# Patient Record
Sex: FEMALE | Race: WHITE | NOT HISPANIC OR LATINO | Employment: FULL TIME | URBAN - METROPOLITAN AREA
[De-identification: names, ages, dates, MRNs, and addresses within clinical notes are randomized per-mention and may not be internally consistent; named-entity substitution may affect disease eponyms.]

---

## 2017-01-11 DIAGNOSIS — E07.9 THYROID DISORDER: Primary | ICD-10-CM

## 2017-01-11 RX ORDER — LEVOTHYROXINE SODIUM 112 UG/1
112 TABLET ORAL DAILY
Qty: 90 TABLET | Refills: 3 | Status: SHIPPED | OUTPATIENT
Start: 2017-01-11 | End: 2017-11-15

## 2017-01-20 DIAGNOSIS — Z94.0 KIDNEY TRANSPLANTED: Primary | ICD-10-CM

## 2017-01-20 RX ORDER — MYCOPHENOLIC ACID 180 MG/1
540 TABLET, DELAYED RELEASE ORAL 2 TIMES DAILY
Qty: 540 TABLET | Refills: 3 | Status: SHIPPED
Start: 2017-01-20 | End: 2017-12-21

## 2017-04-18 ENCOUNTER — TELEPHONE (OUTPATIENT)
Dept: NEPHROLOGY | Facility: CLINIC | Age: 57
End: 2017-04-18

## 2017-04-18 DIAGNOSIS — I10 HYPERTENSION: ICD-10-CM

## 2017-04-18 RX ORDER — METOPROLOL TARTRATE 50 MG
50 TABLET ORAL 2 TIMES DAILY
Qty: 180 TABLET | Refills: 3 | Status: SHIPPED | OUTPATIENT
Start: 2017-04-18 | End: 2018-04-09

## 2017-04-18 NOTE — TELEPHONE ENCOUNTER
Medication refill per Clinic 2A protocol.  Metoprolol  Associated DX: HTN  Last OV: 8/22/16  Next appointment: 8/14/17  Jeri Ha LPN  Nephrology  [facility]  [phone]

## 2017-05-23 ENCOUNTER — DOCUMENTATION ONLY (OUTPATIENT)
Dept: LAB | Facility: CLINIC | Age: 57
End: 2017-05-23

## 2017-05-23 NOTE — PROGRESS NOTES
Standing lab orders, ordered on 2016, have .  Patient has an appointment on 2017 at [place] for these labs.  Please review pended standing orders, complete required items, and sign if approved.  Please advise patient of any changes to care plan.  Thank you.

## 2017-05-24 ENCOUNTER — TELEPHONE (OUTPATIENT)
Dept: TRANSPLANT | Facility: CLINIC | Age: 57
End: 2017-05-24

## 2017-05-24 DIAGNOSIS — Z79.899 ENCOUNTER FOR LONG-TERM CURRENT USE OF MEDICATION: ICD-10-CM

## 2017-05-24 DIAGNOSIS — Z94.0 KIDNEY REPLACED BY TRANSPLANT: ICD-10-CM

## 2017-05-24 DIAGNOSIS — Z48.298 AFTERCARE FOLLOWING ORGAN TRANSPLANT: ICD-10-CM

## 2017-05-24 DIAGNOSIS — Z94.0 KIDNEY REPLACED BY TRANSPLANT: Primary | ICD-10-CM

## 2017-05-24 LAB
ANION GAP SERPL CALCULATED.3IONS-SCNC: 5 MMOL/L (ref 3–14)
BUN SERPL-MCNC: 14 MG/DL (ref 7–30)
CALCIUM SERPL-MCNC: 9.4 MG/DL (ref 8.5–10.1)
CHLORIDE SERPL-SCNC: 105 MMOL/L (ref 94–109)
CO2 SERPL-SCNC: 27 MMOL/L (ref 20–32)
CREAT SERPL-MCNC: 0.76 MG/DL (ref 0.52–1.04)
ERYTHROCYTE [DISTWIDTH] IN BLOOD BY AUTOMATED COUNT: 12.9 % (ref 10–15)
GFR SERPL CREATININE-BSD FRML MDRD: 78 ML/MIN/1.7M2
GLUCOSE SERPL-MCNC: 100 MG/DL (ref 70–99)
HCT VFR BLD AUTO: 37.7 % (ref 35–47)
HGB BLD-MCNC: 12.4 G/DL (ref 11.7–15.7)
MCH RBC QN AUTO: 30.1 PG (ref 26.5–33)
MCHC RBC AUTO-ENTMCNC: 32.9 G/DL (ref 31.5–36.5)
MCV RBC AUTO: 92 FL (ref 78–100)
PLATELET # BLD AUTO: 157 10E9/L (ref 150–450)
POTASSIUM SERPL-SCNC: 4.2 MMOL/L (ref 3.4–5.3)
RBC # BLD AUTO: 4.12 10E12/L (ref 3.8–5.2)
SODIUM SERPL-SCNC: 137 MMOL/L (ref 133–144)
TACROLIMUS BLD-MCNC: 4.2 UG/L (ref 5–15)
TME LAST DOSE: ABNORMAL H
WBC # BLD AUTO: 4.6 10E9/L (ref 4–11)

## 2017-05-24 PROCEDURE — 36415 COLL VENOUS BLD VENIPUNCTURE: CPT | Performed by: INTERNAL MEDICINE

## 2017-05-24 PROCEDURE — 80197 ASSAY OF TACROLIMUS: CPT | Performed by: INTERNAL MEDICINE

## 2017-05-24 PROCEDURE — 80048 BASIC METABOLIC PNL TOTAL CA: CPT | Performed by: INTERNAL MEDICINE

## 2017-05-24 PROCEDURE — 85027 COMPLETE CBC AUTOMATED: CPT | Performed by: INTERNAL MEDICINE

## 2017-08-08 ENCOUNTER — ALLIED HEALTH/NURSE VISIT (OUTPATIENT)
Dept: RESEARCH | Facility: CLINIC | Age: 57
End: 2017-08-08

## 2017-08-08 DIAGNOSIS — Z00.6 EXAMINATION OF PARTICIPANT IN CLINICAL TRIAL: Primary | ICD-10-CM

## 2017-08-08 NOTE — MR AVS SNAPSHOT
After Visit Summary   8/8/2017    Bessy Spangler    MRN: [id number]           Patient Information     Date Of Birth          1960        Visit Information        Provider Department      8/8/2017 12:39 PM Specialty, Provider [facility] [place],  Clinical Research        Today's Diagnoses     Examination of participant in clinical trial    -  1       Follow-ups after your visit        Your next 10 appointments already scheduled     Aug 14, 2017  2:45 PM CDT   (Arrive by 2:15 PM)   Return Kidney Transplant with Chris Marquez MD   [facility] ([facility])    [address] [place] [address] [phone]              Who to contact     If you have questions or need follow up information about today's clinic visit or your schedule please contact [facility] directly at [phone].  Normal or non-critical lab and imaging results will be communicated to you by MyChart, letter or phone within 4 business days after the clinic has received the results. If you do not hear from us within 7 days, please contact the clinic through Heyday[employer] or phone. If you have a critical or abnormal lab result, we will notify you by phone as soon as possible.  Submit refill requests through [employer] or call your pharmacy and they will forward the refill request to us. Please allow 3 business days for your refill to be completed.          Additional Information About Your Visit        MyChart Information     [employer] gives you secure access to your electronic health record. If you see a primary care provider, you can also send messages to your care team and make appointments. If you have questions, please call your primary care clinic.  If you do not have a primary care provider, please call [phone] and they will assist you.        Care EveryWhere ID     This is your Care EveryWhere ID. This could be used by other organizations to  access your [facility] medical records  [id number]        Your Vitals Were     Last Period                   01/25/2003            Blood Pressure from Last 3 Encounters:   11/14/16 124/75   08/22/16 126/74   11/02/15 119/67    Weight from Last 3 Encounters:   11/14/16 98 kg (216 lb)   08/22/16 96.3 kg (212 lb 3.2 oz)   11/02/15 95 kg (209 lb 8 oz)              Today, you had the following     No orders found for display       Primary Care Provider Office Phone # Fax #    Mike Tan [phone]       [address] [address]        Equal Access to Services     KARL DOAN : Wilber White, walive love, qateresota kaalmada ellen, sandra hawley. So [facility] [phone].    ATENCIÓN: Si habla español, tiene a garcia disposición servicios gratuitos de asistencia lingüística. Llame al [phone].    We comply with applicable federal civil rights laws and [place] laws. We do not discriminate on the basis of race, color, national origin, age, disability sex, sexual orientation or gender identity.            Thank you!     Thank you for choosing [facility]  for your care. Our goal is always to provide you with excellent care. Hearing back from our patients is one way we can continue to improve our services. Please take a few minutes to complete the written survey that you may receive in the mail after your visit with us. Thank you!             Your Updated Medication List - Protect others around you: Learn how to safely use, store and throw away your medicines at www.disposemymeds.org.          This list is accurate as of: 8/8/17 12:40 PM.  Always use your most recent med list.                   Brand Name Dispense Instructions for use Diagnosis    levothyroxine 112 MCG tablet    SYNTHROID/LEVOTHROID    90 tablet    Take 1 tablet (112 mcg) by mouth daily    Thyroid disorder       metoprolol 50 MG  tablet    LOPRESSOR    180 tablet    Take 1 tablet (50 mg) by mouth 2 times daily    Hypertension       * mycophenolic acid EC tablet     180 tablet    Take 3 tablets (540 mg) by mouth 2 times daily    Kidney replaced by transplant       * mycophenolic acid EC tablet     540 tablet    Take 3 tablets (540 mg) by mouth 2 times daily    Kidney transplanted       * tacrolimus 1 MG capsule    PROGRAF - GENERIC EQUIVALENT    540 capsule    Take 3 capsules (3 mg) by mouth 2 times daily    Kidney transplanted       * tacrolimus 1 MG capsule    PROGRAF - GENERIC EQUIVALENT    540 capsule    Take 3 capsules (3 mg) by mouth 2 times daily    Kidney transplanted       VITAMIN E PO           * Notice:  This list has 4 medication(s) that are the same as other medications prescribed for you. Read the directions carefully, and ask your doctor or other care provider to review them with you.

## 2017-08-10 ENCOUNTER — TELEPHONE (OUTPATIENT)
Dept: NEPHROLOGY | Facility: CLINIC | Age: 57
End: 2017-08-10

## 2017-08-11 NOTE — TELEPHONE ENCOUNTER
Patient called back. States she's been doing well since last appointment. Denied symptoms, infections, or hospitalizations. Will try to have her labs drawn tomorrow morning.    Alana White RN

## 2017-08-14 ENCOUNTER — OFFICE VISIT (OUTPATIENT)
Dept: NEPHROLOGY | Facility: CLINIC | Age: 57
End: 2017-08-14
Attending: INTERNAL MEDICINE
Payer: COMMERCIAL

## 2017-08-14 ENCOUNTER — ALLIED HEALTH/NURSE VISIT (OUTPATIENT)
Dept: RESEARCH | Facility: CLINIC | Age: 57
End: 2017-08-14

## 2017-08-14 VITALS
SYSTOLIC BLOOD PRESSURE: 125 MMHG | WEIGHT: 215.6 LBS | BODY MASS INDEX: 34.65 KG/M2 | OXYGEN SATURATION: 95 % | HEART RATE: 64 BPM | DIASTOLIC BLOOD PRESSURE: 67 MMHG | HEIGHT: 66 IN

## 2017-08-14 DIAGNOSIS — Z00.6 EXAMINATION OF PARTICIPANT IN CLINICAL TRIAL: Primary | ICD-10-CM

## 2017-08-14 DIAGNOSIS — Z94.0 KIDNEY REPLACED BY TRANSPLANT: Primary | ICD-10-CM

## 2017-08-14 DIAGNOSIS — I15.1 HYPERTENSION SECONDARY TO OTHER RENAL DISORDERS: ICD-10-CM

## 2017-08-14 DIAGNOSIS — D84.9 IMMUNOSUPPRESSED STATUS (H): ICD-10-CM

## 2017-08-14 DIAGNOSIS — Z48.298 AFTERCARE FOLLOWING ORGAN TRANSPLANT: ICD-10-CM

## 2017-08-14 PROCEDURE — 99212 OFFICE O/P EST SF 10 MIN: CPT | Mod: ZF

## 2017-08-14 ASSESSMENT — PAIN SCALES - GENERAL: PAINLEVEL: NO PAIN (0)

## 2017-08-14 NOTE — PROGRESS NOTES
Surgery Clinical Trials Office Informed Consent Process Documentation  Study Name: Female Urinary Microbiome, Chronic Graft Dysfunction and Kidney Transplantation (IRB # [id number])  ICF Version Date / IRB Approval Date:  Version dt 3/30/17; IRB Approval dt: 4/7/17   Date of Approach/Consent: 8/14/2017     The subject was screened and meets all of the inclusion criteria and none of the exclusion criteria is met.    The subject was told:  -that the study involves research  -the purpose of the research study  -the expected duration of the study and the approximate number of subject sought  -of procedures that are identified as experimental  -of reasonably foreseeable risks or discomforts to the subject  -of any benefits to the subject or others that may be expected from the research  -of alternative procedures and/or treatment  -how the confidentiality of records would be maintained  -whether or not compensation and medical treatments are available should injury occur as a result of the study  -who to contact if they have questions related to the research study or questions regarding research subjects' rights  -that participation is completely voluntary and that their decision to or not to participate will have no impact on their relationships with the [facility] and the staff    No study procedures were completed prior to the consent being obtained.  The use of historical information (lab or assessments) used for the purpose of the study was approved by subject.  The subject was fully aware that we would be reviewing their medical record for the study.  The subject demonstrated an understanding of what the study involved.  Specifically, how this study differed from standard of care at our center and what was required of the subject as part of the study.  The subject reviewed the consent form and was given the opportunity to ask questions before signing.  Questions and concerns were answered by the study staff and/or study  physician.  A copy of the signed informed consent document was provided to the subject.  The consent require the use of a :   [] Yes [x]  No     A 'short form' consent was used:     [] Yes [x] No         If yes, provide name of witness: N/A  The subject required a legally-authorized representative (LAR) to sign on their behalf:                                                    [] Yes  [x] No   If yes, please record name of LAR: N/A    Questions to Evaluate Subject Comprehension of Study  Adult or Legally Authorized Representative (LAR) for a Dependent:  Question: Adequate Response? If No, explain what actions were taken   What is the purpose of this study? [x] Yes  [] No    What will you be asked to do to participate in this study?  [x] Yes  [] No    How many study visits will there be? [x] Yes  [] No

## 2017-08-14 NOTE — PROGRESS NOTES
"Assessment and Plan:  1. LDKT - baseline Cr ~ 0.8-1.0, which has remained stable.  Normal proteinuria.  Will make no changes in immunosuppression.  2. HTN - well controlled at target of less than 140/90.  No changes.  3. Obesity - stable weight.  Encouraged patient to increase exercise and watch caloric intake.  4. Skin cancer risk - no new skin lesions.  Recommend regular follow up with Dermatology.  5. 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  6. Recommend return visit in 12 months.    Assessment and plan was discussed with patient and she voiced her understanding and agreement.    Reason for Visit:  Ms. Spangler is here for routine follow up.    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         Transplant Hx:       Tx: LDKT  Date: 7/30/09       Present Maintenance IS: Tacrolimus and Mycophenolic acid       Baseline Creatinine: 0.8-1.0    Ms. Spangler reports feeling good overall with minimal medical complaints.  She has had cold or allergy symptoms for the last week.  Symptoms include watery eyes, runny nose, sinus congestion and rare dry cough.  Generally a bit better the last couple of days.  Her energy level has been okay, but not quite normal and she could \"always use more.\"  She is active, although really gets minimal exercise.  Denies any chest pain, but a little shortness of breath.  Appetite is good and her weight is stable.  No nausea, vomiting or diarrhea.  No fever, sweats or chills.  Slight leg swelling if she sits all day.    Home BP: 120/60s.      ROS:   A comprehensive review of systems was obtained and negative, except as noted in the HPI or PMH.    Active Medical Problems:  Patient Active Problem List   Diagnosis     Breast cancer (H)     Kidney replaced by transplant     CARDIOVASCULAR SCREENING; LDL GOAL LESS THAN 100     Immunosuppressed status (H)     Hypertension secondary to other renal disorders     " "Thyroid disorder     Post-menopausal     Conjunctival hemorrhage     Tear film insufficiency     Presbyopia - Both Eyes     Aftercare following organ transplant     Personal Hx:  Social History     Social History     Marital status: Single     Spouse name: N/A     Number of children: N/A     Years of education: N/A     Occupational History     Not on file.     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 Jeremías 2014     Allergies:  Allergies   Allergen Reactions     Dilaudid [Hydromorphone Hcl]      Patient unable to recall     Sulfa Drugs Swelling     Medications:  Prior to Admission medications    Medication Sig Start Date End Date Taking? Authorizing Provider   METOPROLOL TARTRATE 2 tablets 2 times daily.   Yes Reported, Patient   tacrolimus (PROGRAF) 1 MG capsule Take  by mouth 2 times daily. 3 caps in the morning and 2 caps at night   Yes Reported, Patient   mycophenolate (CELLCEPT-GENERIC EQUIVALENT) 250 MG capsule Take 4 capsules by mouth every 12 hours. 4/16/12   Chris Marquez MD   LEVOTHYROXINE SODIUM 125 MCG OR TABS 1 TABLET DAILY    Reported, Patient     Vitals:  /67  Pulse 64  Ht 1.664 m (5' 5.5\")  Wt 97.8 kg (215 lb 9.6 oz)  LMP 01/25/2003  SpO2 95%  BMI 35.33 kg/m2    Exam:   GENERAL APPEARANCE: alert and no distress  HENT: mouth without ulcers or lesions  LYMPHATICS: no cervical or supraclavicular nodes  RESP: lungs clear to auscultation - no rales, rhonchi or wheezes  CV: regular rhythm, normal " rate, no rub, no murmur  EDEMA: no LE edema bilaterally  ABDOMEN: soft, nontender, and bowel sounds normal, overweight  MS: extremities normal - no gross deformities noted, no evidence of inflammation in joints, no muscle tenderness; left UE AV fistula with aneurysm  SKIN: no rash  TX KIDNEY: normal    Results:   Recent Results (from the past 2352 hour(s))   CBC with platelets    Collection Time: 05/24/17  8:05 AM   Result Value Ref Range    WBC 4.6 4.0 - 11.0 10e9/L    RBC Count 4.12 3.8 - 5.2 10e12/L    Hemoglobin 12.4 11.7 - 15.7 g/dL    Hematocrit 37.7 35.0 - 47.0 %    MCV 92 78 - 100 fl    MCH 30.1 26.5 - 33.0 pg    MCHC 32.9 31.5 - 36.5 g/dL    RDW 12.9 10.0 - 15.0 %    Platelet Count 157 150 - 450 10e9/L   Basic metabolic panel    Collection Time: 05/24/17  8:05 AM   Result Value Ref Range    Sodium 137 133 - 144 mmol/L    Potassium 4.2 3.4 - 5.3 mmol/L    Chloride 105 94 - 109 mmol/L    Carbon Dioxide 27 20 - 32 mmol/L    Anion Gap 5 3 - 14 mmol/L    Glucose 100 (H) 70 - 99 mg/dL    Urea Nitrogen 14 7 - 30 mg/dL    Creatinine 0.76 0.52 - 1.04 mg/dL    GFR Estimate 78 >60 mL/min/1.7m2    GFR Estimate If Black >90   GFR Calc   >60 mL/min/1.7m2    Calcium 9.4 8.5 - 10.1 mg/dL   Tacrolimus level    Collection Time: 05/24/17  8:05 AM   Result Value Ref Range    Tacrolimus Last Dose 2000 on 5.23.17     Tacrolimus Level 4.2 (L) 5.0 - 15.0 ug/L

## 2017-08-14 NOTE — NURSING NOTE
"Chief Complaint   Patient presents with     RECHECK     Annual follow up for post kidney tx        Initial /67  Pulse 64  Ht 1.664 m (5' 5.5\")  Wt 97.8 kg (215 lb 9.6 oz)  LMP 01/25/2003  SpO2 95%  BMI 35.33 kg/m2 Estimated body mass index is 35.33 kg/(m^2) as calculated from the following:    Height as of this encounter: 1.664 m (5' 5.5\").    Weight as of this encounter: 97.8 kg (215 lb 9.6 oz).  Medication Reconciliation: complete   Dana Nunez CMA    "

## 2017-08-14 NOTE — MR AVS SNAPSHOT
After Visit Summary   8/14/2017    Bessy Spangler    MRN: [id number]           Patient Information     Date Of Birth          1960        Visit Information        Provider Department      8/14/2017 2:45 PM Chris Marquez MD [facility] Nephrology        Today's Diagnoses     Kidney replaced by transplant    -  1    Immunosuppressed status (H)        Aftercare following organ transplant        Hypertension secondary to other renal disorders           Follow-ups after your visit        Follow-up notes from your care team     Return in about 1 year (around 8/14/2018).      Who to contact     If you have questions or need follow up information about today's clinic visit or your schedule please contact [facility] directly at [phone].  Normal or non-critical lab and imaging results will be communicated to you by Pure Softwarehart, letter or phone within 4 business days after the clinic has received the results. If you do not hear from us within 7 days, please contact the clinic through [employer] or phone. If you have a critical or abnormal lab result, we will notify you by phone as soon as possible.  Submit refill requests through [employer] or call your pharmacy and they will forward the refill request to us. Please allow 3 business days for your refill to be completed.          Additional Information About Your Visit        MyChart Information     [employer] gives you secure access to your electronic health record. If you see a primary care provider, you can also send messages to your care team and make appointments. If you have questions, please call your primary care clinic.  If you do not have a primary care provider, please call [phone] and they will assist you.        Care EveryWhere ID     This is your Care EveryWhere ID. This could be used by other organizations to access your [place] medical records  [id number]        Your Vitals Were     Pulse Height Last Period Pulse Oximetry BMI  "(Body Mass Index)       64 1.664 m (5' 5.5\") 01/25/2003 95% 35.33 kg/m2        Blood Pressure from Last 3 Encounters:   08/14/17 125/67   11/14/16 124/75   08/22/16 126/74    Weight from Last 3 Encounters:   08/14/17 97.8 kg (215 lb 9.6 oz)   11/14/16 98 kg (216 lb)   08/22/16 96.3 kg (212 lb 3.2 oz)              Today, you had the following     No orders found for display       Primary Care Provider Office Phone # Fax #    Mike Tan [phone] [address] [facility] [address]        Equal Access to Services     [facility] Center[facility]HA : Wilber abdulo Soarturo, waaxda luqadaha, qaybta kaalmada adeegyada, sandra carnes . So [facility] [phone].    ATENCIÓN: Si habla español, tiene a garcia disposición servicios gratuitos de asistencia lingüística. Ll[facility] [phone].    We comply with applicable federal civil rights laws and [place] laws. We do not discriminate on the basis of race, color, national origin, age, disability sex, sexual orientation or gender identity.            Thank you!     Thank you for choosing [facility]  for your care. Our goal is always to provide you with excellent care. Hearing back from our patients is one way we can continue to improve our services. Please take a few minutes to complete the written survey that you may receive in the mail after your visit with us. Thank you!             Your Updated Medication List - Protect others around you: Learn how to safely use, store and throw away your medicines at www.disposemymeds.org.          This list is accurate as of: 8/14/17  2:54 PM.  Always use your most recent med list.                   Brand Name Dispense Instructions for use Diagnosis    levothyroxine 112 MCG tablet    SYNTHROID/LEVOTHROID    90 tablet    Take 1 tablet (112 mcg) by mouth daily    Thyroid disorder       metoprolol 50 MG tablet    LOPRESSOR    180 tablet    Take 1 tablet (50 mg) by mouth 2 times daily "    Hypertension       mycophenolic acid EC tablet     540 tablet    Take 3 tablets (540 mg) by mouth 2 times daily    Kidney transplanted       tacrolimus 1 MG capsule    GENERIC EQUIVALENT    540 capsule    Take 3 capsules (3 mg) by mouth 2 times daily    Kidney transplanted       VITAMIN E PO

## 2017-08-14 NOTE — LETTER
"8/14/2017      RE: Bessy Spangler  [address] [place] [address]       Assessment and Plan:  1. LDKT - baseline Cr ~ 0.8-1.0, which has remained stable.  Normal proteinuria.  Will make no changes in immunosuppression.  2. HTN - well controlled at target of less than 140/90.  No changes.  3. Obesity - stable weight.  Encouraged patient to increase exercise and watch caloric intake.  4. Skin cancer risk - no new skin lesions.  Recommend regular follow up with Dermatology.  5. 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  6. Recommend return visit in 12 months.    Assessment and plan was discussed with patient and she voiced her understanding and agreement.    Reason for Visit:  Ms. Spangler is here for routine follow up.    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         Transplant Hx:       Tx: LDKT  Date: 7/30/09       Present Maintenance IS: Tacrolimus and Mycophenolic acid       Baseline Creatinine: 0.8-1.0    Ms. Spangler reports feeling good overall with minimal medical complaints.  She has had cold or allergy symptoms for the last week.  Symptoms include watery eyes, runny nose, sinus congestion and rare dry cough.  Generally a bit better the last couple of days.  Her energy level has been okay, but not quite normal and she could \"always use more.\"  She is active, although really gets minimal exercise.  Denies any chest pain, but a little shortness of breath.  Appetite is good and her weight is stable.  No nausea, vomiting or diarrhea.  No fever, sweats or chills.  Slight leg swelling if she sits all day.    Home BP: 120/60s.      ROS:   A comprehensive review of systems was obtained and negative, except as noted in the HPI or PMH.    Active Medical Problems:  Patient Active Problem List   Diagnosis     Breast cancer (H)     Kidney replaced by transplant     CARDIOVASCULAR SCREENING; LDL GOAL LESS THAN 100 " "    Immunosuppressed status (H)     Hypertension secondary to other renal disorders     Thyroid disorder     Post-menopausal     Conjunctival hemorrhage     Tear film insufficiency     Presbyopia - Both Eyes     Aftercare following organ transplant     Personal Hx:  Social History     Social History     Marital status: Single     Spouse name: N/A     Number of children: N/A     Years of education: N/A     Occupational History     Not on file.     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 Jeremías 2014     Allergies:  Allergies   Allergen Reactions     Dilaudid [Hydromorphone Hcl]      Patient unable to recall     Sulfa Drugs Swelling     Medications:  Prior to Admission medications    Medication Sig Start Date End Date Taking? Authorizing Provider   METOPROLOL TARTRATE 2 tablets 2 times daily.   Yes Reported, Patient   tacrolimus (PROGRAF) 1 MG capsule Take  by mouth 2 times daily. 3 caps in the morning and 2 caps at night   Yes Reported, Patient   mycophenolate (CELLCEPT-GENERIC EQUIVALENT) 250 MG capsule Take 4 capsules by mouth every 12 hours. 4/16/12   Chris Marquez MD   LEVOTHYROXINE SODIUM 125 MCG OR TABS 1 TABLET DAILY    Reported, Patient     Vitals:  /67  Pulse 64  Ht 1.664 m (5' 5.5\")  Wt 97.8 kg (215 lb 9.6 oz)  LMP 01/25/2003  SpO2 95%  BMI 35.33 kg/m2    Exam:   GENERAL APPEARANCE: alert and no distress  HENT: mouth without ulcers or lesions  LYMPHATICS: no cervical or supraclavicular nodes  RESP: " lungs clear to auscultation - no rales, rhonchi or wheezes  CV: regular rhythm, normal rate, no rub, no murmur  EDEMA: no LE edema bilaterally  ABDOMEN: soft, nontender, and bowel sounds normal, overweight  MS: extremities normal - no gross deformities noted, no evidence of inflammation in joints, no muscle tenderness; left UE AV fistula with aneurysm  SKIN: no rash  TX KIDNEY: normal    Results:   Recent Results (from the past 2352 hour(s))   CBC with platelets    Collection Time: 05/24/17  8:05 AM   Result Value Ref Range    WBC 4.6 4.0 - 11.0 10e9/L    RBC Count 4.12 3.8 - 5.2 10e12/L    Hemoglobin 12.4 11.7 - 15.7 g/dL    Hematocrit 37.7 35.0 - 47.0 %    MCV 92 78 - 100 fl    MCH 30.1 26.5 - 33.0 pg    MCHC 32.9 31.5 - 36.5 g/dL    RDW 12.9 10.0 - 15.0 %    Platelet Count 157 150 - 450 10e9/L   Basic metabolic panel    Collection Time: 05/24/17  8:05 AM   Result Value Ref Range    Sodium 137 133 - 144 mmol/L    Potassium 4.2 3.4 - 5.3 mmol/L    Chloride 105 94 - 109 mmol/L    Carbon Dioxide 27 20 - 32 mmol/L    Anion Gap 5 3 - 14 mmol/L    Glucose 100 (H) 70 - 99 mg/dL    Urea Nitrogen 14 7 - 30 mg/dL    Creatinine 0.76 0.52 - 1.04 mg/dL    GFR Estimate 78 >60 mL/min/1.7m2    GFR Estimate If Black >90   GFR Calc   >60 mL/min/1.7m2    Calcium 9.4 8.5 - 10.1 mg/dL   Tacrolimus level    Collection Time: 05/24/17  8:05 AM   Result Value Ref Range    Tacrolimus Last Dose 2000 on 5.23.17     Tacrolimus Level 4.2 (L) 5.0 - 15.0 ug/L       Chris Marquez MD

## 2017-08-14 NOTE — MR AVS SNAPSHOT
After Visit Summary   8/14/2017    Bessy Spangler    MRN: [id number]           Patient Information     Date Of Birth          1960        Visit Information        Provider Department      8/14/2017 4:22 PM Specialty, Provider [facility],  Clinical Research        Today's Diagnoses     Examination of participant in clinical trial    -  1       Follow-ups after your visit        Who to contact     If you have questions or need follow up information about today's clinic visit or your schedule please contact [facility],  CLINICAL RESEARCH directly at [phone].  Normal or non-critical lab and imaging results will be communicated to you by [employer], letter or phone within 4 business days after the clinic has received the results. If you do not hear from us within 7 days, please contact the clinic through [employer] or phone. If you have a critical or abnormal lab result, we will notify you by phone as soon as possible.  Submit refill requests through [employer] or call your pharmacy and they will forward the refill request to us. Please allow 3 business days for your refill to be completed.          Additional Information About Your Visit        MyChart Information     [employer] gives you secure access to your electronic health record. If you see a primary care provider, you can also send messages to your care team and make appointments. If you have questions, please call your primary care clinic.  If you do not have a primary care provider, please call [phone] and they will assist you.        Care EveryWhere ID     This is your Care EveryWhere ID. This could be used by other organizations to access your [facility] medical records  [id number]        Your Vitals Were     Last Period                   01/25/2003            Blood Pressure from Last 3 Encounters:   08/14/17 125/67   11/14/16 124/75   08/22/16 126/74    Weight from Last 3 Encounters:   08/14/17 97.8 kg (215 lb 9.6 oz)   11/14/16 98  kg (216 lb)   08/22/16 96.3 kg (212 lb 3.2 oz)              Today, you had the following     No orders found for display       Primary Care Provider Office Phone # Fax #    Mike Tan [phone] [address] [facility] [address]        Equal Access to Services     [facility] : Hadii aad ku hadasho Soomaali, waaxda luqadaha, qaybta kaalmada adeegyada, waxay alexisin hayaan adeeg khkimberlymarta lalibby . So [facility] [phone].    ATENCIÓN: Si habla español, tiene a garcia disposición servicios gratuitos de asistencia lingüística. Edyame al [phone].    We comply with applicable federal civil rights laws and [place] laws. We do not discriminate on the basis of race, color, national origin, age, disability sex, sexual orientation or gender identity.            Thank you!     Thank you for choosing [facility],  [facility]  for your care. Our goal is always to provide you with excellent care. Hearing back from our patients is one way we can continue to improve our services. Please take a few minutes to complete the written survey that you may receive in the mail after your visit with us. Thank you!             Your Updated Medication List - Protect others around you: Learn how to safely use, store and throw away your medicines at www.disposemymeds.org.          This list is accurate as of: 8/14/17  4:24 PM.  Always use your most recent med list.                   Brand Name Dispense Instructions for use Diagnosis    levothyroxine 112 MCG tablet    SYNTHROID/LEVOTHROID    90 tablet    Take 1 tablet (112 mcg) by mouth daily    Thyroid disorder       metoprolol 50 MG tablet    LOPRESSOR    180 tablet    Take 1 tablet (50 mg) by mouth 2 times daily    Hypertension       mycophenolic acid EC tablet     540 tablet    Take 3 tablets (540 mg) by mouth 2 times daily    Kidney transplanted       tacrolimus 1 MG capsule    GENERIC EQUIVALENT    540 capsule    Take 3 capsules (3 mg) by mouth 2  times daily    Kidney transplanted       VITAMIN E PO

## 2017-09-08 DIAGNOSIS — Z94.0 KIDNEY REPLACED BY TRANSPLANT: ICD-10-CM

## 2017-09-08 DIAGNOSIS — Z48.298 AFTERCARE FOLLOWING ORGAN TRANSPLANT: ICD-10-CM

## 2017-09-08 DIAGNOSIS — Z79.899 ENCOUNTER FOR LONG-TERM CURRENT USE OF MEDICATION: ICD-10-CM

## 2017-09-08 LAB
ANION GAP SERPL CALCULATED.3IONS-SCNC: 10 MMOL/L (ref 3–14)
BUN SERPL-MCNC: 16 MG/DL (ref 7–30)
CALCIUM SERPL-MCNC: 9.4 MG/DL (ref 8.5–10.1)
CHLORIDE SERPL-SCNC: 107 MMOL/L (ref 94–109)
CO2 SERPL-SCNC: 23 MMOL/L (ref 20–32)
CREAT SERPL-MCNC: 0.79 MG/DL (ref 0.52–1.04)
CREAT UR-MCNC: 286 MG/DL
ERYTHROCYTE [DISTWIDTH] IN BLOOD BY AUTOMATED COUNT: 12.9 % (ref 10–15)
GFR SERPL CREATININE-BSD FRML MDRD: 74 ML/MIN/1.7M2
GLUCOSE SERPL-MCNC: 97 MG/DL (ref 70–99)
HCT VFR BLD AUTO: 37.7 % (ref 35–47)
HGB BLD-MCNC: 12.4 G/DL (ref 11.7–15.7)
MCH RBC QN AUTO: 30.2 PG (ref 26.5–33)
MCHC RBC AUTO-ENTMCNC: 32.9 G/DL (ref 31.5–36.5)
MCV RBC AUTO: 92 FL (ref 78–100)
PLATELET # BLD AUTO: 131 10E9/L (ref 150–450)
POTASSIUM SERPL-SCNC: 4 MMOL/L (ref 3.4–5.3)
PROT UR-MCNC: 0.18 G/L
PROT/CREAT 24H UR: 0.06 G/G CR (ref 0–0.2)
RBC # BLD AUTO: 4.11 10E12/L (ref 3.8–5.2)
SODIUM SERPL-SCNC: 140 MMOL/L (ref 133–144)
TACROLIMUS BLD-MCNC: 5.5 UG/L (ref 5–15)
TME LAST DOSE: NORMAL H
WBC # BLD AUTO: 4.7 10E9/L (ref 4–11)

## 2017-09-08 PROCEDURE — 80048 BASIC METABOLIC PNL TOTAL CA: CPT | Performed by: INTERNAL MEDICINE

## 2017-09-08 PROCEDURE — 85027 COMPLETE CBC AUTOMATED: CPT | Performed by: INTERNAL MEDICINE

## 2017-09-08 PROCEDURE — 84156 ASSAY OF PROTEIN URINE: CPT | Performed by: INTERNAL MEDICINE

## 2017-09-08 PROCEDURE — 36415 COLL VENOUS BLD VENIPUNCTURE: CPT | Performed by: INTERNAL MEDICINE

## 2017-09-08 PROCEDURE — 80197 ASSAY OF TACROLIMUS: CPT | Performed by: INTERNAL MEDICINE

## 2017-09-10 ENCOUNTER — HEALTH MAINTENANCE LETTER (OUTPATIENT)
Age: 57
End: 2017-09-10

## 2017-09-26 DIAGNOSIS — Z94.0 KIDNEY REPLACED BY TRANSPLANT: Primary | ICD-10-CM

## 2017-09-27 RX ORDER — TACROLIMUS 1 MG/1
3 CAPSULE ORAL 2 TIMES DAILY
Qty: 540 CAPSULE | Refills: 3 | Status: SHIPPED | OUTPATIENT
Start: 2017-09-27 | End: 2018-09-28

## 2017-11-15 ENCOUNTER — OFFICE VISIT (OUTPATIENT)
Dept: FAMILY MEDICINE | Facility: CLINIC | Age: 57
End: 2017-11-15

## 2017-11-15 VITALS
SYSTOLIC BLOOD PRESSURE: 132 MMHG | HEIGHT: 66 IN | DIASTOLIC BLOOD PRESSURE: 81 MMHG | WEIGHT: 216.6 LBS | TEMPERATURE: 98.9 F | BODY MASS INDEX: 34.81 KG/M2 | HEART RATE: 69 BPM | OXYGEN SATURATION: 95 % | RESPIRATION RATE: 16 BRPM

## 2017-11-15 DIAGNOSIS — Z00.00 ROUTINE GENERAL MEDICAL EXAMINATION AT A HEALTH CARE FACILITY: Primary | ICD-10-CM

## 2017-11-15 DIAGNOSIS — E03.9 ACQUIRED HYPOTHYROIDISM: ICD-10-CM

## 2017-11-15 DIAGNOSIS — Z23 NEED FOR PROPHYLACTIC VACCINATION AND INOCULATION AGAINST INFLUENZA: ICD-10-CM

## 2017-11-15 DIAGNOSIS — Z12.11 ENCOUNTER FOR SCREENING FOR MALIGNANT NEOPLASM OF COLON: ICD-10-CM

## 2017-11-15 DIAGNOSIS — Z12.4 SCREENING FOR MALIGNANT NEOPLASM OF CERVIX: ICD-10-CM

## 2017-11-15 DIAGNOSIS — Z85.3 HX: BREAST CANCER: ICD-10-CM

## 2017-11-15 DIAGNOSIS — Z12.31 VISIT FOR SCREENING MAMMOGRAM: ICD-10-CM

## 2017-11-15 DIAGNOSIS — E07.9 THYROID DISORDER: ICD-10-CM

## 2017-11-15 LAB — TSH SERPL DL<=0.005 MIU/L-ACNC: 2.71 MU/L (ref 0.4–4)

## 2017-11-15 RX ORDER — LEVOTHYROXINE SODIUM 112 UG/1
112 TABLET ORAL DAILY
Qty: 90 TABLET | Refills: 3 | Status: SHIPPED | OUTPATIENT
Start: 2017-11-15 | End: 2019-01-22

## 2017-11-15 ASSESSMENT — PAIN SCALES - GENERAL: PAINLEVEL: NO PAIN (0)

## 2017-11-15 NOTE — MR AVS SNAPSHOT
After Visit Summary   11/15/2017    Bessy Spangler    MRN: [id number]           Patient Information     Date Of Birth          1960        Visit Information        Provider Department      11/15/2017 3:30 PM Mike Tan MD M [facility]        Today's Diagnoses     Routine general medical examination at a health care facility    -  1    Visit for screening mammogram        Screening for malignant neoplasm of cervix        Need for prophylactic vaccination and inoculation against influenza        Encounter for screening for malignant neoplasm of colon        Acquired hypothyroidism        HX: breast cancer          Care Instructions    [facility]: [phone]     Primary Care Center Medication Refill Request Information:  * Please contact your pharmacy regarding ANY request for medication refills.  ** [facility] Prescription Fax = [phone]  * Please allow 3 business days for routine medication refills.  * Please allow 5 business days for controlled substance medication refills.     Primary Care Center Test Result notification information:  *You will be notified with in 7-10 days of your appointment day regarding the results of your test.  If you are on MyChart you will be notified as soon as the provider has reviewed the results and signed off on them.    Breast Center ([facility]) [phone] (2nd Floor [facility])   Breast Center ([facility]) [phone] ([address])     Colonoscopy at [facility] [phone]          Follow-ups after your visit        Additional Services     GI Procedure Referral       Last Lab Result: Creatinine (mg/dL)       Date                     Value                 09/08/2017               0.79             ----------  Body mass index is 34.96 kg/(m^2).     Needed:  No  Language:  English    Patient will be contacted to schedule procedure.     Please be aware that coverage of these  services is subject to the terms and limitations of your health insurance plan.  Call member services at your health plan with any benefit or coverage questions.  Any procedures must be performed at a [facility] facility OR coordinated by your clinic's referral office.    Please bring the following with you to your appointment:    (1) Any X-Rays, CTs or MRIs which have been performed.  Contact the facility where they were done to arrange for  prior to your scheduled appointment.    (2) List of current medications   (3) This referral request   (4) Any documents/labs given to you for this referral                  Follow-up notes from your care team     Write patient with results Return in about 1 year (around 11/15/2018).      Your next 10 appointments already scheduled     Nov 15, 2017  4:45 PM CST   LAB with  LAB    Health Lab ([facility])    [address] [place] [id number] [phone]           Please do not eat 10-12 hours before your appointment if you are coming in fasting for labs on lipids, cholesterol, or glucose (sugar). This does not apply to pregnant women. Water, hot tea and black coffee (with nothing added) are okay. Do not drink other fluids, diet soda or chew gum.              Future tests that were ordered for you today     Open Future Orders        Priority Expected Expires Ordered    TSH with free T4 reflex Routine 11/15/2017 11/15/2018 11/15/2017    MA SCREENING DIGITAL BILAT - Future  (s+30) Routine  11/15/2018 11/15/2017    GI Procedure Referral Routine 3/1/2018 11/15/2018 11/15/2017            Who to contact     Please call your clinic at [phone] to:    Ask questions about your health    Make or cancel appointments    Discuss your medicines    Learn about your test results    Speak to your doctor   If you have compliments or concerns about an experience at your clinic, or if you wish to file a complaint, please contact  "[facility] Patient Relations at [phone] or email us at [email or web address]         Additional Information About Your Visit        [employer] Information     [employer] gives you secure access to your electronic health record. If you see a primary care provider, you can also send messages to your care team and make appointments. If you have questions, please call your primary care clinic.  If you do not have a primary care provider, please call [phone] and they will assist you.      [employer] is an electronic gateway that provides easy, online access to your medical records. With [employer], you can request a clinic appointment, read your test results, renew a prescription or communicate with your care team.     To access your existing account, please contact your [facility] Clinic or call [phone] for assistance.        Care EveryWhere ID     This is your Care EveryWhere ID. This could be used by other organizations to access your [facility] medical records  [id number]        Your Vitals Were     Pulse Temperature Respirations Height Last Period Pulse Oximetry    69 98.9  F (37.2  C) (Oral) 16 1.676 m (5' 6\") 01/25/2003 95%    Breastfeeding? BMI (Body Mass Index)                No 34.96 kg/m2           Blood Pressure from Last 3 Encounters:   11/15/17 132/81   08/14/17 125/67   11/14/16 124/75    Weight from Last 3 Encounters:   11/15/17 98.2 kg (216 lb 9.6 oz)   08/14/17 97.8 kg (215 lb 9.6 oz)   11/14/16 98 kg (216 lb)              We Performed the Following     FLU VACCINE, 3 YRS +, IM  [16982]        Primary Care Provider Office Phone # Fax #    Mike Tan [phone]       [address] [facility] [address]        Equal Access to Services     KARL DOAN : Wilber White, walive love, qastefanie kaalmalgorzata hughes, sandra hawley. So [facility] [phone].    ATENCIÓN: Si habla " español, tiene a garcia disposición servicios gratuitos de asistencia lingüística. Roxanne trujillo [phone].    We comply with applicable federal civil rights laws and [place] laws. We do not discriminate on the basis of race, color, national origin, age, disability, sex, sexual orientation, or gender identity.            Thank you!     Thank you for choosing [facility]  for your care. Our goal is always to provide you with excellent care. Hearing back from our patients is one way we can continue to improve our services. Please take a few minutes to complete the written survey that you may receive in the mail after your visit with us. Thank you!             Your Updated Medication List - Protect others around you: Learn how to safely use, store and throw away your medicines at www.disposemymeds.org.          This list is accurate as of: 11/15/17  4:40 PM.  Always use your most recent med list.                   Brand Name Dispense Instructions for use Diagnosis    levothyroxine 112 MCG tablet    SYNTHROID/LEVOTHROID    90 tablet    Take 1 tablet (112 mcg) by mouth daily    Thyroid disorder       metoprolol 50 MG tablet    LOPRESSOR    180 tablet    Take 1 tablet (50 mg) by mouth 2 times daily    Hypertension       mycophenolic acid 180 MG EC tablet     540 tablet    Take 3 tablets (540 mg) by mouth 2 times daily    Kidney transplanted       * tacrolimus 1 MG capsule    GENERIC EQUIVALENT    540 capsule    Take 3 capsules (3 mg) by mouth 2 times daily    Kidney transplanted       * tacrolimus 1 MG capsule    GENERIC EQUIVALENT    540 capsule    Take 3 capsules (3 mg) by mouth 2 times daily    Kidney replaced by transplant       VITAMIN E PO           * Notice:  This list has 2 medication(s) that are the same as other medications prescribed for you. Read the directions carefully, and ask your doctor or other care provider to review them with you.

## 2017-11-15 NOTE — NURSING NOTE
"Injectable Influenza Immunization Documentation    1.  Has the patient received the information for the injectable influenza vaccine? YES     2. Is the patient 6 months of age or older? YES     3. Does the patient have any of the following contraindications?         Severe allergy to eggs?  No     Severe allergic reaction to previous influenza vaccines? No   Severe allergy to latex? No       History of Guillain-[place] syndrome? No     Currently have a temperature greater than 100.4F? No        4.  Severely egg allergic patients should have flu vaccine eligibility assessed by an MD, RN, or pharmacist, and those who received flu vaccine should be observed for 15 min by an MD, RN, Pharmacist, Medical Technician, or member of clinic staff.\": YES    5. Latex-allergic patients should be given latex-free influenza vaccine Yes. Please reference the Vaccine latex table to determine if your clinic s product is latex-containing.    Patient tolerated injection well.        Vaccination given by Megan Alamo LPN at 4:52 PM on 11/15/2017.        "

## 2017-11-15 NOTE — PROGRESS NOTES
Bessy Spangler is here  for general physical   She has a renal transplant, living donor, her youngest son.Bessy Spangler had nephritis as a child and required living related donor kidney transplant from her son in 2009.   She remains on immunosuppressive therapy.   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   Her weight is stable.  She is due for mammography.   She has previous history of breast cancer through [facility]. Left lumpectomy, radiation and chemotherapy. She does not recall the chemotherapy she received.     Her daughter had renal failure, IgA nephropathy as a child and is on dialysis.   Last DEXA showed improved bone density. She has vitamin D deficiency being management by her nephrologist.  She states her immunizations are up to date but needs flu vaccine.  She is not due for a PAP this year as last was in 2014 HPV neg therefore due 2019.  Colonoscopy in the past  Due in 3/ 2018.  She has a history of thyroid abnormality requiring radioactive ablation, now takes thyroid replacement , needs labs.    Shortly after her renal transplant she had a pulmonary emboli. She was treated with anticoagulation for a few months, then it was discontinued without recurrence.  She has urinary stress incontinence occasionally when she fails to urinate regularly and lets her bladder get too full but no major concern.      Patient Active Problem List   Diagnosis     Breast cancer (H)     Kidney replaced by transplant     CARDIOVASCULAR SCREENING; LDL GOAL LESS THAN 100     Immunosuppressed status (H)     Hypertension secondary to other renal disorders     Thyroid disorder     Post-menopausal     Conjunctival hemorrhage     Tear film insufficiency     Presbyopia - Both Eyes     Aftercare following organ transplant       Past Medical History:   Diagnosis Date     Anemia of chronic renal failure      Breast cancer (H)      Chronic diarrhea      Chronic kidney disease  "(CKD), stage V (H)     Due to \"nephritis\". Biopsy done years ago. I do not know the histologic specifics     High risk medication use      Hyperphosphatemia      Hypertension      Hypothyroid      Immunosuppressed status (H)      Kidney replaced by transplant     nephritis as a child     PE (pulmonary embolism)     after transplant     Secondary hyperparathyroidism (of renal origin)        Past Surgical History:   Procedure Laterality Date     APPENDECTOMY OPEN  1999     AV FISTULA OR GRAFT ARTERIAL      left     C TRANSPLANT,PREP LIVING  RENAL GRAFT  7/30/2009    Hx of nephritis     LUMPECTOMY BREAST      left       Current Outpatient Prescriptions   Medication     tacrolimus (GENERIC EQUIVALENT) 1 MG capsule     metoprolol (LOPRESSOR) 50 MG tablet     MYFORTIC 180 MG PO EC TABLET     levothyroxine (SYNTHROID/LEVOTHROID) 112 MCG tablet     tacrolimus (PROGRAF - GENERIC EQUIVALENT) 1 MG capsule     VITAMIN E PO     No current facility-administered medications for this visit.        Allergies   Allergen Reactions     Dilaudid [Hydromorphone Hcl]      Patient unable to recall     Sulfa Drugs Swelling     Social History     Social History     Marital status: Single     Spouse name: N/A     Number of children: N/A     Years of education: N/A     Occupational History     Not on file.     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  degree    Sal 1981,Betty 1983 ( On dialysis), Vincenzo Alvares 1988    Grandchildren Torri 11/2010, Krystina 10/2007 lives with her and Betty, son and family. Jeremías 2014    2 additional grandchildren will be born soon     Family History " "  Problem Relation Age of Onset     DIABETES Father      type 2       Arthritis Father      Glaucoma Father      CANCER Mother 58     smoker,metastatic adnoid cystic cancer,  early 60s     Arthritis Mother      Endocrine Disease Mother      thyroid     Hypertension Maternal Grandmother       at a young age     Hypertension Maternal Grandfather      CANCER Paternal Grandmother      stomach cancer     Endocrine Disease Sister      thyroid     Genitourinary Problems Daughter 32     renal failure      Endocrine Disease Sister      thyroid     Chronic Obstructive Pulmonary Disease Paternal Grandfather      smoking  young       Occasional joint pain, postmenopausal.  10 point ROS of systems including Constitutional, Eyes, Respiratory, Cardiovascular, Gastroenterology, Genitourinary, Integumentary, Muscularskeletal, Psychiatric were all negative except for pertinent positives noted in my HPI.    /81  Pulse 69  Temp 98.9  F (37.2  C) (Oral)  Resp 16  Ht 1.676 m (5' 6\")  Wt 98.2 kg (216 lb 9.6 oz)  LMP 2003  SpO2 95%  Breastfeeding? No  BMI 34.96 kg/m2  Constitutional: Oriented to person, place, and time. Vital signs are noted.  Appears well-developed and well-nourished. Non-toxic appearance.  No distress. Obese but weight stable.  HENT:   Ears TM clear  Head: Normocephalic and atraumatic.   Mouth/Throat: Oropharynx is clear and moist. No oropharyngeal exudate.   Eyes: Conjunctivae and EOM are normal. Pupils are equal, round, and reactive to light. No scleral icterus.   Neck: Normal range of motion. Neck supple.  No tracheal deviation present. No thyromegaly present.   Cardiovascular: Regular rhythm, normal heart sounds and intact distal pulses. Grade 1/6 flow murmur present. Exam reveals no gallop and no friction rub.   Pulmonary/Chest: Effort normal and breath sounds normal. No respiratory distress.   Abdominal: Soft.Obese Bowel sounds are normal. No distension and no mass. No " tenderness.   Musculoskeletal: Left arm fistula present. Normal range of motion. No edema and no tenderness.   Lymphadenopathy:   No cervical adenopathy.   Neurological: Alert and oriented to person, place, and time. Normal strength. No cranial nerve deficit or sensory deficit.  Skin:  Lesion over left lateral chest wall near breast slightly rough, raised.Scattered seborrheic keratosis back and legs, sun damaged skin. She has a lesion on right check slightly raised  Skin is warm and dry. No rash noted. No erythema. No pallor.   Psychiatric: Normal mood, affect and behavior is normal.   Breasts: Right normal without suspicious masses, skin changes or axillary nodes, left evidence of previous lumpectomy, irregular tissue relate to previous surgery. No axillary or supraclavicular adenopathy  Pelvic deferred  Results for BESSY KITCHEN (MRN [id number]) as of 11/15/2017 18:31   Ref. Range 11/15/2017 17:04   TSH Latest Ref Range: 0.40 - 4.00 mU/L 2.71     Bessy was seen today for physical, breast exam, and update on her health. PAP due in 2019.  She has a personal HX of breast cancer left and renal transplant.   Thyroid disorder needs thyroid labs. She is on the appropriate dose of thyroid replacement I refilled for her.  Lipids are excellent through biometric testing at her work.   Annual exam.    Bessy was seen today for physical and medication refill.    Diagnoses and all orders for this visit:    Routine general medical examination at a health care facility    Visit for screening mammogram  -     MA SCREENING DIGITAL BILAT - Future  (s+30); Future    Screening for malignant neoplasm of cervix    Need for prophylactic vaccination and inoculation against influenza  -     FLU VACCINE, 3 YRS +, IM  [[id number]]    Encounter for screening for malignant neoplasm of colon  -     GI Procedure Referral; Future    Acquired hypothyroidism  -     TSH with free T4 reflex; Future    HX: breast cancer  Comments:  Left sided  1999  Orders:  -     MA SCREENING DIGITAL BILAT - Future  (s+30); Future    Other orders  -     Cancel: Pap imaged thin layer screen with HPV - recommended age 30 - 65 years (select HPV order below)      All questions were addressed and voiced understanding and agreement with the above.    Mike Tan MD

## 2017-11-15 NOTE — NURSING NOTE
Chief Complaint   Patient presents with     Physical     Patient is here for annual physical.      Medication Refill     Patient is here for medication refills.      Megan Alamo LPN at 3:15 PM on 11/15/2017.

## 2017-11-15 NOTE — PROGRESS NOTES
Rooming Note    Health Maintenance  Health Maintenance Due   Topic Date Due     MAMMO Q1 YR  11/11/2015     INFLUENZA VACCINE (SYSTEM ASSIGNED)  09/01/2017   All health maintenance items discussed and pended.  Megan Alamo LPN at 4:13 PM on 11/15/2017.

## 2017-11-15 NOTE — PATIENT INSTRUCTIONS
[facility]: [phone]     Primary Care Center Medication Refill Request Information:  * Please contact your pharmacy regarding ANY request for medication refills.  ** [facility] Prescription Fax = [phone]  * Please allow 3 business days for routine medication refills.  * Please allow 5 business days for controlled substance medication refills.     Primary Care Center Test Result notification information:  *You will be notified with in 7-10 days of your appointment day regarding the results of your test.  If you are on MyChart you will be notified as soon as the provider has reviewed the results and signed off on them.    Breast Center ([facility]) [phone] (2nd Floor [facility])   Breast Center ([facility]) [phone] ([address])     Colonoscopy at [facility] [phone]

## 2017-12-21 DIAGNOSIS — Z94.0 KIDNEY TRANSPLANTED: ICD-10-CM

## 2017-12-21 RX ORDER — MYCOPHENOLIC ACID 180 MG/1
TABLET, DELAYED RELEASE ORAL
Qty: 540 TABLET | Refills: 3 | Status: SHIPPED | OUTPATIENT
Start: 2017-12-21 | End: 2018-04-10

## 2017-12-24 ENCOUNTER — OFFICE VISIT (OUTPATIENT)
Dept: URGENT CARE | Facility: URGENT CARE | Age: 57
End: 2017-12-24
Payer: COMMERCIAL

## 2017-12-24 ENCOUNTER — NURSE TRIAGE (OUTPATIENT)
Dept: NURSING | Facility: CLINIC | Age: 57
End: 2017-12-24

## 2017-12-24 VITALS
DIASTOLIC BLOOD PRESSURE: 82 MMHG | OXYGEN SATURATION: 92 % | SYSTOLIC BLOOD PRESSURE: 152 MMHG | WEIGHT: 215 LBS | BODY MASS INDEX: 34.7 KG/M2 | TEMPERATURE: 99 F | HEART RATE: 80 BPM

## 2017-12-24 DIAGNOSIS — R07.0 THROAT PAIN: ICD-10-CM

## 2017-12-24 DIAGNOSIS — R05.9 COUGH: ICD-10-CM

## 2017-12-24 DIAGNOSIS — J06.9 VIRAL URI: Primary | ICD-10-CM

## 2017-12-24 LAB
DEPRECATED S PYO AG THROAT QL EIA: NORMAL
FLUAV+FLUBV AG SPEC QL: NEGATIVE
FLUAV+FLUBV AG SPEC QL: NEGATIVE
SPECIMEN SOURCE: NORMAL
SPECIMEN SOURCE: NORMAL

## 2017-12-24 PROCEDURE — 87081 CULTURE SCREEN ONLY: CPT | Performed by: FAMILY MEDICINE

## 2017-12-24 PROCEDURE — 87880 STREP A ASSAY W/OPTIC: CPT | Performed by: FAMILY MEDICINE

## 2017-12-24 PROCEDURE — 87804 INFLUENZA ASSAY W/OPTIC: CPT | Performed by: FAMILY MEDICINE

## 2017-12-24 PROCEDURE — 99213 OFFICE O/P EST LOW 20 MIN: CPT | Performed by: FAMILY MEDICINE

## 2017-12-24 NOTE — NURSING NOTE
"Chief Complaint   Patient presents with     Pharyngitis     Patient complains of sore throat       Initial /82 (BP Location: Left arm, Patient Position: Chair, Cuff Size: Adult Regular)  Pulse 80  Temp 99  F (37.2  C) (Oral)  Wt 215 lb (97.5 kg)  LMP 01/25/2003  SpO2 92%  BMI 34.7 kg/m2 Estimated body mass index is 34.7 kg/(m^2) as calculated from the following:    Height as of 11/15/17: 5' 6\" (1.676 m).    Weight as of this encounter: 215 lb (97.5 kg).  Medication Reconciliation: complete       Nupur Timmons    "

## 2017-12-24 NOTE — TELEPHONE ENCOUNTER
"  Reason for Disposition    [1] Sore throat is the only symptom AND [2] present > 48 hours    Additional Information    Negative: [1] Severe difficulty swallowing (e.g., drooling or spitting) AND [2] started suddenly after taking a medicine or allergic food    Negative: Wheezing, stridor, hoarseness, or difficulty breathing    Negative: [1] Swollen tongue AND [2] sudden onset    Negative: Sounds like a life-threatening emergency to the triager    Negative: Mouth ulcers are seen    Sore throat (throat pain with swallowing)    Negative: Severe difficulty breathing (e.g., struggling for each breath, speaks in single words, stridor)    Negative: Sounds like a life-threatening emergency to the triager    Negative: [1] Diagnosed strep throat AND [2] taking antibiotic AND [3] symptoms continue    Negative: Throat culture results, call about    Negative: Productive cough is main symptom    Negative: Non-productive cough is main symptom    Negative: Hoarseness is main symptom    Negative: Runny nose is main symptom    Negative: [1] Drooling or spitting out saliva (because can't swallow) AND [2] normal breathing    Negative: Unable to open mouth completely    Negative: [1] Difficulty breathing AND [2] not severe    Negative: Fever > 104 F (40 C)    Negative: [1] Refuses to drink anything AND [2] for > 12 hours    Negative: [1] Drinking very little AND [2] dehydration suspected (e.g., no urine > 12 hours, very dry mouth, very lightheaded)    Negative: Patient sounds very sick or weak to the triager    Negative: SEVERE (e.g., excruciating) throat pain    Negative: [1] Pus on tonsils (back of throat) AND [2]  fever AND [3] swollen neck lymph nodes (\"glands\")    Negative: [1] Rash AND [2] widespread (especially chest and abdomen)    Negative: Earache also present    Negative: Fever present > 3 days (72 hours)    Negative: Diabetes mellitus or weak immune system (e.g., HIV positive, cancer chemo, splenectomy, organ transplant)    " Negative: History of rheumatic fever    Negative: [1] Adult is leaving on a trip AND [2] requests an antibiotic NOW    Negative: [1] Positive throat culture or rapid strep test (according to lab, PCP, caller, etc.) AND [2] NO  standing order to call in prescription for antibiotic    Negative: [1] Exposure to family member (or spouse or boyfriend/girlfriend) with test-proven strep AND [2] within last 10 days    Protocols used: SORE THROAT-ADULT-, SWALLOWING DIFFICULTY-ADULT-  patient is calling and states he throat is swollen. Patient states she has had these symptoms for a little over a week. Patient is having difficulty swallowing due to swelling of throat.

## 2017-12-24 NOTE — MR AVS SNAPSHOT
After Visit Summary   12/24/2017    Bessy Spangler    MRN: [id number]           Patient Information     Date Of Birth          1960        Visit Information        Provider Department      12/24/2017 10:30 AM Pola Salazar MD [facility]        Today's Diagnoses     Viral URI    -  1    Throat pain        Cough          Care Instructions      Viral Upper Respiratory Illness (Adult)  You have a viral upper respiratory illness (URI), which is another term for the common cold. This illness is contagious during the first few days. It is spread through the air by coughing and sneezing. It may also be spread by direct contact (touching the sick person and then touching your own eyes, nose, or mouth). Frequent handwashing will decrease risk of spread. Most viral illnesses go away within 7 to 10 days with rest and simple home remedies. Sometimes the illness may last for several weeks. Antibiotics will not kill a virus, and they are generally not prescribed for this condition.    Home care    If symptoms are severe, rest at home for the first 2 to 3 days. When you resume activity, don't let yourself get too tired.    Avoid being exposed to cigarette smoke (yours or others ).    You may use acetaminophen or ibuprofen to control pain and fever, unless another medicine was prescribed. (Note: If you have chronic liver or kidney disease, have ever had a stomach ulcer or gastrointestinal bleeding, or are taking blood-thinning medicines, talk with your healthcare provider before using these medicines.) Aspirin should never be given to anyone under 18 years of age who is ill with a viral infection or fever. It may cause severe liver or brain damage.    Your appetite may be poor, so a light diet is fine. Avoid dehydration by drinking 6 to 8 glasses of fluids per day (water, soft drinks, juices, tea, or soup). Extra fluids will help loosen secretions in the nose and lungs.    Over-the-counter  cold medicines will not shorten the length of time you re sick, but they may be helpful for the following symptoms: cough, sore throat, and nasal and sinus congestion. (Note: Do not use decongestants if you have high blood pressure.)  Follow-up care  Follow up with your healthcare provider, or as advised.  When to seek medical advice  Call your healthcare provider right away if any of these occur:    Cough with lots of colored sputum (mucus)    Severe headache; face, neck, or ear pain    Difficulty swallowing due to throat pain    Fever of 100.4 F (38 C)  Call 911, or get immediate medical care  Call emergency services right away if any of these occur:    Chest pain, shortness of breath, wheezing, or difficulty breathing    Coughing up blood    Inability to swallow due to throat pain  Date Last Reviewed: 9/13/2015 2000-2017 [employer] [address] [place] [address]. All rights reserved. This information is not intended as a substitute for professional medical care. Always follow your healthcare professional's instructions.                Follow-ups after your visit        Who to contact     If you have questions or need follow up information about today's clinic visit or your schedule please contact [facility] directly at [phone].  Normal or non-critical lab and imaging results will be communicated to you by [employer], letter or phone within 4 business days after the clinic has received the results. If you do not hear from us within 7 days, please contact the clinic through [employer] or phone. If you have a critical or abnormal lab result, we will notify you by phone as soon as possible.  Submit refill requests through [employer] or call your pharmacy and they will forward the refill request to us. Please allow 3 business days for your refill to be completed.          Additional Information About Your Visit        [employer] Information     [employer] gives you secure access  to your electronic health record. If you see a primary care provider, you can also send messages to your care team and make appointments. If you have questions, please call your primary care clinic.  If you do not have a primary care provider, please call [phone] and they will assist you.        Care EveryWhere ID     This is your Care EveryWhere ID. This could be used by other organizations to access your [place] medical records  [id number]        Your Vitals Were     Pulse Temperature Last Period Pulse Oximetry BMI (Body Mass Index)       80 99  F (37.2  C) (Oral) 01/25/2003 92% 34.7 kg/m2        Blood Pressure from Last 3 Encounters:   12/24/17 152/82   11/15/17 132/81   08/14/17 125/67    Weight from Last 3 Encounters:   12/24/17 215 lb (97.5 kg)   11/15/17 216 lb 9.6 oz (98.2 kg)   08/14/17 215 lb 9.6 oz (97.8 kg)              We Performed the Following     Beta strep group A culture     Influenza A/B antigen     Strep, Rapid Screen        Primary Care Provider Office Phone # Fax #    Mike Tan [phone] [address] [facility] [address]        Equal Access to Services     KARL DOAN : Hadii aad ku hadasho Soomaali, waaxda luqadaha, qaybta kaalmada adeegyada, waxay alexisin haysidran keesha carnes . So [facility] [phone].    ATENCIÓN: Si habla español, tiene a garcia disposición servicios gratuitos de asistencia lingüística. Llame al [phone].    We comply with applicable federal civil rights laws and [place] laws. We do not discriminate on the basis of race, color, national origin, age, disability, sex, sexual orientation, or gender identity.            Thank you!     Thank you for choosing [facility]  for your care. Our goal is always to provide you with excellent care. Hearing back from our patients is one way we can continue to improve our services. Please take a few minutes to complete the written survey that you may receive in the  mail after your visit with us. Thank you!             Your Updated Medication List - Protect others around you: Learn how to safely use, store and throw away your medicines at www.disposemymeds.org.          This list is accurate as of: 12/24/17 12:27 PM.  Always use your most recent med list.                   Brand Name Dispense Instructions for use Diagnosis    levothyroxine 112 MCG tablet    SYNTHROID/LEVOTHROID    90 tablet    Take 1 tablet (112 mcg) by mouth daily    Thyroid disorder       metoprolol 50 MG tablet    LOPRESSOR    180 tablet    Take 1 tablet (50 mg) by mouth 2 times daily    Hypertension       mycophenolic acid 180 MG EC tablet     540 tablet    TAKE 3 TABLETS (540MG) BY MOUTH TWICE A DAY    Kidney transplanted       * tacrolimus 1 MG capsule    GENERIC EQUIVALENT    540 capsule    Take 3 capsules (3 mg) by mouth 2 times daily    Kidney transplanted       * tacrolimus 1 MG capsule    GENERIC EQUIVALENT    540 capsule    Take 3 capsules (3 mg) by mouth 2 times daily    Kidney replaced by transplant       VITAMIN E PO           * Notice:  This list has 2 medication(s) that are the same as other medications prescribed for you. Read the directions carefully, and ask your doctor or other care provider to review them with you.

## 2017-12-24 NOTE — PATIENT INSTRUCTIONS

## 2017-12-24 NOTE — PROGRESS NOTES
SUBJECTIVE:   Bessy Spangler is a 57 year old female who presents to clinic today for the following health issues:      Sore throat      Duration: 10 days     Description (location/character/radiation): sore throat    Intensity:  moderate    Accompanying signs and symptoms: sore throat, shortness of breath, coughing up mucus, sinus pressure    History (similar episodes/previous evaluation): None    Precipitating or alleviating factors: None    Therapies tried and outcome: tylenol    H/o renal transplant          Problem list and histories reviewed & adjusted, as indicated.  Additional history: as documented    Patient Active Problem List   Diagnosis     Breast cancer (H)     Kidney replaced by transplant     CARDIOVASCULAR SCREENING; LDL GOAL LESS THAN 100     Immunosuppressed status (H)     Hypertension secondary to other renal disorders     Thyroid disorder     Post-menopausal     Conjunctival hemorrhage     Tear film insufficiency     Presbyopia - Both Eyes     Aftercare following organ transplant     Past Surgical History:   Procedure Laterality Date     APPENDECTOMY OPEN       AV FISTULA OR GRAFT ARTERIAL      left     C TRANSPLANT,PREP LIVING  RENAL GRAFT  2009    Hx of nephritis     LUMPECTOMY BREAST      left       Social History   Substance Use Topics     Smoking status: Former Smoker     Packs/day: 1.00     Years: 20.00     Types: Cigarettes     Quit date: 1998     Smokeless tobacco: Never Used     Alcohol use No     Family History   Problem Relation Age of Onset     DIABETES Father      type 2       Arthritis Father      Glaucoma Father      CANCER Mother 58     smoker,metastatic adnoid cystic cancer,  early 60s     Arthritis Mother      Endocrine Disease Mother      thyroid     Hypertension Maternal Grandmother       at a young age     Hypertension Maternal Grandfather      CANCER Paternal Grandmother      stomach cancer     Endocrine Disease Sister      thyroid      Genitourinary Problems Daughter 32     renal failure      Endocrine Disease Sister      thyroid     Chronic Obstructive Pulmonary Disease Paternal Grandfather      smoking  young         Current Outpatient Prescriptions   Medication Sig Dispense Refill     MYFORTIC (BRAND) 180 MG EC TABLET TAKE 3 TABLETS (540MG) BY MOUTH TWICE A  tablet 3     levothyroxine (SYNTHROID/LEVOTHROID) 112 MCG tablet Take 1 tablet (112 mcg) by mouth daily 90 tablet 3     tacrolimus (GENERIC EQUIVALENT) 1 MG capsule Take 3 capsules (3 mg) by mouth 2 times daily 540 capsule 3     metoprolol (LOPRESSOR) 50 MG tablet Take 1 tablet (50 mg) by mouth 2 times daily 180 tablet 3     tacrolimus (PROGRAF - GENERIC EQUIVALENT) 1 MG capsule Take 3 capsules (3 mg) by mouth 2 times daily 540 capsule 3     VITAMIN E PO        Allergies   Allergen Reactions     Dilaudid [Hydromorphone Hcl]      Patient unable to recall     Sulfa Drugs Swelling     Recent Labs   Lab Test  11/15/17   1704  17   0750  17   0805   16   1741   11/13/15   0753   10/28/14   0749   13   0718   11   0730   11   0730   08/25/10   0750   LDL   --    --    --    --    --    --   92   --   145*   --   75   < >   --    < >   --    < >   --    HDL   --    --    --    --    --    --   68   --   59   --   57   < >   --    < >   --    < >   --    TRIG   --    --    --    --    --    --   103   --   98   --   175*   < >   --    < >   --    < >   --    ALT   --    --    --    --    --    --    --    --    --    --    --    --   10   --   25   --   7   CR   --   0.79  0.76   < >   --    < >  0.73   < >  0.74   < >   --    < >  0.75   < >  0.95   < >  0.79   GFRESTIMATED   --   74  78   < >   --    < >  82   < >  81   < >   --    < >  81   < >  62   < >  76   GFRESTBLACK   --   90  >90   GFR Calc     < >   --    < >  >90   GFR Calc     < >  >90   GFR Calc     < >   --    < >  >90   < >  75   < >  >90    POTASSIUM   --   4.0  4.2   < >   --    < >  4.7   < >  4.2   < >   --    < >  5.6*   < >  5.2   < >  5.0   TSH  2.71   --    --    --   4.03*   --   1.54   --   1.24   --   4.00   < >   --    --    --    --    --     < > = values in this interval not displayed.      BP Readings from Last 3 Encounters:   12/24/17 152/82   11/15/17 132/81   08/14/17 125/67    Wt Readings from Last 3 Encounters:   12/24/17 215 lb (97.5 kg)   11/15/17 216 lb 9.6 oz (98.2 kg)   08/14/17 215 lb 9.6 oz (97.8 kg)                  Labs reviewed in EPIC          Reviewed and updated as needed this visit by clinical staff     Reviewed and updated as needed this visit by Provider         ROS:  Constitutional, HEENT, cardiovascular, pulmonary, gi and gu systems are negative, except as otherwise noted.      OBJECTIVE:   /82 (BP Location: Left arm, Patient Position: Chair, Cuff Size: Adult Regular)  Pulse 80  Temp 99  F (37.2  C) (Oral)  Wt 215 lb (97.5 kg)  LMP 01/25/2003  SpO2 92%  BMI 34.7 kg/m2  Body mass index is 34.7 kg/(m^2).  GENERAL: alert, no distress and obese  EYES: Eyes grossly normal to inspection, PERRL and conjunctivae and sclerae normal  HENT: normal cephalic/atraumatic, ear canals and TM's normal, oral mucous membranes moist, oropharxnx crowded and sinuses: not tender  NECK: no adenopathy, no asymmetry, masses, or scars and thyroid normal to palpation  RESP: lungs clear to auscultation - no rales, rhonchi or wheezes  CV: regular rates and rhythm, normal S1 S2, no S3 or S4 and no murmur, click or rub  ABDOMEN: soft, nontender, no hepatosplenomegaly, no masses and bowel sounds normal  MS: no gross musculoskeletal defects noted, no edema    Diagnostic Test Results:  Results for orders placed or performed in visit on 12/24/17 (from the past 24 hour(s))   Strep, Rapid Screen   Result Value Ref Range    Specimen Description Throat     Rapid Strep A Screen       NEGATIVE: No Group A streptococcal antigen detected by  immunoassay, await culture report.   Influenza A/B antigen   Result Value Ref Range    Influenza A/B Agn Specimen Nasopharyngeal     Influenza A Negative NEG^Negative    Influenza B Negative NEG^Negative       ASSESSMENT/PLAN:         ICD-10-CM    1. Viral URI J06.9     B97.89    2. Throat pain R07.0 Strep, Rapid Screen     Influenza A/B antigen     Beta strep group A culture   3. Cough R05        Discussed in detail differentials and further management. Symptoms are likely secondary to viral infection. Recommended well hydration, over-the-counter analgesia, warm fluids and saline gargles. Written instructions/information provided. Patient understood and in agreement with the above plan. All questions are answered. Follow-up if symptoms persist or worsen.        Patient Instructions     Viral Upper Respiratory Illness (Adult)  You have a viral upper respiratory illness (URI), which is another term for the common cold. This illness is contagious during the first few days. It is spread through the air by coughing and sneezing. It may also be spread by direct contact (touching the sick person and then touching your own eyes, nose, or mouth). Frequent handwashing will decrease risk of spread. Most viral illnesses go away within 7 to 10 days with rest and simple home remedies. Sometimes the illness may last for several weeks. Antibiotics will not kill a virus, and they are generally not prescribed for this condition.    Home care    If symptoms are severe, rest at home for the first 2 to 3 days. When you resume activity, don't let yourself get too tired.    Avoid being exposed to cigarette smoke (yours or others ).    You may use acetaminophen or ibuprofen to control pain and fever, unless another medicine was prescribed. (Note: If you have chronic liver or kidney disease, have ever had a stomach ulcer or gastrointestinal bleeding, or are taking blood-thinning medicines, talk with your healthcare provider before using  these medicines.) Aspirin should never be given to anyone under 18 years of age who is ill with a viral infection or fever. It may cause severe liver or brain damage.    Your appetite may be poor, so a light diet is fine. Avoid dehydration by drinking 6 to 8 glasses of fluids per day (water, soft drinks, juices, tea, or soup). Extra fluids will help loosen secretions in the nose and lungs.    Over-the-counter cold medicines will not shorten the length of time you re sick, but they may be helpful for the following symptoms: cough, sore throat, and nasal and sinus congestion. (Note: Do not use decongestants if you have high blood pressure.)  Follow-up care  Follow up with your healthcare provider, or as advised.  When to seek medical advice  Call your healthcare provider right away if any of these occur:    Cough with lots of colored sputum (mucus)    Severe headache; face, neck, or ear pain    Difficulty swallowing due to throat pain    Fever of 100.4 F (38 C)  Call 911, or get immediate medical care  Call emergency services right away if any of these occur:    Chest pain, shortness of breath, wheezing, or difficulty breathing    Coughing up blood    Inability to swallow due to throat pain  Date Last Reviewed: 9/13/2015 2000-2017 [employer] [address] [place] [address], [place] [address]. All rights reserved. This information is not intended as a substitute for professional medical care. Always follow your healthcare professional's instructions.            Pola Salazar MD  [facility]

## 2017-12-25 LAB
BACTERIA SPEC CULT: NORMAL
SPECIMEN SOURCE: NORMAL

## 2017-12-26 ENCOUNTER — TELEPHONE (OUTPATIENT)
Dept: URGENT CARE | Facility: URGENT CARE | Age: 57
End: 2017-12-26

## 2018-01-10 ENCOUNTER — TELEPHONE (OUTPATIENT)
Dept: TRANSPLANT | Facility: CLINIC | Age: 58
End: 2018-01-10

## 2018-01-10 NOTE — TELEPHONE ENCOUNTER
Call returned to patient: Patient verbalize understanding to practice good handwashing skills, remain hydrated and f\u with PCP if she develope symptoms.

## 2018-01-10 NOTE — TELEPHONE ENCOUNTER
Patient Call: Transplant Illness  Duration of illness: someone in her house was diagnosed with Influenza A  Illness: Bessy was wondering what she should do  # [phone]

## 2018-01-23 DIAGNOSIS — Z48.298 AFTERCARE FOLLOWING ORGAN TRANSPLANT: ICD-10-CM

## 2018-01-23 DIAGNOSIS — Z94.0 KIDNEY REPLACED BY TRANSPLANT: ICD-10-CM

## 2018-01-23 DIAGNOSIS — Z79.899 ENCOUNTER FOR LONG-TERM CURRENT USE OF MEDICATION: ICD-10-CM

## 2018-01-23 LAB
ANION GAP SERPL CALCULATED.3IONS-SCNC: 10 MMOL/L (ref 3–14)
BUN SERPL-MCNC: 12 MG/DL (ref 7–30)
CALCIUM SERPL-MCNC: 9.1 MG/DL (ref 8.5–10.1)
CHLORIDE SERPL-SCNC: 108 MMOL/L (ref 94–109)
CO2 SERPL-SCNC: 22 MMOL/L (ref 20–32)
CREAT SERPL-MCNC: 0.72 MG/DL (ref 0.52–1.04)
ERYTHROCYTE [DISTWIDTH] IN BLOOD BY AUTOMATED COUNT: 13.2 % (ref 10–15)
GFR SERPL CREATININE-BSD FRML MDRD: 83 ML/MIN/1.7M2
GLUCOSE SERPL-MCNC: 101 MG/DL (ref 70–99)
HCT VFR BLD AUTO: 38.8 % (ref 35–47)
HGB BLD-MCNC: 12.4 G/DL (ref 11.7–15.7)
MCH RBC QN AUTO: 29.5 PG (ref 26.5–33)
MCHC RBC AUTO-ENTMCNC: 32 G/DL (ref 31.5–36.5)
MCV RBC AUTO: 92 FL (ref 78–100)
PLATELET # BLD AUTO: 152 10E9/L (ref 150–450)
POTASSIUM SERPL-SCNC: 4.1 MMOL/L (ref 3.4–5.3)
RBC # BLD AUTO: 4.21 10E12/L (ref 3.8–5.2)
SODIUM SERPL-SCNC: 140 MMOL/L (ref 133–144)
WBC # BLD AUTO: 4.9 10E9/L (ref 4–11)

## 2018-01-23 PROCEDURE — 80197 ASSAY OF TACROLIMUS: CPT | Performed by: INTERNAL MEDICINE

## 2018-01-23 PROCEDURE — 80048 BASIC METABOLIC PNL TOTAL CA: CPT | Performed by: INTERNAL MEDICINE

## 2018-01-23 PROCEDURE — 85027 COMPLETE CBC AUTOMATED: CPT | Performed by: INTERNAL MEDICINE

## 2018-01-23 PROCEDURE — 36415 COLL VENOUS BLD VENIPUNCTURE: CPT | Performed by: INTERNAL MEDICINE

## 2018-01-24 LAB
TACROLIMUS BLD-MCNC: 4.3 UG/L (ref 5–15)
TME LAST DOSE: ABNORMAL H

## 2018-04-01 ENCOUNTER — HOSPITAL ENCOUNTER (EMERGENCY)
Facility: CLINIC | Age: 58
Discharge: HOME | End: 2018-04-01
Attending: EMERGENCY MEDICINE | Admitting: EMERGENCY MEDICINE
Payer: COMMERCIAL

## 2018-04-01 ENCOUNTER — APPOINTMENT (OUTPATIENT)
Dept: CT IMAGING | Facility: CLINIC | Age: 58
End: 2018-04-01
Attending: EMERGENCY MEDICINE
Payer: COMMERCIAL

## 2018-04-01 VITALS
OXYGEN SATURATION: 99 % | RESPIRATION RATE: 18 BRPM | BODY MASS INDEX: 35.02 KG/M2 | SYSTOLIC BLOOD PRESSURE: 157 MMHG | TEMPERATURE: 98.7 F | HEART RATE: 70 BPM | WEIGHT: 217 LBS | DIASTOLIC BLOOD PRESSURE: 64 MMHG

## 2018-04-01 DIAGNOSIS — H81.12 BENIGN PAROXYSMAL POSITIONAL VERTIGO, LEFT: ICD-10-CM

## 2018-04-01 LAB
ANION GAP SERPL CALCULATED.3IONS-SCNC: 10 MMOL/L (ref 3–14)
APTT PPP: 27 SEC (ref 22–37)
BASOPHILS # BLD AUTO: 0 10E9/L (ref 0–0.2)
BASOPHILS NFR BLD AUTO: 0.2 %
BUN SERPL-MCNC: 9 MG/DL (ref 7–30)
CALCIUM SERPL-MCNC: 9.2 MG/DL (ref 8.5–10.1)
CHLORIDE SERPL-SCNC: 108 MMOL/L (ref 94–109)
CO2 SERPL-SCNC: 23 MMOL/L (ref 20–32)
CREAT BLD-MCNC: 0.6 MG/DL (ref 0.52–1.04)
CREAT SERPL-MCNC: 0.65 MG/DL (ref 0.52–1.04)
DIFFERENTIAL METHOD BLD: ABNORMAL
EOSINOPHIL # BLD AUTO: 0 10E9/L (ref 0–0.7)
EOSINOPHIL NFR BLD AUTO: 0 %
ERYTHROCYTE [DISTWIDTH] IN BLOOD BY AUTOMATED COUNT: 12.9 % (ref 10–15)
GFR SERPL CREATININE-BSD FRML MDRD: >90 ML/MIN/1.7M2
GFR SERPL CREATININE-BSD FRML MDRD: >90 ML/MIN/1.7M2
GLUCOSE BLDC GLUCOMTR-MCNC: 103 MG/DL (ref 70–99)
GLUCOSE SERPL-MCNC: 115 MG/DL (ref 70–99)
HCT VFR BLD AUTO: 38.9 % (ref 35–47)
HGB BLD-MCNC: 12.9 G/DL (ref 11.7–15.7)
IMM GRANULOCYTES # BLD: 0 10E9/L (ref 0–0.4)
IMM GRANULOCYTES NFR BLD: 0.3 %
INR BLD: 1.2 (ref 0.86–1.14)
LYMPHOCYTES # BLD AUTO: 0.7 10E9/L (ref 0.8–5.3)
LYMPHOCYTES NFR BLD AUTO: 12.1 %
MCH RBC QN AUTO: 29.7 PG (ref 26.5–33)
MCHC RBC AUTO-ENTMCNC: 33.2 G/DL (ref 31.5–36.5)
MCV RBC AUTO: 89 FL (ref 78–100)
MONOCYTES # BLD AUTO: 0.3 10E9/L (ref 0–1.3)
MONOCYTES NFR BLD AUTO: 4.6 %
NEUTROPHILS # BLD AUTO: 4.9 10E9/L (ref 1.6–8.3)
NEUTROPHILS NFR BLD AUTO: 82.8 %
NRBC # BLD AUTO: 0 10*3/UL
NRBC BLD AUTO-RTO: 0 /100
PLATELET # BLD AUTO: 147 10E9/L (ref 150–450)
POTASSIUM SERPL-SCNC: 4 MMOL/L (ref 3.4–5.3)
RBC # BLD AUTO: 4.35 10E12/L (ref 3.8–5.2)
SODIUM SERPL-SCNC: 141 MMOL/L (ref 133–144)
TROPONIN I BLD-MCNC: 0 UG/L (ref 0–0.1)
WBC # BLD AUTO: 5.9 10E9/L (ref 4–11)

## 2018-04-01 PROCEDURE — 25000128 H RX IP 250 OP 636: Performed by: EMERGENCY MEDICINE

## 2018-04-01 PROCEDURE — 85610 PROTHROMBIN TIME: CPT | Mod: QW

## 2018-04-01 PROCEDURE — 85730 THROMBOPLASTIN TIME PARTIAL: CPT | Performed by: EMERGENCY MEDICINE

## 2018-04-01 PROCEDURE — 93010 ELECTROCARDIOGRAM REPORT: CPT | Mod: Z6 | Performed by: EMERGENCY MEDICINE

## 2018-04-01 PROCEDURE — 82565 ASSAY OF CREATININE: CPT

## 2018-04-01 PROCEDURE — 25000125 ZZHC RX 250: Performed by: RADIOLOGY

## 2018-04-01 PROCEDURE — 80048 BASIC METABOLIC PNL TOTAL CA: CPT | Performed by: EMERGENCY MEDICINE

## 2018-04-01 PROCEDURE — 25000128 H RX IP 250 OP 636: Performed by: RADIOLOGY

## 2018-04-01 PROCEDURE — 99285 EMERGENCY DEPT VISIT HI MDM: CPT | Mod: 25 | Performed by: EMERGENCY MEDICINE

## 2018-04-01 PROCEDURE — 96374 THER/PROPH/DIAG INJ IV PUSH: CPT | Mod: 59 | Performed by: EMERGENCY MEDICINE

## 2018-04-01 PROCEDURE — 00000146 ZZHCL STATISTIC GLUCOSE BY METER IP

## 2018-04-01 PROCEDURE — 93005 ELECTROCARDIOGRAM TRACING: CPT | Performed by: EMERGENCY MEDICINE

## 2018-04-01 PROCEDURE — 85025 COMPLETE CBC W/AUTO DIFF WBC: CPT | Performed by: EMERGENCY MEDICINE

## 2018-04-01 PROCEDURE — 84484 ASSAY OF TROPONIN QUANT: CPT

## 2018-04-01 PROCEDURE — 25000131 ZZH RX MED GY IP 250 OP 636 PS 637: Performed by: EMERGENCY MEDICINE

## 2018-04-01 PROCEDURE — 70498 CT ANGIOGRAPHY NECK: CPT

## 2018-04-01 PROCEDURE — 96361 HYDRATE IV INFUSION ADD-ON: CPT | Performed by: EMERGENCY MEDICINE

## 2018-04-01 PROCEDURE — 96375 TX/PRO/DX INJ NEW DRUG ADDON: CPT | Performed by: EMERGENCY MEDICINE

## 2018-04-01 RX ORDER — ONDANSETRON 2 MG/ML
4 INJECTION INTRAMUSCULAR; INTRAVENOUS EVERY 30 MIN PRN
Status: DISCONTINUED | OUTPATIENT
Start: 2018-04-01 | End: 2018-04-02 | Stop reason: HOSPADM

## 2018-04-01 RX ORDER — LORAZEPAM 2 MG/ML
1 INJECTION INTRAMUSCULAR ONCE
Status: COMPLETED | OUTPATIENT
Start: 2018-04-01 | End: 2018-04-01

## 2018-04-01 RX ORDER — LORAZEPAM 1 MG/1
1 TABLET ORAL EVERY 8 HOURS PRN
Qty: 9 TABLET | Refills: 0 | Status: SHIPPED | OUTPATIENT
Start: 2018-04-01 | End: 2018-07-24

## 2018-04-01 RX ORDER — ONDANSETRON 4 MG/1
4 TABLET, ORALLY DISINTEGRATING ORAL EVERY 6 HOURS PRN
Qty: 12 TABLET | Refills: 0 | Status: SHIPPED | OUTPATIENT
Start: 2018-04-01 | End: 2018-04-04

## 2018-04-01 RX ORDER — MECLIZINE HYDROCHLORIDE 25 MG/1
25 TABLET ORAL ONCE
Status: COMPLETED | OUTPATIENT
Start: 2018-04-01 | End: 2018-04-01

## 2018-04-01 RX ORDER — IOPAMIDOL 755 MG/ML
75 INJECTION, SOLUTION INTRAVASCULAR ONCE
Status: COMPLETED | OUTPATIENT
Start: 2018-04-01 | End: 2018-04-01

## 2018-04-01 RX ORDER — MECLIZINE HCL 12.5 MG 12.5 MG/1
25 TABLET ORAL 4 TIMES DAILY PRN
Qty: 30 TABLET | Refills: 0 | Status: SHIPPED | OUTPATIENT
Start: 2018-04-01 | End: 2018-07-24

## 2018-04-01 RX ADMIN — SODIUM CHLORIDE 500 ML: 9 INJECTION, SOLUTION INTRAVENOUS at 19:55

## 2018-04-01 RX ADMIN — SODIUM CHLORIDE, PRESERVATIVE FREE 90 ML: 5 INJECTION INTRAVENOUS at 20:24

## 2018-04-01 RX ADMIN — LORAZEPAM 1 MG: 2 INJECTION INTRAMUSCULAR; INTRAVENOUS at 19:56

## 2018-04-01 RX ADMIN — IOPAMIDOL 75 ML: 755 INJECTION, SOLUTION INTRAVENOUS at 20:24

## 2018-04-01 RX ADMIN — ONDANSETRON 4 MG: 2 INJECTION INTRAMUSCULAR; INTRAVENOUS at 21:12

## 2018-04-01 RX ADMIN — MECLIZINE 25 MG: 25 TABLET ORAL at 21:12

## 2018-04-01 ASSESSMENT — ENCOUNTER SYMPTOMS
POLYDIPSIA: 0
AGITATION: 0
VOMITING: 1
LIGHT-HEADEDNESS: 0
NUMBNESS: 0
DIZZINESS: 1
SHORTNESS OF BREATH: 0
DIFFICULTY URINATING: 0
NAUSEA: 1
ADENOPATHY: 0
SPEECH DIFFICULTY: 0
CHILLS: 0
BACK PAIN: 0
ABDOMINAL PAIN: 0
HEADACHES: 0
COLOR CHANGE: 0
WEAKNESS: 0
FEVER: 0
NECK PAIN: 0
PHOTOPHOBIA: 0
BRUISES/BLEEDS EASILY: 0
NECK STIFFNESS: 0

## 2018-04-01 NOTE — ED AVS SNAPSHOT
[facility], Emergency Department    [address] [facility] [address]    Phone:  [phone]                                       Bessy Spangler   MRN: [id number]    Department:  [facility], Emergency Department   Date of Visit:  4/1/2018           Patient Information     Date Of Birth          1960        Your diagnoses for this visit were:     Benign paroxysmal positional vertigo, left        You were seen by Ivy Toscano MD.        Discharge Instructions       Please make an appointment to follow up with [facility] ([phone]) as soon as possible for further evaluation and recommendations.  If your symptoms significantly worsen please come back to the emergency department.    Benign Paroxysmal Positional Vertigo     Your health care provider may move your head in certain ways to treat your BPPV.     Benign paroxysmal positional vertigo (BPPV) is a problem with the inner ear. The inner ear contains the vestibular system. This system is what helps you keep your balance. BPPV causes a feeling of spinning. It is a common problem of the vestibular system.  Understanding the vestibular system  The vestibular system of the ear is made up of very tiny parts. They include the utricle, saccule, and semicircular canals. The utricle is a tiny organ that contains calcium crystals. In some people, the crystals can move into the semicircular canals. When this happens, the system no longer works as it should. This causes BPPV. Benign means it is not life threatening. Paroxysmal means it happens suddenly. Positional means that it happens when you move your head. Vertigo is a feeling of spinning.  What causes BPPV?  Causes include injury to your head or neck. Other problems with the vestibular system may cause BPPV. In many people, the cause of BPPV is not known.  Symptoms of BPPV  You many have repeated feelings of spinning (vertigo). The vertigo  usually lasts less than 1 minute. Some movements, such as rolling over in bed, can bring on vertigo.  Diagnosing BPPV  Your primary healthcare provider may diagnose and treat your BPPV. Or you may see an ear, nose, and throat doctor (otolaryngologist). In some cases, you may see a nervous system doctor (neurologist).  The healthcare provider will ask about your symptoms and your medical history. He or she will examine you. You may have hearing and balance tests. As part of the exam, your healthcare provider may have you move your head and body in certain ways. If you have BPPV, the movements can bring on vertigo. Your provider will also look for abnormal movements of your eyes. You may have other tests to check your vestibular or nervous systems.  Treatment for BPPV  Your healthcare provider may try to move the calcium crystals. This is done by having you move your head and neck in certain ways. This treatment is safe and often works well. You may also be told to do these movements at home. You may still have vertigo for a few weeks. Your healthcare provider will recheck your symptoms, usually in about a month. Special physical therapy may also be part of treatment. In rare cases, surgery may be needed for BPPV that does not go away.     When to call the healthcare provider  Call your healthcare provider right away if you have any of these:    Symptoms that do not go away with treatment    Symptoms that get worse    New symptoms   Date Last Reviewed: 5/1/2017 2000-2017 [employer]. [address] [place] [address]. All rights reserved. This information is not intended as a substitute for professional medical care. Always follow your healthcare professional's instructions.            24 Hour Appointment Hotline       To make an appointment at any [facility], call [phone] ([phone]). If you don't have a family doctor or clinic, we will help you find one. [facility] are  conveniently located to serve the needs of you and your family.             Review of your medicines      START taking        Dose / Directions Last dose taken    LORazepam 1 MG tablet   Commonly known as:  ATIVAN   Dose:  1 mg   Quantity:  9 tablet        Take 1 tablet (1 mg) by mouth every 8 hours as needed for nausea   Refills:  0        meclizine 12.5 MG tablet   Commonly known as:  ANTIVERT   Dose:  25 mg   Quantity:  30 tablet        Take 2 tablets (25 mg) by mouth 4 times daily as needed for dizziness   Refills:  0        ondansetron 4 MG ODT tab   Commonly known as:  ZOFRAN ODT   Dose:  4 mg   Quantity:  12 tablet        Take 1 tablet (4 mg) by mouth every 6 hours as needed   Refills:  0          Our records show that you are taking the medicines listed below. If these are incorrect, please call your family doctor or clinic.        Dose / Directions Last dose taken    levothyroxine 112 MCG tablet   Commonly known as:  SYNTHROID/LEVOTHROID   Dose:  112 mcg   Quantity:  90 tablet        Take 1 tablet (112 mcg) by mouth daily   Refills:  3        metoprolol tartrate 50 MG tablet   Commonly known as:  LOPRESSOR   Dose:  50 mg   Quantity:  180 tablet        Take 1 tablet (50 mg) by mouth 2 times daily   Refills:  3        mycophenolic acid 180 MG EC tablet   Quantity:  540 tablet        TAKE 3 TABLETS (540MG) BY MOUTH TWICE A DAY   Refills:  3        * tacrolimus 1 MG capsule   Commonly known as:  GENERIC EQUIVALENT   Dose:  3 mg   Quantity:  540 capsule        Take 3 capsules (3 mg) by mouth 2 times daily   Refills:  3        * tacrolimus 1 MG capsule   Commonly known as:  GENERIC EQUIVALENT   Dose:  3 mg   Quantity:  540 capsule        Take 3 capsules (3 mg) by mouth 2 times daily   Refills:  3        VITAMIN E PO        Refills:  0        * Notice:  This list has 2 medication(s) that are the same as other medications prescribed for you. Read the directions carefully, and ask your doctor or other care provider  to review them with you.            Prescriptions were sent or printed at these locations (3 Prescriptions)                   Other Prescriptions                Printed at Department/Unit printer (3 of 3)         ondansetron (ZOFRAN ODT) 4 MG ODT tab               LORazepam (ATIVAN) 1 MG tablet               meclizine (ANTIVERT) 12.5 MG tablet                Procedures and tests performed during your visit     Activity: Bedrest    Basic metabolic panel    CBC with platelets differential    CT Head Neck Angio w/o & w Contrast    Creatinine POCT    Dysphagia Screen    EKG 12 lead    Glucose by meter    Glucose monitor nursing POCT    INR point of care    ISTAT INR nursing POCT    ISTAT creatinine  POCT    ISTAT troponin nursing POCT    Notify CT that Stroke patient is in ED    Partial thromboplastin time    Pulse oximetry nursing    Troponin POCT    Vital signs and neuro checks      Orders Needing Specimen Collection     None      Pending Results     Date and Time Order Name Status Description    4/1/2018 1936 CT Head Neck Angio w/o & w Contrast Preliminary     4/1/2018 1906 EKG 12 lead Preliminary             Pending Culture Results     No orders found from 3/30/2018 to 4/2/2018.            Pending Results Instructions     If you had any lab results that were not finalized at the time of your Discharge, you can call the ED Lab Result RN at [phone]. You will be contacted by this team for any positive Lab results or changes in treatment. The nurses are available 7 days a week from 10A to 6:30P.  You can leave a message 24 hours per day and they will return your call.        Thank you for choosing [facility]       Thank you for choosing [facility] for your care. Our goal is always to provide you with excellent care. Hearing back from our patients is one way we can continue to improve our services. Please take a few minutes to complete the written survey that you may receive in the mail after you visit with us. Thank  you!        [employer] Information     [employer] gives you secure access to your electronic health record. If you see a primary care provider, you can also send messages to your care team and make appointments. If you have questions, please call your primary care clinic.  If you do not have a primary care provider, please call [phone] and they will assist you.        Care EveryWhere ID     This is your Care EveryWhere ID. This could be used by other organizations to access your [facility] medical records  [id number]        Equal Access to Services     KARL DOAN : Wilber White, walive luluizadaha, qastefanie kaalmalgorzata hughes, sandra hawley. So [facility] [phone].    ATENCIÓN: Si habla español, tiene a garcia disposición servicios gratuitos de asistencia lingüística. Llame al [phone].    We comply with applicable federal civil rights laws and [place] laws. We do not discriminate on the basis of race, color, national origin, age, disability, sex, sexual orientation, or gender identity.            After Visit Summary       This is your record. Keep this with you and show to your community pharmacist(s) and doctor(s) at your next visit.

## 2018-04-01 NOTE — ED AVS SNAPSHOT
[facility] [place], Emergency Department    [address] [place] [address]    Phone:  [phone]                                       Bessy Spangler   MRN: [id number]    Department:  [facility], Emergency Department   Date of Visit:  4/1/2018           After Visit Summary Signature Page     I have received my discharge instructions, and my questions have been answered. I have discussed any challenges I see with this plan with the nurse or doctor.    ..........................................................................................................................................  Patient/Patient Representative Signature      ..........................................................................................................................................  Patient Representative Print Name and Relationship to Patient    ..................................................               ................................................  Date                                            Time    ..........................................................................................................................................  Reviewed by Signature/Title    ...................................................              ..............................................  Date                                                            Time

## 2018-04-02 ENCOUNTER — TELEPHONE (OUTPATIENT)
Dept: FAMILY MEDICINE | Facility: CLINIC | Age: 58
End: 2018-04-02

## 2018-04-02 DIAGNOSIS — H81.10 BENIGN PAROXYSMAL POSITIONAL VERTIGO, UNSPECIFIED LATERALITY: Primary | ICD-10-CM

## 2018-04-02 LAB — INTERPRETATION ECG - MUSE: NORMAL

## 2018-04-02 NOTE — TELEPHONE ENCOUNTER
Reviewed request and chart.  Diagnoses and all orders for this visit:    Benign paroxysmal positional vertigo, unspecified laterality  -     PHYSICAL THERAPY REFERRAL    Mike Tan MD

## 2018-04-02 NOTE — ED NOTES
[facility] called re: PT referal order. PT consult order placed on EPIC and Fax'ed to [facility] 10:50 am Apr 2 2018.     Evin Juan MD  04/02/18 1048

## 2018-04-02 NOTE — TELEPHONE ENCOUNTER
----- Message from Margot Maria sent at 4/2/2018  8:10 AM CDT -----  Regarding: requested order  Hello!    Patient called to sched for PT for BPPV, however there is not an order for her in the system. She said she was referred after going to the ER. If appropriate can you please enter in that order so she can be seen asap and scheduled?    Thank you,  Margot

## 2018-04-02 NOTE — ED PROVIDER NOTES
"  History     Chief Complaint   Patient presents with     Dizziness     HPI  Bessy Spangler is a 58 year old female with a history of hypertension, CKD 5 status post kidney transplant, and chronic ongoing anemia who presents to the emergency department for the evaluation of dizziness.  The patient states that she initially noted this on 3/29/2018, 3 days ago.  At that time, its presentation was self-limiting, but it has been intermittent since. She then awoke today to a recurrence of the \"room spinning\" sensation, but this time it is much worse and more persistent.  Associated with this, the patient has been vomiting, but she denies any other symptoms such as abdominal pain or diarrhea.  Due to the nausea/vomiting, the patient has not taken her medications, including her antirejection medications, for the past 2 days.  She denies any visual changes or gait instability, but she states that it is hard to walk a straight line due to the dizziness.  Notably, the patient states that her vertiginous symptoms are alleviated when fixing her gaze to the right.  She has tried both meclizine and Zofran, but these have proven ineffective in addressing her symptoms.  No headache.  No pain.  No fevers.          I have reviewed the Medications, Allergies, Past Medical and Surgical History, and Social History in the Epic system.    Review of Systems   Constitutional: Negative for chills and fever.   HENT: Negative for congestion and ear pain.    Eyes: Negative for photophobia and visual disturbance.   Respiratory: Negative for shortness of breath.    Cardiovascular: Negative for chest pain.   Gastrointestinal: Positive for nausea and vomiting. Negative for abdominal pain.   Endocrine: Negative for polydipsia and polyuria.   Genitourinary: Negative for difficulty urinating.   Musculoskeletal: Negative for back pain, neck pain and neck stiffness.   Skin: Negative for color change.   Allergic/Immunologic: Positive for " immunocompromised state.   Neurological: Positive for dizziness. Negative for speech difficulty, weakness, light-headedness, numbness and headaches.   Hematological: Negative for adenopathy. Does not bruise/bleed easily.   Psychiatric/Behavioral: Negative for agitation and behavioral problems.       Physical Exam   BP: 168/80  Pulse: 72  Temp: 98.7  F (37.1  C)  Resp: 16  Weight: 98.4 kg (217 lb)  SpO2: 99 %      Physical Exam   Constitutional: She is oriented to person, place, and time. She appears well-developed and well-nourished. She appears distressed ( Mild distress due to nausea).   HENT:   Head: Normocephalic and atraumatic.   Mouth/Throat: Oropharynx is clear and moist. No oropharyngeal exudate.   Eyes: Conjunctivae are normal. Pupils are equal, round, and reactive to light. No scleral icterus.   There is nystagmus left left sided gaze and her symptoms worsen left-sided gaze.  Symptoms resolve right sided gaze.   Neck: Normal range of motion. Neck supple.   Cardiovascular: Normal rate, normal heart sounds and intact distal pulses.    Pulmonary/Chest: Effort normal and breath sounds normal. No respiratory distress. She has no wheezes.   Abdominal: Soft. Bowel sounds are normal. There is no tenderness.   Musculoskeletal: Normal range of motion. She exhibits no edema or tenderness.   Neurological: She is alert and oriented to person, place, and time. She has normal strength. No cranial nerve deficit or sensory deficit. She exhibits normal muscle tone. Coordination normal. GCS eye subscore is 4. GCS verbal subscore is 5. GCS motor subscore is 6.   Reflex Scores:       Patellar reflexes are 2+ on the right side and 2+ on the left side.       Achilles reflexes are 2+ on the right side and 2+ on the left side.  No dysarthria, dysmetria, diplopia, disdiadochokinesia. Strength 5/5 in b/l UEs with  and b/l LEs with dorsi- and plantar-flexion against resistance. Sensation to light touch and 2-point discrimination  intact in b/l distal UEs and LEs. CNs II-XII intact.   Skin: Skin is warm. No rash noted. She is not diaphoretic.   Psychiatric: She has a normal mood and affect. Her behavior is normal. Judgment and thought content normal.   Nursing note and vitals reviewed.      ED Course     ED Course     Procedures             EKG Interpretation:      Interpreted by Lexx Toscano MD  Time reviewed: 19:12  Symptoms at time of EK:12   Rhythm: NSR  Rate: 68  Axis: Normal  Ectopy: none  Conduction: normal  ST Segments/ T Waves: No ST-T wave changes  Q Waves: none  Comparison to prior: Unchanged from 2009    Clinical Impression: no acute changes            Critical Care time:  none             Labs Ordered and Resulted from Time of ED Arrival Up to the Time of Departure from the ED   CBC WITH PLATELETS DIFFERENTIAL - Abnormal; Notable for the following:        Result Value    Platelet Count 147 (*)     Absolute Lymphocytes 0.7 (*)     All other components within normal limits   BASIC METABOLIC PANEL - Abnormal; Notable for the following:     Glucose 115 (*)     All other components within normal limits   INR POINT OF CARE - Abnormal; Notable for the following:     INR Point of Care 1.2 (*)     All other components within normal limits   GLUCOSE BY METER - Abnormal; Notable for the following:     Glucose 103 (*)     All other components within normal limits   PARTIAL THROMBOPLASTIN TIME   GLUCOSE MONITOR NURSING POCT   CREATININE POCT   ISTAT CREATININE NURSING POCT   VITAL SIGNS AND NEURO CHECKS   ACTIVITY   PULSE OXIMETRY NURSING   ASSESSMENT   NOTIFY   ISTAT INR NURSING POCT   ISTAT TROPONIN NURSING POCT   TROPONIN POCT            Assessments & Plan (with Medical Decision Making)   58-year-old woman presenting with vertigo, nausea, vomiting.  Differential diagnosis the BPPV, posterior circulation stroke, acute vestibular neuronitis, electrolyte abnormalities.    After the recent physical exam, the patient appears to be in  mild distress due to nausea.  I will treat her nausea and vertigo with IV Ativan and obtain CT/CTA of the head and neck along with laboratory studies for further diagnostic evaluation of posterior circulation stroke.  She and her daughter agree with the plan.    I reviewed patient's CT/CTA of the head and neck and I read the radiology report; there is no evidence of intracranial hemorrhage, acute stroke, or stenosis of the major cervical arteries.  Laboratory studies returned with no leukocytosis, WBC is normal at 5900.  There is no anemia, hemoglobin is normal at 12.9.  Electrolytes show no evidence of dehydration, creatinine is normal 0.65.  Glucose is normal at 103.  Patient felt significantly better after intravenous lorazepam, intravenous ondansetron, and oral meclizine.  She was able to ambulate without difficulty.  She states her symptoms are significantly better, however, not completely resolved.  I offered to perform Epley's maneuver, however, she declined stating she preferred to follow-up in balance and dizziness/vestibular rehabilitation clinic.  I will give her a phone number for the clinic to try to schedule appointment tomorrow morning along with prescriptions for meclizine, Ativan, and Zofran.  She and her family member agree with the plan.  At this point, she is stable for discharge.  Gait is normal at discharge.      This part of the medical record was transcribed by Adriana Faye, Medical Scribe, from a dictation done by Ivy Toscano MD.     I have reviewed the nursing notes.    I have reviewed the findings, diagnosis, plan and need for follow up with the patient.    Discharge Medication List as of 4/1/2018 10:20 PM      START taking these medications    Details   ondansetron (ZOFRAN ODT) 4 MG ODT tab Take 1 tablet (4 mg) by mouth every 6 hours as needed, Disp-12 tablet, R-0, Local Print      LORazepam (ATIVAN) 1 MG tablet Take 1 tablet (1 mg) by mouth every 8 hours as needed for nausea,  Disp-9 tablet, R-0, Local Print      meclizine (ANTIVERT) 12.5 MG tablet Take 2 tablets (25 mg) by mouth 4 times daily as needed for dizziness, Disp-30 tablet, R-0, Local Print             Final diagnoses:   Benign paroxysmal positional vertigo, left   I, Mingo Fairchild, am serving as a trained medical scribe to document services personally performed by Ivy Toscano MD, based on the provider's statements to me.      IIvy MD, was physically present and have reviewed and verified the accuracy of this note documented by Mingo Fairchild.       4/1/2018   [facility], [place], EMERGENCY DEPARTMENT     Ivy Toscano MD  04/01/18 6676

## 2018-04-02 NOTE — ED NOTES
Pt here with complaints of vertigo and nausea today. Has been unable to take medications today, hx renal transplant.

## 2018-04-02 NOTE — DISCHARGE INSTRUCTIONS
Please make an appointment to follow up with [facility] ([phone]) as soon as possible for further evaluation and recommendations.  If your symptoms significantly worsen please come back to the emergency department.    Benign Paroxysmal Positional Vertigo     Your health care provider may move your head in certain ways to treat your BPPV.     Benign paroxysmal positional vertigo (BPPV) is a problem with the inner ear. The inner ear contains the vestibular system. This system is what helps you keep your balance. BPPV causes a feeling of spinning. It is a common problem of the vestibular system.  Understanding the vestibular system  The vestibular system of the ear is made up of very tiny parts. They include the utricle, saccule, and semicircular canals. The utricle is a tiny organ that contains calcium crystals. In some people, the crystals can move into the semicircular canals. When this happens, the system no longer works as it should. This causes BPPV. Benign means it is not life threatening. Paroxysmal means it happens suddenly. Positional means that it happens when you move your head. Vertigo is a feeling of spinning.  What causes BPPV?  Causes include injury to your head or neck. Other problems with the vestibular system may cause BPPV. In many people, the cause of BPPV is not known.  Symptoms of BPPV  You many have repeated feelings of spinning (vertigo). The vertigo usually lasts less than 1 minute. Some movements, such as rolling over in bed, can bring on vertigo.  Diagnosing BPPV  Your primary healthcare provider may diagnose and treat your BPPV. Or you may see an ear, nose, and throat doctor (otolaryngologist). In some cases, you may see a nervous system doctor (neurologist).  The healthcare provider will ask about your symptoms and your medical history. He or she will examine you. You may have hearing and balance tests. As part of the exam, your  healthcare provider may have you move your head and body in certain ways. If you have BPPV, the movements can bring on vertigo. Your provider will also look for abnormal movements of your eyes. You may have other tests to check your vestibular or nervous systems.  Treatment for BPPV  Your healthcare provider may try to move the calcium crystals. This is done by having you move your head and neck in certain ways. This treatment is safe and often works well. You may also be told to do these movements at home. You may still have vertigo for a few weeks. Your healthcare provider will recheck your symptoms, usually in about a month. Special physical therapy may also be part of treatment. In rare cases, surgery may be needed for BPPV that does not go away.     When to call the healthcare provider  Call your healthcare provider right away if you have any of these:    Symptoms that do not go away with treatment    Symptoms that get worse    New symptoms   Date Last Reviewed: 5/1/2017 2000-2017 [employer]. [address] [place] [address], [place] [id number]. All rights reserved. This information is not intended as a substitute for professional medical care. Always follow your healthcare professional's instructions.

## 2018-04-04 ENCOUNTER — TELEPHONE (OUTPATIENT)
Dept: GASTROENTEROLOGY | Facility: CLINIC | Age: 58
End: 2018-04-04

## 2018-04-04 ENCOUNTER — TELEPHONE (OUTPATIENT)
Dept: FAMILY MEDICINE | Facility: CLINIC | Age: 58
End: 2018-04-04

## 2018-04-04 NOTE — TELEPHONE ENCOUNTER
Called patient regarding need for excuse from work needed.   Advised patient to make an appointment to see the provider prior to this request being done. Last exam was  In November with Dr. Coffey.  Chase Perez LPN at 1:54 PM on 4/4/2018

## 2018-04-04 NOTE — TELEPHONE ENCOUNTER
Patient of Dr. Marquez's called to inform clinic that an application for assistance with medication My[employer] will be coming in and they are going to want a new prescription and to please make sure to make it a 90 day supply.

## 2018-04-04 NOTE — TELEPHONE ENCOUNTER
Patient calling asking for a excuse to be excused from work due to her vertigo.Please call her regarding this.  Please email excuse to her at [email or web address]

## 2018-04-09 ENCOUNTER — MYC MEDICAL ADVICE (OUTPATIENT)
Dept: FAMILY MEDICINE | Facility: CLINIC | Age: 58
End: 2018-04-09

## 2018-04-09 DIAGNOSIS — I10 HYPERTENSION: ICD-10-CM

## 2018-04-09 RX ORDER — METOPROLOL TARTRATE 50 MG
TABLET ORAL
Qty: 180 TABLET | Refills: 3 | Status: SHIPPED | OUTPATIENT
Start: 2018-04-09 | End: 2019-04-05

## 2018-04-09 NOTE — LETTER
Bessy Spangler  [address] [place] [address]  : 1960  MRN:  [id number]      2018      To whom it may concern  Due to medical condition is not able to drive or work 18 through 18 and will be re-evaluated on 18.  Best wishes,  Mike Tan MD

## 2018-04-10 ENCOUNTER — TRANSFERRED RECORDS (OUTPATIENT)
Dept: HEALTH INFORMATION MANAGEMENT | Facility: CLINIC | Age: 58
End: 2018-04-10

## 2018-04-10 DIAGNOSIS — Z94.0 KIDNEY TRANSPLANTED: Primary | ICD-10-CM

## 2018-04-10 RX ORDER — MYCOPHENOLIC ACID 180 MG/1
540 TABLET, DELAYED RELEASE ORAL 2 TIMES DAILY
Qty: 540 TABLET | Refills: 3 | Status: SHIPPED | OUTPATIENT
Start: 2018-04-10 | End: 2018-04-26

## 2018-04-11 ENCOUNTER — OFFICE VISIT (OUTPATIENT)
Dept: AUDIOLOGY | Facility: CLINIC | Age: 58
End: 2018-04-11
Payer: COMMERCIAL

## 2018-04-11 DIAGNOSIS — H90.3 SENSORINEURAL HEARING LOSS (SNHL) OF BOTH EARS: Primary | ICD-10-CM

## 2018-04-11 PROCEDURE — 92557 COMPREHENSIVE HEARING TEST: CPT | Performed by: AUDIOLOGIST

## 2018-04-11 PROCEDURE — 92550 TYMPANOMETRY & REFLEX THRESH: CPT | Performed by: AUDIOLOGIST

## 2018-04-11 NOTE — TELEPHONE ENCOUNTER
I reviewed need for appointment to complete short term disability and FMLA paperwork or ask ENT specialist to complete as I will be out of the office next week. I will provide a note for her work at this time documenting absence 4/1/18 through 4/13/18 when she is evaluated by ENT.  Best wishes,  Mike Tan MD

## 2018-04-11 NOTE — PROGRESS NOTES
AUDIOLOGY REPORT    SUMMARY: Audiology visit completed. See audiogram for results.    RECOMMENDATIONS: Follow-up with ENT.    Geo Ryan  Doctor of Audiology  [place] License # [id number]

## 2018-04-13 ENCOUNTER — OFFICE VISIT (OUTPATIENT)
Dept: OTOLARYNGOLOGY | Facility: CLINIC | Age: 58
End: 2018-04-13
Payer: COMMERCIAL

## 2018-04-13 VITALS
HEART RATE: 69 BPM | HEIGHT: 66 IN | SYSTOLIC BLOOD PRESSURE: 139 MMHG | WEIGHT: 215 LBS | DIASTOLIC BLOOD PRESSURE: 78 MMHG | BODY MASS INDEX: 34.55 KG/M2

## 2018-04-13 DIAGNOSIS — H81.21 VESTIBULAR NEURONITIS OF RIGHT EAR: Primary | ICD-10-CM

## 2018-04-13 DIAGNOSIS — R42 VERTIGO: ICD-10-CM

## 2018-04-13 PROCEDURE — 99203 OFFICE O/P NEW LOW 30 MIN: CPT | Performed by: OTOLARYNGOLOGY

## 2018-04-13 ASSESSMENT — ENCOUNTER SYMPTOMS
NAUSEA: 0
STRIDOR: 0
DIZZINESS: 1
VOMITING: 0
SPUTUM PRODUCTION: 0
DOUBLE VISION: 0
HEADACHES: 0
COUGH: 0
BLURRED VISION: 0
SORE THROAT: 0
TREMORS: 0
PHOTOPHOBIA: 0
HEMOPTYSIS: 0
BRUISES/BLEEDS EASILY: 0
NECK PAIN: 0
TINGLING: 0
HEARTBURN: 0
CONSTITUTIONAL NEGATIVE: 1
SINUS PAIN: 0

## 2018-04-13 NOTE — LETTER
4/13/2018         RE: Bessy Spangler  [address] [place] [address]        Dear Colleague,    Thank you for referring your patient, Bessy Spangler, to the [facility]. Please see a copy of my visit note below.    HPI    This is a 58 year old patient who has been having vertigo, dizziness and balance issues for the past 2 weeks. She was at work and her symptoms started as spinning sensation, nausea, vomiting for several hours. Felt dizzy and off-balanced next day. Next morning, her symptoms continued with vertigo and she was seen in the ER. Her symptoms aggravate when she lays down, rolls over, or turns head quickly. Feels ear pressure and tinnitus in her right ear. Denies any drainage, head trauma, head and neck surgery, back issues, Migraine, weakness, numbness or tingling sensation. She is premenopausal. She has a hx of kidney transplantation, thyroidal issues, and breast Ca. Her BP is under control. No hx of diabetes. She was given vestibular suppressors which did not seem to be helping. Her CT of head was noncontributory. She was diagnosed with BPPV and later vestibular neuronitis. She was given vestibular rehab exercises. Her sister has a hx of vertigo otherwise family hx is negative for vertigo.    Review of Systems   Constitutional: Negative.    HENT: Positive for hearing loss and tinnitus. Negative for congestion, ear discharge, ear pain, nosebleeds, sinus pain and sore throat.    Eyes: Negative for blurred vision, double vision and photophobia.   Respiratory: Negative for cough, hemoptysis, sputum production and stridor.    Gastrointestinal: Negative for heartburn, nausea and vomiting.   Musculoskeletal: Negative for neck pain.   Skin: Negative.    Neurological: Positive for dizziness. Negative for tingling, tremors and headaches.   Endo/Heme/Allergies: Positive for environmental allergies. Does not bruise/bleed easily.         Physical Exam   Constitutional: She is  well-developed, well-nourished, and in no distress.   HENT:   Head: Normocephalic and atraumatic.   Right Ear: Tympanic membrane, external ear and ear canal normal. No drainage, swelling or tenderness. No middle ear effusion. Decreased hearing is noted.   Left Ear: Tympanic membrane, external ear and ear canal normal. No drainage, swelling or tenderness.  No middle ear effusion. Decreased hearing is noted.   Nose: Nose normal. No mucosal edema, rhinorrhea or septal deviation.   Mouth/Throat: Uvula is midline, oropharynx is clear and moist and mucous membranes are normal. No oropharyngeal exudate.   Eyes: Pupils are equal, round, and reactive to light.   Neck: Neck supple. No tracheal deviation present. No thyromegaly present.   Lymphadenopathy:     She has no cervical adenopathy.   Head shake test: Positive with rotatory nystagmus.  Walking: WNLs.  Nystagmus: present to the left.    A/P    This pleasant patient is having peripheral vertigo; likely due to vestibular neuronitis. Her hearing test showed mild SNHL especially in higher frequencies. I would like to request an MRI of the brain, comprehensive vestibular testing if she can tolerate and Richardson hallpike. In the meantime, good hydration and resting recommended. She will stop her vestibular suppressors 48 hours before testing. Her questions were answered.         Again, thank you for allowing me to participate in the care of your patient.        Sincerely,        Pino Monteiro MD

## 2018-04-13 NOTE — MR AVS SNAPSHOT
After Visit Summary   4/13/2018    Bessy Spangler    MRN: [id number]           Patient Information     Date Of Birth          1960        Visit Information        Provider Department      4/13/2018 8:00 AM Pino Monteiro MD [facility]        Today's Diagnoses     Vestibular neuronitis of right ear    -  1    Vertigo          Care Instructions    Please call [phone] to schedule your testing at the           Follow-ups after your visit        Additional Services     AUDIOLOGY BALANCE REFERRAL       Your provider has referred you to: [facility] Balance [facility] [phone] [email or web address]    Audiology Balance Referral (Comprehensive) includes Videonystagmography (VNG), Rotational Chair testing, and Computerized Dynamic Posturography.  You may also select individual tests from the list below.    Procedure(s): Audiology Balance Referral Comprehensive (includes Videonystagmography (VNG), Rotational Chair Testing and Computerized Dynamic Posturography)                  Future tests that were ordered for you today     Open Future Orders        Priority Expected Expires Ordered    MR Brain w/o & w Contrast Routine  4/13/2019 4/13/2018            Who to contact     If you have questions or need follow up information about today's clinic visit or your schedule please contact [facility] directly at [phone].  Normal or non-critical lab and imaging results will be communicated to you by MyChart, letter or phone within 4 business days after the clinic has received the results. If you do not hear from us within 7 days, please contact the clinic through MyChart or phone. If you have a critical or abnormal lab result, we will notify you by phone as soon as possible.  Submit refill requests through [employer] or call your pharmacy and they will forward the refill request to us. Please allow 3 business days for  "your refill to be completed.          Additional Information About Your Visit        [employer] Information     [employer] gives you secure access to your electronic health record. If you see a primary care provider, you can also send messages to your care team and make appointments. If you have questions, please call your primary care clinic.  If you do not have a primary care provider, please call [phone] and they will assist you.      [employer] is an electronic gateway that provides easy, online access to your medical records. With [employer], you can request a clinic appointment, read your test results, renew a prescription or communicate with your care team.     To access your existing account, please contact your [facility] or call [phone] for assistance.        Care EveryWhere ID     This is your Care EveryWhere ID. This could be used by other organizations to access your [place] medical records  [id number]        Your Vitals Were     Pulse Height Last Period BMI (Body Mass Index)          69 1.676 m (5' 6\") 10/25/2004 34.7 kg/m2         Blood Pressure from Last 3 Encounters:   04/13/18 139/78   04/01/18 157/64   12/24/17 152/82    Weight from Last 3 Encounters:   04/13/18 97.5 kg (215 lb)   04/01/18 98.4 kg (217 lb)   12/24/17 97.5 kg (215 lb)              We Performed the Following     AUDIOLOGY BALANCE REFERRAL        Primary Care Provider Office Phone # Fax #    Mike Tan [phone]       [address] [facility] [address]        Equal Access to Services     HARSHA DOAN : Hadii aad ku hadasho Soomaali, waaxda luqadaha, qaybta kaalmada adeegyada, sandra carnes . So [facility] [phone].    ATENCIÓN: Si habla español, tiene a garcia disposición servicios gratuitos de asistencia lingüística. Llame al [phone].    We comply with applicable federal civil rights laws and [place] laws. We do not discriminate on the basis " of race, color, national origin, age, disability, sex, sexual orientation, or gender identity.            Thank you!     Thank you for choosing [facility]  for your care. Our goal is always to provide you with excellent care. Hearing back from our patients is one way we can continue to improve our services. Please take a few minutes to complete the written survey that you may receive in the mail after your visit with us. Thank you!             Your Updated Medication List - Protect others around you: Learn how to safely use, store and throw away your medicines at www.disposemymeds.org.          This list is accurate as of 4/13/18  8:58 AM.  Always use your most recent med list.                   Brand Name Dispense Instructions for use Diagnosis    levothyroxine 112 MCG tablet    SYNTHROID/LEVOTHROID    90 tablet    Take 1 tablet (112 mcg) by mouth daily    Thyroid disorder       LORazepam 1 MG tablet    ATIVAN    9 tablet    Take 1 tablet (1 mg) by mouth every 8 hours as needed for nausea        meclizine 12.5 MG tablet    ANTIVERT    30 tablet    Take 2 tablets (25 mg) by mouth 4 times daily as needed for dizziness        metoprolol tartrate 50 MG tablet    LOPRESSOR    180 tablet    TAKE 1 TABLET (50 MG) BY MOUTH 2 TIMES DAILY    Hypertension       mycophenolic acid 180 MG EC tablet     540 tablet    Take 3 tablets (540 mg) by mouth 2 times daily    Kidney transplanted       * tacrolimus 1 MG capsule    GENERIC EQUIVALENT    540 capsule    Take 3 capsules (3 mg) by mouth 2 times daily    Kidney transplanted       * tacrolimus 1 MG capsule    GENERIC EQUIVALENT    540 capsule    Take 3 capsules (3 mg) by mouth 2 times daily    Kidney replaced by transplant       VITAMIN E PO           ZOFRAN PO      Take 4 mg by mouth        * Notice:  This list has 2 medication(s) that are the same as other medications prescribed for you. Read the directions carefully, and ask your doctor or other care provider  to review them with you.

## 2018-04-13 NOTE — NURSING NOTE
"Bessy Spangler's goals for this visit include:   Chief Complaint   Patient presents with     Consult     Tinnitis x 2 weeks, vertigo since March       She requests these members of her care team be copied on today's visit information:   Chief Complaint   Patient presents with     Consult     Tinnitis x 2 weeks, vertigo since March         PCP: Mike Tan    Referring Provider:  No referring provider defined for this encounter.    Chief Complaint   Patient presents with     Consult     Tinnitis x 2 weeks, vertigo since March       Initial /78  Pulse 69  Ht 1.676 m (5' 6\")  Wt 97.5 kg (215 lb)  LMP 10/25/2004  BMI 34.7 kg/m2 Estimated body mass index is 34.7 kg/(m^2) as calculated from the following:    Height as of this encounter: 1.676 m (5' 6\").    Weight as of this encounter: 97.5 kg (215 lb).  Medication Reconciliation: complete    "

## 2018-04-16 ENCOUNTER — TELEPHONE (OUTPATIENT)
Dept: FAMILY MEDICINE | Facility: CLINIC | Age: 58
End: 2018-04-16

## 2018-04-16 ENCOUNTER — TELEPHONE (OUTPATIENT)
Dept: OTOLARYNGOLOGY | Facility: CLINIC | Age: 58
End: 2018-04-16

## 2018-04-16 DIAGNOSIS — H81.21 VESTIBULAR NEURONITIS OF RIGHT EAR: Primary | ICD-10-CM

## 2018-04-16 NOTE — TELEPHONE ENCOUNTER
I completed short term disability forms from 4/1/18 through 4/13/18. Further extension of work absence should be requested through ENT in my absence.  Best wishes,  Mike Tan MD

## 2018-04-16 NOTE — TELEPHONE ENCOUNTER
[facility] Call Center    Phone Message    May a detailed message be left on voicemail: yes    Reason for Call: Other: Aliza from [employer] is requested that the forms for workability and restriction be return or a verbal certification will work as well. If, When, and duration of work restriction.  Claim 2018-04-.  original form faxed to [facility] clinic on 4.9.18.  Verbal authrization can be given to Unruly      Action Taken: Message routed to:  [facility] ([facility] primary

## 2018-04-16 NOTE — TELEPHONE ENCOUNTER
Reason for call:  Other   Patient called regarding (reason for call): needs a return to work letter from Dr Omid Monteiro.  Has restrictions, no driving. But has arrangements to get to work.  Work is requiring that a letter be sent.   Additional comments: for letter, fax to: [phone]  Attn: Ella Vergara    Phone number to reach patient:  Cell number on file:    Telephone Information:   Mobile [phone]       Best Time:  any    Can we leave a detailed message on this number?  YES

## 2018-04-17 ENCOUNTER — TELEPHONE (OUTPATIENT)
Dept: OTOLARYNGOLOGY | Facility: CLINIC | Age: 58
End: 2018-04-17

## 2018-04-17 NOTE — TELEPHONE ENCOUNTER
[facility] Call Center    Phone Message    May a detailed message be left on voicemail: yes    Reason for Call: Other: Pt would like to move balance testing from 4/21/18, to 4/28/18, to coordinate appts into one day.  Please follow up with pt.     Action Taken: Other: Routed to:  [facility]NT [facility]    Forwarded message to audiology scheduling:  Patient wants to change  balance testing from 4/21 to 4/28 ( wants to do on same day as MRI).This balance testing is ordered by Omid Monteiro / DANIELA in [place], not ENT MD here at [facility].

## 2018-04-21 ENCOUNTER — OFFICE VISIT (OUTPATIENT)
Dept: AUDIOLOGY | Facility: CLINIC | Age: 58
End: 2018-04-21
Payer: COMMERCIAL

## 2018-04-21 DIAGNOSIS — H81.21 VESTIBULAR NEURONITIS OF RIGHT EAR: ICD-10-CM

## 2018-04-21 DIAGNOSIS — R42 DIZZINESS AND GIDDINESS: Primary | ICD-10-CM

## 2018-04-21 NOTE — MR AVS SNAPSHOT
After Visit Summary   4/21/2018    Bessy Spangler    MRN: [id number]           Patient Information     Date Of Birth          1960        Visit Information        Provider Department      4/21/2018 8:00 AM Megan Paulson [facility] Audiology        Today's Diagnoses     Dizziness and giddiness    -  1    Vestibular neuronitis of right ear           Follow-ups after your visit        Your next 10 appointments already scheduled     Apr 28, 2018  9:15 AM CDT   (Arrive by 9:00 AM)   MR BRAIN W/O & W CONTRAST with [address] [facility] ([facility])    [address] [address] [phone]           Take your medicines as usual, unless your doctor tells you not to. Bring a list of your current medicines to your exam (including vitamins, minerals and over-the-counter drugs).  You may or may not receive intravenous (IV) contrast for this exam pending the discretion of the Radiologist.  You do not need to do anything special to prepare.  The MRI machine uses a strong magnet. Please wear clothes without metal (snaps, zippers). A sweatsuit works well, or we may give you a hospital gown.  Please remove any body piercings and hair extensions before you arrive. You will also remove watches, jewelry, hairpins, wallets, dentures, partial dental plates and hearing aids. You may wear contact lenses, and you may be able to wear your rings. We have a safe place to keep your personal items, but it is safer to leave them at home.  **IMPORTANT** THE INSTRUCTIONS BELOW ARE ONLY FOR THOSE PATIENTS WHO HAVE BEEN PRESCRIBED SEDATION OR GENERAL ANESTHESIA DURING THEIR MRI PROCEDURE:  IF YOUR DOCTOR PRESCRIBED ORAL SEDATION (take medicine to help you relax during your exam):   You must get the medicine from your doctor (oral medication) before you arrive. Bring the medicine to the exam. Do not take it at home. You ll be told when to take it upon  arriving for your exam.   Arrive one hour early. Bring someone who can take you home after the test. Your medicine will make you sleepy. After the exam, you may not drive, take a bus or take a taxi by yourself.  IF YOUR DOCTOR PRESCRIBED IV SEDATION:   Arrive one hour early. Bring someone who can take you home after the test. Your medicine will make you sleepy. After the exam, you may not drive, take a bus or take a taxi by yourself.   No eating 6 hours before your exam. You may have clear liquids up until 4 hours before your exam. (Clear liquids include water, clear tea, black coffee and fruit juice without pulp.)  IF YOUR DOCTOR PRESCRIBED ANESTHESIA (be asleep for your exam):   Arrive 1 1/2 hours early. Bring someone who can take you home after the test. You may not drive, take a bus or take a taxi by yourself.   No eating 8 hours before your exam. You may have clear liquids up until 4 hours before your exam. (Clear liquids include water, clear tea, black coffee and fruit juice without pulp.)   You will spend four to five hours in the recovery room.  Please call the Imaging Department at your exam site with any questions.              Who to contact     Please call your clinic at [phone] to:    Ask questions about your health    Make or cancel appointments    Discuss your medicines    Learn about your test results    Speak to your doctor            Additional Information About Your Visit        [employer] Information     [employer] gives you secure access to your electronic health record. If you see a primary care provider, you can also send messages to your care team and make appointments. If you have questions, please call your primary care clinic.  If you do not have a primary care provider, please call [phone] and they will assist you.      [employer] is an electronic gateway that provides easy, online access to your medical records. With [employer], you can request a clinic appointment, read your test results,  renew a prescription or communicate with your care team.     To access your existing account, please contact your [facility] or call [phone] for assistance.        Care EveryWhere ID     This is your Care EveryWhere ID. This could be used by other organizations to access your [place] medical records  [id number]        Your Vitals Were     Last Period                   10/25/2004            Blood Pressure from Last 3 Encounters:   04/13/18 139/78   04/01/18 157/64   12/24/17 152/82    Weight from Last 3 Encounters:   04/13/18 97.5 kg (215 lb)   04/01/18 98.4 kg (217 lb)   12/24/17 97.5 kg (215 lb)              We Performed the Following     Caloric Vestibular Test, W./ Rec, Bilateral, [place], 3 Irrigations (94347/52)     Oscillating Track Test (77968)     Positional Nystagmus W/ Recording (32048)     Sinusoidal Vertical Axis Rotation Test (96879)     Spontaneous Nystagmus W/ Recording (26181)     Tympanometry (impedance - testing) (79606)        Primary Care Provider Office Phone # Fax #    Mike Tan [phone] [address] [facility] [address]        Equal Access to Services     KARL DOAN : Hadii aad ku hadasho Soomaali, waaxda luqadaha, qaybta kaalmada adeegyada, waxay arturo haysidran keesha hawley. So [facility] [phone].    ATENCIÓN: Si habla español, tiene a garcia disposición servicios gratuitos de asistencia lingüística. Llame al [phone].    We comply with applicable federal civil rights laws and [place] laws. We do not discriminate on the basis of race, color, national origin, age, disability, sex, sexual orientation, or gender identity.            Thank you!     Thank you for choosing [facility] AUDIOLOGY  for your care. Our goal is always to provide you with excellent care. Hearing back from our patients is one way we can continue to improve our services. Please take a few minutes to complete the written survey that you  may receive in the mail after your visit with us. Thank you!             Your Updated Medication List - Protect others around you: Learn how to safely use, store and throw away your medicines at www.disposemymeds.org.          This list is accurate as of 4/21/18  1:09 PM.  Always use your most recent med list.                   Brand Name Dispense Instructions for use Diagnosis    levothyroxine 112 MCG tablet    SYNTHROID/LEVOTHROID    90 tablet    Take 1 tablet (112 mcg) by mouth daily    Thyroid disorder       LORazepam 1 MG tablet    ATIVAN    9 tablet    Take 1 tablet (1 mg) by mouth every 8 hours as needed for nausea        meclizine 12.5 MG tablet    ANTIVERT    30 tablet    Take 2 tablets (25 mg) by mouth 4 times daily as needed for dizziness        metoprolol tartrate 50 MG tablet    LOPRESSOR    180 tablet    TAKE 1 TABLET (50 MG) BY MOUTH 2 TIMES DAILY    Hypertension       mycophenolic acid 180 MG EC tablet     540 tablet    Take 3 tablets (540 mg) by mouth 2 times daily    Kidney transplanted       * tacrolimus 1 MG capsule    GENERIC EQUIVALENT    540 capsule    Take 3 capsules (3 mg) by mouth 2 times daily    Kidney transplanted       * tacrolimus 1 MG capsule    GENERIC EQUIVALENT    540 capsule    Take 3 capsules (3 mg) by mouth 2 times daily    Kidney replaced by transplant       VITAMIN E PO           ZOFRAN PO      Take 4 mg by mouth        * Notice:  This list has 2 medication(s) that are the same as other medications prescribed for you. Read the directions carefully, and ask your doctor or other care provider to review them with you.

## 2018-04-21 NOTE — Clinical Note
Dr. Monteiro- Vestibular report attached. Patient has total right caloric hypofunction in uncompensated state. I didn't see BPPV today. Please consider referring for continued vestibular rehab therapy with PT. Thank you, Megan Paulson, Audiology

## 2018-04-21 NOTE — PROGRESS NOTES
AUDIOLOGY REPORT    BACKGROUND INFORMATION: Bessy Spangler, 58 year old, was seen in Audiology at the [facility] on 4/21/2018, for videonystagmography (VNG) and rotational chair testing, referred by Dr. Monteiro. The patient reports recent onset of dizziness 3 weeks ago. Symptoms started with intense spinning, nausea for several days and she was seen in ER. She has received diagnosis of right vestibular neuritis and right BPPV. She saw PT and was given vestibular exercises. She thinks her symptoms are starting to improve but her symptoms are still interfering with her typical daily life.  She has history of kidney transplant.  Hearing evaluations have revealed mild high frequency hearing loss.  Patient describes hearing differently in the right ear. Patient describes tripping on a stair last year and falling and spraining ankle. She has fallen twice in past year with the one ankle sprain. The patient has not taken any antivestibular medications in the past 24 hours. Patient did report having one cup of coffee this morning.    TEST RESULTS AND PROCEDURES:    Dizziness Handicap Inventory (DHI): 66/100 severe perceived impairment    Modified Clinical Test of Sensory Integration and Balance (mCTSIB): 30/30/30/5  Patient was able to maintain upright stance with eyes open and closed on floor, and with eyes open on foam. Patient was not able to maintain stance with eyes closed on foam, and test was discontinued. There were no falls.    Rotational chair testing:   Sinusoidal harmonic acceleration test:    Spontaneous nystagmus: Present: left-beating nystagmus  Phase: Abnormal: Phase lead at 0.01, 0.02, 0.04, 0.08, 0.16 and 0.32 Hz  Gain: Abnormal: Low gain at 0.01, 0.02 Hz, rising to borderline normal at 0.04, 0.08, 0.16 and 0.32 Hz  Symmetry: Abnormal: Right greater than left slow component velocity asymmetry at 0.01, 0.02, 0.04, 0.08, 0.16 and 0.32 Hz  Spectral Purity:  Normal at 0.01, 0.02, 0.04, 0.08, 0.16 and 0.32 Hz  Overall rotational chair test: Abnormal: pattern consistent with right peripheral vestibular hypofunction in uncompensated state    Videonystagmography (VNG) testing:  Tympanograms: normal eardrum mobility bilaterally  Ocular range of motion and ocular counter roll: Normal  Head Thrust: Positive to the right  Gaze nystagmus with fixation: Positive for slight left torsional nystagmus  Saccades: Normal  Anti-saccades: Normal  Pursuit: Normal    Gaze with fixation removed: Center: 5 deg/s left beat; Right: negative; Left: 10 deg/s left torsional; Up: 5 deg/s  Head Shake test: 6 deg/s left beat (horizontal shake); 4 deg/s left beat (vertical shake)    Richardson-Hallpike Head Right: Negative for classic BPPV, however patient had slight subjective symptoms. Left beating nystagmus present as in other positions   Richardson-Hallpike Head Left: Negative for classic BPPV, left beating nystagmus present as in other positions  Roll Test: Negative for symptoms bilaterally; left beating nystagmus present as in other positions    Positionals: Supine: Positive 12 deg/s left beat  Positionals: Body Right: Positive 6 deg/s left beat  Positionals: Body Left: Positive 6 deg/s left beat  Positionals: Pre-Caloric: Positive 7 deg/s left beat    Calorics (Tested at 44 degrees and 30 degrees Celsius for 30 seconds for warm and cool water, respectively):  Right Warm Eye Speed: 7 degrees per second left beating (same as pre-caloric baseline)  Left Warm Eye Speed: 55 degrees per second left beating  Right Ice Eye Speed: 7 deg/s left beat at 30 deg supine, and 6 deg/s left beat with head prone, no change or reversal. (same as pre-caloric baseline)  Difference between ear: No response to caloric stimuli in the right ear indicating total right peripheral caloric weakness. (Greater than 25% considered clinically significant.)  Fixation Index: 0.01  Overall caloric test: abnormal    SUMMARY AND  RECOMMENDATIONS:   1) There were no indications of significant central vestibular system involvement noted on today's exam. Nystagmus recorded in gaze with fixation is enhanced without fixation and is most likely due to actue peripheral vestibular involvement.     2) Indications of peripheral vestibular system involvement noted on today's exam were as follows:   Rotary chair pattern consistent with right peripheral hypofunction; total lack of response to caloric stimulation in the right ear; left beating primarily horizontal nystagmus enhanced without fixation is all consistent with right peripheral vestibular hypofunction in an uncompensated state. There was no evidence of classic BPPV today however patient did notice slight subjective symptoms with right Richardson-Hallpike.    Follow-up with Dr. Monteiro for medical management. Continue with PT vestibular rehabilitation to aid in central compensation process and fall prevention.  Please call this clinic at [phone] with questions regarding these results or recommendations.     Geo Loco, CCC-A  Board Certified in Audiology   Licensed Audiologist [place]: 9813

## 2018-04-24 ENCOUNTER — TELEPHONE (OUTPATIENT)
Dept: OTOLARYNGOLOGY | Facility: CLINIC | Age: 58
End: 2018-04-24

## 2018-04-26 ENCOUNTER — TELEPHONE (OUTPATIENT)
Dept: TRANSPLANT | Facility: CLINIC | Age: 58
End: 2018-04-26

## 2018-04-26 DIAGNOSIS — Z94.0 KIDNEY TRANSPLANTED: ICD-10-CM

## 2018-04-26 RX ORDER — MYCOPHENOLIC ACID 180 MG/1
540 TABLET, DELAYED RELEASE ORAL 2 TIMES DAILY
Qty: 540 TABLET | Refills: 3 | Status: SHIPPED | OUTPATIENT
Start: 2018-04-26 | End: 2018-12-31

## 2018-04-26 NOTE — TELEPHONE ENCOUNTER
Patient Call: Medication Refill  Route to LPN  Instruct the patient to first contact their pharmacy. If they have called their pharmacy and require further assistance, route to LPN.    [facility] Name: [facility]  Name of Medication: Myphortic  When will the patient be out of this medication?: Less than 3 days (Route high priority)

## 2018-04-28 ENCOUNTER — RADIANT APPOINTMENT (OUTPATIENT)
Dept: MRI IMAGING | Facility: CLINIC | Age: 58
End: 2018-04-28
Attending: OTOLARYNGOLOGY
Payer: COMMERCIAL

## 2018-04-28 DIAGNOSIS — R42 VERTIGO: ICD-10-CM

## 2018-04-28 DIAGNOSIS — H81.21 VESTIBULAR NEURONITIS OF RIGHT EAR: ICD-10-CM

## 2018-04-28 RX ORDER — GADOBUTROL 604.72 MG/ML
10 INJECTION INTRAVENOUS ONCE
Status: DISCONTINUED | OUTPATIENT
Start: 2018-04-28 | End: 2018-04-28 | Stop reason: CLARIF

## 2018-04-28 RX ORDER — GADOBUTROL 604.72 MG/ML
10 INJECTION INTRAVENOUS ONCE
Status: DISCONTINUED | OUTPATIENT
Start: 2018-04-28 | End: 2018-04-28

## 2018-04-28 NOTE — DISCHARGE INSTRUCTIONS
MRI Contrast Discharge Instructions    The IV contrast you received today will pass out of your body in your  urine. This will happen in the next 24 hours. You will not feel this process.  Your urine will not change color.    Drink at least 4 extra glasses of water or juice today (unless your doctor  has restricted your fluids). This reduces the stress on your kidneys.  You may take your regular medicines.    If you are on dialysis: It is best to have dialysis today.    If you have a reaction: Most reactions happen right away. If you have  any new symptoms after leaving the hospital (such as hives or swelling),  call your hospital at the correct number below. Or call your family doctor.  If you have breathing distress or wheezing, call 911.    Special instructions: ***    I have read and understand the above information.    Signature:______________________________________ Date:___________    Staff:__________________________________________ Date:___________     Time:__________    [facility] Departments:    ___[place]: [phone]  ___[facility]: [phone]  ___[place]: [phone] ___[facility]: [phone]  ___[facility]: [phone]  ___[facility]: [phone]  ___[phone]  ___[facility]: [phone]  ___[phone]

## 2018-05-03 ENCOUNTER — TELEPHONE (OUTPATIENT)
Dept: OTOLARYNGOLOGY | Facility: CLINIC | Age: 58
End: 2018-05-03

## 2018-05-03 NOTE — TELEPHONE ENCOUNTER
Phone call to pt with MRI results showing no pathology or cause for vertigo.  Pt states she has gone to PT for vestibular therapy.  She no longer experiences dizziness, but still feels unsteady on her feet. Pt has been doing PT exercises , and will return for more therapy if unsteadiness fails to resolve.  Pt states she will call back clinic if further questions or concerns arise.  Eli Vogel RN

## 2018-05-06 ENCOUNTER — HEALTH MAINTENANCE LETTER (OUTPATIENT)
Age: 58
End: 2018-05-06

## 2018-06-26 DIAGNOSIS — Z48.298 AFTERCARE FOLLOWING ORGAN TRANSPLANT: ICD-10-CM

## 2018-06-26 DIAGNOSIS — Z79.899 ENCOUNTER FOR LONG-TERM CURRENT USE OF MEDICATION: ICD-10-CM

## 2018-06-26 DIAGNOSIS — Z94.0 KIDNEY REPLACED BY TRANSPLANT: ICD-10-CM

## 2018-06-26 LAB
ANION GAP SERPL CALCULATED.3IONS-SCNC: 10 MMOL/L (ref 3–14)
BUN SERPL-MCNC: 10 MG/DL (ref 7–30)
CALCIUM SERPL-MCNC: 9.5 MG/DL (ref 8.5–10.1)
CHLORIDE SERPL-SCNC: 103 MMOL/L (ref 94–109)
CO2 SERPL-SCNC: 26 MMOL/L (ref 20–32)
CREAT SERPL-MCNC: 0.71 MG/DL (ref 0.52–1.04)
CREAT UR-MCNC: 19 MG/DL
ERYTHROCYTE [DISTWIDTH] IN BLOOD BY AUTOMATED COUNT: 13.2 % (ref 10–15)
GFR SERPL CREATININE-BSD FRML MDRD: 84 ML/MIN/1.7M2
GLUCOSE SERPL-MCNC: 97 MG/DL (ref 70–99)
HCT VFR BLD AUTO: 37.5 % (ref 35–47)
HGB BLD-MCNC: 12.1 G/DL (ref 11.7–15.7)
MCH RBC QN AUTO: 30.2 PG (ref 26.5–33)
MCHC RBC AUTO-ENTMCNC: 32.3 G/DL (ref 31.5–36.5)
MCV RBC AUTO: 94 FL (ref 78–100)
PLATELET # BLD AUTO: 138 10E9/L (ref 150–450)
POTASSIUM SERPL-SCNC: 4.5 MMOL/L (ref 3.4–5.3)
PROT UR-MCNC: <0.05 G/L
PROT/CREAT 24H UR: NORMAL G/G CR (ref 0–0.2)
RBC # BLD AUTO: 4.01 10E12/L (ref 3.8–5.2)
SODIUM SERPL-SCNC: 139 MMOL/L (ref 133–144)
TACROLIMUS BLD-MCNC: 4.8 UG/L (ref 5–15)
TME LAST DOSE: ABNORMAL H
WBC # BLD AUTO: 6 10E9/L (ref 4–11)

## 2018-06-26 PROCEDURE — 36415 COLL VENOUS BLD VENIPUNCTURE: CPT | Performed by: INTERNAL MEDICINE

## 2018-06-26 PROCEDURE — 85027 COMPLETE CBC AUTOMATED: CPT | Performed by: INTERNAL MEDICINE

## 2018-06-26 PROCEDURE — 84156 ASSAY OF PROTEIN URINE: CPT | Performed by: INTERNAL MEDICINE

## 2018-06-26 PROCEDURE — 80048 BASIC METABOLIC PNL TOTAL CA: CPT | Performed by: INTERNAL MEDICINE

## 2018-06-26 PROCEDURE — 80197 ASSAY OF TACROLIMUS: CPT | Performed by: INTERNAL MEDICINE

## 2018-07-24 ENCOUNTER — OFFICE VISIT (OUTPATIENT)
Dept: FAMILY MEDICINE | Facility: CLINIC | Age: 58
End: 2018-07-24
Payer: COMMERCIAL

## 2018-07-24 VITALS
TEMPERATURE: 98.5 F | WEIGHT: 222 LBS | OXYGEN SATURATION: 96 % | BODY MASS INDEX: 35.68 KG/M2 | DIASTOLIC BLOOD PRESSURE: 81 MMHG | SYSTOLIC BLOOD PRESSURE: 132 MMHG | HEIGHT: 66 IN | HEART RATE: 64 BPM

## 2018-07-24 DIAGNOSIS — D84.9 IMMUNOSUPPRESSED STATUS (H): ICD-10-CM

## 2018-07-24 DIAGNOSIS — Z94.0 KIDNEY REPLACED BY TRANSPLANT: ICD-10-CM

## 2018-07-24 DIAGNOSIS — E66.01 MORBID OBESITY (H): ICD-10-CM

## 2018-07-24 DIAGNOSIS — Z12.31 VISIT FOR SCREENING MAMMOGRAM: ICD-10-CM

## 2018-07-24 DIAGNOSIS — Z12.11 SCREEN FOR COLON CANCER: ICD-10-CM

## 2018-07-24 DIAGNOSIS — I82.4Z1 ACUTE DEEP VEIN THROMBOSIS (DVT) OF DISTAL VEIN OF RIGHT LOWER EXTREMITY (H): Primary | ICD-10-CM

## 2018-07-24 PROCEDURE — 99214 OFFICE O/P EST MOD 30 MIN: CPT | Performed by: PREVENTIVE MEDICINE

## 2018-07-24 ASSESSMENT — PAIN SCALES - GENERAL: PAINLEVEL: NO PAIN (0)

## 2018-07-24 NOTE — PATIENT INSTRUCTIONS
At [facility], we strive to deliver an exceptional experience to you, every time we see you.  If you receive a survey in the mail, please send us back your thoughts. We really do value your feedback.    Based on your medical history, these are the current health maintenance/preventive care services that you are due for (some may have been done at this visit.)  Health Maintenance Due   Topic Date Due     MAMMO Q1 YR  11/11/2015     COLONOSCOPY Q10 YR  03/03/2018       Suggested websites for health information:  Www.SocStock.org : Up to date and easily searchable information on multiple topics.  Www.Bookit.com.gov : medication info, interactive tutorials, watch real surgeries online  Www.familydoctor.org : good info from the Academy of Family Physicians  Www.cdc.gov : public health info, travel advisories, epidemics (H1N1)  Www.aap.org : children's health info, normal development, vaccinations  Www.health.[facility].[place].us : [place] dept of health, public health issues in [place], N1N1    Your care team:                            Family Medicine Internal Medicine   MD Mack Singh MD Shantel Branch-Fleming, MD Katya Georgiev PA-C Megan Hill, APRN CNP    Frank Gil MD Pediatrics   Gregg Pal, PAJOHN Hyde, CNP MD Karol Lanza APRN CNP   MD Marissa Reese MD Deborah Mielke, MD Kim Thein, APRN [facility]      Clinic hours: Monday - Thursday 7 am-7 pm; Fridays 7 am-5 pm.   Urgent care: Monday - Friday 11 am-9 pm; Saturday and Sunday 9 am-5 pm.  Pharmacy : Monday -Thursday 8 am-8 pm; Friday 8 am-6 pm; Saturday and Sunday 9 am-5 pm.     Clinic: [phone]   Pharmacy: [phone]

## 2018-07-24 NOTE — PROGRESS NOTES
SUBJECTIVE:   Bessy Spangler is a 58 year old female who presents to clinic today for the following health issues:      ED/UC Followup:    Facility:  [facility]  Date of visit: 6/29/18  Reason for visit: blood clot  Current Status: better     Reviewed Care Everywhere:    US VENOUS LOWER EXT RIGHT   Final Result   IMPRESSION:   1. Acute occlusive deep venous thrombosis along the right distal superficial femoral, popliteal, peroneal and posterior tibial veins.     REPORT SIGNED BY DR. Andrea Swann     Results per radiology.    Interventions:     Medications   2110: Eliquis 10 mg, PO     MDM:   Bessy Spangler is a 58 y.o. female who presents with right lower leg pain since yesterday. An ultrasound completed through triage shows a right lower extremity DVT. The patient has good distal pulses, no proximal DVT, in that leg, and otherwise looks well. She also has no concerning shortness of breath or pleuritic chest pain.     The cause of the DVT, however, is unclear. She did have a humerus fracture of the left upper extremity about a month ago, but that is healing and feeling better. She has had no history of clotting disorder that is known to her. She has had a previous DVT and PE years ago after a kidney transplant, but has had no episodes of spontaneous DVT in the past.     The patient will need further workup for the etiology of this current DVT, as per history and exam, there is no clear reason for it at this time. Her INR is normal. His hemoglobin is stable. Her kidney function is normal.     I did send a factor V acid, but I won't have this back today. She received her first dose of Eliquis as treatment for the DVT and will follow up with her primary care physician to determine what further blood tests need to be done and if she needs a consultation with hematology. The patient understands the plan.     Factor V negative    DIAGNOSIS:   1. Right lower leg acute DVT       Musculoskeletal  problem/pain      Duration: x 18    Description  Location: right calf pain    Intensity:  moderate    Accompanying signs and symptoms: none    History  Previous similar problem: no   Previous evaluation:  ultrasound    Precipitating or alleviating factors:  Trauma or overuse: no   Aggravating factors include: none    Therapies tried and outcome: Blood thinner    Overall better    Per patient,  a lot has happened in the last few months    No chest pain    No fever or chills    No shortness of breath       Problem list and histories reviewed & adjusted, as indicated.  Additional history: as documented    Patient Active Problem List   Diagnosis     Breast cancer (H)     Kidney replaced by transplant     CARDIOVASCULAR SCREENING; LDL GOAL LESS THAN 100     Immunosuppressed status (H)     Hypertension secondary to other renal disorders     Thyroid disorder     Post-menopausal     Conjunctival hemorrhage     Tear film insufficiency     Presbyopia - Both Eyes     Aftercare following organ transplant     Vestibular neuronitis of right ear     Morbid obesity (H)     Acute deep vein thrombosis (DVT) of distal vein of right lower extremity (H)     Past Surgical History:   Procedure Laterality Date     APPENDECTOMY OPEN       AV FISTULA OR GRAFT ARTERIAL      left     C TRANSPLANT,PREP LIVING  RENAL GRAFT  2009    Hx of nephritis     LUMPECTOMY BREAST      left       Social History   Substance Use Topics     Smoking status: Former Smoker     Packs/day: 1.00     Years: 20.00     Types: Cigarettes     Quit date: 1998     Smokeless tobacco: Never Used     Alcohol use No     Family History   Problem Relation Age of Onset     Diabetes Father      type 2       Arthritis Father      Glaucoma Father      Cancer Mother 58     smoker,metastatic adnoid cystic cancer,  early 60s     Arthritis Mother      Endocrine Disease Mother      thyroid     Hypertension Maternal Grandmother       at a young age      "Hypertension Maternal Grandfather      Cancer Paternal Grandmother      stomach cancer     Endocrine Disease Sister      thyroid     Genitourinary Problems Daughter 32     renal failure      Endocrine Disease Sister      thyroid     Chronic Obstructive Pulmonary Disease Paternal Grandfather      smoking  young         Current Outpatient Prescriptions   Medication Sig Dispense Refill     apixaban ANTICOAGULANT (ELIQUIS) 5 MG tablet Take 1 tablet (5 mg) by mouth 2 times daily 60 tablet 3     levothyroxine (SYNTHROID/LEVOTHROID) 112 MCG tablet Take 1 tablet (112 mcg) by mouth daily 90 tablet 3     metoprolol tartrate (LOPRESSOR) 50 MG tablet TAKE 1 TABLET (50 MG) BY MOUTH 2 TIMES DAILY 180 tablet 3     MYFORTIC (BRAND) 180 MG EC TABLET Take 3 tablets (540 mg) by mouth 2 times daily 540 tablet 3     tacrolimus (GENERIC EQUIVALENT) 1 MG capsule Take 3 capsules (3 mg) by mouth 2 times daily 540 capsule 3     VITAMIN D, CHOLECALCIFEROL, PO Take 1,000 Units by mouth daily       Allergies   Allergen Reactions     Dilaudid [Hydromorphone Hcl]      Patient unable to recall     Sulfa Drugs Swelling     BP Readings from Last 3 Encounters:   18 132/81   18 139/78   18 157/64    Wt Readings from Last 3 Encounters:   18 222 lb (100.7 kg)   18 215 lb (97.5 kg)   18 217 lb (98.4 kg)                  Labs reviewed in EPIC    Reviewed and updated as needed this visit by clinical staff  Tobacco  Allergies  Meds       Reviewed and updated as needed this visit by Provider         ROS:  Constitutional, neuro, ENT, endocrine, pulmonary, cardiac, gastrointestinal, genitourinary, musculoskeletal, integument and psychiatric systems are negative, except as otherwise noted.    OBJECTIVE:                                                    /81  Pulse 64  Temp 98.5  F (36.9  C) (Oral)  Ht 5' 6\" (1.676 m)  Wt 222 lb (100.7 kg)  LMP 10/25/2004  SpO2 96%  Breastfeeding? No  BMI 35.83 kg/m2  Body " mass index is 35.83 kg/(m^2).  GENERAL APPEARANCE: healthy, alert and no distress  EYES: Eyes grossly normal to inspection and conjunctivae and sclerae normal  NECK: trachea midline and normal to palpation  RESP: lungs clear to auscultation - no rales, rhonchi or wheezes  CV: regular rates and rhythm, normal S1 S2, no S3 or S4 and no murmur, click or rub  ABDOMEN: no rebound or guarding   MS: trace edema noted on right lower extremity, no erythema, no rash, peripheral pulses are palpable  SKIN: no suspicious lesions or rashes  NEURO: Normal strength and tone, mentation intact and speech normal  PSYCH: mentation appears normal    Diagnostic test results:  Diagnostic Test Results:  No results found for this or any previous visit (from the past 24 hour(s)).     ASSESSMENT/PLAN:                                                    1. Acute deep vein thrombosis (DVT) of distal vein of right lower extremity (H)  -Refill on Eliquis provided  -Previous DVT and PE after kidney transplant  -Factor V negative   - ONC/HEME ADULT REFERRAL  - apixaban ANTICOAGULANT (ELIQUIS) 5 MG tablet; Take 1 tablet (5 mg) by mouth 2 times daily  Dispense: 60 tablet; Refill: 3    2. Morbid obesity (H)  -Plans to increase exercise     3. Immunosuppressed status (H)  -History of renal transplant     4. Screen for colon cancer  -Will schedule colonoscopy herself  -Last 10 years ago     5. Visit for screening mammogram  -Appointment made  - MA SCREENING DIGITAL BILAT - Future  (s+30); Future    6. Kidney replaced by transplant  -Followed by Nephrology Dr Marquez       Follow up with Provider - 3 months with PCP     Gina Dc MD MPH    [facility]

## 2018-07-24 NOTE — MR AVS SNAPSHOT
After Visit Summary   7/24/2018    Bessy Spangler    MRN: [id number]           Patient Information     Date Of Birth          1960        Visit Information        Provider Department      7/24/2018 7:00 AM Gina Dc MD [facility]        Today's Diagnoses     Acute deep vein thrombosis (DVT) of distal vein of right lower extremity (H)    -  1    Morbid obesity (H)        Immunosuppressed status (H)        Screen for colon cancer        Visit for screening mammogram        Kidney replaced by transplant          Care Instructions    At [facility], we strive to deliver an exceptional experience to you, every time we see you.  If you receive a survey in the mail, please send us back your thoughts. We really do value your feedback.    Based on your medical history, these are the current health maintenance/preventive care services that you are due for (some may have been done at this visit.)  Health Maintenance Due   Topic Date Due     MAMMO Q1 YR  11/11/2015     COLONOSCOPY Q10 YR  03/03/2018       Suggested websites for health information:  Www.IDEA SPHERE.org : Up to date and easily searchable information on multiple topics.  Www.medlineplus.gov : medication info, interactive tutorials, watch real surgeries online  Www.familydoctor.org : good info from the Academy of Family Physicians  Www.cdc.gov : public health info, travel advisories, epidemics (H1N1)  Www.aap.org : children's health info, normal development, vaccinations  Www.health.state.[place].us : [place] dept of health, public health issues in [place], N1N1    Your care team:                            Family Medicine Internal Medicine   MD Mack Singh MD Shantel Branch-Fleming, MD Katya Georgiev PA-C Megan Hill, APRN CNP    Frank Gil MD Pediatrics   GISEL Donaldson, MD Karol Vivas APRN CNP   MD Marissa Reese MD Deborah Mielke, MD     DIAMANTE Nick CNP      Clinic hours: Monday - Thursday 7 am-7 pm; Fridays 7 am-5 pm.   Urgent care: Monday - Friday 11 am-9 pm; Saturday and Sunday 9 am-5 pm.  Pharmacy : Monday -Thursday 8 am-8 pm; Friday 8 am-6 pm; Saturday and Sunday 9 am-5 pm.     Clinic: [phone]   Pharmacy: [phone]              Follow-ups after your visit        Additional Services     ONC/HEME ADULT REFERRAL       Your provider has referred you to: [facility]: Cancer Care/Hematology (All Cancer Related Services) - [place] [phone]   [email or web address]    Please be aware that coverage of these services is subject to the terms and limitations of your health insurance plan.  Call member services at your health plan with any benefit or coverage questions.      Please bring the following with you to your appointment:    (1) Any X-Rays, CTs or MRIs which have been performed.  Contact the facility where they were done to arrange for  prior to your scheduled appointment.   (2) List of current medications  (3) This referral request   (4) Any documents/labs given to you for this referral                  Future tests that were ordered for you today     Open Future Orders        Priority Expected Expires Ordered    MA SCREENING DIGITAL BILAT - Future  (s+30) Routine  7/24/2019 7/24/2018            Who to contact     If you have questions or need follow up information about today's clinic visit or your schedule please contact [facility] directly at [phone].  Normal or non-critical lab and imaging results will be communicated to you by MyChart, letter or phone within 4 business days after the clinic has received the results. If you do not hear from us within 7 days, please contact the clinic through MyChart or phone. If you have a critical or abnormal lab result, we will notify you by phone as soon as possible.  Submit refill requests through "[employer]hart or  "call your pharmacy and they will forward the refill request to us. Please allow 3 business days for your refill to be completed.          Additional Information About Your Visit        [employer] Information     [employer] gives you secure access to your electronic health record. If you see a primary care provider, you can also send messages to your care team and make appointments. If you have questions, please call your primary care clinic.  If you do not have a primary care provider, please call [phone] and they will assist you.        Care EveryWhere ID     This is your Care EveryWhere ID. This could be used by other organizations to access your [facility] medical records  [id number]        Your Vitals Were     Pulse Temperature Height Last Period Pulse Oximetry Breastfeeding?    64 98.5  F (36.9  C) (Oral) 5' 6\" (1.676 m) 10/25/2004 96% No    BMI (Body Mass Index)                   35.83 kg/m2            Blood Pressure from Last 3 Encounters:   07/24/18 132/81   04/13/18 139/78   04/01/18 157/64    Weight from Last 3 Encounters:   07/24/18 222 lb (100.7 kg)   04/13/18 215 lb (97.5 kg)   04/01/18 217 lb (98.4 kg)              We Performed the Following     ONC/HEME ADULT REFERRAL          Today's Medication Changes          These changes are accurate as of 7/24/18  7:31 AM.  If you have any questions, ask your nurse or doctor.               These medicines have changed or have updated prescriptions.        Dose/Directions    apixaban ANTICOAGULANT 5 MG tablet   Commonly known as:  ELIQUIS   This may have changed:    - when to take this  - Another medication with the same name was removed. Continue taking this medication, and follow the directions you see here.   Used for:  Acute deep vein thrombosis (DVT) of distal vein of right lower extremity (H)   Changed by:  Gina Dc MD        Dose:  5 mg   Take 1 tablet (5 mg) by mouth 2 times daily   Quantity:  60 tablet   Refills:  3       tacrolimus 1 MG capsule "   Commonly known as:  GENERIC EQUIVALENT   This may have changed:  Another medication with the same name was removed. Continue taking this medication, and follow the directions you see here.   Used for:  Kidney replaced by transplant   Changed by:  Gina Dc MD        Dose:  3 mg   Take 3 capsules (3 mg) by mouth 2 times daily   Quantity:  540 capsule   Refills:  3         Stop taking these medicines if you haven't already. Please contact your care team if you have questions.     LORazepam 1 MG tablet   Commonly known as:  ATIVAN   Stopped by:  Gina Dc MD           meclizine 12.5 MG tablet   Commonly known as:  ANTIVERT   Stopped by:  Gina Dc MD           VITAMIN E PO   Stopped by:  Gina Dc MD           ZOFRAN PO   Stopped by:  Gina Dc MD                Where to get your medicines      These medications were sent to [place] [address] [place] [facility] [address] [facility]  [address] [facility] [address]     Phone:  [phone]     apixaban ANTICOAGULANT 5 MG tablet                Primary Care Provider Office Phone # Fax #    Mike Tan [phone] [address] [facility] [address]        Equal Access to Services     [facility]: Hadii aad ku hadasho Soomaali, waaxda luqadaha, qaybta kaalmada adeegyada, sandra morris haynicci hawley. So [facility] [phone].    ATENCIÓN: Si habla español, tiene a garcia disposición servicios gratuitos de asistencia lingüística. Llame al [phone].    We comply with applicable federal civil rights laws and [place] laws. We do not discriminate on the basis of race, color, national origin, age, disability, sex, sexual orientation, or gender identity.            Thank you!     Thank you for choosing [facility]  for your care. Our goal is always to provide you with excellent care. Hearing back from our patients is one way we can continue to improve our services.  Please take a few minutes to complete the written survey that you may receive in the mail after your visit with us. Thank you!             Your Updated Medication List - Protect others around you: Learn how to safely use, store and throw away your medicines at www.disposemymeds.org.          This list is accurate as of 7/24/18  7:31 AM.  Always use your most recent med list.                   Brand Name Dispense Instructions for use Diagnosis    apixaban ANTICOAGULANT 5 MG tablet    ELIQUIS    60 tablet    Take 1 tablet (5 mg) by mouth 2 times daily    Acute deep vein thrombosis (DVT) of distal vein of right lower extremity (H)       levothyroxine 112 MCG tablet    SYNTHROID/LEVOTHROID    90 tablet    Take 1 tablet (112 mcg) by mouth daily    Thyroid disorder       metoprolol tartrate 50 MG tablet    LOPRESSOR    180 tablet    TAKE 1 TABLET (50 MG) BY MOUTH 2 TIMES DAILY    Hypertension       mycophenolic acid 180 MG EC tablet     540 tablet    Take 3 tablets (540 mg) by mouth 2 times daily    Kidney transplanted       tacrolimus 1 MG capsule    GENERIC EQUIVALENT    540 capsule    Take 3 capsules (3 mg) by mouth 2 times daily    Kidney replaced by transplant       VITAMIN D (CHOLECALCIFEROL) PO      Take 1,000 Units by mouth daily

## 2018-07-31 ENCOUNTER — TELEPHONE (OUTPATIENT)
Dept: NEPHROLOGY | Facility: CLINIC | Age: 58
End: 2018-07-31

## 2018-07-31 NOTE — TELEPHONE ENCOUNTER
Left voicemail for patient to call back (follow up from last transplant appointment).    Alana White RN

## 2018-08-07 ENCOUNTER — ONCOLOGY VISIT (OUTPATIENT)
Dept: ONCOLOGY | Facility: CLINIC | Age: 58
End: 2018-08-07
Payer: COMMERCIAL

## 2018-08-07 ENCOUNTER — RADIANT APPOINTMENT (OUTPATIENT)
Dept: MAMMOGRAPHY | Facility: CLINIC | Age: 58
End: 2018-08-07
Payer: COMMERCIAL

## 2018-08-07 VITALS
DIASTOLIC BLOOD PRESSURE: 76 MMHG | OXYGEN SATURATION: 93 % | SYSTOLIC BLOOD PRESSURE: 136 MMHG | TEMPERATURE: 99.4 F | HEIGHT: 66 IN | BODY MASS INDEX: 35.84 KG/M2 | WEIGHT: 223 LBS | RESPIRATION RATE: 18 BRPM | HEART RATE: 68 BPM

## 2018-08-07 DIAGNOSIS — C50.112 MALIGNANT NEOPLASM OF CENTRAL PORTION OF LEFT BREAST IN FEMALE, ESTROGEN RECEPTOR POSITIVE (H): ICD-10-CM

## 2018-08-07 DIAGNOSIS — Z12.31 VISIT FOR SCREENING MAMMOGRAM: ICD-10-CM

## 2018-08-07 DIAGNOSIS — I82.4Z1 ACUTE DEEP VEIN THROMBOSIS (DVT) OF DISTAL VEIN OF RIGHT LOWER EXTREMITY (H): Primary | ICD-10-CM

## 2018-08-07 DIAGNOSIS — Z17.0 MALIGNANT NEOPLASM OF CENTRAL PORTION OF LEFT BREAST IN FEMALE, ESTROGEN RECEPTOR POSITIVE (H): ICD-10-CM

## 2018-08-07 PROCEDURE — 85306 CLOT INHIBIT PROT S FREE: CPT | Performed by: INTERNAL MEDICINE

## 2018-08-07 PROCEDURE — 36415 COLL VENOUS BLD VENIPUNCTURE: CPT | Performed by: INTERNAL MEDICINE

## 2018-08-07 PROCEDURE — 85303 CLOT INHIBIT PROT C ACTIVITY: CPT | Performed by: INTERNAL MEDICINE

## 2018-08-07 PROCEDURE — 86146 BETA-2 GLYCOPROTEIN ANTIBODY: CPT | Performed by: INTERNAL MEDICINE

## 2018-08-07 PROCEDURE — 85300 ANTITHROMBIN III ACTIVITY: CPT | Performed by: INTERNAL MEDICINE

## 2018-08-07 PROCEDURE — 86147 CARDIOLIPIN ANTIBODY EA IG: CPT | Performed by: INTERNAL MEDICINE

## 2018-08-07 PROCEDURE — 99204 OFFICE O/P NEW MOD 45 MIN: CPT | Performed by: INTERNAL MEDICINE

## 2018-08-07 PROCEDURE — 77067 SCR MAMMO BI INCL CAD: CPT | Mod: TC

## 2018-08-07 ASSESSMENT — PAIN SCALES - GENERAL: PAINLEVEL: NO PAIN (0)

## 2018-08-07 NOTE — PATIENT INSTRUCTIONS
Labs today    Cont Eliquis    Refer to Genetic Counselor    Repeat US in 2 months and see me a few days after that

## 2018-08-07 NOTE — LETTER
8/7/2018         RE: Bessy Spangler  [address] [place] [address]        Dear Colleague,    Thank you for referring your patient, Bessy Spangler, to the [facility]. Please see a copy of my visit note below.    Hematology initial visit:  Date on this visit: 8/7/2018    Besys Spangler  is referred by Dr.Shaista Dc for a hematology consultation. She requires evaluation for new diagnosis of DVT    Primary Physician: Mike Tan     History Of Present Illness:  Ms. Spangler is a 58 year old female who has a hx of grade 2 Infiltrating ductal carcinoma of the left breast diagnosed March of 2002, 1.1 cm, tumor stage T1c, N0, stage 1, ER/CO positive, and HER-2 3+ positive, treated with lumpectomy followed by 4 cycles of Adriamycin and cyclophosphamide followed by radiation. She was on on tamoxifen from July 2002 until June 2005. At that point it was changed to exemestane. In May 2008 patient elected to stop therapy.   She also has a hx of nephritis and had kidney transplant in July 2009. She then had PE in September 2009 in the post transplant setting. Factor V Leidin and Prothrombin gene mutations were negative.    Recently found to have Acute occlusive deep venous thrombosis along the right distal superficial femoral, popliteal, peroneal and posterior tibial veins, when she presented with a couple of days of right leg pain and swelling. There was no prior sickness, long travels, or other provoking factors. No local trauma apart from a sprained ankle in December 2017 when she was wearing a boot for a week but she was not bedbound at that time and she was able to walk even at that time she developed vertigo. Had a fall around end of May 2018 when she broke her left humerus.  She was started on Apixaban.  She tells me that she is tolerating apixaban well.  Denies bleeding.  The right leg pain has resolved.  The swelling has not resolved and it is is still swollen.  Denies  "any shortness of breath.  No B symptoms.  No other swellings.  No other pain.  No neuropathy.  Around February 2018 and had negative workup for that.  This has significantly improved with time.    She tells me that for the breast cancer she has been doing well.  She never had genetic testing for breast cancer.          ROS:  A comprehensive ROS was otherwise neg    Past Medical/Surgical History:  Past Medical History:   Diagnosis Date     Anemia of chronic renal failure      Breast cancer (H)      Chronic diarrhea      Chronic kidney disease (CKD), stage V (H)     Due to \"nephritis\". Biopsy done years ago. I do not know the histologic specifics     High risk medication use      Hyperphosphatemia      Hypertension      Hypothyroid      Immunosuppressed status (H)      Kidney replaced by transplant     nephritis as a child     PE (pulmonary embolism)     after transplant     Secondary hyperparathyroidism (of renal origin)      Past Surgical History:   Procedure Laterality Date     APPENDECTOMY OPEN  1999     AV FISTULA OR GRAFT ARTERIAL      left     C TRANSPLANT,PREP LIVING  RENAL GRAFT  7/30/2009    Hx of nephritis     LUMPECTOMY BREAST      left     Allergies:  Allergies as of 08/07/2018 - Wali as Reviewed 08/07/2018   Allergen Reaction Noted     Dilaudid [hydromorphone hcl]  10/15/2011     Sulfa drugs Swelling 05/27/2008     Current Medications:  Current Outpatient Prescriptions   Medication Sig Dispense Refill     apixaban ANTICOAGULANT (ELIQUIS) 5 MG tablet Take 1 tablet (5 mg) by mouth 2 times daily 60 tablet 3     levothyroxine (SYNTHROID/LEVOTHROID) 112 MCG tablet Take 1 tablet (112 mcg) by mouth daily 90 tablet 3     metoprolol tartrate (LOPRESSOR) 50 MG tablet TAKE 1 TABLET (50 MG) BY MOUTH 2 TIMES DAILY 180 tablet 3     MYFORTIC (BRAND) 180 MG EC TABLET Take 3 tablets (540 mg) by mouth 2 times daily 540 tablet 3     tacrolimus (GENERIC EQUIVALENT) 1 MG capsule Take 3 capsules (3 mg) by mouth 2 times daily " 540 capsule 3     VITAMIN D, CHOLECALCIFEROL, PO Take 1,000 Units by mouth daily        Family History:  Family History   Problem Relation Age of Onset     Diabetes Father      type 2       Arthritis Father      Glaucoma Father      Cancer Mother 58     smoker,metastatic adnoid cystic cancer,  early 60s     Arthritis Mother      Endocrine Disease Mother      thyroid     Hypertension Maternal Grandmother       at a young age     Hypertension Maternal Grandfather      Cancer Paternal Grandmother      stomach cancer     Endocrine Disease Sister      thyroid     Genitourinary Problems Daughter 32     renal failure      Endocrine Disease Sister      thyroid     Chronic Obstructive Pulmonary Disease Paternal Grandfather      smoking  young     No family history of bleeding or clotting disorder.  Her mother was diagnosed with adenoid cystic cancer of the throat around the age 59.  Paternal grandmother had stomach cancer but she is not sure of the age.  Daughter has chronic kidney disease.  She has 2 other sons who are healthy.  Social History:  Social History     Social History     Marital status: Single     Spouse name: N/A     Number of children: N/A     Years of education: N/A     Occupational History     Not on file.     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  degree    Sal 1981,Betty  ( On dialysis), Vincenzo Alvares 1988    Grandchildren Torri 2010, Krystina 10/2007 lives with her and Betty, son and family. Jeremías     2 additional grandchildren will be born soon   She used to smoke but quit 15 years ago.  Does not drink alcohol.  She  "lives with her younger son his wife and 2 children.  She has a desk job related to real estate .  Physical Exam:  /76  Pulse 68  Temp 99.4  F (37.4  C) (Oral)  Resp 18  Ht 1.676 m (5' 6\")  Wt 101.2 kg (223 lb)  LMP 10/25/2004  SpO2 93%  BMI 35.99 kg/m2  CONSTITUTIONAL: no acute distress  EYES: PERRLA, no palor or icterus.   ENT/MOUTH: no mouth lesions. Ears normal  CVS: s1s2 no m r g .   RESPIRATORY: clear to auscultation b/l  GI: soft non tender no hepatosplenomegaly  NEURO: AAOX3  Grossly non focal neuro exam  INTEGUMENT: no obvious rashes  LYMPHATIC: no palpable cervical, supraclavicular, axillary or inguinal LAD  MUSCULOSKELETAL: Unremarkable. No bony tenderness.   EXTREMITIES: Right leg is more swollen as compared to the left side.  There is no calf tenderness.  Left upper extremity has AV fistula in the forearm.  PSYCH: Mentation, mood and affect are normal. Decision making capacity is intact    Laboratory/Imaging Studies  Reviewed  Results for orders placed or performed in visit on 08/07/18   MA SCREENING DIGITAL BILAT - Future  (s+30)    Narrative    Examination: Bilateral digital screening mammography with computer  aided detection, 8/7/2018 9:01 AM.    Comparison: 11/11/2014, 10/29/2012, 05/26/2010    History: No current breast concerns. LEFT breast cancer status post  breast conservation therapy.    BREAST DENSITY: Scattered fibroglandular densities.    COMMENTS: Breast conservation therapy changes again seen in the LEFT  breast. There is an asymmetry seen in the medial LEFT breast on the CC  projection. No concerning findings in the right breast.       Impression    IMPRESSION: BI-RADS CATEGORY: 0 - Need Additional Imaging Evaluation  and/or Prior Mammograms for Comparison.    RECOMMENDED FOLLOW-UP: Diagnostic Mammogram and Ultrasound.    Additional imaging evaluation of the LEFT breast with diagnostic  mammography and possibly ultrasound.    The patient will be notified of " the results.     ANDREA MELCHOR MD     ASSESSMENT/PLAN:    Unprovoked right lower extremity DVT.  Previously she has a history of provoked pulmonary embolism after kidney transplant.  She is negative for factor V Leiden and prothrombin gene mutations.  Because of unprovoked nature of the DVT I believe she would need indefinite anticoagulation.  She is on apixaban.  For now she will continue that and she is tolerating it well.  I would recommend repeating an ultrasound in a couple of months to get another baseline.    Thrombophilia workup.  We discussed the possibility of doing thrombophilia workup although the chances that it will change our management is pretty low.  She would like to go ahead and proceed with the thrombophilia workup.  In the past she was tested negative for prothrombin gene mutation and factor V Leiden so I will test her for protein C/protein S as well as anti-thrombin III levels.  We will check anticardiolipin antibodies and anti-beta-2 glycoprotein antibodies.  I will not check lupus anticoagulant as she is on apixaban and it can make the lupus anticoagulant test false-positive.    Right leg swelling.  I asked her that she can wear compression stockings because of the swelling.  She has them at home and she will wear them.    Previous history of breast cancer.  She had early stage left-sided breast cancer which was estrogen and progesterone receptor positive and HER-2 was positive as well.  She was treated with lumpectomy followed by adjuvant Adriamycin and Cytoxan followed by radiation.  She was on hormonal therapy for about 5 years or so but she has not received any treatment for it since 2008.  She is following with her primary care physician getting regular annual mammograms.  She never had genetic testing done even though she developed a breast cancer at a young age.  Should her  paternal grandmother also had a stomach cancer.  I recommend that she gets evaluated by a genetic counselor and  she is interested in that.  I gave her referral for that.    I would like to see her back in a couple of months after repeat ultrasound.    I answered all of her questions to her satisfaction.  She is agreeable and comfortable with the plan.    Symone Pacheco          Again, thank you for allowing me to participate in the care of your patient.        Sincerely,        Symone Pacheco MD

## 2018-08-07 NOTE — MR AVS SNAPSHOT
After Visit Summary   8/7/2018    Bessy Spangler    MRN: [id number]           Patient Information     Date Of Birth          1960        Visit Information        Provider Department      8/7/2018 12:45 PM Symone Pacheco MD [facility]        Today's Diagnoses     Acute deep vein thrombosis (DVT) of distal vein of right lower extremity (H)    -  1    Malignant neoplasm of central portion of left breast in female, estrogen receptor positive (H)          Care Instructions    Labs today    Cont Eliquis    Refer to Genetic Counselor    Repeat US in 2 months and see me a few days after that          Follow-ups after your visit        Additional Services     CANCER RISK MGMT/CANCER GENETIC COUNSELING REFERRAL       Your provider has referred you to the Cancer Risk Management Program - Cancer Genetic Counseling.    Reason for Referral: genetic testing     We have a sent a notice to a staff member of the Cancer Risk Management Program to give you a call to assist with scheduling your appointment.  You may also call  [phone] ([phone]) to initiate scheduling.    Please be aware that coverage of these services is subject to the terms and limitations of your health insurance plan.  Call member services at your health plan with any benefit or coverage questions.      Please bring the completed family history sheet to your appointment in addition to any available outside medical records documenting your cancer diagnosis.                  Your next 10 appointments already scheduled     Aug 23, 2018  3:00 PM CDT   Lab with  LAB   [facility] ([facility])    [address] [place] [address] [id number] [phone]            Aug 23, 2018  4:00 PM CDT   (Arrive by 3:30 PM)   Return Kidney Transplant with  Kidney/Pancreas Recipient   [facility] ([facility])    [address] [place] [address]  [address]  [facility] [id number] [phone]            Oct 04, 2018  7:00 AM CDT   US LOWER EXTREMITY VENOUS DUPLEX RIGHT with MGUS1, MG US TECH   [facility]    [address] [place] [address] [phone]           Please bring a list of your medicines (including vitamins, minerals and over-the-counter drugs). Also, tell your doctor about any allergies you may have. Wear comfortable clothes and leave your valuables at home.  You do not need to do anything special to prepare for your exam.  Please call the Imaging Department at your exam site with any questions.            Oct 09, 2018  4:15 PM CDT   Return Visit with Symone Pacheco MD   [facility] ([facility])    [address] [place] [address] [phone]              Future tests that were ordered for you today     Open Future Orders        Priority Expected Expires Ordered    US Lower Extremity Venous Duplex RT Routine 10/7/2018 8/7/2019 8/7/2018    Protein C chromogenic Routine  8/7/2019 8/7/2018    Beta 2 Glycoprotein 1 Antibody IgG Routine  8/7/2019 8/7/2018    Beta 2 Glycoprotein 1 Antibody IgM Routine  8/7/2019 8/7/2018    Antithrombin III Routine  8/7/2019 8/7/2018    Protein S Antigen Free Routine  8/7/2019 8/7/2018            Who to contact     If you have questions or need follow up information about today's clinic visit or your schedule please contact [facility] directly at [phone].  Normal or non-critical lab and imaging results will be communicated to you by MyChart, letter or phone within 4 business days after the clinic has received the results. If you do not hear from us within 7 days, please contact the clinic through MyChart or phone. If you have a critical or abnormal lab result, we will notify you by phone as soon as possible.  Submit refill requests through [employer] or call your pharmacy and they will  "forward the refill request to us. Please allow 3 business days for your refill to be completed.          Additional Information About Your Visit        [employer] Information     [employer] gives you secure access to your electronic health record. If you see a primary care provider, you can also send messages to your care team and make appointments. If you have questions, please call your primary care clinic.  If you do not have a primary care provider, please call [phone] and they will assist you.      [employer] is an electronic gateway that provides easy, online access to your medical records. With [employer], you can request a clinic appointment, read your test results, renew a prescription or communicate with your care team.     To access your existing account, please contact your [facility] or call [phone] for assistance.        Care EveryWhere ID     This is your Care EveryWhere ID. This could be used by other organizations to access your [place] medical records  [id number]        Your Vitals Were     Pulse Temperature Respirations Height Last Period Pulse Oximetry    68 99.4  F (37.4  C) (Oral) 18 1.676 m (5' 6\") 10/25/2004 93%    BMI (Body Mass Index)                   35.99 kg/m2            Blood Pressure from Last 3 Encounters:   08/07/18 136/76   07/24/18 132/81   04/13/18 139/78    Weight from Last 3 Encounters:   08/07/18 101.2 kg (223 lb)   07/24/18 100.7 kg (222 lb)   04/13/18 97.5 kg (215 lb)              We Performed the Following     CANCER RISK MGMT/CANCER GENETIC COUNSELING REFERRAL     Cardiolipin Princess IgG and IgM        Primary Care Provider Office Phone # Fax #    Mike Tan [phone]       [address] [facility] [address]        Equal Access to Services     KARL DOAN : rafael Davis, sandra douglas. So [facility] [phone].    ATENCIÓN: Si " karol hull, tiene a garcia disposición servicios gratuitos de asistencia lingüística. Roxanne trujillo [phone].    We comply with applicable federal civil rights laws and [place] laws. We do not discriminate on the basis of race, color, national origin, age, disability, sex, sexual orientation, or gender identity.            Thank you!     Thank you for choosing [facility]  for your care. Our goal is always to provide you with excellent care. Hearing back from our patients is one way we can continue to improve our services. Please take a few minutes to complete the written survey that you may receive in the mail after your visit with us. Thank you!             Your Updated Medication List - Protect others around you: Learn how to safely use, store and throw away your medicines at www.disposemymeds.org.          This list is accurate as of 8/7/18  1:40 PM.  Always use your most recent med list.                   Brand Name Dispense Instructions for use Diagnosis    apixaban ANTICOAGULANT 5 MG tablet    ELIQUIS    60 tablet    Take 1 tablet (5 mg) by mouth 2 times daily    Acute deep vein thrombosis (DVT) of distal vein of right lower extremity (H)       levothyroxine 112 MCG tablet    SYNTHROID/LEVOTHROID    90 tablet    Take 1 tablet (112 mcg) by mouth daily    Thyroid disorder       metoprolol tartrate 50 MG tablet    LOPRESSOR    180 tablet    TAKE 1 TABLET (50 MG) BY MOUTH 2 TIMES DAILY    Hypertension       mycophenolic acid 180 MG EC tablet     540 tablet    Take 3 tablets (540 mg) by mouth 2 times daily    Kidney transplanted       tacrolimus 1 MG capsule    GENERIC EQUIVALENT    540 capsule    Take 3 capsules (3 mg) by mouth 2 times daily    Kidney replaced by transplant       VITAMIN D (CHOLECALCIFEROL) PO      Take 1,000 Units by mouth daily

## 2018-08-07 NOTE — NURSING NOTE
"Oncology Rooming Note    August 7, 2018 1:00 PM   Bessy Spangler is a 58 year old female who presents for:    Chief Complaint   Patient presents with     Oncology Clinic Visit     new patient      Initial Vitals: /76  Pulse 68  Temp 99.4  F (37.4  C) (Oral)  Resp 18  Ht 1.676 m (5' 6\")  Wt 101.2 kg (223 lb)  LMP 10/25/2004  SpO2 93%  BMI 35.99 kg/m2 Estimated body mass index is 35.99 kg/(m^2) as calculated from the following:    Height as of this encounter: 1.676 m (5' 6\").    Weight as of this encounter: 101.2 kg (223 lb). Body surface area is 2.17 meters squared.  No Pain (0) Comment: Data Unavailable   Patient's last menstrual period was 10/25/2004.  Allergies reviewed: Yes  Medications reviewed: Yes    Medications: Medication refills not needed today.  Pharmacy name entered into [facility]:    [facility] - [place], [place] - [address], [address]  [facility] [place]  [facility] [id number] [address] [facility] [place] - [address] [facility] - USE FOR MAILING ONLY - [facility] [place] [facility] [address] [place] [address] [place]  [id number] - [place] - [address] [facility] [place] [place] - [facility]         5 minutes for nursing intake (face to face time)     Zahra Aburto LPN              "

## 2018-08-07 NOTE — PROGRESS NOTES
Hematology initial visit:  Date on this visit: 8/7/2018    Bessy Spangler  is referred by Dr.Shaista Dc for a hematology consultation. She requires evaluation for new diagnosis of DVT    Primary Physician: Mike Tan     History Of Present Illness:  Ms. Spangler is a 58 year old female who has a hx of grade 2 Infiltrating ductal carcinoma of the left breast diagnosed March of 2002, 1.1 cm, tumor stage T1c, N0, stage 1, ER/CO positive, and HER-2 3+ positive, treated with lumpectomy followed by 4 cycles of Adriamycin and cyclophosphamide followed by radiation. She was on on tamoxifen from July 2002 until June 2005. At that point it was changed to exemestane. In May 2008 patient elected to stop therapy.   She also has a hx of nephritis and had kidney transplant in July 2009. She then had PE in September 2009 in the post transplant setting. Factor V Leidin and Prothrombin gene mutations were negative.    Recently found to have Acute occlusive deep venous thrombosis along the right distal superficial femoral, popliteal, peroneal and posterior tibial veins, when she presented with a couple of days of right leg pain and swelling. There was no prior sickness, long travels, or other provoking factors. No local trauma apart from a sprained ankle in December 2017 when she was wearing a boot for a week but she was not bedbound at that time and she was able to walk even at that time she developed vertigo. Had a fall around end of May 2018 when she broke her left humerus.  She was started on Apixaban.  She tells me that she is tolerating apixaban well.  Denies bleeding.  The right leg pain has resolved.  The swelling has not resolved and it is is still swollen.  Denies any shortness of breath.  No B symptoms.  No other swellings.  No other pain.  No neuropathy.  Around February 2018 and had negative workup for that.  This has significantly improved with time.    She tells me that for the breast cancer she has been  "doing well.  She never had genetic testing for breast cancer.          ROS:  A comprehensive ROS was otherwise neg    Past Medical/Surgical History:  Past Medical History:   Diagnosis Date     Anemia of chronic renal failure      Breast cancer (H)      Chronic diarrhea      Chronic kidney disease (CKD), stage V (H)     Due to \"nephritis\". Biopsy done years ago. I do not know the histologic specifics     High risk medication use      Hyperphosphatemia      Hypertension      Hypothyroid      Immunosuppressed status (H)      Kidney replaced by transplant     nephritis as a child     PE (pulmonary embolism)     after transplant     Secondary hyperparathyroidism (of renal origin)      Past Surgical History:   Procedure Laterality Date     APPENDECTOMY OPEN       AV FISTULA OR GRAFT ARTERIAL      left     C TRANSPLANT,PREP LIVING  RENAL GRAFT  2009    Hx of nephritis     LUMPECTOMY BREAST      left     Allergies:  Allergies as of 2018 - [place] as Reviewed 2018   Allergen Reaction Noted     Dilaudid [hydromorphone hcl]  10/15/2011     Sulfa drugs Swelling 2008     Current Medications:  Current Outpatient Prescriptions   Medication Sig Dispense Refill     apixaban ANTICOAGULANT (ELIQUIS) 5 MG tablet Take 1 tablet (5 mg) by mouth 2 times daily 60 tablet 3     levothyroxine (SYNTHROID/LEVOTHROID) 112 MCG tablet Take 1 tablet (112 mcg) by mouth daily 90 tablet 3     metoprolol tartrate (LOPRESSOR) 50 MG tablet TAKE 1 TABLET (50 MG) BY MOUTH 2 TIMES DAILY 180 tablet 3     MYFORTIC (BRAND) 180 MG EC TABLET Take 3 tablets (540 mg) by mouth 2 times daily 540 tablet 3     tacrolimus (GENERIC EQUIVALENT) 1 MG capsule Take 3 capsules (3 mg) by mouth 2 times daily 540 capsule 3     VITAMIN D, CHOLECALCIFEROL, PO Take 1,000 Units by mouth daily        Family History:  Family History   Problem Relation Age of Onset     Diabetes Father      type 2  2015     Arthritis Father      Glaucoma Father      Cancer " "Mother 58     smoker,metastatic adnoid cystic cancer,  early 60s     Arthritis Mother      Endocrine Disease Mother      thyroid     Hypertension Maternal Grandmother       at a young age     Hypertension Maternal Grandfather      Cancer Paternal Grandmother      stomach cancer     Endocrine Disease Sister      thyroid     Genitourinary Problems Daughter 32     renal failure      Endocrine Disease Sister      thyroid     Chronic Obstructive Pulmonary Disease Paternal Grandfather      smoking  young     No family history of bleeding or clotting disorder.  Her mother was diagnosed with adenoid cystic cancer of the throat around the age 59.  Paternal grandmother had stomach cancer but she is not sure of the age.  Daughter has chronic kidney disease.  She has 2 other sons who are healthy.  Social History:  Social History     Social History     Marital status: Single     Spouse name: N/A     Number of children: N/A     Years of education: N/A     Occupational History     Not on file.     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  degree    Sal ,Betty  ( On dialysis), Vincenzo Alvares     Grandchildren Torri 2010, Krystina 10/2007 lives with her and Betty, son and family. Jeremías     2 additional grandchildren will be born soon   She used to smoke but quit 15 years ago.  Does not drink alcohol.  She lives with her younger son his wife and 2 children.  She has a desk job related to real estate .  Physical Exam:  /76  Pulse 68  Temp 99.4  F (37.4  C) (Oral)  Resp 18  Ht 1.676 m (5' 6\")  Wt 101.2 kg (223 lb)  LMP " 10/25/2004  SpO2 93%  BMI 35.99 kg/m2  CONSTITUTIONAL: no acute distress  EYES: PERRLA, no palor or icterus.   ENT/MOUTH: no mouth lesions. Ears normal  CVS: s1s2 no m r g .   RESPIRATORY: clear to auscultation b/l  GI: soft non tender no hepatosplenomegaly  NEURO: AAOX3  Grossly non focal neuro exam  INTEGUMENT: no obvious rashes  LYMPHATIC: no palpable cervical, supraclavicular, axillary or inguinal LAD  MUSCULOSKELETAL: Unremarkable. No bony tenderness.   EXTREMITIES: Right leg is more swollen as compared to the left side.  There is no calf tenderness.  Left upper extremity has AV fistula in the forearm.  PSYCH: Mentation, mood and affect are normal. Decision making capacity is intact    Laboratory/Imaging Studies  Reviewed  Results for orders placed or performed in visit on 08/07/18   MA SCREENING DIGITAL BILAT - Future  (s+30)    Narrative    Examination: Bilateral digital screening mammography with computer  aided detection, 8/7/2018 9:01 AM.    Comparison: 11/11/2014, 10/29/2012, 05/26/2010    History: No current breast concerns. LEFT breast cancer status post  breast conservation therapy.    BREAST DENSITY: Scattered fibroglandular densities.    COMMENTS: Breast conservation therapy changes again seen in the LEFT  breast. There is an asymmetry seen in the medial LEFT breast on the CC  projection. No concerning findings in the right breast.       Impression    IMPRESSION: BI-RADS CATEGORY: 0 - Need Additional Imaging Evaluation  and/or Prior Mammograms for Comparison.    RECOMMENDED FOLLOW-UP: Diagnostic Mammogram and Ultrasound.    Additional imaging evaluation of the LEFT breast with diagnostic  mammography and possibly ultrasound.    The patient will be notified of the results.     ANDREA MELCHOR MD     ASSESSMENT/PLAN:    Unprovoked right lower extremity DVT.  Previously she has a history of provoked pulmonary embolism after kidney transplant.  She is negative for factor V Leiden and prothrombin gene  mutations.  Because of unprovoked nature of the DVT I believe she would need indefinite anticoagulation.  She is on apixaban.  For now she will continue that and she is tolerating it well.  I would recommend repeating an ultrasound in a couple of months to get another baseline.    Thrombophilia workup.  We discussed the possibility of doing thrombophilia workup although the chances that it will change our management is pretty low.  She would like to go ahead and proceed with the thrombophilia workup.  In the past she was tested negative for prothrombin gene mutation and factor V Leiden so I will test her for protein C/protein S as well as anti-thrombin III levels.  We will check anticardiolipin antibodies and anti-beta-2 glycoprotein antibodies.  I will not check lupus anticoagulant as she is on apixaban and it can make the lupus anticoagulant test false-positive.    Right leg swelling.  I asked her that she can wear compression stockings because of the swelling.  She has them at home and she will wear them.    Previous history of breast cancer.  She had early stage left-sided breast cancer which was estrogen and progesterone receptor positive and HER-2 was positive as well.  She was treated with lumpectomy followed by adjuvant Adriamycin and Cytoxan followed by radiation.  She was on hormonal therapy for about 5 years or so but she has not received any treatment for it since 2008.  She is following with her primary care physician getting regular annual mammograms.  She never had genetic testing done even though she developed a breast cancer at a young age.  Should her  paternal grandmother also had a stomach cancer.  I recommend that she gets evaluated by a genetic counselor and she is interested in that.  I gave her referral for that.    I would like to see her back in a couple of months after repeat ultrasound.    I answered all of her questions to her satisfaction.  She is agreeable and comfortable with the  plan.    Symone Pacheco

## 2018-08-08 LAB
B2 GLYCOPROT1 IGG SERPL IA-ACNC: <0.6 U/ML
B2 GLYCOPROT1 IGM SERPL IA-ACNC: <0.9 U/ML
CARDIOLIPIN ANTIBODY IGG: <1.6 GPL-U/ML (ref 0–19.9)
CARDIOLIPIN ANTIBODY IGM: 3.3 MPL-U/ML (ref 0–19.9)

## 2018-08-10 ENCOUNTER — HOSPITAL ENCOUNTER (OUTPATIENT)
Dept: MAMMOGRAPHY | Facility: CLINIC | Age: 58
Discharge: HOME | End: 2018-08-10
Attending: FAMILY MEDICINE | Admitting: FAMILY MEDICINE
Payer: COMMERCIAL

## 2018-08-10 DIAGNOSIS — R92.8 ABNORMAL MAMMOGRAM: ICD-10-CM

## 2018-08-10 LAB
AT III ACT/NOR PPP CHRO: 95 % (ref 85–135)
PROT C ACT/NOR PPP CHRO: 118 % (ref 70–170)
PROT S FREE AG ACT/NOR PPP IA: 100 % (ref 55–125)

## 2018-08-10 PROCEDURE — G0279 TOMOSYNTHESIS, MAMMO: HCPCS

## 2018-08-10 NOTE — LETTER
Bessy Spangler  [address] [place] [id number]      August 10, 2018    Dear Bessy:    Thank you for your recent visit.  Breast Imaging Result: We are pleased to inform you that the results of your recent breast imaging show no evidence of malignancy (cancer).    If you are experiencing any breast problems such as a lump or localized pain we request that you discuss this with your health care provider if you haven t already done so, as additional testing may be necessary.    As you know, early detection of cancer is very important. Although mammography is the most accurate method for early detection, not all cancers are found through mammography. A thorough examination includes a combination of mammography, physical examination and breast self-examination. Currently the American College of Radiology and the Society of Breast Imaging recommend an annual mammogram for all women beginning at the age of 40.    A report of your breast imaging results was sent to: Gina Dc    Your breast imaging will become part of your medical file here at [facility] for at least 10 years. You are responsible for informing any new health care provider or breast imaging facility of the date and location of this examination.    We appreciate the opportunity to participate in your health care.    Sincerely,    Marsha Castillo MD  Interpreting Radiologist  [facility]

## 2018-08-22 DIAGNOSIS — Z94.0 KIDNEY REPLACED BY TRANSPLANT: ICD-10-CM

## 2018-08-22 DIAGNOSIS — Z48.298 AFTERCARE FOLLOWING ORGAN TRANSPLANT: Primary | ICD-10-CM

## 2018-08-22 DIAGNOSIS — Z79.899 ENCOUNTER FOR LONG-TERM CURRENT USE OF MEDICATION: ICD-10-CM

## 2018-08-23 ENCOUNTER — OFFICE VISIT (OUTPATIENT)
Dept: NEPHROLOGY | Facility: CLINIC | Age: 58
End: 2018-08-23
Attending: INTERNAL MEDICINE
Payer: COMMERCIAL

## 2018-08-23 VITALS
DIASTOLIC BLOOD PRESSURE: 74 MMHG | HEART RATE: 69 BPM | BODY MASS INDEX: 35.61 KG/M2 | WEIGHT: 220.6 LBS | SYSTOLIC BLOOD PRESSURE: 118 MMHG | OXYGEN SATURATION: 97 % | TEMPERATURE: 98.6 F

## 2018-08-23 DIAGNOSIS — Z29.89 NEED FOR PNEUMOCYSTIS PROPHYLAXIS: ICD-10-CM

## 2018-08-23 DIAGNOSIS — N25.81 SECONDARY RENAL HYPERPARATHYROIDISM (H): ICD-10-CM

## 2018-08-23 DIAGNOSIS — I10 BENIGN ESSENTIAL HYPERTENSION: ICD-10-CM

## 2018-08-23 DIAGNOSIS — Z94.0 KIDNEY TRANSPLANTED: Primary | ICD-10-CM

## 2018-08-23 DIAGNOSIS — D84.9 IMMUNOSUPPRESSION (H): ICD-10-CM

## 2018-08-23 PROCEDURE — G0463 HOSPITAL OUTPT CLINIC VISIT: HCPCS | Mod: ZF

## 2018-08-23 ASSESSMENT — PAIN SCALES - GENERAL: PAINLEVEL: NO PAIN (0)

## 2018-08-23 NOTE — LETTER
8/23/2018      RE: Bessy Spangler  [address] [place] [address]       TRANSPLANT NEPHROLOGY VISIT    Assessment & Plan   # LDKT: baseline Cr ~ 0.7 mg / dl; Stable   - Proteinuria: Normal     # Immunosuppression: Tacrolimus immediate release (goal  3-5) and Mycophenolic acid (goal  no TDM needed)   - Changes: No    Immunsupresent Medications     Immunosuppressive Agents Disp Start End    MYFORTIC (BRAND) 180 MG EC TABLET 540 tablet 4/26/2018     Sig - Route: Take 3 tablets (540 mg) by mouth 2 times daily - Oral    Class: Local Print    tacrolimus (GENERIC EQUIVALENT) 1 MG capsule 540 capsule 9/27/2017     Sig - Route: Take 3 capsules (3 mg) by mouth 2 times daily - Oral    Class: E-Prescribe        # Prophylaxis:   - PJP: Bactrim   - CMV: completed    # Transplant History:    Transplant: living kidney    Donor Type: Donor Class:    Crossmatch at time of Tx: negative    DSA at time of Tx: No    Latest DSA: No    Significant changes in immunosuppression: None    Biopsy: No Rejection History:No                   Transplant Office Phone Number: [phone]     SA1 Comments   Date Value Ref Range Status   10/26/2009   Final    No donor specific antibody to 7-30-09 kidney donor.     SA2 Comments   Date Value Ref Range Status   10/26/2009   Final    Possible donor specific antibody to 7-30-09 kidney donor: DQB1*0501 qme=[id number]. Pt is DQB1*0503.     BK Virus Log   Date Value Ref Range Status   01/23/2012  <3.0 Log copies/mL Final    <3.0  The lower limit of detection for this assay is 1000 copies/mL.  Real-time TaqMan   PCR was performed using BK primers and probe for the detection of a 90 bp   portion of the  1 gene.  The performance characteristics were validated by   the [facility], [place].   It has not been cleared or approved by the U.S. Food and Drug Administration.     # Hypertension: controlled; Goal BP: 140/90   - Changes: No    # Mineral Bone Disorder:   - Secondary  renal hyperparathyroidism: secondary hyperparathyroidism; check PTH yearly.    - Vitamin D: low in 2014   - Calcium: 9.5 mg / dl   - Phosphorus: 4.2    # Other Medical Issues: I recommended that the patient follow up with dermatology so that she can be followed for skin cancer screening.  I also recommended that she get her flu shot this fall when available with her primary care provider.    Return visit: No Follow-up on file.    Assessment and plan was discussed with the patient and he voiced his understanding and agreement.    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  Next    The patient notes that she has had a rough last 12 months.  She ended up hurting her ankle in January resulting in inability to be able to exercise.  She developed a right lower extremity DVT that also limited her ability to be able to move.      The patient has excellent allograft function.  Her most recent creatinine of 0.7 mg/dL.  She is on chronic immunosuppression therapy with tacrolimus and CellCept.  Her goal tacrolimus level is 3-5 ng/mL.  Her most recent tacrolimus level in June was 4.8 ng/mL.  Next    Today, the patient states that she feels well.  She specifically denies any chest pain or breathing difficulties.  She has no nausea or vomiting.  She has no fever shakes or chills.  She is moving her bowels appropriately.  She does note weight gain in the last 9 months given her recent musculoskeletal problems.    She did see a dermatologist about 3 or 4 years ago but is due to see one.    She is planning on getting her flu shot this fall.    Recent Hospitalizations:  [x] No [] Yes    New Medical Issues: [x] No [] Yes    Decreased energy: [x] No [] Yes    Chest pain or SOB with exertion:  [x] No  [] Yes    Appetite change or weight change: [x] No [] Yes    Nausea, vomiting or diarrhea:  [x] No [] Yes    Fever, sweats or chills: [x] No [] Yes    Leg swelling: [x] No [] Yes      Other medical issues:  No    Home BP: at goal    Review of Systems   A comprehensive review of systems was obtained and negative, except as noted in the HPI or PMH.    Problem List   Patient Active Problem List   Diagnosis     Breast cancer (H)     Kidney replaced by transplant     CARDIOVASCULAR SCREENING; LDL GOAL LESS THAN 100     Immunosuppressed status (H)     Hypertension secondary to other renal disorders     Thyroid disorder     Post-menopausal     Conjunctival hemorrhage     Tear film insufficiency     Presbyopia - Both Eyes     Aftercare following organ transplant     Vestibular neuronitis of right ear     Morbid obesity (H)     Acute deep vein thrombosis (DVT) of distal vein of right lower extremity (H)     Social History  Social History     Social History     Marital status: Single     Spouse name: N/A     Number of children: N/A     Years of education: N/A     Occupational History     Not on file.     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  degree    Sal 1981,Betty 1983 ( On dialysis), Vincenzo Alvares 1988    Grandchildren Torri 11/2010, Krystina 10/2007 lives with her and Betty, son and family. Jeremías 2014    2 additional grandchildren will be born soon     Allergies      Allergies   Allergen Reactions     Dilaudid [Hydromorphone Hcl]      Patient unable to recall     Sulfa Drugs Swelling     Medications   Outpatient Encounter Prescriptions as of 8/23/2018    Medication Sig Dispense Refill     apixaban ANTICOAGULANT (ELIQUIS) 5 MG tablet Take 1 tablet (5 mg) by mouth 2 times daily 60 tablet 3     levothyroxine (SYNTHROID/LEVOTHROID) 112 MCG tablet Take 1 tablet (112 mcg) by mouth daily 90 tablet 3     metoprolol tartrate (LOPRESSOR) 50 MG tablet TAKE 1 TABLET (50 MG) BY MOUTH 2 TIMES DAILY 180 tablet 3     MYFORTIC (BRAND) 180 MG EC TABLET Take 3 tablets (540 mg) by mouth 2 times daily 540 tablet 3     tacrolimus (GENERIC EQUIVALENT) 1 MG capsule Take 3 capsules (3 mg) by mouth 2 times daily 540 capsule 3     VITAMIN D, CHOLECALCIFEROL, PO Take 1,000 Units by mouth daily       No facility-administered encounter medications on file as of 8/23/2018.      Physical Exam   Vital Signs: Temp 98.6  F (37  C) (Oral)  Wt 100.1 kg (220 lb 9.6 oz)  LMP 10/25/2004  BMI 35.61 kg/m2     GENERAL APPEARANCE: alert and no distress  HENT: mouth without ulcers or lesions  LYMPHATICS: no cervical or supraclavicular nodes  RESP: lungs clear to auscultation - no rales, rhonchi or wheezes  CV: regular rhythm, normal rate, no rub, no murmur  EDEMA: no LE edema bilaterally  ABDOMEN: soft, nondistended, nontender, bowel sounds normal  MS: extremities normal - no gross deformities noted, no evidence of inflammation in joints, no muscle tenderness  SKIN: no rash    Data   Renal -   SA1 Comments   Date Value Ref Range Status   10/26/2009   Final    No donor specific antibody to 7-30-09 kidney donor.     SA2 Comments   Date Value Ref Range Status   10/26/2009   Final    Possible donor specific antibody to 7-30-09 kidney donor: DQB1*0501 bkk=9792. Pt is DQB1*0503.     BK Virus Log   Date Value Ref Range Status   01/23/2012  <3.0 Log copies/mL Final    <3.0  The lower limit of detection for this assay is 1000 copies/mL.  Real-time TaqMan   PCR was performed using BK primers and probe for the detection of a 90 bp   portion of the  1 gene.  The performance characteristics were validated by   the  [facility], [place].   It has not been cleared or approved by the U.S. Food and Drug Administration.     Creatinine   Date Value Ref Range Status   06/26/2018 0.71 0.52 - 1.04 mg/dL Final     Protein Total Urine g/gr Creatinine   Date Value Ref Range Status   06/26/2018 Unable to calculate due to low value 0 - 0.2 g/g Cr Final     Tacrolimus Level   Date Value Ref Range Status   06/26/2018 4.8 (L) 5.0 - 15.0 ug/L Final     Comment:     Tacrolimus Reference Range  Kidney Transplant  Pediatric                      ug/L    0-3 months post transplant   10-12    3-6 months post transplant   8-10    6-12 months post transplant  6-8    >12 months post transplant   4-7  Adult    0-6 months post transplant   8-10    6-12 months post transplant  6-8    >12 months post transplant   4-6    >5 years post transplant     3-5  Heart Transplant  Pediatric    0-12 months post transplant  10-15    >12 months post transplant   5-10  Adult    0-3 months post transplant   10-15    3-6 months post transplant   8-12    6-12 months post transplant  6-12    >12 months post transplant   6-10  Lung Transplant    0-12 months post transplant  10-15    >12 months post transplant   8-12  Liver Transplant  Pediatric    0-3 months post transplant   10-15    3-6 months post transplant   8-10    >6 months post transplant    6-8  Adult    0-3 months post transplant   10-12    3-6 months post transplant   8-10    >6 months post transplant    6-8  Pancreas Transplant    0-6 months post transplant   8-10    >6 months post transplant    5-8  This test was developed and its performance characteristics determined by the   [facility],  [facility]. It has   not been cleared or approved by the FDA. The laboratory is regulated under   CLIA as qualified to perform high-complexity testing. This test is used for   clinical purposes. It should not be regarded as investigational or for    research.       Mycophenolic Acid Mg/L   Date Value Ref Range Status   07/02/2014 1.24 1.00 - 3.50 mg/L Final     Glucose   Date Value Ref Range Status   06/26/2018 97 70 - 99 mg/dL Final     Hemoglobin A1C   Date Value Ref Range Status   07/29/2009 5.0 4.3 - 6.0 % Final     Hemoglobin   Date Value Ref Range Status   06/26/2018 12.1 11.7 - 15.7 g/dL Final     Ferritin   Date Value Ref Range Status   08/26/2014 365 (H) 10 - 300 ng/mL Final     Iron Saturation Index   Date Value Ref Range Status   08/26/2014 27 15 - 46 % Final     Parathyroid Hormone Intact   Date Value Ref Range Status   08/26/2014 175 (H) 12 - 72 pg/mL Final     Vitamin D Deficiency screening   Date Value Ref Range Status   08/26/2014 19 (L) 30 - 75 ug/L Final     Comment:     Season, race, dietary intake, and treatment affect the concentration of   25-hydroxy-Vitamin D. Values may decrease during winter months and increase   during summer months. Values less than 30 ug/L may indicate Vitamin D   deficiency.   Vitamin D determiniation is routinely performed by an immunoassay specific   for   25 hydroxyvitamin D3.  If an individual is on vitamin D2 (ergocalciferol)   supplementation, please specify 25 OH vitamin D2 and D3 level determination   by   LCMSMS test VITD23.  For questions, please contact the laboratory at   [phone].       Calcium   Date Value Ref Range Status   06/26/2018 9.5 8.5 - 10.1 mg/dL Final     Phosphorus   Date Value Ref Range Status   01/27/2010 3.3 2.5 - 4.5 mg/dL Final     Magnesium   Date Value Ref Range Status   05/12/2010 1.7 1.6 - 2.3 mg/dL Final     Potassium   Date Value Ref Range Status   06/26/2018 4.5 3.4 - 5.3 mmol/L Final     CMV IgG Antibody   Date Value Ref Range Status   07/29/2009 >160.0 EU/mL Final     Comment:     Positive for anti-CMV IgG     EBV IgG Antibody Interpretation   Date Value Ref Range Status   07/29/2009 Positive, suggests immunologic exposure.  Final       Kidney/Pancreas Recipient

## 2018-08-23 NOTE — NURSING NOTE
Chief Complaint   Patient presents with     RECHECK     Follow up Kidney TX     /74 (BP Location: Right arm)  Pulse 69  Temp 98.6  F (37  C) (Oral)  Wt 100.1 kg (220 lb 9.6 oz)  LMP 10/25/2004  SpO2 97%  BMI 35.61 kg/m2   Danyelle Krishnamurthy

## 2018-08-23 NOTE — PATIENT INSTRUCTIONS
Goal blood pressure is 100-140/60-90. If more than half measurements are out of range, call and we will adjust bp meds.     Continue with labs and meds as you are doing. Every 3 months is fine.    Flu shot in the fall with PCP.    Establish back with dermatology at least every 2 years.     Call with any questions.     [phone].  Coordinator is pavel Madrid.    Follow up in 12 months.

## 2018-08-23 NOTE — MR AVS SNAPSHOT
After Visit Summary   8/23/2018    Bessy Spangler    MRN: [id number]           Patient Information     Date Of Birth          1960        Visit Information        Provider Department      8/23/2018 4:00 PM Recipient, Uc Kidney/Pancreas [facility]        Care Instructions    Goal blood pressure is 100-140/60-90. If more than half measurements are out of range, call and we will adjust bp meds.     Continue with labs and meds as you are doing. Every 3 months is fine.    Flu shot in the fall with PCP.    Establish back with dermatology at least every 2 years.     Call with any questions.     [phone].  Coordinator is pavel Madrid.    Follow up in 12 months.           Follow-ups after your visit        Your next 10 appointments already scheduled     Oct 04, 2018  7:00 AM CDT   US LOWER EXTREMITY VENOUS DUPLEX RIGHT with MGUS1, MG US TECH   [facility]    [address] [place] [id number] [phone]           Please bring a list of your medicines (including vitamins, minerals and over-the-counter drugs). Also, tell your doctor about any allergies you may have. Wear comfortable clothes and leave your valuables at home.  You do not need to do anything special to prepare for your exam.  Please call the Imaging Department at your exam site with any questions.            Oct 09, 2018  4:15 PM CDT   Return Visit with Symone Pacheco MD   [facility] ([facility])    [address] [place] [address]   [phone]            Nov 02, 2018 11:15 AM CDT   New Visit with Eli Calderon GC   [facility] ([facility])    [address] [place] [address]   [phone]            Aug 22, 2019  3:05 PM CDT   (Arrive by 2:35 PM)   Return Kidney Transplant with Uc Kidney/Pancreas Recipient   [facility]  [place])    [address]  [address]  [facility] [address] [phone]              Future tests that were ordered for you today     Open Standing Orders        Priority Remaining Interval Expires Ordered    CBC with platelets Routine 4/4 Every 3 Months 8/22/2019 8/22/2018    Basic metabolic panel Routine 4/4 Every 3 Months 8/22/2019 8/22/2018    Tacrolimus level Routine 4/4 Every 3 Months 8/22/2019 8/22/2018    Protein  random urine with Creat Ratio Routine 2/2 Every 6 Months 8/22/2019 8/22/2018            Who to contact     If you have questions or need follow up information about today's clinic visit or your schedule please contact [facility] directly at [phone].  Normal or non-critical lab and imaging results will be communicated to you by Notonthehighstreethart, letter or phone within 4 business days after the clinic has received the results. If you do not hear from us within 7 days, please contact the clinic through [employer] Storaget or phone. If you have a critical or abnormal lab result, we will notify you by phone as soon as possible.  Submit refill requests through Programeter or call your pharmacy and they will forward the refill request to us. Please allow 3 business days for your refill to be completed.          Additional Information About Your Visit        Programeter Information     Programeter gives you secure access to your electronic health record. If you see a primary care provider, you can also send messages to your care team and make appointments. If you have questions, please call your primary care clinic.  If you do not have a primary care provider, please call [phone] and they will assist you.        Care EveryWhere ID     This is your Care EveryWhere ID. This could be used by other organizations to access your [place] medical records  [id number]        Your Vitals Were     Pulse Temperature Last Period Pulse Oximetry BMI (Body Mass Index)       69 98.6  F (37  C) (Oral) 10/25/2004 97% 35.61  kg/m2        Blood Pressure from Last 3 Encounters:   08/23/18 118/74   08/07/18 136/76   07/24/18 132/81    Weight from Last 3 Encounters:   08/23/18 100.1 kg (220 lb 9.6 oz)   08/07/18 101.2 kg (223 lb)   07/24/18 100.7 kg (222 lb)              Today, you had the following     No orders found for display       Primary Care Provider Office Phone # Fax #    Mike Tan [phone]       [address] [address]        Equal Access to Services     [facility]: Hadii sonal felton hadjian Soarturo, walive love, qateresota kaalmada ellen, sandra carnes . So [facility] [phone].    ATENCIÓN: Si habla español, tiene a garcia disposición servicios gratuitos de asistencia lingüística. LlFort [facility] [phone].    We comply with applicable federal civil rights laws and [place] laws. We do not discriminate on the basis of race, color, national origin, age, disability, sex, sexual orientation, or gender identity.            Thank you!     Thank you for choosing [facility]  for your care. Our goal is always to provide you with excellent care. Hearing back from our patients is one way we can continue to improve our services. Please take a few minutes to complete the written survey that you may receive in the mail after your visit with us. Thank you!             Your Updated Medication List - Protect others around you: Learn how to safely use, store and throw away your medicines at www.disposemymeds.org.          This list is accurate as of 8/23/18  4:31 PM.  Always use your most recent med list.                   Brand Name Dispense Instructions for use Diagnosis    apixaban ANTICOAGULANT 5 MG tablet    ELIQUIS    60 tablet    Take 1 tablet (5 mg) by mouth 2 times daily    Acute deep vein thrombosis (DVT) of distal vein of right lower extremity (H)       levothyroxine 112 MCG tablet    SYNTHROID/LEVOTHROID    90 tablet    Take 1 tablet (112 mcg) by mouth daily     Thyroid disorder       metoprolol tartrate 50 MG tablet    LOPRESSOR    180 tablet    TAKE 1 TABLET (50 MG) BY MOUTH 2 TIMES DAILY    Hypertension       mycophenolic acid 180 MG EC tablet     540 tablet    Take 3 tablets (540 mg) by mouth 2 times daily    Kidney transplanted       tacrolimus 1 MG capsule    GENERIC EQUIVALENT    540 capsule    Take 3 capsules (3 mg) by mouth 2 times daily    Kidney replaced by transplant       VITAMIN D (CHOLECALCIFEROL) PO      Take 1,000 Units by mouth daily

## 2018-08-23 NOTE — PROGRESS NOTES
TRANSPLANT NEPHROLOGY VISIT    Assessment & Plan   # LDKT: baseline Cr ~ 0.7 mg / dl; Stable   - Proteinuria: Normal     # Immunosuppression: Tacrolimus immediate release (goal  3-5) and Mycophenolic acid (goal  no TDM needed)   - Changes: No    Immunsupresent Medications     Immunosuppressive Agents Disp Start End    MYFORTIC (BRAND) 180 MG EC TABLET 540 tablet 4/26/2018     Sig - Route: Take 3 tablets (540 mg) by mouth 2 times daily - Oral    Class: Local Print    tacrolimus (GENERIC EQUIVALENT) 1 MG capsule 540 capsule 9/27/2017     Sig - Route: Take 3 capsules (3 mg) by mouth 2 times daily - Oral    Class: E-Prescribe        # Prophylaxis:   - PJP: Bactrim   - CMV: completed    # Transplant History:    Transplant: living kidney    Donor Type: Donor Class:    Crossmatch at time of Tx: negative    DSA at time of Tx: No    Latest DSA: No    Significant changes in immunosuppression: None    Biopsy: No Rejection History:No                   Transplant Office Phone Number: [phone]     SA1 Comments   Date Value Ref Range Status   10/26/2009   Final    No donor specific antibody to 7-30-09 kidney donor.     SA2 Comments   Date Value Ref Range Status   10/26/2009   Final    Possible donor specific antibody to 7-30-09 kidney donor: DQB1*0501 giz=[id number]. Pt is DQB1*0503.     BK Virus Log   Date Value Ref Range Status   01/23/2012  <3.0 Log copies/mL Final    <3.0  The lower limit of detection for this assay is 1000 copies/mL.  Real-time TaqMan   PCR was performed using BK primers and probe for the detection of a 90 bp   portion of the  1 gene.  The performance characteristics were validated by   the [facility].   It has not been cleared or approved by the U.S. Food and Drug Administration.     # Hypertension: controlled; Goal BP: 140/90   - Changes: No    # Mineral Bone Disorder:   - Secondary renal hyperparathyroidism: secondary hyperparathyroidism; check PTH yearly.    - Vitamin  D: low in 2014   - Calcium: 9.5 mg / dl   - Phosphorus: 4.2    # Other Medical Issues: I recommended that the patient follow up with dermatology so that she can be followed for skin cancer screening.  I also recommended that she get her flu shot this fall when available with her primary care provider.    Return visit: No Follow-up on file.    Assessment and plan was discussed with the patient and he voiced his understanding and agreement.    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  Next    The patient notes that she has had a rough last 12 months.  She ended up hurting her ankle in January resulting in inability to be able to exercise.  She developed a right lower extremity DVT that also limited her ability to be able to move.      The patient has excellent allograft function.  Her most recent creatinine of 0.7 mg/dL.  She is on chronic immunosuppression therapy with tacrolimus and CellCept.  Her goal tacrolimus level is 3-5 ng/mL.  Her most recent tacrolimus level in June was 4.8 ng/mL.  Next    Today, the patient states that she feels well.  She specifically denies any chest pain or breathing difficulties.  She has no nausea or vomiting.  She has no fever shakes or chills.  She is moving her bowels appropriately.  She does note weight gain in the last 9 months given her recent musculoskeletal problems.    She did see a dermatologist about 3 or 4 years ago but is due to see one.    She is planning on getting her flu shot this fall.    Recent Hospitalizations:  [x] No [] Yes    New Medical Issues: [x] No [] Yes    Decreased energy: [x] No [] Yes    Chest pain or SOB with exertion:  [x] No [] Yes    Appetite change or weight change: [x] No [] Yes    Nausea, vomiting or  diarrhea:  [x] No [] Yes    Fever, sweats or chills: [x] No [] Yes    Leg swelling: [x] No [] Yes      Other medical issues:  No    Home BP: at goal    Review of Systems   A comprehensive review of systems was obtained and negative, except as noted in the HPI or PMH.    Problem List   Patient Active Problem List   Diagnosis     Breast cancer (H)     Kidney replaced by transplant     CARDIOVASCULAR SCREENING; LDL GOAL LESS THAN 100     Immunosuppressed status (H)     Hypertension secondary to other renal disorders     Thyroid disorder     Post-menopausal     Conjunctival hemorrhage     Tear film insufficiency     Presbyopia - Both Eyes     Aftercare following organ transplant     Vestibular neuronitis of right ear     Morbid obesity (H)     Acute deep vein thrombosis (DVT) of distal vein of right lower extremity (H)     Social History  Social History     Social History     Marital status: Single     Spouse name: N/A     Number of children: N/A     Years of education: N/A     Occupational History     Not on file.     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  degree    Sal 1981,Betty 1983 ( On dialysis), Vincenzo Alvares 1988    Grandchildren Torri 11/2010, Krystina 10/2007 lives with her and Betty, son and family. Jeremías 2014    2 additional grandchildren will be born soon     Allergies      Allergies   Allergen Reactions     Dilaudid [Hydromorphone Hcl]      Patient unable to recall     Sulfa Drugs Swelling     Medications   Outpatient Encounter Prescriptions as of 8/23/2018   Medication Sig Dispense Refill     apixaban ANTICOAGULANT (ELIQUIS) 5 MG tablet Take 1  tablet (5 mg) by mouth 2 times daily 60 tablet 3     levothyroxine (SYNTHROID/LEVOTHROID) 112 MCG tablet Take 1 tablet (112 mcg) by mouth daily 90 tablet 3     metoprolol tartrate (LOPRESSOR) 50 MG tablet TAKE 1 TABLET (50 MG) BY MOUTH 2 TIMES DAILY 180 tablet 3     MYFORTIC (BRAND) 180 MG EC TABLET Take 3 tablets (540 mg) by mouth 2 times daily 540 tablet 3     tacrolimus (GENERIC EQUIVALENT) 1 MG capsule Take 3 capsules (3 mg) by mouth 2 times daily 540 capsule 3     VITAMIN D, CHOLECALCIFEROL, PO Take 1,000 Units by mouth daily       No facility-administered encounter medications on file as of 8/23/2018.      Physical Exam   Vital Signs: Temp 98.6  F (37  C) (Oral)  Wt 100.1 kg (220 lb 9.6 oz)  LMP 10/25/2004  BMI 35.61 kg/m2     GENERAL APPEARANCE: alert and no distress  HENT: mouth without ulcers or lesions  LYMPHATICS: no cervical or supraclavicular nodes  RESP: lungs clear to auscultation - no rales, rhonchi or wheezes  CV: regular rhythm, normal rate, no rub, no murmur  EDEMA: no LE edema bilaterally  ABDOMEN: soft, nondistended, nontender, bowel sounds normal  MS: extremities normal - no gross deformities noted, no evidence of inflammation in joints, no muscle tenderness  SKIN: no rash    Data   Renal -   SA1 Comments   Date Value Ref Range Status   10/26/2009   Final    No donor specific antibody to 7-30-09 kidney donor.     SA2 Comments   Date Value Ref Range Status   10/26/2009   Final    Possible donor specific antibody to 7-30-09 kidney donor: DQB1*0501 wyn=[id number]. Pt is DQB1*0503.     BK Virus Log   Date Value Ref Range Status   01/23/2012  <3.0 Log copies/mL Final    <3.0  The lower limit of detection for this assay is 1000 copies/mL.  Real-time TaqMan   PCR was performed using BK primers and probe for the detection of a 90 bp   portion of the  1 gene.  The performance characteristics were validated by   the [facility], [place].   It has not been cleared or approved by  the U.S. Food and Drug Administration.     Creatinine   Date Value Ref Range Status   06/26/2018 0.71 0.52 - 1.04 mg/dL Final     Protein Total Urine g/gr Creatinine   Date Value Ref Range Status   06/26/2018 Unable to calculate due to low value 0 - 0.2 g/g Cr Final     Tacrolimus Level   Date Value Ref Range Status   06/26/2018 4.8 (L) 5.0 - 15.0 ug/L Final     Comment:     Tacrolimus Reference Range  Kidney Transplant  Pediatric                      ug/L    0-3 months post transplant   10-12    3-6 months post transplant   8-10    6-12 months post transplant  6-8    >12 months post transplant   4-7  Adult    0-6 months post transplant   8-10    6-12 months post transplant  6-8    >12 months post transplant   4-6    >5 years post transplant     3-5  Heart Transplant  Pediatric    0-12 months post transplant  10-15    >12 months post transplant   5-10  Adult    0-3 months post transplant   10-15    3-6 months post transplant   8-12    6-12 months post transplant  6-12    >12 months post transplant   6-10  Lung Transplant    0-12 months post transplant  10-15    >12 months post transplant   8-12  Liver Transplant  Pediatric    0-3 months post transplant   10-15    3-6 months post transplant   8-10    >6 months post transplant    6-8  Adult    0-3 months post transplant   10-12    3-6 months post transplant   8-10    >6 months post transplant    6-8  Pancreas Transplant    0-6 months post transplant   8-10    >6 months post transplant    5-8  This test was developed and its performance characteristics determined by the   [facility],  [facility]. It has   not been cleared or approved by the FDA. The laboratory is regulated under   CLIA as qualified to perform high-complexity testing. This test is used for   clinical purposes. It should not be regarded as investigational or for   research.       Mycophenolic Acid Mg/L   Date Value Ref Range Status   07/02/2014 1.24 1.00 -  3.50 mg/L Final     Glucose   Date Value Ref Range Status   06/26/2018 97 70 - 99 mg/dL Final     Hemoglobin A1C   Date Value Ref Range Status   07/29/2009 5.0 4.3 - 6.0 % Final     Hemoglobin   Date Value Ref Range Status   06/26/2018 12.1 11.7 - 15.7 g/dL Final     Ferritin   Date Value Ref Range Status   08/26/2014 365 (H) 10 - 300 ng/mL Final     Iron Saturation Index   Date Value Ref Range Status   08/26/2014 27 15 - 46 % Final     Parathyroid Hormone Intact   Date Value Ref Range Status   08/26/2014 175 (H) 12 - 72 pg/mL Final     Vitamin D Deficiency screening   Date Value Ref Range Status   08/26/2014 19 (L) 30 - 75 ug/L Final     Comment:     Season, race, dietary intake, and treatment affect the concentration of   25-hydroxy-Vitamin D. Values may decrease during winter months and increase   during summer months. Values less than 30 ug/L may indicate Vitamin D   deficiency.   Vitamin D determiniation is routinely performed by an immunoassay specific   for   25 hydroxyvitamin D3.  If an individual is on vitamin D2 (ergocalciferol)   supplementation, please specify 25 OH vitamin D2 and D3 level determination   by   LCMSMS test [id number].  For questions, please contact the laboratory at   [phone].       Calcium   Date Value Ref Range Status   06/26/2018 9.5 8.5 - 10.1 mg/dL Final     Phosphorus   Date Value Ref Range Status   01/27/2010 3.3 2.5 - 4.5 mg/dL Final     Magnesium   Date Value Ref Range Status   05/12/2010 1.7 1.6 - 2.3 mg/dL Final     Potassium   Date Value Ref Range Status   06/26/2018 4.5 3.4 - 5.3 mmol/L Final     CMV IgG Antibody   Date Value Ref Range Status   07/29/2009 >160.0 EU/mL Final     Comment:     Positive for anti-CMV IgG     EBV IgG Antibody Interpretation   Date Value Ref Range Status   07/29/2009 Positive, suggests immunologic exposure.  Final

## 2018-08-25 ENCOUNTER — MYC REFILL (OUTPATIENT)
Dept: FAMILY MEDICINE | Facility: CLINIC | Age: 58
End: 2018-08-25

## 2018-08-25 DIAGNOSIS — I82.4Z1 ACUTE DEEP VEIN THROMBOSIS (DVT) OF DISTAL VEIN OF RIGHT LOWER EXTREMITY (H): ICD-10-CM

## 2018-08-27 NOTE — TELEPHONE ENCOUNTER
"Requested Prescriptions   Pending Prescriptions Disp Refills     apixaban ANTICOAGULANT (ELIQUIS) 5 MG tablet  Last Written Prescription Date:  07/24/18-Patient requesting this be changed to a 90 day supply.  See message below.  Last Fill Quantity: 60,  # refills: 3   Last Office Visit with GRACIE, NATE or [facility] prescribing provider:  07/24/18-Dao   Future Office Visit:    Next 5 appointments (look out 90 days)     Oct 09, 2018  4:15 PM CDT   Return Visit with Symone Pacheco MD   [facility] ([facility]    [address] [address] [place] [id number] [phone]                60 tablet 3     Sig: Take 1 tablet (5 mg) by mouth 2 times daily    Platelet Inhibitors Failed    8/27/2018  6:57 AM       Failed - Normal ALT on file in past 12 months    Recent Labs   Lab Test  08/25/11   0730   ALT  10            Failed - Normal AST on file in past 12 months    Recent Labs   Lab Test  08/25/11   0730   AST  21            Failed - Normal Platelets on file in past 12 months    Recent Labs   Lab Test  06/26/18   0753   PLT  138*              Passed - Normal HGB on file in past 12 months    Recent Labs   Lab Test  06/26/18   0753   HGB  12.1              Passed - Recent (12 mo) or future (30 days) visit within the authorizing provider's specialty    Patient had office visit in the last 12 months or has a visit in the next 30 days with authorizing provider or within the authorizing provider's specialty.  See \"Patient Info\" tab in inbasket, or \"Choose Columns\" in Meds & Orders section of the refill encounter.           Passed - Patient is age 18 or older       Passed - No active pregnancy on record       Passed - Normal serum creatinine on file in past 12 months    Recent Labs   Lab Test  06/26/18   0753  04/01/18   1942   05/28/14   0844   CR  0.71   --    < >   --    CREAT   --   0.6   --    --    CRPOC   --    --    --   0.7    < > = values in this interval not displayed.            " Passed - No positive pregnancy test in past 12 months

## 2018-08-27 NOTE — TELEPHONE ENCOUNTER
Message from [place]:  Original authorizing provider: MD Bessy Reese would like a refill of the following medications:  apixaban ANTICOAGULANT (ELIQUIS) 5 MG tablet [Gina Dc MD]    Preferred pharmacy: [facility] [id number] [id number] [facility]    Comment:  Latesha Dc, Could you please write this prescription for a 3 month supply. Thank you, Bessy Spangler

## 2018-09-18 ENCOUNTER — OFFICE VISIT (OUTPATIENT)
Dept: FAMILY MEDICINE | Facility: CLINIC | Age: 58
End: 2018-09-18
Payer: COMMERCIAL

## 2018-09-18 ENCOUNTER — TRANSFERRED RECORDS (OUTPATIENT)
Dept: HEALTH INFORMATION MANAGEMENT | Facility: CLINIC | Age: 58
End: 2018-09-18

## 2018-09-18 ENCOUNTER — DOCUMENTATION ONLY (OUTPATIENT)
Dept: TRANSPLANT | Facility: CLINIC | Age: 58
End: 2018-09-18

## 2018-09-18 VITALS
BODY MASS INDEX: 34.23 KG/M2 | WEIGHT: 213 LBS | OXYGEN SATURATION: 97 % | DIASTOLIC BLOOD PRESSURE: 74 MMHG | TEMPERATURE: 98.4 F | SYSTOLIC BLOOD PRESSURE: 119 MMHG | HEIGHT: 66 IN | HEART RATE: 65 BPM

## 2018-09-18 DIAGNOSIS — Z79.899 ENCOUNTER FOR LONG-TERM CURRENT USE OF MEDICATION: ICD-10-CM

## 2018-09-18 DIAGNOSIS — E07.9 THYROID DISORDER: ICD-10-CM

## 2018-09-18 DIAGNOSIS — I82.4Z1 ACUTE DEEP VEIN THROMBOSIS (DVT) OF DISTAL VEIN OF RIGHT LOWER EXTREMITY (H): ICD-10-CM

## 2018-09-18 DIAGNOSIS — Z00.00 ROUTINE GENERAL MEDICAL EXAMINATION AT A HEALTH CARE FACILITY: Primary | ICD-10-CM

## 2018-09-18 DIAGNOSIS — Z12.11 SCREEN FOR COLON CANCER: ICD-10-CM

## 2018-09-18 DIAGNOSIS — Z00.00 ROUTINE GENERAL MEDICAL EXAMINATION AT A HEALTH CARE FACILITY: ICD-10-CM

## 2018-09-18 DIAGNOSIS — Z94.0 KIDNEY REPLACED BY TRANSPLANT: ICD-10-CM

## 2018-09-18 DIAGNOSIS — D84.9 IMMUNOSUPPRESSED STATUS (H): ICD-10-CM

## 2018-09-18 DIAGNOSIS — Z48.298 AFTERCARE FOLLOWING ORGAN TRANSPLANT: ICD-10-CM

## 2018-09-18 DIAGNOSIS — Z23 NEED FOR PROPHYLACTIC VACCINATION AND INOCULATION AGAINST INFLUENZA: ICD-10-CM

## 2018-09-18 LAB
ANION GAP SERPL CALCULATED.3IONS-SCNC: 9 MMOL/L (ref 3–14)
BUN SERPL-MCNC: 14 MG/DL (ref 7–30)
CALCIUM SERPL-MCNC: 9.6 MG/DL (ref 8.5–10.1)
CHLORIDE SERPL-SCNC: 106 MMOL/L (ref 94–109)
CHOLEST SERPL-MCNC: 166 MG/DL
CO2 SERPL-SCNC: 24 MMOL/L (ref 20–32)
CREAT SERPL-MCNC: 0.78 MG/DL (ref 0.52–1.04)
ERYTHROCYTE [DISTWIDTH] IN BLOOD BY AUTOMATED COUNT: 13 % (ref 10–15)
GFR SERPL CREATININE-BSD FRML MDRD: 75 ML/MIN/1.7M2
GLUCOSE SERPL-MCNC: 100 MG/DL (ref 70–99)
HCT VFR BLD AUTO: 39 % (ref 35–47)
HDLC SERPL-MCNC: 59 MG/DL
HGB BLD-MCNC: 12.7 G/DL (ref 11.7–15.7)
LDLC SERPL CALC-MCNC: 90 MG/DL
MCH RBC QN AUTO: 29.9 PG (ref 26.5–33)
MCHC RBC AUTO-ENTMCNC: 32.6 G/DL (ref 31.5–36.5)
MCV RBC AUTO: 92 FL (ref 78–100)
NONHDLC SERPL-MCNC: 107 MG/DL
PLATELET # BLD AUTO: 140 10E9/L (ref 150–450)
POTASSIUM SERPL-SCNC: 4.2 MMOL/L (ref 3.4–5.3)
RBC # BLD AUTO: 4.25 10E12/L (ref 3.8–5.2)
SODIUM SERPL-SCNC: 139 MMOL/L (ref 133–144)
T4 FREE SERPL-MCNC: 1.05 NG/DL (ref 0.76–1.46)
TACROLIMUS BLD-MCNC: 6.9 UG/L (ref 5–15)
TME LAST DOSE: NORMAL H
TRIGL SERPL-MCNC: 86 MG/DL
TSH SERPL DL<=0.005 MIU/L-ACNC: 3.15 MU/L (ref 0.4–4)
WBC # BLD AUTO: 5.2 10E9/L (ref 4–11)

## 2018-09-18 PROCEDURE — 85027 COMPLETE CBC AUTOMATED: CPT | Performed by: PREVENTIVE MEDICINE

## 2018-09-18 PROCEDURE — 36415 COLL VENOUS BLD VENIPUNCTURE: CPT | Performed by: PREVENTIVE MEDICINE

## 2018-09-18 PROCEDURE — 84439 ASSAY OF FREE THYROXINE: CPT | Performed by: PREVENTIVE MEDICINE

## 2018-09-18 PROCEDURE — 80197 ASSAY OF TACROLIMUS: CPT | Performed by: INTERNAL MEDICINE

## 2018-09-18 PROCEDURE — 99396 PREV VISIT EST AGE 40-64: CPT | Mod: 25 | Performed by: PREVENTIVE MEDICINE

## 2018-09-18 PROCEDURE — 90471 IMMUNIZATION ADMIN: CPT | Performed by: PREVENTIVE MEDICINE

## 2018-09-18 PROCEDURE — 84443 ASSAY THYROID STIM HORMONE: CPT | Performed by: PREVENTIVE MEDICINE

## 2018-09-18 PROCEDURE — 90686 IIV4 VACC NO PRSV 0.5 ML IM: CPT | Performed by: PREVENTIVE MEDICINE

## 2018-09-18 PROCEDURE — 80048 BASIC METABOLIC PNL TOTAL CA: CPT | Performed by: PREVENTIVE MEDICINE

## 2018-09-18 PROCEDURE — 80061 LIPID PANEL: CPT | Performed by: PREVENTIVE MEDICINE

## 2018-09-18 ASSESSMENT — PAIN SCALES - GENERAL: PAINLEVEL: NO PAIN (0)

## 2018-09-18 NOTE — PROGRESS NOTES

## 2018-09-18 NOTE — MR AVS SNAPSHOT
After Visit Summary   9/18/2018    Bessy Spangler    MRN: [id number]           Patient Information     Date Of Birth          1960        Visit Information        Provider Department      9/18/2018 7:00 AM Gina Dc MD [facility]        Today's Diagnoses     Routine general medical examination at a health care facility    -  1    Thyroid disorder        Immunosuppressed status (H)        Acute deep vein thrombosis (DVT) of distal vein of right lower extremity (H)        Screen for colon cancer        Need for prophylactic vaccination and inoculation against influenza          Care Instructions    At [facility], we strive to deliver an exceptional experience to you, every time we see you.  If you receive a survey in the mail, please send us back your thoughts. We really do value your feedback.    Based on your medical history, these are the current health maintenance/preventive care services that you are due for (some may have been done at this visit.)  Health Maintenance Due   Topic Date Due     COLONOSCOPY Q10 YR  03/03/2018     INFLUENZA VACCINE (1) 09/01/2018       Suggested websites for health information:  Www.[employer] : Up to date and easily searchable information on multiple topics.  Www.medlineplus.gov : medication info, interactive tutorials, watch real surgeries online  Www.familydoctor.org : good info from the Academy of Family Physicians  Www.cdc.gov : public health info, travel advisories, epidemics (H1N1)  Www.aap.org : children's health info, normal development, vaccinations  Www.health.[facility].[place].us : [place] dept of health, public health issues in [place], N1N1    Your care team:                            Family Medicine Internal Medicine   MD Mack Singh MD Shantel Branch-Fleming, MD Katya Georgiev PA-C Megan Hill, APRN PATRICIA Gil MD Pediatrics   Gregg Pal, GISEL Hyde, CNP MD Karol Lanza  MD Marissa Roy CNP, MD Deborah Mielke, MD Kim Thein, APRN CNP      Clinic hours: Monday - Thursday 7 am-7 pm; Fridays 7 am-5 pm.   Urgent care: Monday - Friday 11 am-9 pm; Saturday and Sunday 9 am-5 pm.  Pharmacy : Monday -Thursday 8 am-8 pm; Friday 8 am-6 pm; Saturday and Sunday 9 am-5 pm.     Clinic: [phone]   Pharmacy: [phone]        Preventive Health Recommendations  Female Ages 50 - 64    Yearly exam: See your health care provider every year in order to  o Review health changes.   o Discuss preventive care.    o Review your medicines if your doctor has prescribed any.      Get a Pap test every three years (unless you have an abnormal result and your provider advises testing more often).    If you get Pap tests with HPV test, you only need to test every 5 years, unless you have an abnormal result.     You do not need a Pap test if your uterus was removed (hysterectomy) and you have not had cancer.    You should be tested each year for STDs (sexually transmitted diseases) if you're at risk.     Have a mammogram every 1 to 2 years.    Have a colonoscopy at age 50, or have a yearly FIT test (stool test). These exams screen for colon cancer.      Have a cholesterol test every 5 years, or more often if advised.    Have a diabetes test (fasting glucose) every three years. If you are at risk for diabetes, you should have this test more often.     If you are at risk for osteoporosis (brittle bone disease), think about having a bone density scan (DEXA).    Shots: Get a flu shot each year. Get a tetanus shot every 10 years.    Nutrition:     Eat at least 5 servings of fruits and vegetables each day.    Eat whole-grain bread, whole-wheat pasta and brown rice instead of white grains and rice.    Get adequate Calcium and Vitamin D.     Lifestyle    Exercise at least 150 minutes a week (30 minutes a day, 5 days a week). This will help you control your weight and prevent  disease.    Limit alcohol to one drink per day.    No smoking.     Wear sunscreen to prevent skin cancer.     See your dentist every six months for an exam and cleaning.    See your eye doctor every 1 to 2 years.            Follow-ups after your visit        Additional Services     GASTROENTEROLOGY ADULT REF PROCEDURE ONLY       Last Lab Result: Creatinine (mg/dL)       Date                     Value                 06/26/2018               0.71             ----------  Body mass index is 34.38 kg/(m^2).     Needed:  No  Language:  English    Patient will be contacted to schedule procedure.     Please be aware that coverage of these services is subject to the terms and limitations of your health insurance plan.  Call member services at your health plan with any benefit or coverage questions.  Any procedures must be performed at a [facility] facility OR coordinated by your clinic's referral office.    Please bring the following with you to your appointment:    (1) Any X-Rays, CTs or MRIs which have been performed.  Contact the facility where they were done to arrange for  prior to your scheduled appointment.    (2) List of current medications   (3) This referral request   (4) Any documents/labs given to you for this referral                  Follow-up notes from your care team     Return in about 1 year (around 9/18/2019) for Routine Visit.      Your next 10 appointments already scheduled     Oct 04, 2018  7:00 AM CDT   US LOWER EXTREMITY VENOUS DUPLEX RIGHT with MGUS1, MG  [facility]   [facility] ([facility])    [address] [address] [place] [id number] [phone]           How do I prepare for my exam? (Food and drink instructions) No Food and Drink Restrictions.  How do I prepare for my exam? (Other instructions) You do not need to do anything special to prepare for your exam.  What should I wear: Wear comfortable clothes.  How long does the exam take: Most  ultrasounds take 30 to 60 minutes.  What should I bring: Bring a list of your medicines, including vitamins, minerals and over-the-counter drugs. It is safest to leave personal items at home.  Do I need a :  No  is needed.  What do I need to tell my doctor: Tell your doctor about any allergies you may have.  What should I do after the exam: No restrictions, You may resume normal activities.  What is this test: An ultrasound uses sound waves to make pictures of the body. Sound waves do not cause pain. The only discomfort may be the pressure of the wand against your skin or full bladder.  Who should I call with questions: If you have any questions, please call the Imaging Department where you will have your exam. Directions, parking instructions, and other information is available on our website, [email or web address].            Oct 09, 2018  4:15 PM CDT   [facility]    [address] [place] [id number] [phone]            Nov 02, 2018 11:15 AM CDT   New [facility] with Eli Calderon GC   [facility]    [address] [place] [id number] [phone]            Aug 22, 2019  3:05 PM CDT   (Arrive by 2:35 PM)   Return Kidney Transplant with  Kidney/Pancreas Recipient [address] [place] [address] Nephrology ([facility])    [address] [place] [address]  [address]  [facility] [address] [phone]              Who to contact     If you have questions or need follow up information about today's clinic visit or your schedule please contact [facility] directly at [phone].  Normal or non-critical lab and imaging results will be communicated to you by MyChart, letter or phone within 4 business days after the clinic has received the results. If you do not hear from us within 7 days, please contact the clinic through  "MyChart or phone. If you have a critical or abnormal lab result, we will notify you by phone as soon as possible.  Submit refill requests through testbirds or call your pharmacy and they will forward the refill request to us. Please allow 3 business days for your refill to be completed.          Additional Information About Your Visit        [employer] Information     testbirds gives you secure access to your electronic health record. If you see a primary care provider, you can also send messages to your care team and make appointments. If you have questions, please call your primary care clinic.  If you do not have a primary care provider, please call [phone] and they will assist you.        Care EveryWhere ID     This is your Care EveryWhere ID. This could be used by other organizations to access your [facility] medical records  [id number]        Your Vitals Were     Pulse Temperature Height Last Period Pulse Oximetry Breastfeeding?    65 98.4  F (36.9  C) (Oral) 5' 6\" (1.676 m) 10/25/2004 97% No    BMI (Body Mass Index)                   34.38 kg/m2            Blood Pressure from Last 3 Encounters:   09/18/18 119/74   08/23/18 118/74   08/07/18 136/76    Weight from Last 3 Encounters:   09/18/18 213 lb (96.6 kg)   08/23/18 220 lb 9.6 oz (100.1 kg)   08/07/18 223 lb (101.2 kg)              We Performed the Following     FLU VACCINE, SPLIT VIRUS, IM (QUADRIVALENT) [50355]- >3 YRS     GASTROENTEROLOGY ADULT REF PROCEDURE ONLY     Vaccine Administration, Initial [46560]        Primary Care Provider Office Phone # Fax #    Mike Tan [phone] [address] [facility] [address]        Equal Access to Services     [facility]: Hadii sonal White, waadelfoda luluizadaha, qastefanie kaalmada ellen, sandra hawley. So [facility] [phone].    ATENCIÓN: Si habla español, tiene a garcia disposición servicios gratuitos de asistencia lingüística. Llame al " [phone].    We comply with applicable federal civil rights laws and [place] laws. We do not discriminate on the basis of race, color, national origin, age, disability, sex, sexual orientation, or gender identity.            Thank you!     Thank you for choosing [facility]  for your care. Our goal is always to provide you with excellent care. Hearing back from our patients is one way we can continue to improve our services. Please take a few minutes to complete the written survey that you may receive in the mail after your visit with us. Thank you!             Your Updated Medication List - Protect others around you: Learn how to safely use, store and throw away your medicines at www.disposemymeds.org.          This list is accurate as of 9/18/18  7:53 AM.  Always use your most recent med list.                   Brand Name Dispense Instructions for use Diagnosis    apixaban ANTICOAGULANT 5 MG tablet    ELIQUIS    180 tablet    Take 1 tablet (5 mg) by mouth 2 times daily    Acute deep vein thrombosis (DVT) of distal vein of right lower extremity (H)       levothyroxine 112 MCG tablet    SYNTHROID/LEVOTHROID    90 tablet    Take 1 tablet (112 mcg) by mouth daily    Thyroid disorder       metoprolol tartrate 50 MG tablet    LOPRESSOR    180 tablet    TAKE 1 TABLET (50 MG) BY MOUTH 2 TIMES DAILY    Hypertension       mycophenolic acid 180 MG EC tablet     540 tablet    Take 3 tablets (540 mg) by mouth 2 times daily    Kidney transplanted       tacrolimus 1 MG capsule    GENERIC EQUIVALENT    540 capsule    Take 3 capsules (3 mg) by mouth 2 times daily    Kidney replaced by transplant       VITAMIN D (CHOLECALCIFEROL) PO      Take 1,000 Units by mouth daily

## 2018-09-18 NOTE — PROGRESS NOTES
Bessy,     Cholesterol looks good and is at goal for you.   Thyroid function is in the normal range.     Please do not hesitate to call us at [phone] if you have any questions or concerns.    Thank you,    Gina Dc MD MPH

## 2018-09-18 NOTE — PATIENT INSTRUCTIONS
At [facility], we strive to deliver an exceptional experience to you, every time we see you.  If you receive a survey in the mail, please send us back your thoughts. We really do value your feedback.    Based on your medical history, these are the current health maintenance/preventive care services that you are due for (some may have been done at this visit.)  Health Maintenance Due   Topic Date Due     COLONOSCOPY Q10 YR  03/03/2018     INFLUENZA VACCINE (1) 09/01/2018       Suggested websites for health information:  Www.[employer].Dailyevent : Up to date and easily searchable information on multiple topics.  Www.MorphoSys.gov : medication info, interactive tutorials, watch real surgeries online  Www.familydoctor.org : good info from the Academy of Family Physicians  Www.cdc.gov : public health info, travel advisories, epidemics (H1N1)  Www.aap.org : children's health info, normal development, vaccinations  Www.health.[email or web address].[place].us : [place] dept of health, public health issues in [place], N1N1    Your care team:                            Family Medicine Internal Medicine   MD Mack Singh MD Shantel Branch-Fleming, MD Katya Georgiev PA-C Megan Hill, APRN CNP    Frnak Gil MD Pediatrics   Gregg Pal, PAJOHN Hyde, CNP MD Karol Lanza APRN CNP   MD Marissa Reese MD Deborah Mielke, MD Kim Thein, APRN [facility]      Clinic hours: Monday - Thursday 7 am-7 pm; Fridays 7 am-5 pm.   Urgent care: Monday - Friday 11 am-9 pm; Saturday and Sunday 9 am-5 pm.  Pharmacy : Monday -Thursday 8 am-8 pm; Friday 8 am-6 pm; Saturday and Sunday 9 am-5 pm.     Clinic: [phone]   Pharmacy: [phone]        Preventive Health Recommendations  Female Ages 50 - 64    Yearly exam: See your health care provider every year in order to  o Review health changes.   o Discuss preventive care.    o Review your medicines if your doctor has prescribed any.      Get a Pap  test every three years (unless you have an abnormal result and your provider advises testing more often).    If you get Pap tests with HPV test, you only need to test every 5 years, unless you have an abnormal result.     You do not need a Pap test if your uterus was removed (hysterectomy) and you have not had cancer.    You should be tested each year for STDs (sexually transmitted diseases) if you're at risk.     Have a mammogram every 1 to 2 years.    Have a colonoscopy at age 50, or have a yearly FIT test (stool test). These exams screen for colon cancer.      Have a cholesterol test every 5 years, or more often if advised.    Have a diabetes test (fasting glucose) every three years. If you are at risk for diabetes, you should have this test more often.     If you are at risk for osteoporosis (brittle bone disease), think about having a bone density scan (DEXA).    Shots: Get a flu shot each year. Get a tetanus shot every 10 years.    Nutrition:     Eat at least 5 servings of fruits and vegetables each day.    Eat whole-grain bread, whole-wheat pasta and brown rice instead of white grains and rice.    Get adequate Calcium and Vitamin D.     Lifestyle    Exercise at least 150 minutes a week (30 minutes a day, 5 days a week). This will help you control your weight and prevent disease.    Limit alcohol to one drink per day.    No smoking.     Wear sunscreen to prevent skin cancer.     See your dentist every six months for an exam and cleaning.    See your eye doctor every 1 to 2 years.

## 2018-09-18 NOTE — PROGRESS NOTES
SUBJECTIVE:   CC: Bessy Spangler is an 58 year old woman who presents for preventive health visit.     Healthy Habits:    Do you get at least three servings of calcium containing foods daily (dairy, green leafy vegetables, etc.)? yes    Amount of exercise or daily activities, outside of work: 3 day(s) per week, walking     Problems taking medications regularly No    Medication side effects: No    Have you had an eye exam in the past two years? yes    Do you see a dentist twice per year? yes    Do you have sleep apnea, excessive snoring or daytime drowsiness?no      Hypothyroidism Follow-up      Since last visit, patient describes the following symptoms: Weight stable, no hair loss, no skin changes, no constipation, no loose stools      Today's PHQ-2 Score:   PHQ-2 ( 1999 Pf[employer]) 4/13/2018 10/16/2014   Q1: Little interest or pleasure in doing things 0 0   Q2: Feeling down, depressed or hopeless 0 0   PHQ-2 Score 0 0       Abuse: Current or Past(Physical, Sexual or Emotional)- No  Do you feel safe in your environment - Yes    Social History   Substance Use Topics     Smoking status: Former Smoker     Packs/day: 1.00     Years: 20.00     Types: Cigarettes     Quit date: 1/1/1998     Smokeless tobacco: Never Used     Alcohol use No     If you drink alcohol do you typically have >3 drinks per day or >7 drinks per week? No                     Reviewed orders with patient.  Reviewed health maintenance and updated orders accordingly - Yes  Labs reviewed in EPIC  BP Readings from Last 3 Encounters:   09/18/18 119/74   08/23/18 118/74   08/07/18 136/76    Wt Readings from Last 3 Encounters:   09/18/18 213 lb (96.6 kg)   08/23/18 220 lb 9.6 oz (100.1 kg)   08/07/18 223 lb (101.2 kg)                  Patient Active Problem List   Diagnosis     Breast cancer (H)     Kidney replaced by transplant     CARDIOVASCULAR SCREENING; LDL GOAL LESS THAN 100     Immunosuppressed status (H)     Hypertension secondary to other renal  disorders     Thyroid disorder     Post-menopausal     Conjunctival hemorrhage     Tear film insufficiency     Presbyopia - Both Eyes     Aftercare following organ transplant     Vestibular neuronitis of right ear     Morbid obesity (H)     Acute deep vein thrombosis (DVT) of distal vein of right lower extremity (H)     Past Surgical History:   Procedure Laterality Date     APPENDECTOMY OPEN       AV FISTULA OR GRAFT ARTERIAL      left     C TRANSPLANT,PREP LIVING  RENAL GRAFT  2009    Hx of nephritis     LUMPECTOMY BREAST      left       Social History   Substance Use Topics     Smoking status: Former Smoker     Packs/day: 1.00     Years: 20.00     Types: Cigarettes     Quit date: 1998     Smokeless tobacco: Never Used     Alcohol use No     Family History   Problem Relation Age of Onset     Diabetes Father      type 2       Arthritis Father      Glaucoma Father      Cancer Mother 58     smoker,metastatic adnoid cystic cancer,  early 60s     Arthritis Mother      Endocrine Disease Mother      thyroid     Hypertension Maternal Grandmother       at a young age     Hypertension Maternal Grandfather      Cancer Paternal Grandmother      stomach cancer     Endocrine Disease Sister      thyroid     Genitourinary Problems Daughter 32     renal failure      Endocrine Disease Sister      thyroid     Chronic Obstructive Pulmonary Disease Paternal Grandfather      smoking  young         Current Outpatient Prescriptions   Medication Sig Dispense Refill     apixaban ANTICOAGULANT (ELIQUIS) 5 MG tablet Take 1 tablet (5 mg) by mouth 2 times daily 180 tablet 1     levothyroxine (SYNTHROID/LEVOTHROID) 112 MCG tablet Take 1 tablet (112 mcg) by mouth daily 90 tablet 3     metoprolol tartrate (LOPRESSOR) 50 MG tablet TAKE 1 TABLET (50 MG) BY MOUTH 2 TIMES DAILY 180 tablet 3     MYFORTIC (BRAND) 180 MG EC TABLET Take 3 tablets (540 mg) by mouth 2 times daily 540 tablet 3     tacrolimus (GENERIC EQUIVALENT)  "1 MG capsule Take 3 capsules (3 mg) by mouth 2 times daily 540 capsule 3     VITAMIN D, CHOLECALCIFEROL, PO Take 1,000 Units by mouth daily       Allergies   Allergen Reactions     Dilaudid [Hydromorphone Hcl]      Patient unable to recall     Sulfa Drugs Swelling       Patient over age 50, mutual decision to screen reflected in health maintenance.  Does have a history of breast cancer     Pertinent mammograms are reviewed under the imaging tab.  History of abnormal Pap smear: NO - age 30-65 PAP every 5 years with negative HPV co-testing recommended  PAP / HPV 10/16/2014   PAP NIL     Reviewed and updated as needed this visit by clinical staff  Tobacco  Allergies  Meds  Med Hx  Surg Hx         Reviewed and updated as needed this visit by Provider  Allergies  Meds  Med Hx  Surg Hx        Past Medical History:   Diagnosis Date     Anemia of chronic renal failure      Breast cancer (H)      Chronic diarrhea      Chronic kidney disease (CKD), stage V (H)     Due to \"nephritis\". Biopsy done years ago. I do not know the histologic specifics     High risk medication use      Hyperphosphatemia      Hypertension      Hypothyroid      Immunosuppressed status (H)      Kidney replaced by transplant     nephritis as a child     PE (pulmonary embolism)     after transplant     Secondary hyperparathyroidism (of renal origin)       Past Surgical History:   Procedure Laterality Date     APPENDECTOMY OPEN  1999     AV FISTULA OR GRAFT ARTERIAL      left     C TRANSPLANT,PREP LIVING  RENAL GRAFT  7/30/2009    Hx of nephritis     LUMPECTOMY BREAST      left       ROS:  CONSTITUTIONAL: NEGATIVE for fever, chills, change in weight  INTEGUMENTARY/SKIN: NEGATIVE for worrisome rashes, moles or lesions  EYES: NEGATIVE for vision changes or irritation  ENT: NEGATIVE for ear, mouth and throat problems  RESP: NEGATIVE for significant cough or SOB  BREAST: NEGATIVE for masses, tenderness or discharge  CV: NEGATIVE for chest pain, " "palpitations or peripheral edema  GI: NEGATIVE for nausea, abdominal pain, heartburn, or change in bowel habits  : NEGATIVE for unusual urinary or vaginal symptoms. No vaginal bleeding.  MUSCULOSKELETAL: NEGATIVE for significant arthralgias or myalgia  NEURO: NEGATIVE for weakness, dizziness or paresthesias  ENDOCRINE: NEGATIVE for temperature intolerance, skin/hair changes  HEME/ALLERGY/IMMUNE: NEGATIVE for bleeding problems  PSYCHIATRIC: NEGATIVE for changes in mood or affect     OBJECTIVE:   /74  Pulse 65  Temp 98.4  F (36.9  C) (Oral)  Ht 5' 6\" (1.676 m)  Wt 213 lb (96.6 kg)  LMP 10/25/2004  SpO2 97%  Breastfeeding? No  BMI 34.38 kg/m2  EXAM:  GENERAL APPEARANCE: healthy, alert and no distress  EYES: Eyes grossly normal to inspection, PERRL and conjunctivae and sclerae normal  HENT: ear canals and TM's normal, nose and mouth without ulcers or lesions, oropharynx clear and oral mucous membranes moist  NECK: no adenopathy, no asymmetry  RESP: lungs clear to auscultation - no rales, rhonchi or wheezes  BREAST: normal without masses, tenderness or nipple discharge and no palpable axillary masses or adenopathy, changes on left side consistent with past lumpectomy   CV: regular rate and rhythm, normal S1 S2, no S3 or S4, no murmur, click or rub, no peripheral edema and peripheral pulses strong  ABDOMEN: soft, nontender, no hepatosplenomegaly, no masses  MS: no musculoskeletal defects are noted and gait is age appropriate without ataxia  SKIN: no suspicious lesions or rashes  NEURO: Normal strength and tone, sensory exam grossly normal, mentation intact and speech normal  PSYCH: mentation appears normal and affect normal/bright    Diagnostic Test Results:  No results found for this or any previous visit (from the past 24 hour(s)).    ASSESSMENT/PLAN:   Bessy was seen today for physical and flu shot.    Diagnoses and all orders for this visit:    Routine general medical examination at a [facility]" "facility  -     Lipid panel reflex to direct LDL Fasting; Future  -Future fasting labs     Thyroid disorder  -     TSH; Future  -     T4 free; Future  -refill coming up in 11/18. If labs normal, then refill medication for a year     Immunosuppressed status (H)  -renal transplant  -followed by Nephrology    Acute deep vein thrombosis (DVT) of distal vein of right lower extremity (H)  -Saw Hematology  -On Eliquis     Screen for colon cancer  -     GASTROENTEROLOGY ADULT REF PROCEDURE ONLY        COUNSELING:   Reviewed preventive health counseling, as reflected in patient instructions       Regular exercise       Healthy diet/nutrition       Vision screening       Immunizations    Vaccinated for: Influenza             Colon cancer screening    BP Readings from Last 1 Encounters:   09/18/18 119/74     Estimated body mass index is 34.38 kg/(m^2) as calculated from the following:    Height as of this encounter: 5' 6\" (1.676 m).    Weight as of this encounter: 213 lb (96.6 kg).      Weight management plan: Discussed healthy diet and exercise guidelines and patient will follow up in 12 months in clinic to re-evaluate.     reports that she quit smoking about 20 years ago. Her smoking use included Cigarettes. She has a 20.00 pack-year smoking history. She has never used smokeless tobacco.      Counseling Resources:  ATP IV Guidelines  Pooled Cohorts Equation Calculator  Breast Cancer Risk Calculator  FRAX Risk Assessment  ICSI Preventive Guidelines  Dietary Guidelines for Americans, 2010  USDA's MyPlate  ASA Prophylaxis  Lung CA Screening    Gina Dc MD MPH    [facility]  "

## 2018-09-24 ENCOUNTER — TELEPHONE (OUTPATIENT)
Dept: FAMILY MEDICINE | Facility: CLINIC | Age: 58
End: 2018-09-24

## 2018-09-24 NOTE — TELEPHONE ENCOUNTER
Panel Management Review      9/18/2018  Last Office Visit with this department: 9/18/2018    Fail List measure: Colon Screening      Patient is due/failing the following:   COLONOSCOPY    Action needed:   None    Type of outreach:    Sent letter.    Questions for provider review:    None                                                                                                                                    Demarcus Harper MA      Chart routed to Na .

## 2018-09-24 NOTE — LETTER
September 24, 2018      Bessy Spangler  [address] [place] [address]          Dear Bessy Spangler,      At [facility] we care about your health and are committed to providing quality patient care, which includes staying current on preventative cancer screenings.  You can increase your chances of finding and treating cancers through regular screenings.      Our records show that you are due for the following screening(s):    Colonoscopy for colon cancer  Specialty Schedule Number [phone]  Recommended every ten years for everyone age 50 and older  We strongly urge our patient's to consider having a colonoscopy done, which is the best screening test available and only needs to be done every 10 years if normal.      Other option is that you can do a FIT and this is once a year.  Any questions or concern, please contact us at [facility] at [phone].    You may contact the closest location to schedule the screening test(s) at your earliest convenience.    If you have already had one or all of the above screening tests at another facility, please call us so that we may update your chart.      Sincerely,    Quality Committee at [facility]

## 2018-09-28 DIAGNOSIS — Z94.0 KIDNEY REPLACED BY TRANSPLANT: Primary | ICD-10-CM

## 2018-09-28 RX ORDER — TACROLIMUS 1 MG/1
3 CAPSULE ORAL 2 TIMES DAILY
Qty: 540 CAPSULE | Refills: 3 | Status: SHIPPED | OUTPATIENT
Start: 2018-09-28 | End: 2018-12-19

## 2018-10-04 ENCOUNTER — RADIANT APPOINTMENT (OUTPATIENT)
Dept: ULTRASOUND IMAGING | Facility: CLINIC | Age: 58
End: 2018-10-04
Attending: INTERNAL MEDICINE
Payer: COMMERCIAL

## 2018-10-04 DIAGNOSIS — I82.4Z1 ACUTE DEEP VEIN THROMBOSIS (DVT) OF DISTAL VEIN OF RIGHT LOWER EXTREMITY (H): ICD-10-CM

## 2018-10-04 PROCEDURE — 93971 EXTREMITY STUDY: CPT | Mod: RT | Performed by: RADIOLOGY

## 2018-10-05 ENCOUNTER — TELEPHONE (OUTPATIENT)
Dept: ONCOLOGY | Facility: CLINIC | Age: 58
End: 2018-10-05

## 2018-10-05 NOTE — TELEPHONE ENCOUNTER
10/5/2018    I spoke with Bessy today. She also had a representative from her insurance company on the phone, as there were questions about her upcoming genetic counseling/testing being covered. They asked specifically about the 37910 code. We discussed that this is for the genetic counseling visit only, and that the laboratory testing would be billed separately. We discussed that there are multiple labs that offer different genetic tests, and that we would review her personal and family history to determine what test is appropriate.  She asked which lab we most commonly use, which I stated was [facility]. Her  was able to confirm that they are in-network with this laboratory.    After her  left the call, Bessy had further questions about the visit. She also asked how she would know how much testing would cost. We discussed [facility]'s billing policy, and that they contact the patient if the out of pocket cost is expected to be over $100. Bessy was comfortable with this. She also wished to move her appointment. We will meet on Oct 19th at 8am, and she will bet set up with a tentative return appointment on Dec 19th at 1:15pm.    Bessy had no further questions today.     Eli Calderon MS, [facility]  Licensed Genetic Counselor  P. [phone]  F. [phone]

## 2018-10-09 ENCOUNTER — ONCOLOGY VISIT (OUTPATIENT)
Dept: ONCOLOGY | Facility: CLINIC | Age: 58
End: 2018-10-09
Payer: COMMERCIAL

## 2018-10-09 VITALS
RESPIRATION RATE: 18 BRPM | OXYGEN SATURATION: 97 % | SYSTOLIC BLOOD PRESSURE: 135 MMHG | WEIGHT: 213 LBS | DIASTOLIC BLOOD PRESSURE: 76 MMHG | HEART RATE: 64 BPM | TEMPERATURE: 98.8 F | BODY MASS INDEX: 34.23 KG/M2 | HEIGHT: 66 IN

## 2018-10-09 DIAGNOSIS — I82.4Z1 ACUTE DEEP VEIN THROMBOSIS (DVT) OF DISTAL VEIN OF RIGHT LOWER EXTREMITY (H): Primary | ICD-10-CM

## 2018-10-09 PROCEDURE — 99214 OFFICE O/P EST MOD 30 MIN: CPT | Performed by: INTERNAL MEDICINE

## 2018-10-09 ASSESSMENT — PAIN SCALES - GENERAL: PAINLEVEL: NO PAIN (0)

## 2018-10-09 NOTE — NURSING NOTE
"Oncology Rooming Note    October 9, 2018 4:02 PM   Bessy Spangler is a 58 year old female who presents for:    Chief Complaint   Patient presents with     Oncology Clinic Visit     follow up     Initial Vitals: /76  Pulse 64  Temp 98.8  F (37.1  C)  Resp 18  Ht 1.676 m (5' 6\")  Wt 96.6 kg (213 lb)  LMP 10/25/2004  SpO2 97%  BMI 34.38 kg/m2 Estimated body mass index is 34.38 kg/(m^2) as calculated from the following:    Height as of this encounter: 1.676 m (5' 6\").    Weight as of this encounter: 96.6 kg (213 lb). Body surface area is 2.12 meters squared.  No Pain (0) Comment: Data Unavailable   Patient's last menstrual period was 10/25/2004.  Allergies reviewed: Yes  Medications reviewed: Yes    Medications: Medication refills not needed today.  Pharmacy name entered into [facility]:    [facility] - [place], [place] - [address], [address]  [facility] [place]  [facility] [address] [place] [facility] - [place], [place] - [address] [facility] - USE FOR MAILING ONLY - [facility] [place] - [place] - [address] [place] [address] [place]  [id number] - [place] - [address] [facility]  [facility] [place], [place] - [facility]        5 minutes for nursing intake (face to face time)     Zahra Aburto LPN              "

## 2018-10-09 NOTE — PROGRESS NOTES
Hematology follow up visit:  Date on this visit: 10/9/2018     Chief complaint  DVT    Primary Physician: Mike Tan     History Of Present Illness:    Please see previous note for details.  I have copied and updated from prior note.   Ms. Spangler is a 58 year old female who has a hx of grade 2 Infiltrating ductal carcinoma of the left breast diagnosed March of 2002, 1.1 cm, tumor stage T1c, N0, stage 1, ER/VT positive, and HER-2 3+ positive, treated with lumpectomy followed by 4 cycles of Adriamycin and cyclophosphamide followed by radiation. She was on on tamoxifen from July 2002 until June 2005. At that point it was changed to exemestane. In May 2008 patient elected to stop therapy.   She also has a hx of nephritis and had kidney transplant in July 2009. She then had PE in September 2009 in the post transplant setting. Factor V Leidin and Prothrombin gene mutations were negative.    Recently found to have Acute occlusive deep venous thrombosis along the right distal superficial femoral, popliteal, peroneal and posterior tibial veins, when she presented with a couple of days of right leg pain and swelling. There was no prior sickness, long travels, or other provoking factors. No local trauma apart from a sprained ankle in December 2017 when she was wearing a boot for a week but she was not bedbound at that time and she was able to walk even at that time she developed vertigo. Had a fall around end of May 2018 when she broke her left humerus.  She was started on Apixaban and was tolerating it well.  We did further thrombophilia workup and Protein C/protein S levels were normal.  Antithrombin III was also unremarkable.  Anti-beta-2 glycoprotein antibodies and anticardiolipin antibodies were negative.      Because of unprovoked nature of the blood clot we decided on indefinite anticoagulation.  She was continued on apixaban.    She had a repeat ultrasound done improved clot burden and now nonocclusive  "thrombus in the right popliteal vein is seen.  Overall she feels well and is tolerating apixaban well.  Denies any bleeding.  She does not noticed the right leg swelling anymore.  No trouble breathing.  She has been trying to eat healthy and exercise more and has lost a few pounds intentionally.  Denies nausea vomiting diarrhea constipation.  No trouble breathing.  Occasionally feels he feels a little dizzy but denies any new neurological problems.        ROS:  Rest of the review of the system was essentially unremarkable.    I reviewed other history in epic as below.    Past Medical/Surgical History:  Past Medical History:   Diagnosis Date     Anemia of chronic renal failure      Breast cancer (H)      Chronic diarrhea      Chronic kidney disease (CKD), stage V (H)     Due to \"nephritis\". Biopsy done years ago. I do not know the histologic specifics     High risk medication use      Hyperphosphatemia      Hypertension      Hypothyroid      Immunosuppressed status (H)      Kidney replaced by transplant     nephritis as a child     PE (pulmonary embolism)     after transplant     Secondary hyperparathyroidism (of renal origin)    Previous history of breast cancer.  She had early stage left-sided breast cancer which was estrogen and progesterone receptor positive and HER-2 was positive as well.  She was treated with lumpectomy followed by adjuvant Adriamycin and Cytoxan followed by radiation.  She was on hormonal therapy for about 5 years or so but she has not received any treatment for it since 2008.  She is following with her primary care physician getting regular annual mammograms.    Past Surgical History:   Procedure Laterality Date     APPENDECTOMY OPEN  1999     AV FISTULA OR GRAFT ARTERIAL      left     C TRANSPLANT,PREP LIVING  RENAL GRAFT  7/30/2009    Hx of nephritis     LUMPECTOMY BREAST      left     Allergies:  Allergies as of 10/09/2018 - Wali as Reviewed 10/09/2018   Allergen Reaction Noted     " Dilaudid [hydromorphone hcl]  10/15/2011     Sulfa drugs Swelling 2008     Current Medications:  Current Outpatient Prescriptions   Medication Sig Dispense Refill     apixaban ANTICOAGULANT (ELIQUIS) 5 MG tablet Take 1 tablet (5 mg) by mouth 2 times daily 180 tablet 1     levothyroxine (SYNTHROID/LEVOTHROID) 112 MCG tablet Take 1 tablet (112 mcg) by mouth daily 90 tablet 3     metoprolol tartrate (LOPRESSOR) 50 MG tablet TAKE 1 TABLET (50 MG) BY MOUTH 2 TIMES DAILY 180 tablet 3     MYFORTIC (BRAND) 180 MG EC TABLET Take 3 tablets (540 mg) by mouth 2 times daily 540 tablet 3     tacrolimus (GENERIC EQUIVALENT) 1 MG capsule Take 3 capsules (3 mg) by mouth 2 times daily 540 capsule 3     VITAMIN D, CHOLECALCIFEROL, PO Take 1,000 Units by mouth daily        Family History:  Family History   Problem Relation Age of Onset     Diabetes Father      type 2       Arthritis Father      Glaucoma Father      Cancer Mother 58     smoker,metastatic adnoid cystic cancer,  early 60s     Arthritis Mother      Endocrine Disease Mother      thyroid     Hypertension Maternal Grandmother       at a young age     Hypertension Maternal Grandfather      Cancer Paternal Grandmother      stomach cancer     Endocrine Disease Sister      thyroid     Genitourinary Problems Daughter 32     renal failure      Endocrine Disease Sister      thyroid     Chronic Obstructive Pulmonary Disease Paternal Grandfather      smoking  young     No family history of bleeding or clotting disorder.  Her mother was diagnosed with adenoid cystic cancer of the throat around the age 59.  Paternal grandmother had stomach cancer but she is not sure of the age.  Daughter has chronic kidney disease.  She has 2 other sons who are healthy.  Social History:  Social History     Social History     Marital status: Single     Spouse name: N/A     Number of children: N/A     Years of education: N/A     Occupational History     Not on file.     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  degree    Sal 1981,Betty 1983 ( On dialysis), Vincenzo Alvares 1988    Grandchildren Torri 11/2010, Krystina 10/2007 lives with her and Betty, son and family. Jeremías 2014    2 additional grandchildren will be born soon   She used to smoke but quit 15 years ago.  Does not drink alcohol.  She lives with her younger son his wife and 2 children.  She has a desk job related to real estate .  Physical Exam:  /76  Pulse 64  Temp 98.8  F (37.1  C)  Resp 18  Ht 1.676 m (5' 6\")  Wt 96.6 kg (213 lb)  LMP 10/25/2004  SpO2 97%  BMI 34.38 kg/m2  CONSTITUTIONAL: No apparent distress  EYES: PERRLA, without pallor or jaundice  ENT/MOUTH: Ears unremarkable. No oral lesions  CVS: s1s2 normal  RESPIRATORY: Chest is clear  GI: Abdomen is benign  NEURO: Alert and oriented ×3  INTEGUMENT: no concerning skin rashes   LYMPHATIC: no palpable lymphadenopathy  MUSCULOSKELETAL: Unremarkable. No bony tenderness.   EXTREMITIES: no pedal edema  PSYCH: Mentation, mood and affect are appropriate      Laboratory/Imaging Studies  Reviewed  Results for orders placed or performed in visit on 10/04/18   US Lower Extremity Venous Duplex RT    Narrative    Exam: Ultrasound of the deep venous system of right lower extremity  dated 10/4/2018 7:30 AM    Clinical information: Follow-up DVT    Comparison: Outside report, [facility]: Right lower  extremity venous ultrasound 6/29/2018    Ordering provider: CORRIE STOKES    Technique: Gray-scale evaluation with compression and Doppler  assessment of deep venous system for spontaneous and " "phasic flow, as  well as the presence of distal augmentation. Color flow images  obtained as needed. Gray-scale images with compression of the great  saphenous vein obtained as needed.    Findings:    Right leg:    CFV: Thrombus: No, Phasic: Yes  Femoral vein, proximal: Thrombus: No, Phasic: Yes  Femoral vein, mid: Thrombus: No, Phasic: Yes  Femoral vein, distal: Thrombus: No, Phasic: Yes  Popliteal vein: Thrombus: Yes, nonocclusive. Phasic: Abnormal  PTV: Thrombus: No  Peroneal vein: Thrombus: No      Impression    Impression:    Right leg: Nonocclusive thrombus in the right popliteal vein. Per  outside report dated 6/29/2018 (images not available), occlusive  thrombus was previously seen in the right distal superficial femoral,  popliteal, peroneal, and posterior tibial veins.    Reference: \"Duplex Ultrasound in the Diagnosis of Lower-Extremity Deep  Venous Thrombosis\"- Juliann Russell MD, S; Reggie Juarez MD  (Circulation. 2014;129:917-921. http://circ.ahajournals.org )     ASSESSMENT/PLAN:      Unprovoked right lower extremity DVT.  Previously she has a history of provoked pulmonary embolism after kidney transplant.  Because this time the clot was clearly unprovoked I recommended indefinite anticoagulation.  She is tolerating apixaban well and she should continue that.  We discussed that long-term anticoagulation increases the risk of significant bleeding but in her case the risk of clotting if she goes off anticoagulation is likely to be much higher than the risk of significant bleeding.    Repeat ultrasound shows significant improvement in the clot burden.  Symptomatically she is doing well and her lower extremity swelling has resolved.        Thrombophilia workup.   In the past she was tested negative for prothrombin gene mutation and factor V Leiden so  we did further thrombophilia workup and Protein C/protein S levels were normal.  Antithrombin III was also unremarkable.  Anti-beta-2 glycoprotein " antibodies and anticardiolipin antibodies were negative.  I did not repeat lupus anticoagulant as it would have been false positive in the setting of apixaban use.      Genetic testing.  Because of her young age of presentation for breast cancer as well as paternal grandmother with a stomach cancer I gave her a referral to see genetic counselor and she has an appointment later this month.    Going forward she can follow with her primary care physician and see me back on an as-needed basis     All of her questions were answered to her satisfaction.  She is comfortable with the plan.      Symone Pacheco

## 2018-10-09 NOTE — MR AVS SNAPSHOT
After Visit Summary   10/9/2018    Bessy Spangler    MRN: [id number]           Patient Information     Date Of Birth          1960        Visit Information        Provider Department      10/9/2018 4:15 PM Symone Pacheco MD [facility]        Today's Diagnoses     Acute deep vein thrombosis (DVT) of distal vein of right lower extremity (H)    -  1      Care Instructions    Cont Apixaban    Follow with PCP and see me back as needed          Follow-ups after your visit        Your next 10 appointments already scheduled     Oct 19, 2018  8:00 AM CDT   New Visit with Eli Calderon GC   [facility] ([facility])    [id number] [place] [id number] [phone]            Dec 19, 2018  1:15 PM CST   Return Visit with Eli Calderon GC   [facility] ([facility])    [address] [place] [id number] [phone]            Aug 22, 2019  3:05 PM CDT   (Arrive by 2:35 PM)   Return Kidney Transplant with  Kidney/Pancreas Recipient 1   [facility] ([facility])    [address] [place] [address]  [address] [place] [id number] [phone]              Who to contact     If you have questions or need follow up information about today's clinic visit or your schedule please contact [facility] directly at [phone].  Normal or non-critical lab and imaging results will be communicated to you by MyChart, letter or phone within 4 business days after the clinic has received the results. If you do not hear from us within 7 days, please contact the clinic through MyChart or phone. If you have a critical or abnormal lab result, we will notify you by phone as soon as possible.  Submit refill requests through [employer] or call your pharmacy and they will forward the refill request to us. Please allow 3 business days for your refill to be completed.     "      Additional Information About Your Visit        [employer] Information     [employer] gives you secure access to your electronic health record. If you see a primary care provider, you can also send messages to your care team and make appointments. If you have questions, please call your primary care clinic.  If you do not have a primary care provider, please call [phone] and they will assist you.      [employer] is an electronic gateway that provides easy, online access to your medical records. With [employer], you can request a clinic appointment, read your test results, renew a prescription or communicate with your care team.     To access your existing account, please contact your [facility] or call [phone] for assistance.        Care EveryWhere ID     This is your Care EveryWhere ID. This could be used by other organizations to access your [facility] medical records  [id number]        Your Vitals Were     Pulse Temperature Respirations Height Last Period Pulse Oximetry    64 98.8  F (37.1  C) 18 1.676 m (5' 6\") 10/25/2004 97%    BMI (Body Mass Index)                   34.38 kg/m2            Blood Pressure from Last 3 Encounters:   10/09/18 135/76   09/18/18 119/74   08/23/18 118/74    Weight from Last 3 Encounters:   10/09/18 96.6 kg (213 lb)   09/18/18 96.6 kg (213 lb)   08/23/18 100.1 kg (220 lb 9.6 oz)              Today, you had the following     No orders found for display       Primary Care Provider Office Phone # Fax #    Mike Tan [phone]       [address] [facility] [address]        Equal Access to Services     [facility] : Hadii sonal ku hadashmarge Soarturo, waaxda luqadaha, qaybta kaalmasandra moya. So [facility] [phone].    ATENCIÓN: Si habla español, tiene a garcia disposición servicios gratuitos de asistencia lingüística. Llame al [phone].    We comply with applicable federal civil rights " laws and [place] laws. We do not discriminate on the basis of race, color, national origin, age, disability, sex, sexual orientation, or gender identity.            Thank you!     Thank you for choosing [facility]  for your care. Our goal is always to provide you with excellent care. Hearing back from our patients is one way we can continue to improve our services. Please take a few minutes to complete the written survey that you may receive in the mail after your visit with us. Thank you!             Your Updated Medication List - Protect others around you: Learn how to safely use, store and throw away your medicines at www.disposemymeds.org.          This list is accurate as of 10/9/18  4:49 PM.  Always use your most recent med list.                   Brand Name Dispense Instructions for use Diagnosis    apixaban ANTICOAGULANT 5 MG tablet    ELIQUIS    180 tablet    Take 1 tablet (5 mg) by mouth 2 times daily    Acute deep vein thrombosis (DVT) of distal vein of right lower extremity (H)       levothyroxine 112 MCG tablet    SYNTHROID/LEVOTHROID    90 tablet    Take 1 tablet (112 mcg) by mouth daily    Thyroid disorder       metoprolol tartrate 50 MG tablet    LOPRESSOR    180 tablet    TAKE 1 TABLET (50 MG) BY MOUTH 2 TIMES DAILY    Hypertension       mycophenolic acid 180 MG EC tablet     540 tablet    Take 3 tablets (540 mg) by mouth 2 times daily    Kidney transplanted       tacrolimus 1 MG capsule    GENERIC EQUIVALENT    540 capsule    Take 3 capsules (3 mg) by mouth 2 times daily    Kidney replaced by transplant       VITAMIN D (CHOLECALCIFEROL) PO      Take 1,000 Units by mouth daily

## 2018-10-09 NOTE — LETTER
10/9/2018         RE: Bessy Spangler  [id number]  [facility] [id number]        Dear Colleague,    Thank you for referring your patient, Bessy Spangler, to the [facility]. Please see a copy of my visit note below.    Hematology follow up visit:  Date on this visit: 10/9/2018     Chief complaint  DVT    Primary Physician: Mike Tan     History Of Present Illness:    Please see previous note for details.  I have copied and updated from prior note.   Ms. Spangler is a 58 year old female who has a hx of grade 2 Infiltrating ductal carcinoma of the left breast diagnosed March of 2002, 1.1 cm, tumor stage T1c, N0, stage 1, ER/MN positive, and HER-2 3+ positive, treated with lumpectomy followed by 4 cycles of Adriamycin and cyclophosphamide followed by radiation. She was on on tamoxifen from July 2002 until June 2005. At that point it was changed to exemestane. In May 2008 patient elected to stop therapy.   She also has a hx of nephritis and had kidney transplant in July 2009. She then had PE in September 2009 in the post transplant setting. Factor V Leidin and Prothrombin gene mutations were negative.    Recently found to have Acute occlusive deep venous thrombosis along the right distal superficial femoral, popliteal, peroneal and posterior tibial veins, when she presented with a couple of days of right leg pain and swelling. There was no prior sickness, long travels, or other provoking factors. No local trauma apart from a sprained ankle in December 2017 when she was wearing a boot for a week but she was not bedbound at that time and she was able to walk even at that time she developed vertigo. Had a fall around end of May 2018 when she broke her left humerus.  She was started on Apixaban and was tolerating it well.  We did further thrombophilia workup and Protein C/protein S levels were normal.  Antithrombin III was also unremarkable.  Anti-beta-2 glycoprotein antibodies and  "anticardiolipin antibodies were negative.      Because of unprovoked nature of the blood clot we decided on indefinite anticoagulation.  She was continued on apixaban.    She had a repeat ultrasound done improved clot burden and now nonocclusive thrombus in the right popliteal vein is seen.  Overall she feels well and is tolerating apixaban well.  Denies any bleeding.  She does not noticed the right leg swelling anymore.  No trouble breathing.  She has been trying to eat healthy and exercise more and has lost a few pounds intentionally.  Denies nausea vomiting diarrhea constipation.  No trouble breathing.  Occasionally feels he feels a little dizzy but denies any new neurological problems.        ROS:  Rest of the review of the system was essentially unremarkable.    I reviewed other history in epic as below.    Past Medical/Surgical History:  Past Medical History:   Diagnosis Date     Anemia of chronic renal failure      Breast cancer (H)      Chronic diarrhea      Chronic kidney disease (CKD), stage V (H)     Due to \"nephritis\". Biopsy done years ago. I do not know the histologic specifics     High risk medication use      Hyperphosphatemia      Hypertension      Hypothyroid      Immunosuppressed status (H)      Kidney replaced by transplant     nephritis as a child     PE (pulmonary embolism)     after transplant     Secondary hyperparathyroidism (of renal origin)    Previous history of breast cancer.  She had early stage left-sided breast cancer which was estrogen and progesterone receptor positive and HER-2 was positive as well.  She was treated with lumpectomy followed by adjuvant Adriamycin and Cytoxan followed by radiation.  She was on hormonal therapy for about 5 years or so but she has not received any treatment for it since 2008.  She is following with her primary care physician getting regular annual mammograms.    Past Surgical History:   Procedure Laterality Date     APPENDECTOMY OPEN  1999     AV " FISTULA OR GRAFT ARTERIAL      left     C TRANSPLANT,PREP LIVING  RENAL GRAFT  2009    Hx of nephritis     LUMPECTOMY BREAST      left     Allergies:  Allergies as of 10/09/2018 - Wali as Reviewed 10/09/2018   Allergen Reaction Noted     Dilaudid [hydromorphone hcl]  10/15/2011     Sulfa drugs Swelling 2008     Current Medications:  Current Outpatient Prescriptions   Medication Sig Dispense Refill     apixaban ANTICOAGULANT (ELIQUIS) 5 MG tablet Take 1 tablet (5 mg) by mouth 2 times daily 180 tablet 1     levothyroxine (SYNTHROID/LEVOTHROID) 112 MCG tablet Take 1 tablet (112 mcg) by mouth daily 90 tablet 3     metoprolol tartrate (LOPRESSOR) 50 MG tablet TAKE 1 TABLET (50 MG) BY MOUTH 2 TIMES DAILY 180 tablet 3     MYFORTIC (BRAND) 180 MG EC TABLET Take 3 tablets (540 mg) by mouth 2 times daily 540 tablet 3     tacrolimus (GENERIC EQUIVALENT) 1 MG capsule Take 3 capsules (3 mg) by mouth 2 times daily 540 capsule 3     VITAMIN D, CHOLECALCIFEROL, PO Take 1,000 Units by mouth daily        Family History:  Family History   Problem Relation Age of Onset     Diabetes Father      type 2       Arthritis Father      Glaucoma Father      Cancer Mother 58     smoker,metastatic adnoid cystic cancer,  early 60s     Arthritis Mother      Endocrine Disease Mother      thyroid     Hypertension Maternal Grandmother       at a young age     Hypertension Maternal Grandfather      Cancer Paternal Grandmother      stomach cancer     Endocrine Disease Sister      thyroid     Genitourinary Problems Daughter 32     renal failure      Endocrine Disease Sister      thyroid     Chronic Obstructive Pulmonary Disease Paternal Grandfather      smoking  young     No family history of bleeding or clotting disorder.  Her mother was diagnosed with adenoid cystic cancer of the throat around the age 59.  Paternal grandmother had stomach cancer but she is not sure of the age.  Daughter has chronic kidney disease.  She has 2  "other sons who are healthy.  Social History:  Social History     Social History     Marital status: Single     Spouse name: N/A     Number of children: N/A     Years of education: N/A     Occupational History     Not on file.     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  degree    Sal 1981,Betty 1983 ( On dialysis), Vincenzo Alvares 1988    Grandchildren Torri 11/2010, Krystina 10/2007 lives with her and Betty, son and family. Jeremías 2014    2 additional grandchildren will be born soon   She used to smoke but quit 15 years ago.  Does not drink alcohol.  She lives with her younger son his wife and 2 children.  She has a desk job related to real estate .  Physical Exam:  /76  Pulse 64  Temp 98.8  F (37.1  C)  Resp 18  Ht 1.676 m (5' 6\")  Wt 96.6 kg (213 lb)  LMP 10/25/2004  SpO2 97%  BMI 34.38 kg/m2  CONSTITUTIONAL: No apparent distress  EYES: PERRLA, without pallor or jaundice  ENT/MOUTH: Ears unremarkable. No oral lesions  CVS: s1s2 normal  RESPIRATORY: Chest is clear  GI: Abdomen is benign  NEURO: Alert and oriented ×3  INTEGUMENT: no concerning skin rashes   LYMPHATIC: no palpable lymphadenopathy  MUSCULOSKELETAL: Unremarkable. No bony tenderness.   EXTREMITIES: no pedal edema  PSYCH: Mentation, mood and affect are appropriate      Laboratory/Imaging Studies  Reviewed  Results for orders placed or performed in visit on 10/04/18    Lower Extremity Venous Duplex RT    Narrative    Exam: Ultrasound of the deep venous system of right lower extremity  dated 10/4/2018 7:30 AM    Clinical information: Follow-up DVT    Comparison: Outside " "report, [facility]: Right lower  extremity venous ultrasound 6/29/2018    Ordering provider: CORRIE STOKES    Technique: Gray-scale evaluation with compression and Doppler  assessment of deep venous system for spontaneous and phasic flow, as  well as the presence of distal augmentation. Color flow images  obtained as needed. Gray-scale images with compression of the great  saphenous vein obtained as needed.    Findings:    Right leg:    CFV: Thrombus: No, Phasic: Yes  Femoral vein, proximal: Thrombus: No, Phasic: Yes  Femoral vein, mid: Thrombus: No, Phasic: Yes  Femoral vein, distal: Thrombus: No, Phasic: Yes  Popliteal vein: Thrombus: Yes, nonocclusive. Phasic: Abnormal  PTV: Thrombus: No  Peroneal vein: Thrombus: No      Impression    Impression:    Right leg: Nonocclusive thrombus in the right popliteal vein. Per  outside report dated 6/29/2018 (images not available), occlusive  thrombus was previously seen in the right distal superficial femoral,  popliteal, peroneal, and posterior tibial veins.    Reference: \"Duplex Ultrasound in the Diagnosis of Lower-Extremity Deep  Venous Thrombosis\"- Juliann Russell MD, S; Reggie Juarez MD  (Circulation. 2014;129:917-921. http://circ.ahajournals.org )     ASSESSMENT/PLAN:      Unprovoked right lower extremity DVT.  Previously she has a history of provoked pulmonary embolism after kidney transplant.  Because this time the clot was clearly unprovoked I recommended indefinite anticoagulation.  She is tolerating apixaban well and she should continue that.  We discussed that long-term anticoagulation increases the risk of significant bleeding but in her case the risk of clotting if she goes off anticoagulation is likely to be much higher than the risk of significant bleeding.    Repeat ultrasound shows significant improvement in the clot burden.  Symptomatically she is doing well and her lower extremity swelling has resolved.        Thrombophilia workup.   In " the past she was tested negative for prothrombin gene mutation and factor V Leiden so  we did further thrombophilia workup and Protein C/protein S levels were normal.  Antithrombin III was also unremarkable.  Anti-beta-2 glycoprotein antibodies and anticardiolipin antibodies were negative.  I did not repeat lupus anticoagulant as it would have been false positive in the setting of apixaban use.      Genetic testing.  Because of her young age of presentation for breast cancer as well as paternal grandmother with a stomach cancer I gave her a referral to see genetic counselor and she has an appointment later this month.    Going forward she can follow with her primary care physician and see me back on an as-needed basis     All of her questions were answered to her satisfaction.  She is comfortable with the plan.      Symone Pacheco          Again, thank you for allowing me to participate in the care of your patient.        Sincerely,        Symone Pacheco MD

## 2018-11-09 ENCOUNTER — TELEPHONE (OUTPATIENT)
Dept: FAMILY MEDICINE | Facility: CLINIC | Age: 58
End: 2018-11-09

## 2018-11-09 NOTE — TELEPHONE ENCOUNTER
What type of form? Wellness form  What day did you drop off your forms? 11/9/18  Is there a due date? As soon as possible (7-10 business day to compete forms)   How would you like to receive these forms? Please fax to [phone]  Which clinic was the form dropped off at? [facility]    What is the best number to contact you? Home [phone]  What time works best to contact you with in 4 hrs? Anytime  Is it okay to leave a message? Yes    Margot Vasquez

## 2018-11-09 NOTE — TELEPHONE ENCOUNTER
Received form. Filled in results. I do not see in the visit that a waist circumference has been done. I called and left a message for patient to call  Me with the waist measurement in inches.  Cindy Wright MA/  For Teams Luis Antonio

## 2018-11-13 NOTE — TELEPHONE ENCOUNTER
[facility] [phone], right fax confirmed at 9:14 am today. Copy to TC and abstracting.  Cindy Wright MA/  For Teams [employer]

## 2018-11-20 ENCOUNTER — TELEPHONE (OUTPATIENT)
Dept: CARE COORDINATION | Facility: CLINIC | Age: 58
End: 2018-11-20

## 2018-11-20 ENCOUNTER — TELEPHONE (OUTPATIENT)
Dept: TRANSPLANT | Facility: CLINIC | Age: 58
End: 2018-11-20

## 2018-11-20 NOTE — TELEPHONE ENCOUNTER
Patient Call: Medication refill  Route to LPN    Reason for call: Still having problems with [employer] Assistance Plan- on her Myfortic They have not received the paper work yet     Call back needed? Yes    Return Call Needed  Same as documented in contacts section  When to return call?: Same day: Route High Priority

## 2018-11-20 NOTE — TELEPHONE ENCOUNTER
Transplant Social Work Services Phone Call      Data: Received message that pt was requesting assistance with [facility] patient assistance program for Myfortic medication. Completed Section A of application and faxed (11/20 at 11:30a) to [employer], as requested. Called pt to update her. Pt expressed frustration trying to get application submitted and thanked SW for the assistance. Provided her with SW contact info (for this writer and long term transplant SW, Angie) for any additional needs that come up. Sent message to pt's transplant coordinator and requested that she fax new script for Myfortic to [facility], as well.   Intervention: Patient assistance program   Assessment: Pt seemed frustrated, but was pleasant and receptive to SW, seemed appreciative of the assistance.  Education provided by SW: NA   Plan: SW available to support and assist with ongoing social service needs.    Tash Mercado, MSW, [facility]  - Coverage for Angie Jennifer   Outpatient Kidney/Pancreas/Auto Islet Transplant   Ph. [phone]  [email or web address]     NO LETTER

## 2018-11-26 NOTE — TELEPHONE ENCOUNTER
Call returned to pt. She states that she has been in contact with SW, and has no further questions. I asked that she call with further issues she may have. Bessy voices understanding.

## 2018-12-17 DIAGNOSIS — Z79.899 ENCOUNTER FOR LONG-TERM CURRENT USE OF MEDICATION: ICD-10-CM

## 2018-12-17 DIAGNOSIS — Z48.298 AFTERCARE FOLLOWING ORGAN TRANSPLANT: ICD-10-CM

## 2018-12-17 DIAGNOSIS — Z94.0 KIDNEY REPLACED BY TRANSPLANT: ICD-10-CM

## 2018-12-17 LAB
ANION GAP SERPL CALCULATED.3IONS-SCNC: 7 MMOL/L (ref 3–14)
BUN SERPL-MCNC: 16 MG/DL (ref 7–30)
CALCIUM SERPL-MCNC: 8.6 MG/DL (ref 8.5–10.1)
CHLORIDE SERPL-SCNC: 107 MMOL/L (ref 94–109)
CO2 SERPL-SCNC: 24 MMOL/L (ref 20–32)
CREAT SERPL-MCNC: 0.64 MG/DL (ref 0.52–1.04)
ERYTHROCYTE [DISTWIDTH] IN BLOOD BY AUTOMATED COUNT: 13.6 % (ref 10–15)
GFR SERPL CREATININE-BSD FRML MDRD: >90 ML/MIN/1.7M2
GLUCOSE SERPL-MCNC: 96 MG/DL (ref 70–99)
HCT VFR BLD AUTO: 37.5 % (ref 35–47)
HGB BLD-MCNC: 12.2 G/DL (ref 11.7–15.7)
MCH RBC QN AUTO: 30.1 PG (ref 26.5–33)
MCHC RBC AUTO-ENTMCNC: 32.5 G/DL (ref 31.5–36.5)
MCV RBC AUTO: 93 FL (ref 78–100)
PLATELET # BLD AUTO: 124 10E9/L (ref 150–450)
POTASSIUM SERPL-SCNC: 4.1 MMOL/L (ref 3.4–5.3)
RBC # BLD AUTO: 4.05 10E12/L (ref 3.8–5.2)
SODIUM SERPL-SCNC: 138 MMOL/L (ref 133–144)
TACROLIMUS BLD-MCNC: 6.1 UG/L (ref 5–15)
TME LAST DOSE: NORMAL H
WBC # BLD AUTO: 5.2 10E9/L (ref 4–11)

## 2018-12-17 PROCEDURE — 85027 COMPLETE CBC AUTOMATED: CPT | Performed by: INTERNAL MEDICINE

## 2018-12-17 PROCEDURE — 80048 BASIC METABOLIC PNL TOTAL CA: CPT | Performed by: INTERNAL MEDICINE

## 2018-12-17 PROCEDURE — 36415 COLL VENOUS BLD VENIPUNCTURE: CPT | Performed by: INTERNAL MEDICINE

## 2018-12-17 PROCEDURE — 80197 ASSAY OF TACROLIMUS: CPT | Performed by: INTERNAL MEDICINE

## 2018-12-18 DIAGNOSIS — Z94.0 KIDNEY REPLACED BY TRANSPLANT: Primary | ICD-10-CM

## 2018-12-18 NOTE — TELEPHONE ENCOUNTER
ISSUE:   Tacrolimus level 6.1 on 12/17/18, goal 3-5, dose 3 mg BID    PLAN:   Please call pt and confirm this was a good 12-hour trough. Verify dose 3 mg BID. Confirm no new medications or illness (macey. Diarrhea). If good trough, decrease dose to 2 mg BID and recheck level in 1 week. Update rx, enter lab order.

## 2018-12-19 ENCOUNTER — TELEPHONE (OUTPATIENT)
Dept: TRANSPLANT | Facility: CLINIC | Age: 58
End: 2018-12-19

## 2018-12-19 DIAGNOSIS — Z94.0 KIDNEY REPLACED BY TRANSPLANT: Primary | ICD-10-CM

## 2018-12-19 RX ORDER — TACROLIMUS 1 MG/1
2 CAPSULE ORAL 2 TIMES DAILY
Qty: 360 CAPSULE | Refills: 3 | Status: SHIPPED | OUTPATIENT
Start: 2018-12-19 | End: 2019-08-23

## 2018-12-19 NOTE — TELEPHONE ENCOUNTER
Call placed to patient. No answer. Detailed voice message left requesting patient to return call to discuss tacrolimus level, any recent illness, medication changes or diarrhea. Instructions given to decrease tacrolimus dose to 2 mg BID and repeat level in one week. Order/rx sent

## 2018-12-19 NOTE — TELEPHONE ENCOUNTER
Patient Call: General    Reason for call: patient was sent a My chart message to contact coordinator regarding tacrolimus level.    Call back needed? Yes    Return Call Needed  Same as documented in contacts section  When to return call?: Same day: Route High Priority

## 2018-12-19 NOTE — TELEPHONE ENCOUNTER
Call returned to pt. She confirms current tac dose of 3mg BID and 12 hour level. I asked that she DECREASE dose to 2mg BID and repeat level next week. Rx updated. Lab order sent.   Pt verbalizes understanding of plan

## 2018-12-28 ENCOUNTER — TELEPHONE (OUTPATIENT)
Dept: TRANSPLANT | Facility: CLINIC | Age: 58
End: 2018-12-28

## 2018-12-28 DIAGNOSIS — Z94.0 KIDNEY TRANSPLANTED: ICD-10-CM

## 2018-12-28 DIAGNOSIS — Z94.0 KIDNEY REPLACED BY TRANSPLANT: ICD-10-CM

## 2018-12-28 LAB
ANION GAP SERPL CALCULATED.3IONS-SCNC: 7 MMOL/L (ref 3–14)
BASOPHILS # BLD AUTO: 0 10E9/L (ref 0–0.2)
BASOPHILS NFR BLD AUTO: 0.2 %
BUN SERPL-MCNC: 16 MG/DL (ref 7–30)
CALCIUM SERPL-MCNC: 9.2 MG/DL (ref 8.5–10.1)
CHLORIDE SERPL-SCNC: 105 MMOL/L (ref 94–109)
CO2 SERPL-SCNC: 28 MMOL/L (ref 20–32)
CREAT SERPL-MCNC: 0.68 MG/DL (ref 0.52–1.04)
DIFFERENTIAL METHOD BLD: ABNORMAL
EOSINOPHIL # BLD AUTO: 0 10E9/L (ref 0–0.7)
EOSINOPHIL NFR BLD AUTO: 0.8 %
ERYTHROCYTE [DISTWIDTH] IN BLOOD BY AUTOMATED COUNT: 13.4 % (ref 10–15)
GFR SERPL CREATININE-BSD FRML MDRD: >90 ML/MIN/{1.73_M2}
GLUCOSE SERPL-MCNC: 110 MG/DL (ref 70–99)
HCT VFR BLD AUTO: 39.2 % (ref 35–47)
HGB BLD-MCNC: 12.8 G/DL (ref 11.7–15.7)
LYMPHOCYTES # BLD AUTO: 1.5 10E9/L (ref 0.8–5.3)
LYMPHOCYTES NFR BLD AUTO: 30.1 %
MCH RBC QN AUTO: 30.2 PG (ref 26.5–33)
MCHC RBC AUTO-ENTMCNC: 32.7 G/DL (ref 31.5–36.5)
MCV RBC AUTO: 93 FL (ref 78–100)
MONOCYTES # BLD AUTO: 0.4 10E9/L (ref 0–1.3)
MONOCYTES NFR BLD AUTO: 8.9 %
NEUTROPHILS # BLD AUTO: 3 10E9/L (ref 1.6–8.3)
NEUTROPHILS NFR BLD AUTO: 60 %
PLATELET # BLD AUTO: 132 10E9/L (ref 150–450)
POTASSIUM SERPL-SCNC: 4.4 MMOL/L (ref 3.4–5.3)
RBC # BLD AUTO: 4.24 10E12/L (ref 3.8–5.2)
SODIUM SERPL-SCNC: 140 MMOL/L (ref 133–144)
TACROLIMUS BLD-MCNC: 3.3 UG/L (ref 5–15)
TME LAST DOSE: ABNORMAL H
WBC # BLD AUTO: 5 10E9/L (ref 4–11)

## 2018-12-28 PROCEDURE — 80197 ASSAY OF TACROLIMUS: CPT | Performed by: INTERNAL MEDICINE

## 2018-12-28 PROCEDURE — 80048 BASIC METABOLIC PNL TOTAL CA: CPT | Performed by: INTERNAL MEDICINE

## 2018-12-28 PROCEDURE — 36415 COLL VENOUS BLD VENIPUNCTURE: CPT | Performed by: INTERNAL MEDICINE

## 2018-12-28 PROCEDURE — 85025 COMPLETE CBC W/AUTO DIFF WBC: CPT | Performed by: INTERNAL MEDICINE

## 2018-12-28 RX ORDER — MYCOPHENOLIC ACID 180 MG/1
TABLET, DELAYED RELEASE ORAL
Qty: 540 TABLET | Refills: 3 | Status: SHIPPED | OUTPATIENT
Start: 2018-12-28 | End: 2019-09-23

## 2018-12-28 NOTE — TELEPHONE ENCOUNTER
Patient Call: Medication Refill  Route to LPN  Instruct the patient to first contact their pharmacy. If they have called their pharmacy and require further assistance, route to LPN.    Pharmacy Name:   [facility] [phone] (Phone)  [phone] (Fax)       Name of Medication: MYFORTIC (BRAND) 180 MG EC TABLET  When will the patient be out of this medication?: Less than 3 days (Route high priority)

## 2018-12-31 RX ORDER — MYCOPHENOLIC ACID 180 MG/1
540 TABLET, DELAYED RELEASE ORAL 2 TIMES DAILY
Qty: 540 TABLET | Refills: 3 | Status: SHIPPED | OUTPATIENT
Start: 2018-12-31 | End: 2019-09-13

## 2019-01-22 DIAGNOSIS — E07.9 THYROID DISORDER: ICD-10-CM

## 2019-01-22 RX ORDER — LEVOTHYROXINE SODIUM 112 UG/1
112 TABLET ORAL DAILY
Qty: 90 TABLET | Refills: 0 | Status: SHIPPED | OUTPATIENT
Start: 2019-01-22 | End: 2019-04-16

## 2019-01-22 NOTE — TELEPHONE ENCOUNTER
Last Clinic Visit: 11/15/2017  [facility]  Scheduling has been notified to contact the pt for appointment.  90 Refill sent to pharmacy

## 2019-01-24 ENCOUNTER — OFFICE VISIT (OUTPATIENT)
Dept: URGENT CARE | Facility: URGENT CARE | Age: 59
End: 2019-01-24
Payer: COMMERCIAL

## 2019-01-24 VITALS
WEIGHT: 204.8 LBS | HEART RATE: 74 BPM | DIASTOLIC BLOOD PRESSURE: 79 MMHG | OXYGEN SATURATION: 97 % | TEMPERATURE: 97.9 F | SYSTOLIC BLOOD PRESSURE: 158 MMHG | BODY MASS INDEX: 33.06 KG/M2

## 2019-01-24 DIAGNOSIS — J02.9 SORE THROAT: Primary | ICD-10-CM

## 2019-01-24 DIAGNOSIS — J01.10 ACUTE NON-RECURRENT FRONTAL SINUSITIS: ICD-10-CM

## 2019-01-24 DIAGNOSIS — J02.0 STREP THROAT: ICD-10-CM

## 2019-01-24 LAB
DEPRECATED S PYO AG THROAT QL EIA: NORMAL
SPECIMEN SOURCE: NORMAL

## 2019-01-24 PROCEDURE — 87880 STREP A ASSAY W/OPTIC: CPT | Performed by: NURSE PRACTITIONER

## 2019-01-24 PROCEDURE — 87081 CULTURE SCREEN ONLY: CPT | Performed by: NURSE PRACTITIONER

## 2019-01-24 PROCEDURE — 99214 OFFICE O/P EST MOD 30 MIN: CPT | Performed by: NURSE PRACTITIONER

## 2019-01-24 RX ORDER — DOXYCYCLINE 100 MG/1
100 CAPSULE ORAL DAILY
Qty: 7 CAPSULE | Refills: 0 | Status: SHIPPED | OUTPATIENT
Start: 2019-01-24 | End: 2019-01-31

## 2019-01-24 ASSESSMENT — ENCOUNTER SYMPTOMS
DIARRHEA: 0
COUGH: 1
SINUS PAIN: 1
SORE THROAT: 1
CHILLS: 0
SINUS PRESSURE: 1
NAUSEA: 0
VOMITING: 0
RHINORRHEA: 1
SHORTNESS OF BREATH: 0
HEADACHES: 0
FEVER: 0

## 2019-01-24 NOTE — PATIENT INSTRUCTIONS
Patient Education     Sinusitis (Antibiotic Treatment)    The sinuses are air-filled spaces within the bones of the face. They connect to the inside of the nose. Sinusitis is an inflammation of the tissue that lines the sinuses. Sinusitis can occur during a cold. It can also happen due to allergies to pollens and other particles in the air. Sinusitis can cause symptoms of sinus congestion and a feeling of fullness. A sinus infection causes fever, headache, and facial pain. There is often green or yellow fluid draining from the nose or into the back of the throat (post-nasal drip). You have been given antibiotics to treat this condition.  Home care    Take the full course of antibiotics as instructed. Do not stop taking them, even when you feel better.    Drink plenty of water, hot tea, and other liquids. This may help thin nasal mucus. It also may help your sinuses drain fluids.    Heat may help soothe painful areas of your face. Use a towel soaked in hot water. Or,  the shower and direct the warm spray onto your face. Using a vaporizer along with a menthol rub at night may also help soothe symptoms.     An expectorant with guaifenesin may help thin nasal mucus and help your sinuses drain fluids.    You can use an over-the-counter decongestant, unless a similar medicine was prescribed to you. Nasal sprays work the fastest. Use one that contains phenylephrine or oxymetazoline. First blow your nose gently. Then use the spray. Do not use these medicines more often than directed on the label. If you do, your symptoms may get worse. You may also take pills that contain pseudoephedrine. Don t use products that combine multiple medicines. This is because side effects may be increased. Read labels. You can also ask the pharmacist for help. (People with high blood pressure should not use decongestants. They can raise blood pressure.)    Over-the-counter antihistamines may help if allergies contributed to your  sinusitis.      Do not use nasal rinses or irrigation during an acute sinus infection, unless your healthcare provider tells you to. Rinsing may spread the infection to other areas in your sinuses.    Use acetaminophen or ibuprofen to control pain, unless another pain medicine was prescribed to you. If you have chronic liver or kidney disease or ever had a stomach ulcer, talk with your healthcare provider before using these medicines. (Aspirin should never be taken by anyone under age 18 who is ill with a fever. It may cause severe liver damage.)    Don't smoke. This can make symptoms worse.  Follow-up care  Follow up with your healthcare provider or our staff if you are better in 1 week.  When to seek medical advice  Call your healthcare provider if any of these occur:    Facial pain or headache that gets worse    Stiff neck    Unusual drowsiness or confusion    Swelling of your forehead or eyelids    Vision problems, such as blurred or double vision    Fever of 100.4 F (38 C) or higher, or as directed by your healthcare provider    Seizure    Breathing problems    Symptoms don't go away in 10 days  Prevention  Here are steps you can take to help prevent an infection:    Keep good hand washing habits.    Don t have close contact with people who have sore throats, colds, or other upper respiratory infections.    Don t smoke, and stay away from secondhand smoke.    Stay up to date with of your vaccines.  Date Last Reviewed: 11/1/2017 2000-2018 [employer]. [address] [place] [address], [place] [address]. All rights reserved. This information is not intended as a substitute for professional medical care. Always follow your healthcare professional's instructions.

## 2019-01-24 NOTE — PROGRESS NOTES
"SUBJECTIVE:   Bessy Spangler is a 58 year old female presenting with a chief complaint of   Chief Complaint   Patient presents with     Nose Problem     Patient complains of scabs inside of nose x 2 weeks, sore throat, and pain in both ears        She is an established patient of [facility].    URI Adult    Onset of symptoms was 2 week(s) ago.  Course of illness is worsening.    Severity moderate  Current and Associated symptoms: runny nose, stuffy nose, cough - productive, ear pain right,  sore throat, facial pain/pressure and   Treatment measures tried include None tried.  Predisposing factors include None.        Review of Systems   Constitutional: Negative for chills and fever.   HENT: Positive for congestion, postnasal drip, rhinorrhea, sinus pressure, sinus pain and sore throat. Negative for ear pain.    Respiratory: Positive for cough. Negative for shortness of breath.    Gastrointestinal: Negative for diarrhea, nausea and vomiting.   Neurological: Negative for headaches.       Past Medical History:   Diagnosis Date     Anemia of chronic renal failure      Breast cancer (H)      Chronic diarrhea      Chronic kidney disease (CKD), stage V (H)     Due to \"nephritis\". Biopsy done years ago. I do not know the histologic specifics     High risk medication use      Hyperphosphatemia      Hypertension      Hypothyroid      Immunosuppressed status (H)      Kidney replaced by transplant     nephritis as a child     PE (pulmonary embolism)     after transplant     Secondary hyperparathyroidism (of renal origin)      Family History   Problem Relation Age of Onset     Diabetes Father         type 2       Arthritis Father      Glaucoma Father      Cancer Mother 58        smoker,metastatic adnoid cystic cancer,  early 60s     Arthritis Mother      Endocrine Disease Mother         thyroid     Hypertension Maternal Grandmother          at a young age     Hypertension Maternal Grandfather      Cancer Paternal " Grandmother         stomach cancer     Endocrine Disease Sister         thyroid     Genitourinary Problems Daughter 32        renal failure      Endocrine Disease Sister         thyroid     Chronic Obstructive Pulmonary Disease Paternal Grandfather         smoking  young     Current Outpatient Medications   Medication Sig Dispense Refill     apixaban ANTICOAGULANT (ELIQUIS) 5 MG tablet Take 1 tablet (5 mg) by mouth 2 times daily 180 tablet 1     doxycycline hyclate (VIBRAMYCIN) 100 MG capsule Take 1 capsule (100 mg) by mouth daily for 7 days 7 capsule 0     levothyroxine (SYNTHROID/LEVOTHROID) 112 MCG tablet Take 1 tablet (112 mcg) by mouth daily Please Call the clinic to schedule a follow up appointment, [phone]-- for further refills 90 tablet 0     metoprolol tartrate (LOPRESSOR) 50 MG tablet TAKE 1 TABLET (50 MG) BY MOUTH 2 TIMES DAILY 180 tablet 3     MYFORTIC (BRAND) 180 MG EC tablet Take 3 tablets (540 mg) by mouth 2 times daily 540 tablet 3     MYFORTIC (BRAND) 180 MG EC tablet TAKE 3 TABLETS (540MG) BY MOUTH TWICE A  tablet 3     tacrolimus (GENERIC EQUIVALENT) 1 MG capsule Take 2 capsules (2 mg) by mouth 2 times daily 360 capsule 3     VITAMIN D, CHOLECALCIFEROL, PO Take 1,000 Units by mouth daily       Social History     Tobacco Use     Smoking status: Former Smoker     Packs/day: 1.00     Years: 20.00     Pack years: 20.00     Types: Cigarettes     Last attempt to quit: 1998     Years since quittin.0     Smokeless tobacco: Never Used   Substance Use Topics     Alcohol use: No     Alcohol/week: 0.0 oz       OBJECTIVE  /79 (BP Location: Left arm, Patient Position: Chair, Cuff Size: Adult Regular)   Pulse 74   Temp 97.9  F (36.6  C) (Oral)   Wt 92.9 kg (204 lb 12.8 oz)   LMP 10/25/2004   SpO2 97%   BMI 33.06 kg/m      Physical Exam   Constitutional: No distress.   HENT:   Right Ear: Tympanic membrane and external ear normal.   Left Ear: Tympanic membrane and external ear  normal.   Nose: Mucosal edema and rhinorrhea present. Right sinus exhibits frontal sinus tenderness. Left sinus exhibits frontal sinus tenderness.   Mouth/Throat: Oropharynx is clear and moist.   Eyes: EOM are normal. Pupils are equal, round, and reactive to light.   Neck: Normal range of motion. Neck supple.   Pulmonary/Chest: Effort normal and breath sounds normal. No respiratory distress.   Lymphadenopathy:     She has no cervical adenopathy.   Neurological: She is alert. No cranial nerve deficit.   Skin: Skin is warm and dry. She is not diaphoretic.   Psychiatric: She has a normal mood and affect.   Nursing note and vitals reviewed.      Labs:  Results for orders placed or performed in visit on 01/24/19 (from the past 24 hour(s))   Strep, Rapid Screen   Result Value Ref Range    Specimen Description Throat     Rapid Strep A Screen       NEGATIVE: No Group A streptococcal antigen detected by immunoassay, await culture report.     ASSESSMENT:      ICD-10-CM    1. Sore throat J02.9 Strep, Rapid Screen   2. Acute non-recurrent frontal sinusitis J01.10 doxycycline hyclate (VIBRAMYCIN) 100 MG capsule            Differential Diagnosis:  URI Adult/Peds:  Influenza, Pneumonia, Viral pharyngitis and Viral upper respiratory illness    Serious Comorbid Conditions:  Adult:  None    PLAN:  Reduced dose of antibiotic due to renal disease  I recommend follow up with PCP in 3 days or sooner if symptoms are getting worse  Side effects of medications discussed  Hydration advised  All questions are answered and patient is in agreement with treatment plan  Nettie Evans  [facility]  Family Nurse Practitoner          Patient Instructions     Patient Education     Sinusitis (Antibiotic Treatment)    The sinuses are air-filled spaces within the bones of the face. They connect to the inside of the nose. Sinusitis is an inflammation of the tissue that lines the sinuses. Sinusitis can occur during a cold. It can also happen due to allergies to  pollens and other particles in the air. Sinusitis can cause symptoms of sinus congestion and a feeling of fullness. A sinus infection causes fever, headache, and facial pain. There is often green or yellow fluid draining from the nose or into the back of the throat (post-nasal drip). You have been given antibiotics to treat this condition.  Home care    Take the full course of antibiotics as instructed. Do not stop taking them, even when you feel better.    Drink plenty of water, hot tea, and other liquids. This may help thin nasal mucus. It also may help your sinuses drain fluids.    Heat may help soothe painful areas of your face. Use a towel soaked in hot water. Or,  the shower and direct the warm spray onto your face. Using a vaporizer along with a menthol rub at night may also help soothe symptoms.     An expectorant with guaifenesin may help thin nasal mucus and help your sinuses drain fluids.    You can use an over-the-counter decongestant, unless a similar medicine was prescribed to you. Nasal sprays work the fastest. Use one that contains phenylephrine or oxymetazoline. First blow your nose gently. Then use the spray. Do not use these medicines more often than directed on the label. If you do, your symptoms may get worse. You may also take pills that contain pseudoephedrine. Don t use products that combine multiple medicines. This is because side effects may be increased. Read labels. You can also ask the pharmacist for help. (People with high blood pressure should not use decongestants. They can raise blood pressure.)    Over-the-counter antihistamines may help if allergies contributed to your sinusitis.      Do not use nasal rinses or irrigation during an acute sinus infection, unless your healthcare provider tells you to. Rinsing may spread the infection to other areas in your sinuses.    Use acetaminophen or ibuprofen to control pain, unless another pain medicine was prescribed to you. If you  have chronic liver or kidney disease or ever had a stomach ulcer, talk with your healthcare provider before using these medicines. (Aspirin should never be taken by anyone under age 18 who is ill with a fever. It may cause severe liver damage.)    Don't smoke. This can make symptoms worse.  Follow-up care  Follow up with your healthcare provider or our staff if you are better in 1 week.  When to seek medical advice  Call your healthcare provider if any of these occur:    Facial pain or headache that gets worse    Stiff neck    Unusual drowsiness or confusion    Swelling of your forehead or eyelids    Vision problems, such as blurred or double vision    Fever of 100.4 F (38 C) or higher, or as directed by your healthcare provider    Seizure    Breathing problems    Symptoms don't go away in 10 days  Prevention  Here are steps you can take to help prevent an infection:    Keep good hand washing habits.    Don t have close contact with people who have sore throats, colds, or other upper respiratory infections.    Don t smoke, and stay away from secondhand smoke.    Stay up to date with of your vaccines.  Date Last Reviewed: 11/1/2017 2000-2018 [employer]. [address] [place] [address]. All rights reserved. This information is not intended as a substitute for professional medical care. Always follow your healthcare professional's instructions.

## 2019-01-25 LAB
BACTERIA SPEC CULT: ABNORMAL
SPECIMEN SOURCE: ABNORMAL

## 2019-01-25 RX ORDER — AMOXICILLIN 875 MG
875 TABLET ORAL 2 TIMES DAILY
Qty: 20 TABLET | Refills: 0 | Status: SHIPPED | OUTPATIENT
Start: 2019-01-25 | End: 2019-02-04

## 2019-02-28 ENCOUNTER — MYC MEDICAL ADVICE (OUTPATIENT)
Dept: FAMILY MEDICINE | Facility: CLINIC | Age: 59
End: 2019-02-28

## 2019-02-28 ENCOUNTER — TELEPHONE (OUTPATIENT)
Dept: FAMILY MEDICINE | Facility: CLINIC | Age: 59
End: 2019-02-28

## 2019-02-28 NOTE — LETTER
March 14, 2019      Bessy Spangler  [address] [place] [address]    Dear Bessy Spangler,     At [facility] we care about your health and are committed to providing quality patient care.    Which includes staying current on preventive cancer screenings.  You can increase your chances of finding and treating cancers through regular screenings.      Our records indicate you may be due for the following preventive screening(s):    Colonoscopy    Colonoscopy is recommended every ten years for everyone age 50 and older. Please take a moment to read over the enclosed information packet about colon cancer screening. We strongly urge our patient's to consider having a colonoscopy done, which is the best screening test available and only needs to be done every 10 years if normal. If you are unwilling or unable to have a colonoscopy then we recommend the annual stool testing for blood. This test is called a FIT test and it looks for blood in the stool.     To schedule an appointment or discuss this screening further, you may contact us by phone at the [facility] at [phone] or online through the patient [email or web address]    If you have had any of the screenings listed above at another facility, please call us so that we may update your chart.      Your partners in health,      Quality Committee at [facility]

## 2019-04-05 DIAGNOSIS — I10 HYPERTENSION: ICD-10-CM

## 2019-04-05 RX ORDER — METOPROLOL TARTRATE 50 MG
TABLET ORAL
Qty: 180 TABLET | Refills: 0 | Status: SHIPPED | OUTPATIENT
Start: 2019-04-05 | End: 2019-06-29

## 2019-04-05 NOTE — TELEPHONE ENCOUNTER
Medication refill:  Metoprolol tartrate (Lopressor) 50mg tablet    Last OV: 8/23/18 with Dr. Vu  Next OV: 8/22/19  Pharmacy:[facility] [place]  Refilled per Nephrology protocol  Claire Lutz, RN, BSN  Nephrology Care Coordinator  [facility]  [email or web address]  [phone]

## 2019-04-16 DIAGNOSIS — E07.9 THYROID DISORDER: ICD-10-CM

## 2019-04-18 NOTE — TELEPHONE ENCOUNTER
"Requested Prescriptions   Pending Prescriptions Disp Refills     levothyroxine (SYNTHROID/LEVOTHROID) 112 MCG tablet [Pharmacy Med Name: LEVOTHYROXINE 112 MCG TABLET] 90 tablet 0     Sig: TAKE 1 TABLET BY MOUTH DAILY PLEASE CALL THE CLINIC TO SCHEDULE A N APPT, [phone] FORREFILLS       Thyroid Protocol Failed - 4/16/2019  7:34 AM        Failed - Recent (12 mo) or future (30 days) visit within the authorizing provider's specialty     Patient had office visit in the last 12 months or has a visit in the next 30 days with authorizing provider or within the authorizing provider's specialty.  See \"Patient Info\" tab in inbasket, or \"Choose Columns\" in Meds & Orders section of the refill encounter.              Passed - Patient is 12 years or older        Passed - Medication is active on med list        Passed - Normal TSH on file in past 12 months     Recent Labs   Lab Test 09/18/18  0746   TSH 3.15              Passed - No active pregnancy on record     If patient is pregnant or has had a positive pregnancy test, please check TSH.          Passed - No positive pregnancy test in past 12 months     If patient is pregnant or has had a positive pregnancy test, please check TSH.          Last Written Prescription Date:  1/22/19  Last Fill Quantity: 90,  # refills: 0   Last office visit: 9/18/2018 with prescribing provider:     Future Office Visit:      "

## 2019-04-18 NOTE — TELEPHONE ENCOUNTER
Routing refill request to provider for review/approval because:  Medication was last refilled by Dr. Tan ([facility]) but was forwarded on to [facility] by Dr. Tan's office and noting patient has been following up with Dr. Dc.  Need PCP to review and refill please.    Per routing comment in refill encounter:   Ivon Wilcox, RN  P Bk Refill   Caller: Unspecified (2 days ago,  7:33 AM)             Requested under Dr. Tan at  but has been seeing Gina Dc MD MPH at [facility], TSH done 9/2018/ WNL. Thanks!           Estefanía Kiran RN

## 2019-04-19 RX ORDER — LEVOTHYROXINE SODIUM 112 UG/1
112 TABLET ORAL DAILY
Qty: 90 TABLET | Refills: 1 | Status: SHIPPED | OUTPATIENT
Start: 2019-04-19 | End: 2019-09-24

## 2019-05-10 DIAGNOSIS — I82.4Z1 ACUTE DEEP VEIN THROMBOSIS (DVT) OF DISTAL VEIN OF RIGHT LOWER EXTREMITY (H): ICD-10-CM

## 2019-05-10 NOTE — TELEPHONE ENCOUNTER
Requested Prescriptions   Pending Prescriptions Disp Refills     ELIQUIS 5 MG tablet [Pharmacy Med Name: ELIQUIS 5 MG TABLET]      Last Written Prescription Date:  8/28/18  Last Fill Quantity: 180,  # refills: 1   Last Office Visit with GRACIE, CINDA or [facility] prescribing provider:  9/18/18   Future Office Visit:      180 tablet 1     Sig: TAKE 1 TABLET (5 MG) BY MOUTH 2 TIMES DAILY       Direct Oral Anticoagulant Agents Failed - 5/10/2019  1:18 AM        Failed - Normal Platelets on file in past 12 months     Recent Labs   Lab Test 12/28/18  0752   *               Failed - Recent (6 mo) or future (30 days) visit within the authorizing provider's specialty        Passed - Medication is active on med list        Passed - Patient is 18-79 years of age        Passed - Serum creatinine less than or equal to 1.4 on file in past 12 mos     Recent Labs   Lab Test 12/28/18  0752  04/01/18  1942  05/28/14  0844   CR 0.68   < >  --    < >  --    CREAT  --   --  0.6  --   --    CRPOC  --   --   --   --  0.7    < > = values in this interval not displayed.             Passed - Weight is greater than 60 kg for the past year     Wt Readings from Last 3 Encounters:   01/24/19 92.9 kg (204 lb 12.8 oz)   10/09/18 96.6 kg (213 lb)   09/18/18 96.6 kg (213 lb)             Passed - No active pregnancy on record        Passed - No positive pregnancy test within past 12 months              Ga Faarax  [facility]

## 2019-05-13 NOTE — TELEPHONE ENCOUNTER
Routing refill request to provider for review/approval because:  A break in medication  Racheal Ann RN

## 2019-05-14 RX ORDER — APIXABAN 5 MG/1
TABLET, FILM COATED ORAL
Qty: 180 TABLET | Refills: 0 | Status: SHIPPED | OUTPATIENT
Start: 2019-05-14 | End: 2019-07-04

## 2019-05-17 DIAGNOSIS — Z79.899 ENCOUNTER FOR LONG-TERM CURRENT USE OF MEDICATION: ICD-10-CM

## 2019-05-17 DIAGNOSIS — Z94.0 KIDNEY REPLACED BY TRANSPLANT: ICD-10-CM

## 2019-05-17 DIAGNOSIS — Z48.298 AFTERCARE FOLLOWING ORGAN TRANSPLANT: ICD-10-CM

## 2019-05-17 LAB
ANION GAP SERPL CALCULATED.3IONS-SCNC: 9 MMOL/L (ref 3–14)
BUN SERPL-MCNC: 19 MG/DL (ref 7–30)
CALCIUM SERPL-MCNC: 9.3 MG/DL (ref 8.5–10.1)
CHLORIDE SERPL-SCNC: 105 MMOL/L (ref 94–109)
CO2 SERPL-SCNC: 26 MMOL/L (ref 20–32)
CREAT SERPL-MCNC: 0.69 MG/DL (ref 0.52–1.04)
CREAT UR-MCNC: 20 MG/DL
ERYTHROCYTE [DISTWIDTH] IN BLOOD BY AUTOMATED COUNT: 13.4 % (ref 10–15)
GFR SERPL CREATININE-BSD FRML MDRD: >90 ML/MIN/{1.73_M2}
GLUCOSE SERPL-MCNC: 99 MG/DL (ref 70–99)
HCT VFR BLD AUTO: 38.5 % (ref 35–47)
HGB BLD-MCNC: 12.4 G/DL (ref 11.7–15.7)
MCH RBC QN AUTO: 30.2 PG (ref 26.5–33)
MCHC RBC AUTO-ENTMCNC: 32.2 G/DL (ref 31.5–36.5)
MCV RBC AUTO: 94 FL (ref 78–100)
PLATELET # BLD AUTO: 133 10E9/L (ref 150–450)
POTASSIUM SERPL-SCNC: 4.2 MMOL/L (ref 3.4–5.3)
PROT UR-MCNC: <0.05 G/L
PROT/CREAT 24H UR: NORMAL G/G CR (ref 0–0.2)
RBC # BLD AUTO: 4.11 10E12/L (ref 3.8–5.2)
SODIUM SERPL-SCNC: 140 MMOL/L (ref 133–144)
TACROLIMUS BLD-MCNC: 3 UG/L (ref 5–15)
TME LAST DOSE: ABNORMAL H
WBC # BLD AUTO: 4.9 10E9/L (ref 4–11)

## 2019-05-17 PROCEDURE — 85027 COMPLETE CBC AUTOMATED: CPT | Performed by: INTERNAL MEDICINE

## 2019-05-17 PROCEDURE — 80197 ASSAY OF TACROLIMUS: CPT | Performed by: INTERNAL MEDICINE

## 2019-05-17 PROCEDURE — 84156 ASSAY OF PROTEIN URINE: CPT | Performed by: INTERNAL MEDICINE

## 2019-05-17 PROCEDURE — 36415 COLL VENOUS BLD VENIPUNCTURE: CPT | Performed by: INTERNAL MEDICINE

## 2019-05-17 PROCEDURE — 80048 BASIC METABOLIC PNL TOTAL CA: CPT | Performed by: INTERNAL MEDICINE

## 2019-05-20 ENCOUNTER — TELEPHONE (OUTPATIENT)
Dept: TRANSPLANT | Facility: CLINIC | Age: 59
End: 2019-05-20

## 2019-06-07 ENCOUNTER — TELEPHONE (OUTPATIENT)
Dept: TRANSPLANT | Facility: CLINIC | Age: 59
End: 2019-06-07

## 2019-06-07 NOTE — TELEPHONE ENCOUNTER
Prior Authorization Specialty Medication Request    Medication/Dose: Myfortic 180 mg tablet  ICD code (if different than what is on RX):  Z94.0 Kidney transplant   Previously Tried and Failed:      Important Lab Values:   Rationale:     Insurance Name:   Insurance ID:   Insurance Phone Number:     Pharmacy Information (if different than what is on RX)  Name:  [facility]  Phone:

## 2019-06-07 NOTE — TELEPHONE ENCOUNTER
Call placed to patient. No answer. Voice message left requesting a return call to discuss if patient is able to take generic Myfortic

## 2019-06-07 NOTE — TELEPHONE ENCOUNTER
Patient Call: General  Route to LPN    Reason for call: patient's insurance plan will not cover Myfortic anymore and will need change to another medication     Call back needed? Yes    Return Call Needed  Same as documented in contacts section  When to return call?: Greater than one day: Route standard priority

## 2019-06-10 ENCOUNTER — TELEPHONE (OUTPATIENT)
Dept: TRANSPLANT | Facility: CLINIC | Age: 59
End: 2019-06-10

## 2019-06-10 NOTE — TELEPHONE ENCOUNTER
PA Initiation    Medication: MYFORTIC 180MG - PA INITIATED  Insurance Company:  [employer]  Start Date: 6/10/2019

## 2019-06-11 NOTE — TELEPHONE ENCOUNTER
Medication Appeal Initiation  We have initiated an appeal for the requested medication:  Medication: MYFORTIC 180MG - PA INITIATED  Appeal Start Date:   6/11/2019  Insurance Company:  [employer]   Comments:   Initial PA and Appeal denied - initiated second appeal

## 2019-06-26 NOTE — TELEPHONE ENCOUNTER
SPOKE TO [facility] SPECIALITY PA TEAM - FORMULARY IS SWITCHING OVER ON 7/1/2019 AT WHICH POINT MYFORTIC 180MG DR TAN WILL BE CONSIDERED A SPECIALTY DRUG. WAS DIRECTED TO RESUBMIT PA ON 7/1/19 ONCE FORMULARY CHANGE OCCURS. SPOKE TO PATIENT AND CONFIRMED SHE RECEIVED A 90 DAY SUPPLY ON 5/6/2019 AND HAS PLENTY OF MEDICATION ON HAND. WILL RE-SUBMIT PA ON 7/1/2019.

## 2019-06-29 DIAGNOSIS — I10 HYPERTENSION: ICD-10-CM

## 2019-07-01 ENCOUNTER — TELEPHONE (OUTPATIENT)
Dept: TRANSPLANT | Facility: CLINIC | Age: 59
End: 2019-07-01

## 2019-07-01 RX ORDER — METOPROLOL TARTRATE 50 MG
TABLET ORAL
Qty: 180 TABLET | Refills: 0 | Status: SHIPPED | OUTPATIENT
Start: 2019-07-01 | End: 2019-09-26

## 2019-07-01 NOTE — TELEPHONE ENCOUNTER
Called patient to inform her of submitted PA for Myfortic 180mg tablets. Informed patient that she would be contacted by writer once the PA is approved/denied. Patient understood.

## 2019-07-04 DIAGNOSIS — I82.4Z1 ACUTE DEEP VEIN THROMBOSIS (DVT) OF DISTAL VEIN OF RIGHT LOWER EXTREMITY (H): ICD-10-CM

## 2019-07-04 NOTE — TELEPHONE ENCOUNTER
Requested Prescriptions   Pending Prescriptions Disp Refills     ELIQUIS 5 MG tablet [Pharmacy Med Name: ELIQUIS 5 MG TABLET] 180 tablet 0     Sig: TAKE 1 TABLET (5 MG) BY MOUTH 2 TIMES DAILY       Direct Oral Anticoagulant Agents Failed - 7/4/2019 12:05 PM        Failed - Normal Platelets on file in past 12 months     Recent Labs   Lab Test 05/17/19  0755   *               Failed - Recent (6 mo) or future (30 days) visit within the authorizing provider's specialty        Passed - Medication is active on med list        Passed - Patient is 18-79 years of age        Passed - Serum creatinine less than or equal to 1.4 on file in past 12 mos     Recent Labs   Lab Test 05/17/19  0755  04/01/18  1942  05/28/14  0844   CR 0.69   < >  --    < >  --    CREAT  --   --  0.6  --   --    CRPOC  --   --   --   --  0.7    < > = values in this interval not displayed.             Passed - Weight is greater than 60 kg for the past year     Wt Readings from Last 3 Encounters:   01/24/19 92.9 kg (204 lb 12.8 oz)   10/09/18 96.6 kg (213 lb)   09/18/18 96.6 kg (213 lb)             Passed - No active pregnancy on record        Passed - No positive pregnancy test within past 12 months        Last Written Prescription Date:  5/14/19  Last Fill Quantity: 180,  # refills: 0   Last office visit: 9/18/2018 with prescribing provider:  Mikal     Future Office Visit:

## 2019-07-08 RX ORDER — APIXABAN 5 MG/1
TABLET, FILM COATED ORAL
Qty: 180 TABLET | Refills: 0 | Status: SHIPPED | OUTPATIENT
Start: 2019-07-08 | End: 2019-10-11

## 2019-07-08 NOTE — TELEPHONE ENCOUNTER
Routing refill request to provider for review/approval because:  Labs out of range:  platelettes  Patient needs to be seen because:  Over due for 6 month recheck      Prashant Gu RN, BSN, PHN

## 2019-07-26 NOTE — TELEPHONE ENCOUNTER
Medication Appeal Initiation    We have initiated an appeal for the requested medication:  Medication: MYFORTIC 180MG - APPEAL  Appeal Start Date:   7/26/19  Insurance Company:  [employer]   Comments:   INSURANCE COMPANY REQUESTING FURTHER DOCUMENTATION OF TRIAL/FAILURE OF PREFERRED MEDICATION. FAXED OVER DOCUMENTATION.

## 2019-07-31 ENCOUNTER — TELEPHONE (OUTPATIENT)
Dept: TRANSPLANT | Facility: CLINIC | Age: 59
End: 2019-07-31

## 2019-08-14 ENCOUNTER — TELEPHONE (OUTPATIENT)
Dept: TRANSPLANT | Facility: CLINIC | Age: 59
End: 2019-08-14

## 2019-08-14 DIAGNOSIS — Z79.899 IMMUNOSUPPRESSIVE MANAGEMENT ENCOUNTER FOLLOWING KIDNEY TRANSPLANT: ICD-10-CM

## 2019-08-14 DIAGNOSIS — Z94.0 IMMUNOSUPPRESSIVE MANAGEMENT ENCOUNTER FOLLOWING KIDNEY TRANSPLANT: ICD-10-CM

## 2019-08-14 DIAGNOSIS — Z94.0 KIDNEY TRANSPLANTED: Primary | ICD-10-CM

## 2019-08-14 DIAGNOSIS — Z48.298 AFTERCARE FOLLOWING ORGAN TRANSPLANT: ICD-10-CM

## 2019-08-14 RX ORDER — MYCOPHENOLIC ACID 180 MG/1
540 TABLET, DELAYED RELEASE ORAL 2 TIMES DAILY
Qty: 180 TABLET | Refills: 0 | Status: SHIPPED | OUTPATIENT
Start: 2019-08-14 | End: 2019-09-06

## 2019-08-14 NOTE — TELEPHONE ENCOUNTER
Call Bessy Spangler and make aware she has not tried Mycophenolic acid.    Will send 1 months worth. She will call and update transplant Center.  If asymptomatic, she will call for refills (3 months at a time).  Appreciates help.    Mycophenolic acid 180 mg (3 tabs = 540 mg) BID (#180 tabs, good for 1 month)  [facility] [place] [place] [phone] (Phone)  [phone] (Fax)

## 2019-08-14 NOTE — TELEPHONE ENCOUNTER
Chris Marquez MD   Physician   Nephrology   Progress Notes   Signed   Note Time:  2/24/2014  4:00 PM               Expand All Collapse All    Assessment and Plan:  1. LDKT - baseline Cr ~ 0.8-1.0, which has remained stable.  Will make no changes in immunosuppression.  2. HTN - well controlled at target of less than 140/90.  No changes.  3. Diarrhea - resolved with change from CellCept to Myfortic.  4. Obesity - encouraged patient to increase exercise and watch caloric intake.  5. URI - likely viral in origin.  Would follow.  6. Recommend return visit in 6 months.        [facility] aware unable to tolerate Cellcept. [facility] message sent. Awaiting reply regarding appeal made.  Per Bessy Spangler she has about 3 weeks worth left of Myfortic (540 mg bid).

## 2019-08-14 NOTE — TELEPHONE ENCOUNTER
"[8/14/2019 11:37 AM] Javy Cárdenas:   Hi Wilian, the appeal for Myfortic has been denied. Despite the documentation of diarrhea subsiding when changing from Mycophenolate Mofetil to Myfortic, the insurance is requesting documentation of trial and failure of both Mycophenolate Mofetil and Mycophenolate Sodium EC. The patient has not tried Mydophenolate Sodium EC.     [8/14/2019 12:23 PM] Wilian Rose:   it looks like she tried mycophenolate (MYFORTIC) 180 mg EC in 2013, is that Mycophenolate sodium EC ?    [8/14/2019 1:01 PM] Javy Cárdenas:   It's not clear from the notes which medication was filled at the time, be it the generic or brand name. The generic version had just received approval from the FDA a few months prior to the Rx's in question, so I'm not even sure the generic drug was on the market at the time of these Rx's.  The Rx from 3/4/13 was written on 2/26/13 but then DC'd on 3/4/13 for \"None\" under reason  [8/14/2019 1:02 PM] Javy Cárdenas:   Actually, now that I look at it closer, it look like the brand name Myfortic was filled. The mycophenolate 180mg EC Rx was d/c'd as a duplicate, and Myfortic was sent       "

## 2019-08-14 NOTE — TELEPHONE ENCOUNTER
Pts is calling to check on refill of her medication.    Her insurance wont pay for the medication, however the pt has stomach issues with other medications. Pts HR stated to the pt that she has to try 3 other medications that are formulary plan before they will cover her myfortic. Pt stated an Russel Block was suppose to start a appeal.     Please connect with the pt as soon as possible.

## 2019-08-22 ENCOUNTER — OFFICE VISIT (OUTPATIENT)
Dept: NEPHROLOGY | Facility: CLINIC | Age: 59
End: 2019-08-22
Attending: INTERNAL MEDICINE
Payer: COMMERCIAL

## 2019-08-22 VITALS
WEIGHT: 200 LBS | OXYGEN SATURATION: 98 % | HEART RATE: 63 BPM | DIASTOLIC BLOOD PRESSURE: 66 MMHG | BODY MASS INDEX: 32.28 KG/M2 | SYSTOLIC BLOOD PRESSURE: 109 MMHG

## 2019-08-22 DIAGNOSIS — Z94.0 KIDNEY REPLACED BY TRANSPLANT: ICD-10-CM

## 2019-08-22 DIAGNOSIS — E55.9 VITAMIN D DEFICIENCY: Primary | ICD-10-CM

## 2019-08-22 DIAGNOSIS — Z79.899 ENCOUNTER FOR LONG-TERM CURRENT USE OF MEDICATION: ICD-10-CM

## 2019-08-22 DIAGNOSIS — Z48.298 AFTERCARE FOLLOWING ORGAN TRANSPLANT: ICD-10-CM

## 2019-08-22 DIAGNOSIS — D84.9 IMMUNOSUPPRESSED STATUS (H): ICD-10-CM

## 2019-08-22 DIAGNOSIS — Z94.0 KIDNEY REPLACED BY TRANSPLANT: Primary | ICD-10-CM

## 2019-08-22 LAB
ANION GAP SERPL CALCULATED.3IONS-SCNC: 7 MMOL/L (ref 3–14)
BUN SERPL-MCNC: 21 MG/DL (ref 7–30)
CALCIUM SERPL-MCNC: 9.7 MG/DL (ref 8.5–10.1)
CHLORIDE SERPL-SCNC: 107 MMOL/L (ref 94–109)
CO2 SERPL-SCNC: 26 MMOL/L (ref 20–32)
CREAT SERPL-MCNC: 0.65 MG/DL (ref 0.52–1.04)
ERYTHROCYTE [DISTWIDTH] IN BLOOD BY AUTOMATED COUNT: 13 % (ref 10–15)
GFR SERPL CREATININE-BSD FRML MDRD: >90 ML/MIN/{1.73_M2}
GLUCOSE SERPL-MCNC: 102 MG/DL (ref 70–99)
HCT VFR BLD AUTO: 38 % (ref 35–47)
HGB BLD-MCNC: 12.3 G/DL (ref 11.7–15.7)
MCH RBC QN AUTO: 30.5 PG (ref 26.5–33)
MCHC RBC AUTO-ENTMCNC: 32.4 G/DL (ref 31.5–36.5)
MCV RBC AUTO: 94 FL (ref 78–100)
PLATELET # BLD AUTO: 125 10E9/L (ref 150–450)
POTASSIUM SERPL-SCNC: 4.2 MMOL/L (ref 3.4–5.3)
RBC # BLD AUTO: 4.03 10E12/L (ref 3.8–5.2)
SODIUM SERPL-SCNC: 140 MMOL/L (ref 133–144)
TACROLIMUS BLD-MCNC: 3.1 UG/L (ref 5–15)
TME LAST DOSE: ABNORMAL H
WBC # BLD AUTO: 4.3 10E9/L (ref 4–11)

## 2019-08-22 PROCEDURE — 85027 COMPLETE CBC AUTOMATED: CPT | Performed by: INTERNAL MEDICINE

## 2019-08-22 PROCEDURE — 80048 BASIC METABOLIC PNL TOTAL CA: CPT | Performed by: INTERNAL MEDICINE

## 2019-08-22 PROCEDURE — 80197 ASSAY OF TACROLIMUS: CPT | Performed by: INTERNAL MEDICINE

## 2019-08-22 PROCEDURE — G0463 HOSPITAL OUTPT CLINIC VISIT: HCPCS | Mod: ZF

## 2019-08-22 PROCEDURE — 36415 COLL VENOUS BLD VENIPUNCTURE: CPT | Performed by: INTERNAL MEDICINE

## 2019-08-22 ASSESSMENT — PAIN SCALES - GENERAL: PAINLEVEL: NO PAIN (0)

## 2019-08-22 NOTE — LETTER
8/22/2019      RE: Bessy Spangler  [id number] [place] [address]       CHRONIC TRANSPLANT NEPHROLOGY VISIT    Assessment & Plan   # LDKT: Stable   - Baseline Cr ~ 0.6-0.7   - Proteinuria: Normal (<0.2 grams)   - Date DSA Last Checked: 2009       Latest DSA: No   - BK Viremia: Not checked recently due to time from transplant   - Kidney Tx Biopsy: No    # Immunosuppression: Tacrolimus immediate release (goal 4-6) and Mycophenolate mofetil (goal not followed)   - Changes: No- will wait repeat tacrolimus level today and readjust as needed.    # Prophylaxis:   - PJP: None    # Hypertension: Controlled; Goal BP: < 130/80   - Changes: No    # History of RLE DVT: unprovoked. Hypercoagulable work up negative. Followed by oncology. Currently on Eliquis    # Skin Cancer Risk:    - Discussed sun protection and recommend regular follow up with Dermatology.    # Medical Compliance: Yes    # Transplant History:  Etiology of kidney failure: Chronic glomerulonephritis (GN)  Tx: DDKT  Transplant: 7/30/2009 (Kidney)  Donor Type: Living Donor Class: Standard Criteria Donor  Significant changes in immunosuppression: None  Significant transplant-related complications: None    Transplant Office Phone Number: [phone]    Assessment and plan was discussed with the patient and she voiced her understanding and agreement.    Return visit: No follow-ups on file.    Rosemary Singh PA-C    Chief Complaint   Ms. Spangler is a 59 year old here for routine follow up.    History of Present Illness   Ms. Spangler has been doing well since she was seen here approximately 1 year ago. She has been trying to be more physically active with walking, but has been busier at work lately and has been sitting at her desk more often. She's still sorting out insurance issues with [employer], but has a few weeks of it left.     Recent Hospitalizations:  [x] No [] Yes    New Medical Issues: [x] No [] Yes    Decreased energy: [x] No [] Yes   "  Chest pain or SOB with exertion:  [x] No [] Yes    Appetite change or weight change: [x] No [] Yes    Nausea, vomiting or diarrhea:  [x] No [] Yes    Fever, sweats or chills: [x] No [] Yes    Leg swelling: [x] No [] Yes      Home BP: 120-130 mmHg systolic     Review of Systems   A comprehensive review of systems was obtained and negative, except as noted in the HPI or PMH.    Problem List   Patient Active Problem List   Diagnosis     Breast cancer (H)     Kidney replaced by transplant     CARDIOVASCULAR SCREENING; LDL GOAL LESS THAN 100     Immunosuppressed status (H)     Hypertension secondary to other renal disorders     Thyroid disorder     Post-menopausal     Conjunctival hemorrhage     Tear film insufficiency     Presbyopia - Both Eyes     Aftercare following organ transplant     Vestibular neuronitis of right ear     Morbid obesity (H)     Acute deep vein thrombosis (DVT) of distal vein of right lower extremity (H)       Social History   Social History     Tobacco Use     Smoking status: Former Smoker     Packs/day: 1.00     Years: 20.00     Pack years: 20.00     Types: Cigarettes     Last attempt to quit: 1998     Years since quittin.6     Smokeless tobacco: Never Used   Substance Use Topics     Alcohol use: No     Alcohol/week: 0.0 oz     Drug use: No       Allergies   Allergies   Allergen Reactions     Dilaudid [Hydromorphone Hcl]      Patient unable to recall     Hydromorphone      Patient unable to recall     Sulfa Drugs Swelling     \"swelling\" - age 3         Medications   Current Outpatient Medications   Medication Sig     ELIQUIS 5 MG tablet TAKE 1 TABLET (5 MG) BY MOUTH 2 TIMES DAILY     levothyroxine (SYNTHROID/LEVOTHROID) 112 MCG tablet Take 1 tablet (112 mcg) by mouth daily     metoprolol tartrate (LOPRESSOR) 50 MG tablet TAKE 1 TABLET (50 MG) BY MOUTH 2 TIMES DAILY     mycophenolic acid (GENERIC EQUIVALENT) 180 MG EC tablet Take 3 tablets (540 mg) by mouth 2 times daily     MYFORTIC " (BRAND) 180 MG EC tablet Take 3 tablets (540 mg) by mouth 2 times daily     MYFORTIC (BRAND) 180 MG EC tablet TAKE 3 TABLETS (540MG) BY MOUTH TWICE A DAY     tacrolimus (GENERIC EQUIVALENT) 1 MG capsule Take 2 capsules (2 mg) by mouth 2 times daily     VITAMIN D, CHOLECALCIFEROL, PO Take 1,000 Units by mouth daily     No current facility-administered medications for this visit.      There are no discontinued medications.    Physical Exam   Vital Signs: LMP 10/25/2004     GENERAL APPEARANCE: alert and no distress  HENT: mouth without ulcers or lesions  LYMPHATICS: no cervical or supraclavicular nodes  RESP: lungs clear to auscultation - no rales, rhonchi or wheezes  CV: regular rhythm, normal rate, no rub, no murmur  EDEMA: no LE edema bilaterally  ABDOMEN: soft, nondistended, nontender  MS: extremities normal - no gross deformities noted, no evidence of inflammation in joints, no muscle tenderness  SKIN: no rash      Data     Renal Latest Ref Rng & Units 5/17/2019 12/28/2018 12/17/2018   Na 133 - 144 mmol/L 140 140 138   K 3.4 - 5.3 mmol/L 4.2 4.4 4.1   Cl 94 - 109 mmol/L 105 105 107   CO2 20 - 32 mmol/L 26 28 24   BUN 7 - 30 mg/dL 19 16 16   Cr 0.52 - 1.04 mg/dL 0.69 0.68 0.64   Glucose 70 - 99 mg/dL 99 110(H) 96   Ca  8.5 - 10.1 mg/dL 9.3 9.2 8.6   Mg 1.6 - 2.3 mg/dL - - -     Bone Health Latest Ref Rng & Units 8/26/2014 1/27/2010 1/20/2010   Phos 2.5 - 4.5 mg/dL - 3.3 4.2   PTHi 12 - 72 pg/mL 175(H) - -   Vit D Def 30 - 75 ug/L 19(L) - -     Heme Latest Ref Rng & Units 8/22/2019 5/17/2019 12/28/2018   WBC 4.0 - 11.0 10e9/L 4.3 4.9 5.0   Hgb 11.7 - 15.7 g/dL 12.3 12.4 12.8   Plt 150 - 450 10e9/L 125(L) 133(L) 132(L)     Liver Latest Ref Rng & Units 8/25/2011 2/16/2011 8/25/2010   AP 40 - 150 U/L 66 70 67   TBili 0.2 - 1.3 mg/dL 0.2 <0.1(L) 0.3   ALT 0 - 50 U/L 10 25 7   AST 0 - 45 U/L 21 16 19   Tot Protein 6.8 - 8.8 g/dL 7.2 6.8 6.9   Albumin 3.9 - 5.1 g/dL 4.3 4.2 4.4     Pancreas Latest Ref Rng & Units  7/29/2009   A1C 4.3 - 6.0 % 5.0     Iron studies Latest Ref Rng & Units 8/26/2014 6/20/2012 3/23/2012   Iron 35 - 180 ug/dL 76 71 97   Iron sat 15 - 46 % 27 23 32   Ferritin 10 - 300 ng/mL 365(H) 344(H) 328(H)     [facility] Txp Virology Latest Ref Rng & Units 3/4/2013 1/23/2012 8/25/2011   CMV IgG EU/mL - - -   CVM DNA Quant - Whole blood, EDTA anticoagulant - -   CMV Quant <100 Copies/mL <100  No CMV DNA detected. - -   CMV QT Log <2.0 Log copies/mL <2.0  The Cytomegalovirus DNA Quantitation assay is a real-time polymerase chain   reaction (PCR) utilizing analyte specific reagents manufactured by [employer]   [facility]. Analyte Specific Reagents (ASRs) are used in many laboratory   tests necessary for standard medical care and generally do not require FDA   approval.   This test was developed and its performance characteristics determined by   [facility].  It has not been   cleared or approved by the US Food and Drug Administration. - -   BK Spec - - Plasma Plasma, EDTA anticoagulant  CORRECTED ON 08/29 AT 1009: PREVIOUSLY REPORTED AS Whole blood, EDTA   anticoagulant   BK Res <1000 copies/mL - <1000 <1000   BK Log <3.0 Log copies/mL - <3.0  The lower limit of detection for this assay is 1000 copies/mL.  Real-time TaqMan   PCR was performed using BK primers and probe for the detection of a 90 bp   portion of the  1 gene.  The performance characteristics were validated by   the [facility].   It has not been cleared or approved by the U.S. Food and Drug Administration. <3.0  The lower limit of detection for this assay is 1000 copies/mL.  Real-time TaqMan   PCR was performed using BK primers and probe for the detection of a 90 bp   portion of the  1 gene.  The performance characteristics were validated by   the [facility].   It has not been cleared or approved by the U.S. Food and Drug Administration.    EBV IgG - - - -   Hep B Core NEG - - -   Hep B Surf - - - -   HIV 1&2 NEG - - -        Recent Labs   Lab Test 12/17/18  0801 12/28/18  0752 05/17/19  0755   DOSTAC 8pm on [id number] at 2000 2008 05.16.19   TACROL 6.1 3.3* 3.0*     Recent Labs   Lab Test 02/11/14  0738 04/04/14  0747 07/02/14  0802   DOSMPA 2/10/2014 20:00. 2000 04/03/2014 07/01/14 2000 PM   MPACID 1.79 1.45 1.24   MPAG 43.1 50.2 46.4       Kidney/Pancreas Recipient

## 2019-08-22 NOTE — NURSING NOTE
Chief Complaint   Patient presents with     RECHECK     Kidney tx fu     Blood pressure 109/66, pulse 63, weight 90.7 kg (200 lb), last menstrual period 10/25/2004, SpO2 98 %, not currently breastfeeding.    Kenney Llamas CMA on 8/22/2019 at 1:55 PM

## 2019-08-22 NOTE — PROGRESS NOTES
CHRONIC TRANSPLANT NEPHROLOGY VISIT    Assessment & Plan   # LDKT: Stable   - Baseline Cr ~ 0.6-0.7   - Proteinuria: Normal (<0.2 grams)   - Date DSA Last Checked: 2009       Latest DSA: No   - BK Viremia: Not checked recently due to time from transplant   - Kidney Tx Biopsy: No    # Immunosuppression: Tacrolimus immediate release (goal 4-6) and Mycophenolate mofetil (goal not followed)   - Changes: No- will wait repeat tacrolimus level today and readjust as needed.    # Prophylaxis:   - PJP: None    # Hypertension: Controlled; Goal BP: < 130/80   - Changes: No    # History of RLE DVT: unprovoked. Hypercoagulable work up negative. Followed by oncology. Currently on Eliquis    # Skin Cancer Risk:    - Discussed sun protection and recommend regular follow up with Dermatology.    # Medical Compliance: Yes    # Transplant History:  Etiology of kidney failure: Chronic glomerulonephritis (GN)  Tx: DDKT  Transplant: 7/30/2009 (Kidney)  Donor Type: Living Donor Class: Standard Criteria Donor  Significant changes in immunosuppression: None  Significant transplant-related complications: None    Transplant Office Phone Number: [phone]    Assessment and plan was discussed with the patient and she voiced her understanding and agreement.    Return visit: No follow-ups on file.    Rosemary Singh PA-C    Chief Complaint   Ms. Spangler is a 59 year old here for routine follow up.    History of Present Illness   Ms. Spangler has been doing well since she was seen here approximately 1 year ago. She has been trying to be more physically active with walking, but has been busier at work lately and has been sitting at her desk more often. She's still sorting out insurance issues with [employer], but has a few weeks of it left.     Recent Hospitalizations:  [x] No [] Yes    New Medical Issues: [x] No [] Yes    Decreased energy: [x] No [] Yes    Chest pain or SOB with exertion:  [x] No [] Yes    Appetite change or weight change: [x] No  "[] Yes    Nausea, vomiting or diarrhea:  [x] No [] Yes    Fever, sweats or chills: [x] No [] Yes    Leg swelling: [x] No [] Yes      Home BP: 120-130 mmHg systolic     Review of Systems   A comprehensive review of systems was obtained and negative, except as noted in the HPI or PMH.    Problem List   Patient Active Problem List   Diagnosis     Breast cancer (H)     Kidney replaced by transplant     CARDIOVASCULAR SCREENING; LDL GOAL LESS THAN 100     Immunosuppressed status (H)     Hypertension secondary to other renal disorders     Thyroid disorder     Post-menopausal     Conjunctival hemorrhage     Tear film insufficiency     Presbyopia - Both Eyes     Aftercare following organ transplant     Vestibular neuronitis of right ear     Morbid obesity (H)     Acute deep vein thrombosis (DVT) of distal vein of right lower extremity (H)       Social History   Social History     Tobacco Use     Smoking status: Former Smoker     Packs/day: 1.00     Years: 20.00     Pack years: 20.00     Types: Cigarettes     Last attempt to quit: 1998     Years since quittin.6     Smokeless tobacco: Never Used   Substance Use Topics     Alcohol use: No     Alcohol/week: 0.0 oz     Drug use: No       Allergies   Allergies   Allergen Reactions     Dilaudid [Hydromorphone Hcl]      Patient unable to recall     Hydromorphone      Patient unable to recall     Sulfa Drugs Swelling     \"swelling\" - age 3         Medications   Current Outpatient Medications   Medication Sig     ELIQUIS 5 MG tablet TAKE 1 TABLET (5 MG) BY MOUTH 2 TIMES DAILY     levothyroxine (SYNTHROID/LEVOTHROID) 112 MCG tablet Take 1 tablet (112 mcg) by mouth daily     metoprolol tartrate (LOPRESSOR) 50 MG tablet TAKE 1 TABLET (50 MG) BY MOUTH 2 TIMES DAILY     mycophenolic acid (GENERIC EQUIVALENT) 180 MG EC tablet Take 3 tablets (540 mg) by mouth 2 times daily     MYFORTIC (BRAND) 180 MG EC tablet Take 3 tablets (540 mg) by mouth 2 times daily     MYFORTIC (BRAND) 180 " MG EC tablet TAKE 3 TABLETS (540MG) BY MOUTH TWICE A DAY     tacrolimus (GENERIC EQUIVALENT) 1 MG capsule Take 2 capsules (2 mg) by mouth 2 times daily     VITAMIN D, CHOLECALCIFEROL, PO Take 1,000 Units by mouth daily     No current facility-administered medications for this visit.      There are no discontinued medications.    Physical Exam   Vital Signs: LMP 10/25/2004     GENERAL APPEARANCE: alert and no distress  HENT: mouth without ulcers or lesions  LYMPHATICS: no cervical or supraclavicular nodes  RESP: lungs clear to auscultation - no rales, rhonchi or wheezes  CV: regular rhythm, normal rate, no rub, no murmur  EDEMA: no LE edema bilaterally  ABDOMEN: soft, nondistended, nontender  MS: extremities normal - no gross deformities noted, no evidence of inflammation in joints, no muscle tenderness  SKIN: no rash      Data     Renal Latest Ref Rng & Units 5/17/2019 12/28/2018 12/17/2018   Na 133 - 144 mmol/L 140 140 138   K 3.4 - 5.3 mmol/L 4.2 4.4 4.1   Cl 94 - 109 mmol/L 105 105 107   CO2 20 - 32 mmol/L 26 28 24   BUN 7 - 30 mg/dL 19 16 16   Cr 0.52 - 1.04 mg/dL 0.69 0.68 0.64   Glucose 70 - 99 mg/dL 99 110(H) 96   Ca  8.5 - 10.1 mg/dL 9.3 9.2 8.6   Mg 1.6 - 2.3 mg/dL - - -     Bone Health Latest Ref Rng & Units 8/26/2014 1/27/2010 1/20/2010   Phos 2.5 - 4.5 mg/dL - 3.3 4.2   PTHi 12 - 72 pg/mL 175(H) - -   Vit D Def 30 - 75 ug/L 19(L) - -     Heme Latest Ref Rng & Units 8/22/2019 5/17/2019 12/28/2018   WBC 4.0 - 11.0 10e9/L 4.3 4.9 5.0   Hgb 11.7 - 15.7 g/dL 12.3 12.4 12.8   Plt 150 - 450 10e9/L 125(L) 133(L) 132(L)     Liver Latest Ref Rng & Units 8/25/2011 2/16/2011 8/25/2010   AP 40 - 150 U/L 66 70 67   TBili 0.2 - 1.3 mg/dL 0.2 <0.1(L) 0.3   ALT 0 - 50 U/L 10 25 7   AST 0 - 45 U/L 21 16 19   Tot Protein 6.8 - 8.8 g/dL 7.2 6.8 6.9   Albumin 3.9 - 5.1 g/dL 4.3 4.2 4.4     Pancreas Latest Ref Rng & Units 7/29/2009   A1C 4.3 - 6.0 % 5.0     Iron studies Latest Ref Rng & Units 8/26/2014 6/20/2012 3/23/2012    Iron 35 - 180 ug/dL 76 71 97   Iron sat 15 - 46 % 27 23 32   Ferritin 10 - 300 ng/mL 365(H) 344(H) 328(H)     [facility] Txp Virology Latest Ref Rng & Units 3/4/2013 1/23/2012 8/25/2011   CMV IgG EU/mL - - -   CVM DNA Quant - Whole blood, EDTA anticoagulant - -   CMV Quant <100 Copies/mL <100  No CMV DNA detected. - -   CMV QT Log <2.0 Log copies/mL <2.0  The Cytomegalovirus DNA Quantitation assay is a real-time polymerase chain   reaction (PCR) utilizing analyte specific reagents manufactured by [employer]   [facility]. Analyte Specific Reagents (ASRs) are used in many laboratory   tests necessary for standard medical care and generally do not require FDA   approval.   This test was developed and its performance characteristics determined by   [facility].  It has not been   cleared or approved by the [place] Food and Drug Administration. - -   BK Spec - - Plasma Plasma, EDTA anticoagulant  CORRECTED ON 08/29 AT 1009: PREVIOUSLY REPORTED AS Whole blood, EDTA   anticoagulant   BK Res <1000 copies/mL - <1000 <1000   BK Log <3.0 Log copies/mL - <3.0  The lower limit of detection for this assay is 1000 copies/mL.  Real-time TaqMan   PCR was performed using BK primers and probe for the detection of a 90 bp   portion of the  1 gene.  The performance characteristics were validated by   the [facility].   It has not been cleared or approved by the U.S. Food and Drug Administration. <3.0  The lower limit of detection for this assay is 1000 copies/mL.  Real-time TaqMan   PCR was performed using BK primers and probe for the detection of a 90 bp   portion of the  1 gene.  The performance characteristics were validated by   the [facility].   It has not been cleared or approved by the U.S. Food and Drug Administration.   EBV IgG - - - -   Hep B Core NEG - - -   Hep B Surf - - - -   HIV 1&2 NEG - - -        Recent Labs    Lab Test 12/17/18  0801 12/28/18  0752 05/17/19  0755   DOSTAC 8pm on [id number] at 2000 2008 05.16.19   TACROL 6.1 3.3* 3.0*     Recent Labs   Lab Test 02/11/14  0738 04/04/14  0747 07/02/14  0802   DOSMPA 2/10/2014 20:00. 2000 04/03/2014 07/01/14 2000 PM   MPACID 1.79 1.45 1.24   MPAG 43.1 50.2 46.4

## 2019-08-22 NOTE — LETTER
8/22/2019       RE: Bessy Spangler  [id number] [facility] [place] [address]     Dear Colleague,    Thank you for referring your patient, Bessy Spangler, to the [facility] at [facility]. Please see a copy of my visit note below.    CHRONIC TRANSPLANT NEPHROLOGY VISIT    Assessment & Plan   # LDKT: Stable   - Baseline Cr ~ 0.6-0.7   - Proteinuria: Normal (<0.2 grams)   - Date DSA Last Checked: 2009       Latest DSA: No   - BK Viremia: Not checked recently due to time from transplant   - Kidney Tx Biopsy: No    # Immunosuppression: Tacrolimus immediate release (goal 4-6) and Mycophenolate mofetil (goal not followed)   - Changes: No- will wait repeat tacrolimus level today and readjust as needed.    # Prophylaxis:   - PJP: None    # Hypertension: Controlled; Goal BP: < 130/80   - Changes: No    # History of RLE DVT: unprovoked. Hypercoagulable work up negative. Followed by oncology. Currently on Eliquis    # Skin Cancer Risk:    - Discussed sun protection and recommend regular follow up with Dermatology.    # Medical Compliance: Yes    # Transplant History:  Etiology of kidney failure: Chronic glomerulonephritis (GN)  Tx: DDKT  Transplant: 7/30/2009 (Kidney)  Donor Type: Living Donor Class: Standard Criteria Donor  Significant changes in immunosuppression: None  Significant transplant-related complications: None    Transplant Office Phone Number: [phone]    Assessment and plan was discussed with the patient and she voiced her understanding and agreement.    Return visit: No follow-ups on file.    Rosemary Singh PA-C    Chief Complaint   Ms. Spangler is a 59 year old here for routine follow up.    History of Present Illness   Ms. Spangler has been doing well since she was seen here approximately 1 year ago. She has been trying to be more physically active with walking, but has been busier at work lately and has been sitting at her desk more often. She's still  "sorting out insurance issues with Myfortic, but has a few weeks of it left.     Recent Hospitalizations:  [x] No [] Yes    New Medical Issues: [x] No [] Yes    Decreased energy: [x] No [] Yes    Chest pain or SOB with exertion:  [x] No [] Yes    Appetite change or weight change: [x] No [] Yes    Nausea, vomiting or diarrhea:  [x] No [] Yes    Fever, sweats or chills: [x] No [] Yes    Leg swelling: [x] No [] Yes      Home BP: 120-130 mmHg systolic     Review of Systems   A comprehensive review of systems was obtained and negative, except as noted in the HPI or PMH.    Problem List   Patient Active Problem List   Diagnosis     Breast cancer (H)     Kidney replaced by transplant     CARDIOVASCULAR SCREENING; LDL GOAL LESS THAN 100     Immunosuppressed status (H)     Hypertension secondary to other renal disorders     Thyroid disorder     Post-menopausal     Conjunctival hemorrhage     Tear film insufficiency     Presbyopia - Both Eyes     Aftercare following organ transplant     Vestibular neuronitis of right ear     Morbid obesity (H)     Acute deep vein thrombosis (DVT) of distal vein of right lower extremity (H)       Social History   Social History     Tobacco Use     Smoking status: Former Smoker     Packs/day: 1.00     Years: 20.00     Pack years: 20.00     Types: Cigarettes     Last attempt to quit: 1998     Years since quittin.6     Smokeless tobacco: Never Used   Substance Use Topics     Alcohol use: No     Alcohol/week: 0.0 oz     Drug use: No       Allergies   Allergies   Allergen Reactions     Dilaudid [Hydromorphone Hcl]      Patient unable to recall     Hydromorphone      Patient unable to recall     Sulfa Drugs Swelling     \"swelling\" - age 3         Medications   Current Outpatient Medications   Medication Sig     ELIQUIS 5 MG tablet TAKE 1 TABLET (5 MG) BY MOUTH 2 TIMES DAILY     levothyroxine (SYNTHROID/LEVOTHROID) 112 MCG tablet Take 1 tablet (112 mcg) by mouth daily     metoprolol tartrate " (LOPRESSOR) 50 MG tablet TAKE 1 TABLET (50 MG) BY MOUTH 2 TIMES DAILY     mycophenolic acid (GENERIC EQUIVALENT) 180 MG EC tablet Take 3 tablets (540 mg) by mouth 2 times daily     MYFORTIC (BRAND) 180 MG EC tablet Take 3 tablets (540 mg) by mouth 2 times daily     MYFORTIC (BRAND) 180 MG EC tablet TAKE 3 TABLETS (540MG) BY MOUTH TWICE A DAY     tacrolimus (GENERIC EQUIVALENT) 1 MG capsule Take 2 capsules (2 mg) by mouth 2 times daily     VITAMIN D, CHOLECALCIFEROL, PO Take 1,000 Units by mouth daily     No current facility-administered medications for this visit.      There are no discontinued medications.    Physical Exam   Vital Signs: LMP 10/25/2004     GENERAL APPEARANCE: alert and no distress  HENT: mouth without ulcers or lesions  LYMPHATICS: no cervical or supraclavicular nodes  RESP: lungs clear to auscultation - no rales, rhonchi or wheezes  CV: regular rhythm, normal rate, no rub, no murmur  EDEMA: no LE edema bilaterally  ABDOMEN: soft, nondistended, nontender  MS: extremities normal - no gross deformities noted, no evidence of inflammation in joints, no muscle tenderness  SKIN: no rash      Data     Renal Latest Ref Rng & Units 5/17/2019 12/28/2018 12/17/2018   Na 133 - 144 mmol/L 140 140 138   K 3.4 - 5.3 mmol/L 4.2 4.4 4.1   Cl 94 - 109 mmol/L 105 105 107   CO2 20 - 32 mmol/L 26 28 24   BUN 7 - 30 mg/dL 19 16 16   Cr 0.52 - 1.04 mg/dL 0.69 0.68 0.64   Glucose 70 - 99 mg/dL 99 110(H) 96   Ca  8.5 - 10.1 mg/dL 9.3 9.2 8.6   Mg 1.6 - 2.3 mg/dL - - -     Bone Health Latest Ref Rng & Units 8/26/2014 1/27/2010 1/20/2010   Phos 2.5 - 4.5 mg/dL - 3.3 4.2   PTHi 12 - 72 pg/mL 175(H) - -   Vit D Def 30 - 75 ug/L 19(L) - -     Heme Latest Ref Rng & Units 8/22/2019 5/17/2019 12/28/2018   WBC 4.0 - 11.0 10e9/L 4.3 4.9 5.0   Hgb 11.7 - 15.7 g/dL 12.3 12.4 12.8   Plt 150 - 450 10e9/L 125(L) 133(L) 132(L)     Liver Latest Ref Rng & Units 8/25/2011 2/16/2011 8/25/2010   AP 40 - 150 U/L 66 70 67   TBili 0.2 - 1.3 mg/dL  0.2 <0.1(L) 0.3   ALT 0 - 50 U/L 10 25 7   AST 0 - 45 U/L 21 16 19   Tot Protein 6.8 - 8.8 g/dL 7.2 6.8 6.9   Albumin 3.9 - 5.1 g/dL 4.3 4.2 4.4     Pancreas Latest Ref Rng & Units 7/29/2009   A1C 4.3 - 6.0 % 5.0     Iron studies Latest Ref Rng & Units 8/26/2014 6/20/2012 3/23/2012   Iron 35 - 180 ug/dL 76 71 97   Iron sat 15 - 46 % 27 23 32   Ferritin 10 - 300 ng/mL 365(H) 344(H) 328(H)     [facility] Txp Virology Latest Ref Rng & Units 3/4/2013 1/23/2012 8/25/2011   CMV IgG EU/mL - - -   CVM DNA Quant - Whole blood, EDTA anticoagulant - -   CMV Quant <100 Copies/mL <100  No CMV DNA detected. - -   CMV QT Log <2.0 Log copies/mL <2.0  The Cytomegalovirus DNA Quantitation assay is a real-time polymerase chain   reaction (PCR) utilizing analyte specific reagents manufactured by [employer]   [employer]. Analyte Specific Reagents (ASRs) are used in many laboratory   tests necessary for standard medical care and generally do not require FDA   approval.   This test was developed and its performance characteristics determined by   [facility].  It has not been   cleared or approved by the US Food and Drug Administration. - -   BK Spec - - Plasma Plasma, EDTA anticoagulant  CORRECTED ON 08/29 AT 1009: PREVIOUSLY REPORTED AS Whole blood, EDTA   anticoagulant   BK Res <1000 copies/mL - <1000 <1000   BK Log <3.0 Log copies/mL - <3.0  The lower limit of detection for this assay is 1000 copies/mL.  Real-time TaqMan   PCR was performed using BK primers and probe for the detection of a 90 bp   portion of the  1 gene.  The performance characteristics were validated by   the [facility].   It has not been cleared or approved by the U.S. Food and Drug Administration. <3.0  The lower limit of detection for this assay is 1000 copies/mL.  Real-time TaqMan   PCR was performed using BK primers and probe for the detection of a 90 bp   portion of the  1 gene.  The  performance characteristics were validated by   the [facility], [place].   It has not been cleared or approved by the U.S. Food and Drug Administration.   EBV IgG - - - -   Hep B Core NEG - - -   Hep B Surf - - - -   HIV 1&2 NEG - - -        Recent Labs   Lab Test 12/17/18  0801 12/28/18  0752 05/17/19  0755   DOSTAC 8pm on [id number] at 2000 2008 05.16.19   TACROL 6.1 3.3* 3.0*     Recent Labs   Lab Test 02/11/14  0738 04/04/14  0747 07/02/14  0802   DOSMPA 2/10/2014 20:00. 2000 04/03/2014 07/01/14 2000 PM   MPACID 1.79 1.45 1.24   MPAG 43.1 50.2 46.4       Again, thank you for allowing me to participate in the care of your patient.      Sincerely,    Kidney/Pancreas Recipient

## 2019-08-22 NOTE — NURSING NOTE
Bessy Spangler was seen today in clinic by this writer. Medications, lab orders, lab frequency, and necessary follow up discussed with patient. Patient was provided with a copy of the current lab letter. Patient voiced understanding and agreement of education and plan.     Bessy M Spangler will check labs next month after starting generic myfortic, then every 3 months thereafter.     Marsha Nolan RN

## 2019-08-23 DIAGNOSIS — Z94.0 KIDNEY REPLACED BY TRANSPLANT: Primary | ICD-10-CM

## 2019-08-23 RX ORDER — TACROLIMUS 0.5 MG/1
0.5 CAPSULE ORAL 2 TIMES DAILY
Qty: 60 CAPSULE | Refills: 11 | Status: SHIPPED | OUTPATIENT
Start: 2019-08-23 | End: 2019-09-13

## 2019-08-23 RX ORDER — TACROLIMUS 1 MG/1
2 CAPSULE ORAL 2 TIMES DAILY
Qty: 360 CAPSULE | Refills: 3 | Status: SHIPPED | OUTPATIENT
Start: 2019-08-23 | End: 2019-09-13

## 2019-08-23 NOTE — TELEPHONE ENCOUNTER
Call placed to patient. Patient confirms current dose and accurate trough level. Denies any recent illness, diarrhea or medication changes. Patient v\u to increase dose to 2.5 mg bid and repeat level in 1-2 weeks. Order/rx sent

## 2019-08-23 NOTE — TELEPHONE ENCOUNTER
ISSUE:   Tacrolimus level 3.1 on 8/22/19, goal 4-6, dose 2 mg BID    PLAN:   Please call pt and confirm this was a good 12-hour trough. Verify dose 2 mg BID. Confirm no new medications or illness (macey. Diarrhea). If good trough, increase dose to 2.5 mg BID and recheck tac and BMP in the next two weeks. Update rx, send lab order.

## 2019-09-06 DIAGNOSIS — Z94.0 KIDNEY TRANSPLANTED: ICD-10-CM

## 2019-09-06 DIAGNOSIS — Z48.298 AFTERCARE FOLLOWING ORGAN TRANSPLANT: ICD-10-CM

## 2019-09-06 DIAGNOSIS — Z79.899 IMMUNOSUPPRESSIVE MANAGEMENT ENCOUNTER FOLLOWING KIDNEY TRANSPLANT: ICD-10-CM

## 2019-09-06 DIAGNOSIS — Z94.0 IMMUNOSUPPRESSIVE MANAGEMENT ENCOUNTER FOLLOWING KIDNEY TRANSPLANT: ICD-10-CM

## 2019-09-06 RX ORDER — MYCOPHENOLIC ACID 180 MG/1
TABLET, DELAYED RELEASE ORAL
Qty: 180 TABLET | Refills: 11 | Status: SHIPPED | OUTPATIENT
Start: 2019-09-06 | End: 2019-09-23

## 2019-09-12 DIAGNOSIS — Z94.0 KIDNEY REPLACED BY TRANSPLANT: ICD-10-CM

## 2019-09-12 DIAGNOSIS — Z79.899 ENCOUNTER FOR LONG-TERM CURRENT USE OF MEDICATION: ICD-10-CM

## 2019-09-12 DIAGNOSIS — Z48.298 AFTERCARE FOLLOWING ORGAN TRANSPLANT: ICD-10-CM

## 2019-09-12 DIAGNOSIS — E55.9 VITAMIN D DEFICIENCY: ICD-10-CM

## 2019-09-12 LAB
ANION GAP SERPL CALCULATED.3IONS-SCNC: 7 MMOL/L (ref 3–14)
BUN SERPL-MCNC: 17 MG/DL (ref 7–30)
CALCIUM SERPL-MCNC: 9.2 MG/DL (ref 8.5–10.1)
CHLORIDE SERPL-SCNC: 108 MMOL/L (ref 94–109)
CO2 SERPL-SCNC: 25 MMOL/L (ref 20–32)
CREAT SERPL-MCNC: 0.61 MG/DL (ref 0.52–1.04)
DEPRECATED CALCIDIOL+CALCIFEROL SERPL-MC: 29 UG/L (ref 20–75)
GFR SERPL CREATININE-BSD FRML MDRD: >90 ML/MIN/{1.73_M2}
GLUCOSE SERPL-MCNC: 100 MG/DL (ref 70–99)
POTASSIUM SERPL-SCNC: 4.4 MMOL/L (ref 3.4–5.3)
SODIUM SERPL-SCNC: 140 MMOL/L (ref 133–144)
TACROLIMUS BLD-MCNC: 4.7 UG/L (ref 5–15)
TME LAST DOSE: ABNORMAL H

## 2019-09-12 PROCEDURE — 80197 ASSAY OF TACROLIMUS: CPT | Performed by: INTERNAL MEDICINE

## 2019-09-12 PROCEDURE — 80180 DRUG SCRN QUAN MYCOPHENOLATE: CPT | Performed by: INTERNAL MEDICINE

## 2019-09-12 PROCEDURE — 82306 VITAMIN D 25 HYDROXY: CPT | Performed by: INTERNAL MEDICINE

## 2019-09-12 PROCEDURE — 80048 BASIC METABOLIC PNL TOTAL CA: CPT | Performed by: INTERNAL MEDICINE

## 2019-09-12 PROCEDURE — 36415 COLL VENOUS BLD VENIPUNCTURE: CPT | Performed by: INTERNAL MEDICINE

## 2019-09-13 DIAGNOSIS — Z48.298 AFTERCARE FOLLOWING ORGAN TRANSPLANT: ICD-10-CM

## 2019-09-13 DIAGNOSIS — Z94.0 IMMUNOSUPPRESSIVE MANAGEMENT ENCOUNTER FOLLOWING KIDNEY TRANSPLANT: ICD-10-CM

## 2019-09-13 DIAGNOSIS — Z94.0 KIDNEY TRANSPLANTED: Primary | ICD-10-CM

## 2019-09-13 DIAGNOSIS — Z94.0 KIDNEY REPLACED BY TRANSPLANT: ICD-10-CM

## 2019-09-13 DIAGNOSIS — Z79.899 IMMUNOSUPPRESSIVE MANAGEMENT ENCOUNTER FOLLOWING KIDNEY TRANSPLANT: ICD-10-CM

## 2019-09-13 LAB
MYCOPHENOLATE SERPL LC/MS/MS-MCNC: 2.59 MG/L (ref 1–3.5)
MYCOPHENOLATE-G SERPL LC/MS/MS-MCNC: 33.6 MG/L (ref 30–95)
TME LAST DOSE: NORMAL H

## 2019-09-13 RX ORDER — TACROLIMUS 0.5 MG/1
0.5 CAPSULE ORAL 2 TIMES DAILY
Qty: 180 CAPSULE | Refills: 3 | Status: SHIPPED | OUTPATIENT
Start: 2019-09-13 | End: 2019-11-20

## 2019-09-13 RX ORDER — TACROLIMUS 1 MG/1
2 CAPSULE ORAL 2 TIMES DAILY
Qty: 360 CAPSULE | Refills: 3 | Status: SHIPPED | OUTPATIENT
Start: 2019-09-13 | End: 2019-11-20

## 2019-09-13 RX ORDER — MYCOPHENOLIC ACID 180 MG/1
540 TABLET, DELAYED RELEASE ORAL 2 TIMES DAILY
Qty: 540 TABLET | Refills: 3 | Status: SHIPPED | OUTPATIENT
Start: 2019-09-13 | End: 2019-09-23

## 2019-09-13 NOTE — TELEPHONE ENCOUNTER
Patient Call: Medication Refill  Route to LPN  Instruct the patient to first contact their pharmacy. If they have called their pharmacy and require further assistance, route to LPN.      mycophenolic acid (GENERIC EQUIVALENT) 180 MG EC tablet   tacrolimus (GENERIC EQUIVALENT) 0.5 MG capsule     [facility] [place] [facility] - [facility] Kelsy Leyva [phone] (Phone)  [phone] (Fax)     90 day supply  When will the patient be out of this medication?: Less than 3 days (Route high priority)

## 2019-09-23 RX ORDER — MYCOPHENOLIC ACID 180 MG/1
540 TABLET, DELAYED RELEASE ORAL 2 TIMES DAILY
Qty: 540 TABLET | Refills: 3 | Status: SHIPPED | OUTPATIENT
Start: 2019-09-23 | End: 2020-06-29

## 2019-09-23 NOTE — TELEPHONE ENCOUNTER
Patient Call: Medication Refill Error   Route to LPN  Instruct the patient to first contact their pharmacy. If they have called their pharmacy and require further assistance, route to LPN.    Pharmacy Name:  [facility] ENMANUEL Mackay [phone] (Phone)  [phone] (Fax)       Name of Medication: mycophenolic acid (GENERIC EQUIVALENT) 180 MG EC tablet-  Refill need to be the generic for for 90 days- insurance will not process a name brand refill. Please correct Rx and call pt when complete    When will the patient be out of this medication?: Less than 3 days (Route high priority)   Pt will be out Thursday 9/26/19 morning dose

## 2019-09-24 DIAGNOSIS — E07.9 THYROID DISORDER: ICD-10-CM

## 2019-09-24 NOTE — TELEPHONE ENCOUNTER
"Requested Prescriptions   Pending Prescriptions Disp Refills     levothyroxine (SYNTHROID/LEVOTHROID) 112 MCG tablet [Pharmacy Med Name: LEVOTHYROXINE 112 MCG TABLET]  Last Written Prescription Date:  04/19/19  Last Fill Quantity: 90,  # refills: 1   Last Office Visit with G, CINDA or [facility] prescribing provider:  09/18/18Jeniffer   Future Office Visit:    90 tablet 1     Sig: TAKE 1 TABLET (112 MCG) BY MOUTH DAILY       Thyroid Protocol Failed - 9/24/2019  1:55 AM        Failed - Recent (12 mo) or future (30 days) visit within the authorizing provider's specialty     Patient had office visit in the last 12 months or has a visit in the next 30 days with authorizing provider or within the authorizing provider's specialty.  See \"Patient Info\" tab in inbasket, or \"Choose Columns\" in Meds & Orders section of the refill encounter.              Failed - Normal TSH on file in past 12 months     Recent Labs   Lab Test 09/18/18  0746   TSH 3.15              Passed - Patient is 12 years or older        Passed - Medication is active on med list        Passed - No active pregnancy on record     If patient is pregnant or has had a positive pregnancy test, please check TSH.          Passed - No positive pregnancy test in past 12 months     If patient is pregnant or has had a positive pregnancy test, please check TSH.            "

## 2019-09-26 DIAGNOSIS — I10 HYPERTENSION: ICD-10-CM

## 2019-09-26 RX ORDER — LEVOTHYROXINE SODIUM 112 UG/1
112 TABLET ORAL DAILY
Qty: 30 TABLET | Refills: 0 | Status: SHIPPED | OUTPATIENT
Start: 2019-09-26 | End: 2019-10-11

## 2019-09-26 RX ORDER — METOPROLOL TARTRATE 50 MG
TABLET ORAL
Qty: 180 TABLET | Refills: 0 | Status: SHIPPED | OUTPATIENT
Start: 2019-09-26 | End: 2019-10-11

## 2019-09-26 NOTE — TELEPHONE ENCOUNTER
Medication is being filled for 1 time refill only due to:  Patient needs to be seen because it has been more than one year since last visit.   30 day wilber refill given, per protocol    Estefanía Kiran RN

## 2019-10-11 ENCOUNTER — OFFICE VISIT (OUTPATIENT)
Dept: FAMILY MEDICINE | Facility: CLINIC | Age: 59
End: 2019-10-11
Payer: COMMERCIAL

## 2019-10-11 VITALS
SYSTOLIC BLOOD PRESSURE: 126 MMHG | HEART RATE: 63 BPM | OXYGEN SATURATION: 93 % | WEIGHT: 200 LBS | TEMPERATURE: 97.9 F | DIASTOLIC BLOOD PRESSURE: 69 MMHG | HEIGHT: 66 IN | BODY MASS INDEX: 32.14 KG/M2 | RESPIRATION RATE: 18 BRPM

## 2019-10-11 DIAGNOSIS — Z12.31 ENCOUNTER FOR SCREENING MAMMOGRAM FOR BREAST CANCER: ICD-10-CM

## 2019-10-11 DIAGNOSIS — Z12.11 SPECIAL SCREENING FOR MALIGNANT NEOPLASMS, COLON: ICD-10-CM

## 2019-10-11 DIAGNOSIS — Z17.0 MALIGNANT NEOPLASM OF CENTRAL PORTION OF LEFT BREAST IN FEMALE, ESTROGEN RECEPTOR POSITIVE (H): ICD-10-CM

## 2019-10-11 DIAGNOSIS — Z94.0 KIDNEY REPLACED BY TRANSPLANT: ICD-10-CM

## 2019-10-11 DIAGNOSIS — C50.112 MALIGNANT NEOPLASM OF CENTRAL PORTION OF LEFT BREAST IN FEMALE, ESTROGEN RECEPTOR POSITIVE (H): ICD-10-CM

## 2019-10-11 DIAGNOSIS — I10 ESSENTIAL HYPERTENSION: ICD-10-CM

## 2019-10-11 DIAGNOSIS — Z12.4 SCREENING FOR MALIGNANT NEOPLASM OF CERVIX: ICD-10-CM

## 2019-10-11 DIAGNOSIS — Z00.00 ROUTINE GENERAL MEDICAL EXAMINATION AT A HEALTH CARE FACILITY: Primary | ICD-10-CM

## 2019-10-11 DIAGNOSIS — E07.9 THYROID DISORDER: ICD-10-CM

## 2019-10-11 DIAGNOSIS — I82.4Z1 ACUTE DEEP VEIN THROMBOSIS (DVT) OF DISTAL VEIN OF RIGHT LOWER EXTREMITY (H): ICD-10-CM

## 2019-10-11 LAB
CHOLEST SERPL-MCNC: 177 MG/DL
HDLC SERPL-MCNC: 79 MG/DL
LDLC SERPL CALC-MCNC: 84 MG/DL
NONHDLC SERPL-MCNC: 98 MG/DL
T4 FREE SERPL-MCNC: 0.94 NG/DL (ref 0.76–1.46)
TRIGL SERPL-MCNC: 68 MG/DL
TSH SERPL DL<=0.005 MIU/L-ACNC: 6.11 MU/L (ref 0.4–4)

## 2019-10-11 PROCEDURE — 36415 COLL VENOUS BLD VENIPUNCTURE: CPT | Performed by: PREVENTIVE MEDICINE

## 2019-10-11 PROCEDURE — 99396 PREV VISIT EST AGE 40-64: CPT | Mod: 25 | Performed by: PREVENTIVE MEDICINE

## 2019-10-11 PROCEDURE — 80061 LIPID PANEL: CPT | Performed by: PREVENTIVE MEDICINE

## 2019-10-11 PROCEDURE — 87624 HPV HI-RISK TYP POOLED RSLT: CPT | Performed by: PREVENTIVE MEDICINE

## 2019-10-11 PROCEDURE — 99212 OFFICE O/P EST SF 10 MIN: CPT | Mod: 25 | Performed by: PREVENTIVE MEDICINE

## 2019-10-11 PROCEDURE — 84443 ASSAY THYROID STIM HORMONE: CPT | Performed by: PREVENTIVE MEDICINE

## 2019-10-11 PROCEDURE — 84439 ASSAY OF FREE THYROXINE: CPT | Performed by: PREVENTIVE MEDICINE

## 2019-10-11 PROCEDURE — 90682 RIV4 VACC RECOMBINANT DNA IM: CPT | Performed by: PREVENTIVE MEDICINE

## 2019-10-11 PROCEDURE — G0145 SCR C/V CYTO,THINLAYER,RESCR: HCPCS | Performed by: PREVENTIVE MEDICINE

## 2019-10-11 PROCEDURE — 90471 IMMUNIZATION ADMIN: CPT | Performed by: PREVENTIVE MEDICINE

## 2019-10-11 RX ORDER — LEVOTHYROXINE SODIUM 112 UG/1
112 TABLET ORAL DAILY
Qty: 90 TABLET | Refills: 3 | Status: SHIPPED | OUTPATIENT
Start: 2019-10-11 | End: 2019-11-27 | Stop reason: DRUGHIGH

## 2019-10-11 RX ORDER — METOPROLOL TARTRATE 50 MG
TABLET ORAL
Qty: 180 TABLET | Refills: 1 | Status: SHIPPED | OUTPATIENT
Start: 2019-10-11 | End: 2020-06-08

## 2019-10-11 ASSESSMENT — PAIN SCALES - GENERAL: PAINLEVEL: NO PAIN (0)

## 2019-10-11 ASSESSMENT — MIFFLIN-ST. JEOR: SCORE: 1488.94

## 2019-10-11 NOTE — PROGRESS NOTES
SUBJECTIVE:   CC: Bessy Spangler is an 59 year old woman who presents for preventive health visit.     Healthy Habits:    Do you get at least three servings of calcium containing foods daily (dairy, green leafy vegetables, etc.)? yes    Amount of exercise or daily activities, outside of work: None    Problems taking medications regularly No    Medication side effects: No    Have you had an eye exam in the past two years? no    Do you see a dentist twice per year? yes    Do you have sleep apnea, excessive snoring or daytime drowsiness?no      Hypothyroidism Follow-up      Since last visit, patient describes the following symptoms: Weight stable, no hair loss, no skin changes, no constipation, no loose stools      Today's PHQ-2 Score:   PHQ-2 (  [employer]) 10/11/2019 2018   Q1: Little interest or pleasure in doing things 0 0   Q2: Feeling down, depressed or hopeless 0 0   PHQ-2 Score 0 0       Abuse: Current or Past(Physical, Sexual or Emotional)- No  Do you feel safe in your environment? Yes    Social History     Tobacco Use     Smoking status: Former Smoker     Packs/day: 1.00     Years: 20.00     Pack years: 20.00     Types: Cigarettes     Last attempt to quit: 1998     Years since quittin.7     Smokeless tobacco: Never Used   Substance Use Topics     Alcohol use: No     Alcohol/week: 0.0 standard drinks     If you drink alcohol do you typically have >3 drinks per day or >7 drinks per week? Not Applicable                     Reviewed orders with patient.  Reviewed health maintenance and updated orders accordingly - Yes  Lab work is in process  Labs reviewed in EPIC  BP Readings from Last 3 Encounters:   10/11/19 126/69   19 109/66   19 158/79    Wt Readings from Last 3 Encounters:   10/11/19 90.7 kg (200 lb)   19 90.7 kg (200 lb)   19 92.9 kg (204 lb 12.8 oz)                  Patient Active Problem List   Diagnosis     Breast cancer (H)     Kidney replaced by transplant      CARDIOVASCULAR SCREENING; LDL GOAL LESS THAN 100     Immunosuppressed status (H)     Hypertension secondary to other renal disorders     Thyroid disorder     Post-menopausal     Conjunctival hemorrhage     Tear film insufficiency     Presbyopia - Both Eyes     Aftercare following organ transplant     Vestibular neuronitis of right ear     Acute deep vein thrombosis (DVT) of distal vein of right lower extremity (H)     Past Surgical History:   Procedure Laterality Date     APPENDECTOMY OPEN       AV FISTULA OR GRAFT ARTERIAL      left     C TRANSPLANT,PREP LIVING  RENAL GRAFT  2009    Hx of nephritis     LUMPECTOMY BREAST      left       Social History     Tobacco Use     Smoking status: Former Smoker     Packs/day: 1.00     Years: 20.00     Pack years: 20.00     Types: Cigarettes     Last attempt to quit: 1998     Years since quittin.7     Smokeless tobacco: Never Used   Substance Use Topics     Alcohol use: No     Alcohol/week: 0.0 standard drinks     Family History   Problem Relation Age of Onset     Diabetes Father         type 2       Arthritis Father      Glaucoma Father      Cancer Mother 58        smoker,metastatic adnoid cystic cancer,  early 60s     Arthritis Mother      Endocrine Disease Mother         thyroid     Hypertension Maternal Grandmother          at a young age     Hypertension Maternal Grandfather      Cancer Paternal Grandmother         stomach cancer     Endocrine Disease Sister         thyroid     Genitourinary Problems Daughter 32        renal failure      Endocrine Disease Sister         thyroid     Chronic Obstructive Pulmonary Disease Paternal Grandfather         smoking  young         Current Outpatient Medications   Medication Sig Dispense Refill     apixaban ANTICOAGULANT (ELIQUIS ANTICOAGULANT) 5 MG tablet TAKE 1 TABLET (5 MG) BY MOUTH 2 TIMES DAILY 180 tablet 1     levothyroxine (SYNTHROID/LEVOTHROID) 112 MCG tablet Take 1 tablet (112 mcg) by  "mouth daily ++NEED APPOINTMENT AND LABS FOR FURTHER REFILLS++ 90 tablet 3     metoprolol tartrate (LOPRESSOR) 50 MG tablet TAKE 1 TABLET (50 MG) BY MOUTH 2 TIMES DAILY 180 tablet 1     mycophenolic acid (GENERIC EQUIVALENT) 180 MG EC tablet Take 3 tablets (540 mg) by mouth 2 times daily 540 tablet 3     tacrolimus (GENERIC EQUIVALENT) 0.5 MG capsule Take 1 capsule (0.5 mg) by mouth 2 times daily Total dose = 2.5 mg twice a day 180 capsule 3     tacrolimus (GENERIC EQUIVALENT) 1 MG capsule Take 2 capsules (2 mg) by mouth 2 times daily Total dose = 2.5 mg twice a day 360 capsule 3     VITAMIN D, CHOLECALCIFEROL, PO Take 1,000 Units by mouth daily       Allergies   Allergen Reactions     Dilaudid [Hydromorphone Hcl]      Patient unable to recall     Hydromorphone      Patient unable to recall     Sulfa Drugs Swelling     \"swelling\" - age 3         Alternate mammogram schedule due to breast cancer history     Pertinent mammograms are reviewed under the imaging tab.  History of abnormal Pap smear: NO - age 30-65 PAP every 5 years with negative HPV co-testing recommended  PAP / HPV 10/16/2014   PAP NIL     Reviewed and updated as needed this visit by clinical staff  Tobacco  Allergies  Meds  Problems  Med Hx  Surg Hx  Fam Hx  Soc Hx          Reviewed and updated as needed this visit by Provider  Problems  Med Hx  Surg Hx        Past Medical History:   Diagnosis Date     Anemia of chronic renal failure      Breast cancer (H)      Chronic diarrhea      Chronic kidney disease (CKD), stage V (H)     Due to \"nephritis\". Biopsy done years ago. I do not know the histologic specifics     High risk medication use      Hyperphosphatemia      Hypertension      Hypothyroid      Immunosuppressed status (H)      Kidney replaced by transplant     nephritis as a child     PE (pulmonary embolism)     after transplant     Secondary hyperparathyroidism (of renal origin)       Past Surgical History:   Procedure Laterality Date     " "APPENDECTOMY OPEN  1999     AV FISTULA OR GRAFT ARTERIAL      left     C TRANSPLANT,PREP LIVING  RENAL GRAFT  7/30/2009    Hx of nephritis     LUMPECTOMY BREAST      left       ROS:  CONSTITUTIONAL: NEGATIVE for fever, chills, change in weight  INTEGUMENTARY/SKIN: NEGATIVE for worrisome rashes, moles or lesions  EYES: NEGATIVE for vision changes or irritation  ENT: NEGATIVE for ear, mouth and throat problems  RESP: NEGATIVE for significant cough or SOB  BREAST: NEGATIVE for masses, tenderness or discharge  CV: NEGATIVE for chest pain, palpitations or peripheral edema  GI: NEGATIVE for nausea, abdominal pain, heartburn, or change in bowel habits  : NEGATIVE for unusual urinary or vaginal symptoms. No vaginal bleeding.  MUSCULOSKELETAL: NEGATIVE for significant arthralgias or myalgia  NEURO: NEGATIVE for weakness, dizziness or paresthesias  ENDOCRINE: NEGATIVE for temperature intolerance, skin/hair changes  HEME/ALLERGY/IMMUNE: NEGATIVE for bleeding problems  PSYCHIATRIC: NEGATIVE for changes in mood or affect     OBJECTIVE:   /69 (BP Location: Right arm, Patient Position: Sitting, Cuff Size: Adult Large)   Pulse 63   Temp 97.9  F (36.6  C) (Oral)   Resp 18   Ht 1.676 m (5' 6\")   Wt 90.7 kg (200 lb)   LMP 10/25/2004 (Exact Date)   SpO2 93%   Breastfeeding? No   BMI 32.28 kg/m    EXAM:  GENERAL APPEARANCE: healthy, alert and no distress  EYES: Eyes grossly normal to inspection, PERRL and conjunctivae and sclerae normal  HENT: ear canals and TM's normal, nose and mouth without ulcers or lesions, oropharynx clear and oral mucous membranes moist  NECK: no adenopathy, no asymmetry  RESP: lungs clear to auscultation - no rales, rhonchi or wheezes  BREAST: normal without masses, tenderness or nipple discharge and no palpable axillary masses or adenopathy, changes from prior breast cancer surgery noted.   CV: regular rate and rhythm, normal S1 S2, no S3 or S4, no murmur, click or rub, no peripheral edema and " peripheral pulses strong  ABDOMEN: soft, nontender, no rebound or guarding    (female): normal female external genitalia, normal urethral meatus, vaginal mucosal atrophy noted, normal cervix, adnexae, and uterus without masses or abnormal discharge  MS: no musculoskeletal defects are noted and gait is age appropriate without ataxia  SKIN: no suspicious lesions or rashes  NEURO: Normal strength and tone, sensory exam grossly normal, mentation intact and speech normal  PSYCH: mentation appears normal and affect normal/bright    Diagnostic Test Results:  Labs reviewed in Epic  Results for orders placed or performed in visit on 10/11/19   T4 free   Result Value Ref Range    T4 Free 0.94 0.76 - 1.46 ng/dL   TSH   Result Value Ref Range    TSH 6.11 (H) 0.40 - 4.00 mU/L   Lipid panel reflex to direct LDL Fasting   Result Value Ref Range    Cholesterol 177 <200 mg/dL    Triglycerides 68 <150 mg/dL    HDL Cholesterol 79 >49 mg/dL    LDL Cholesterol Calculated 84 <100 mg/dL    Non HDL Cholesterol 98 <130 mg/dL       ASSESSMENT/PLAN:   Bessy was seen today for physical.    Diagnoses and all orders for this visit:    Routine general medical examination at a health care facility  -     Lipid panel reflex to direct LDL Fasting    Screening for malignant neoplasm of cervix  -     Pap imaged thin layer screen with HPV - recommended age 30 - 65 years (select HPV order below)  -     HPV High Risk Types DNA Cervical  -Screening guidelines reviewed     Thyroid disorder  -     levothyroxine (SYNTHROID/LEVOTHROID) 112 MCG tablet; Take 1 tablet (112 mcg) by mouth daily ++NEED APPOINTMENT AND LABS FOR FURTHER REFILLS++  -     T4 free  -     TSH  -levels have been good in the past  -TSH at 6.11, will recheck in 3 months and if still abnormal then will adjust medication     Kidney replaced by transplant  -followed by Renal  -On immunosuppressants     Acute deep vein thrombosis (DVT) of distal vein of right lower extremity (H)  -      "apixaban ANTICOAGULANT (ELIQUIS ANTICOAGULANT) 5 MG tablet; TAKE 1 TABLET (5 MG) BY MOUTH 2 TIMES DAILY  -seen by Hematology  -I have provided refills     Essential hypertension  -     metoprolol tartrate (LOPRESSOR) 50 MG tablet; TAKE 1 TABLET (50 MG) BY MOUTH 2 TIMES DAILY  -at goal, continue current medication     Malignant neoplasm of central portion of left breast in female, estrogen receptor positive (H)  -Lumpectomy, radiation and chemotherapy on the left breast     Encounter for screening mammogram for breast cancer  -     MA SCREENING DIGITAL BILAT - Future  (s+30); Future    Special screening for malignant neoplasms, colon  -     Fecal colorectal cancer screen (FIT); Future    Other orders  -     C RIV4 (FLUBLOK) VACCINE RECOMBINANT DNA PRSRV ANTIBIO FREE, IM [52521]  -     VACCINE ADMINISTRATION, INITIAL        COUNSELING:   Reviewed preventive health counseling, as reflected in patient instructions       Regular exercise       Healthy diet/nutrition       Vision screening       Immunizations    Vaccinated for: Influenza          Estimated body mass index is 32.28 kg/m  as calculated from the following:    Height as of this encounter: 1.676 m (5' 6\").    Weight as of this encounter: 90.7 kg (200 lb).    Weight management plan: Discussed healthy diet and exercise guidelines     reports that she quit smoking about 21 years ago. Her smoking use included cigarettes. She has a 20.00 pack-year smoking history. She has never used smokeless tobacco.      Counseling Resources:  ATP IV Guidelines  Pooled Cohorts Equation Calculator  Breast Cancer Risk Calculator  FRAX Risk Assessment  ICSI Preventive Guidelines  Dietary Guidelines for Americans, 2010  USDA's MyPlate  ASA Prophylaxis  Lung CA Screening    Gina Dc MD MPH    [facility]  "

## 2019-10-12 DIAGNOSIS — Z12.11 SPECIAL SCREENING FOR MALIGNANT NEOPLASMS, COLON: ICD-10-CM

## 2019-10-12 LAB — HEMOCCULT STL QL IA: NEGATIVE

## 2019-10-12 PROCEDURE — 82274 ASSAY TEST FOR BLOOD FECAL: CPT | Performed by: PREVENTIVE MEDICINE

## 2019-10-14 NOTE — RESULT ENCOUNTER NOTE
Bessy,     Cholesterol is at goal for you.  Thyroid function (TSH) is elevated. I would recommend we recheck this in 3 months, lab orders are in the system, you can come in for a lab only visit. If abnormal on recheck, then will adjust thyroid medication.     Please do not hesitate to call us at [phone] if you have any questions or concerns.    Thank you,    Gina Dc MD MPH

## 2019-10-14 NOTE — RESULT ENCOUNTER NOTE
Bessy,     Stool test did not show any blood. Plan to check once a year as part of colon cancer screening.     Please do not hesitate to call us at [phone] if you have any questions or concerns.    Thank you,    Gina Dc MD MPH

## 2019-10-15 ENCOUNTER — TELEPHONE (OUTPATIENT)
Dept: FAMILY MEDICINE | Facility: CLINIC | Age: 59
End: 2019-10-15

## 2019-10-15 NOTE — TELEPHONE ENCOUNTER
Received [facility] [employer] Screening form. The provider completed her portion. Patient still has one more question to answer under, Relationship. Called and left a voicemail message for patient to call with the answer or I gave her the option to call and let us know if she would like to just  the form and mail it in. Holding on to form until patient responds.  Cindy Wright [facility]  2nd Floor  Primary Care

## 2019-10-15 NOTE — TELEPHONE ENCOUNTER
Called and spoke to the patient and asked the missing information and completed form. Faxed to Stephan, [phone], right fax confirmed at 2:36 pm today, 10/15/19. Copy to TC and abstracting. Confirmed home address and explained that the original will be mailed to her and go out in tomorrow's mail.  Cindy Wright MA  [facility]  2nd Floor  Primary Care

## 2019-10-15 NOTE — TELEPHONE ENCOUNTER
returned call    Best number to reach caller: Cell number on file:    Telephone Information:   Mobile [phone]       Is it ok to leave a detailed message: YES

## 2019-10-17 LAB
COPATH REPORT: NORMAL
PAP: NORMAL

## 2019-10-18 LAB
FINAL DIAGNOSIS: NORMAL
HPV HR 12 DNA CVX QL NAA+PROBE: NEGATIVE
HPV16 DNA SPEC QL NAA+PROBE: NEGATIVE
HPV18 DNA SPEC QL NAA+PROBE: NEGATIVE
SPECIMEN DESCRIPTION: NORMAL
SPECIMEN SOURCE CVX/VAG CYTO: NORMAL

## 2019-10-22 NOTE — RESULT ENCOUNTER NOTE
Screening frequency of every three years is recommended for this patient.  Thank you.  Gina Dc MD MPH

## 2019-11-08 ENCOUNTER — ANCILLARY PROCEDURE (OUTPATIENT)
Dept: MAMMOGRAPHY | Facility: CLINIC | Age: 59
End: 2019-11-08
Attending: PREVENTIVE MEDICINE
Payer: COMMERCIAL

## 2019-11-08 ENCOUNTER — HEALTH MAINTENANCE LETTER (OUTPATIENT)
Age: 59
End: 2019-11-08

## 2019-11-08 DIAGNOSIS — Z12.31 ENCOUNTER FOR SCREENING MAMMOGRAM FOR BREAST CANCER: ICD-10-CM

## 2019-11-08 PROCEDURE — 77067 SCR MAMMO BI INCL CAD: CPT

## 2019-11-18 ENCOUNTER — NURSE TRIAGE (OUTPATIENT)
Dept: NURSING | Facility: CLINIC | Age: 59
End: 2019-11-18

## 2019-11-19 DIAGNOSIS — E07.9 THYROID DISORDER: ICD-10-CM

## 2019-11-19 DIAGNOSIS — Z48.298 AFTERCARE FOLLOWING ORGAN TRANSPLANT: ICD-10-CM

## 2019-11-19 DIAGNOSIS — Z94.0 KIDNEY REPLACED BY TRANSPLANT: ICD-10-CM

## 2019-11-19 DIAGNOSIS — Z79.899 ENCOUNTER FOR LONG-TERM CURRENT USE OF MEDICATION: ICD-10-CM

## 2019-11-19 LAB
ANION GAP SERPL CALCULATED.3IONS-SCNC: 7 MMOL/L (ref 3–14)
BUN SERPL-MCNC: 12 MG/DL (ref 7–30)
CALCIUM SERPL-MCNC: 9.8 MG/DL (ref 8.5–10.1)
CHLORIDE SERPL-SCNC: 107 MMOL/L (ref 94–109)
CO2 SERPL-SCNC: 24 MMOL/L (ref 20–32)
CREAT SERPL-MCNC: 0.56 MG/DL (ref 0.52–1.04)
CREAT UR-MCNC: 40 MG/DL
ERYTHROCYTE [DISTWIDTH] IN BLOOD BY AUTOMATED COUNT: 13.4 % (ref 10–15)
GFR SERPL CREATININE-BSD FRML MDRD: >90 ML/MIN/{1.73_M2}
GLUCOSE SERPL-MCNC: 100 MG/DL (ref 70–99)
HCT VFR BLD AUTO: 40.4 % (ref 35–47)
HGB BLD-MCNC: 13 G/DL (ref 11.7–15.7)
MCH RBC QN AUTO: 30.2 PG (ref 26.5–33)
MCHC RBC AUTO-ENTMCNC: 32.2 G/DL (ref 31.5–36.5)
MCV RBC AUTO: 94 FL (ref 78–100)
PLATELET # BLD AUTO: 128 10E9/L (ref 150–450)
POTASSIUM SERPL-SCNC: 4.3 MMOL/L (ref 3.4–5.3)
PROT UR-MCNC: <0.05 G/L
PROT/CREAT 24H UR: NORMAL G/G CR (ref 0–0.2)
RBC # BLD AUTO: 4.3 10E12/L (ref 3.8–5.2)
SODIUM SERPL-SCNC: 138 MMOL/L (ref 133–144)
T4 FREE SERPL-MCNC: 0.94 NG/DL (ref 0.76–1.46)
TACROLIMUS BLD-MCNC: 3.6 UG/L (ref 5–15)
TME LAST DOSE: ABNORMAL H
TSH SERPL DL<=0.005 MIU/L-ACNC: 6.26 MU/L (ref 0.4–4)
WBC # BLD AUTO: 4.2 10E9/L (ref 4–11)

## 2019-11-19 PROCEDURE — 85027 COMPLETE CBC AUTOMATED: CPT | Performed by: INTERNAL MEDICINE

## 2019-11-19 PROCEDURE — 84443 ASSAY THYROID STIM HORMONE: CPT | Performed by: INTERNAL MEDICINE

## 2019-11-19 PROCEDURE — 84156 ASSAY OF PROTEIN URINE: CPT | Performed by: INTERNAL MEDICINE

## 2019-11-19 PROCEDURE — 36415 COLL VENOUS BLD VENIPUNCTURE: CPT | Performed by: INTERNAL MEDICINE

## 2019-11-19 PROCEDURE — 84439 ASSAY OF FREE THYROXINE: CPT | Performed by: INTERNAL MEDICINE

## 2019-11-19 PROCEDURE — 80048 BASIC METABOLIC PNL TOTAL CA: CPT | Performed by: INTERNAL MEDICINE

## 2019-11-19 PROCEDURE — 80197 ASSAY OF TACROLIMUS: CPT | Performed by: INTERNAL MEDICINE

## 2019-11-19 NOTE — TELEPHONE ENCOUNTER
"\"I had an episode of insomnia last night which is really weird, and I feel like my heart rate is really elevated.    Caller had HR of 133 and 90 while we are on the phone.  She has standing orders for her kidneys which include a BMP and also has an order for thyroid check.    Advised that caller be seen tonight, which she understands, but isn't certain she will go in.  Said she is feeling much better after speaking to me.  She asked to be transferred to scheduling to make a lab appointment. Warm transfer to scheduling.    Reason for Disposition    Age > 60 years (Exception: brief heart beat symptoms that went away and now feels well)    Additional Information    Negative: Passed out (i.e., lost consciousness, collapsed and was not responding)    Negative: Shock suspected (e.g., cold/pale/clammy skin, too weak to stand, low BP, rapid pulse)    Negative: Difficult to awaken or acting confused (e.g., disoriented, slurred speech)    Negative: Visible sweat on face or sweat dripping down face    Negative: Unable to walk, or can only walk with assistance (e.g., requires support)    Negative: [1] Received SHOCK from implantable cardiac defibrillator AND [2] persisting symptoms (i.e., palpitations, lightheadedness)    Negative: Sounds like a life-threatening emergency to the triager    Negative: Chest pain    Negative: Difficulty breathing    Negative: Dizziness, lightheadedness, or weakness    Negative: [1] Heart beating very rapidly (e.g., > 140 / minute) AND [2] present now  (Exception: during exercise)    Negative: Heart beating very slowly (e.g., < 50 / minute)  (Exception: athlete)    Negative: New or worsened shortness of breath with activity (dyspnea on exertion)    Negative: Patient sounds very sick or weak to the triager    Negative: [1] Heart beating very rapidly (e.g., > 140 / minute) AND [2] not present now  (Exception: during exercise)    Negative: [1] Skipped or extra beat(s) AND [2] increases with exercise " or exertion    Negative: [1] Skipped or extra beat(s) AND [2] occurs 4 or more times per minute    Negative: New or worsened ankle swelling    Negative: History of heart disease  (i.e., heart attack, bypass surgery, angina, angioplasty, CHF) (Exception: brief heart beat symptoms that went away and now feels well)    Protocols used: HEART RATE AND HEARTBEAT [id number] [place] RN  [facility]

## 2019-11-20 DIAGNOSIS — Z94.0 KIDNEY REPLACED BY TRANSPLANT: ICD-10-CM

## 2019-11-20 DIAGNOSIS — Z48.298 AFTERCARE FOLLOWING ORGAN TRANSPLANT: Primary | ICD-10-CM

## 2019-11-20 RX ORDER — TACROLIMUS 0.5 MG/1
CAPSULE ORAL
Qty: 180 CAPSULE | Refills: 3
Start: 2019-11-20 | End: 2020-06-15

## 2019-11-20 RX ORDER — TACROLIMUS 1 MG/1
3 CAPSULE ORAL 2 TIMES DAILY
Qty: 540 CAPSULE | Refills: 3 | Status: SHIPPED | OUTPATIENT
Start: 2019-11-20 | End: 2020-06-15

## 2019-11-20 NOTE — TELEPHONE ENCOUNTER
Tacrolimus = 3.6 (11/19/19)  Goal 4-6  Current tac dose 2.5 mg BID      PLAN:   Please call pt and confirm this was a good 12-hour trough. Verify dose 2.5 mg BID.   Confirm no new medications or illness (macey. Diarrhea).   If good trough, increase dose to 3 mg BID and recheck level in 1-2 weeks.  Otherwise, if not a good level, have Tac levels rechecked and make sure it is a good trough.

## 2019-11-20 NOTE — TELEPHONE ENCOUNTER
Call placed to patient. Patient confirms current dose and accurate trough level. Denies any recent illness, diarrhea or medication changes. Patient v\u to increase dose to 3 mg bid and repeat level in 1-2 weeks. Order/rx sent

## 2019-11-21 ENCOUNTER — MYC MEDICAL ADVICE (OUTPATIENT)
Dept: FAMILY MEDICINE | Facility: CLINIC | Age: 59
End: 2019-11-21

## 2019-11-21 NOTE — RESULT ENCOUNTER NOTE
Bessy,     The thyroid labs indicate that we can go up on the dose of the thyroid medication.   However, I am very concerned about the Pounding heart symptoms you have described. This can be from an irregular heart rhythm (such as supraventricular tachycardia, atrial fibrillation). I would suggest we evaluate the palpitations and pounding heart symptoms in more detail. The problem is that if you have one of these underlying heart rhythm abnormalities, then going up on the thyroid medication can actually be harmful and worsen the symptoms.    My recommendation is that we do an EKG and possibly a heart monitor (Zio patch Holter monitor) to evaluate the palpitation symptoms.   Could you also describe your symptoms in more detail so I have more information.     Please do not hesitate to call us at [phone] if you have any questions or concerns.    Thank you,    Gina Dc MD MPH

## 2019-11-22 NOTE — TELEPHONE ENCOUNTER
Called and spoke with Bessy. We discussed if the heart palpitations comes back or if she has any chest pressure, arm, pain or heart burn she needs to go to UC or ER for evaluation.   Patient agreed to this plan and will also follow up on Tuesday for her scheduled apt.     Suha Mac RN

## 2019-11-22 NOTE — TELEPHONE ENCOUNTER
Patient made an office visit appt for 11/27/19.  Is this ok to wait until then.  Cindy Wright [facility]  2nd Floor  Primary Care

## 2019-11-22 NOTE — TELEPHONE ENCOUNTER
Routing to provider to please review and advise.    Please see [employer] message below:    Description of palpitations:   I came home from work Tuesday 11/19/19 cooked dinner, ate, sat down with my family.  My heart started pounding hard and fast enough for me to notice, I was a aware but had no other symptoms so proceeded to play with my grandchild as normal for about 1/2 hour to 45 minutes.  Started to get ready for bed and the palpitations were still very loud.  I spoke to triage nurse who had me take my blood pressure, which was 163/? pulse 133.  I took it a 2nd time and it was coming down.  The palpitations stopped while I was on the phone with her.  She recommended I go to ER, but I declined and had me labs drawn next morning.  My blood pressure was 116/58, pulse 64 next morning.  Bessy Mac RN

## 2019-11-27 ENCOUNTER — OFFICE VISIT (OUTPATIENT)
Dept: FAMILY MEDICINE | Facility: CLINIC | Age: 59
End: 2019-11-27
Payer: COMMERCIAL

## 2019-11-27 VITALS
TEMPERATURE: 97.3 F | HEIGHT: 66 IN | WEIGHT: 200 LBS | RESPIRATION RATE: 16 BRPM | DIASTOLIC BLOOD PRESSURE: 79 MMHG | BODY MASS INDEX: 32.14 KG/M2 | SYSTOLIC BLOOD PRESSURE: 141 MMHG | OXYGEN SATURATION: 99 % | HEART RATE: 72 BPM

## 2019-11-27 DIAGNOSIS — E07.9 THYROID DISORDER: Primary | ICD-10-CM

## 2019-11-27 DIAGNOSIS — Z12.11 SCREEN FOR COLON CANCER: ICD-10-CM

## 2019-11-27 DIAGNOSIS — Z94.0 KIDNEY REPLACED BY TRANSPLANT: ICD-10-CM

## 2019-11-27 DIAGNOSIS — R00.2 PALPITATIONS: ICD-10-CM

## 2019-11-27 DIAGNOSIS — I10 ESSENTIAL HYPERTENSION: ICD-10-CM

## 2019-11-27 PROCEDURE — 99214 OFFICE O/P EST MOD 30 MIN: CPT | Performed by: PREVENTIVE MEDICINE

## 2019-11-27 PROCEDURE — 93000 ELECTROCARDIOGRAM COMPLETE: CPT | Performed by: PREVENTIVE MEDICINE

## 2019-11-27 RX ORDER — LEVOTHYROXINE SODIUM 125 UG/1
125 TABLET ORAL DAILY
Qty: 90 TABLET | Refills: 3 | Status: SHIPPED | OUTPATIENT
Start: 2019-11-27 | End: 2020-09-03

## 2019-11-27 ASSESSMENT — MIFFLIN-ST. JEOR: SCORE: 1488.94

## 2019-11-27 ASSESSMENT — PAIN SCALES - GENERAL: PAINLEVEL: NO PAIN (0)

## 2019-11-27 NOTE — PATIENT INSTRUCTIONS
At [facility], we strive to deliver an exceptional experience to you, every time we see you. If you receive a survey, please complete it as we do value your feedback.  If you have MyChart, you can expect to receive results automatically within 24 hours of their completion.  Your provider will send a note interpreting your results as well.   If you do not have MyChart, you should receive your results in about a week by mail.    Your care team:                            Family Medicine Internal Medicine   MD Mack Singh MD Shantel Branch-Fleming, MD Katya Georgiev PA-C Megan Hill, APRREGULO Gil, MD Pediatrics   Gregg Pal, PADoriC  Gertrude Hyde, MD Karol Vivas APRN CNP   MD Marissa Reese MD Deborah Mielke, MD Kim Thein, APRN CNP      Clinic hours: Monday - Thursday 7 am-7 pm; Fridays 7 am-5 pm.   Urgent care: Monday - Friday 11 am-9 pm; Saturday and Sunday 9 am-5 pm.  Pharmacy : Monday -Thursday 8 am-8 pm; Friday 8 am-6 pm; Saturday and Sunday 9 am-5 pm.     Clinic: [phone]   Pharmacy: [phone]

## 2019-11-27 NOTE — PROGRESS NOTES
Subjective     Bessy Spangler is a 59 year old female who presents to clinic today for the following health issues:    HPI   Hyperthyroidism Follow-up      Since last visit, patient describes the following symptoms: Weight stable, no hair loss, no skin changes, no constipation, no loose stools      How many servings of fruits and vegetables do you eat daily?  2-3    On average, how many sweetened beverages do you drink each day (Examples: soda, juice, sweet tea, etc.  Do NOT count diet or artificially sweetened beverages)?   0  How many days per week do you miss taking your medication? 1x month    What makes it hard for you to take your medications?  remembering to take     Palpitations:  -recent episode was alarming to patient, 6 pm, no triggers  -started suddenly when she was sitting  -got a drink of water, walked around, but did not get better  -lasted about 45 minutes  -pulse was 133  -history of renal transplant  -first episode  -couple of nights prior to this was not able to sleep  -first happened on 11/20/19  -no symptoms since then  -no change in caffeine intake  -has 2 cups in the morning   -no chest heaviness  -had not missed Toprol on a regular basis   -No dehydration     Hypertension:  -checking blood pressure at home  -readings have been normal at home       Patient Active Problem List   Diagnosis     Breast cancer (H)     Kidney replaced by transplant     CARDIOVASCULAR SCREENING; LDL GOAL LESS THAN 100     Immunosuppressed status (H)     Hypertension secondary to other renal disorders     Thyroid disorder     Post-menopausal     Conjunctival hemorrhage     Tear film insufficiency     Presbyopia - Both Eyes     Aftercare following organ transplant     Vestibular neuronitis of right ear     Acute deep vein thrombosis (DVT) of distal vein of right lower extremity (H)     Past Surgical History:   Procedure Laterality Date     APPENDECTOMY OPEN  1999     AV FISTULA OR GRAFT ARTERIAL      left     C  TRANSPLANT,PREP LIVING  RENAL GRAFT  2009    Hx of nephritis     LUMPECTOMY BREAST      left       Social History     Tobacco Use     Smoking status: Former Smoker     Packs/day: 1.00     Years: 20.00     Pack years: 20.00     Types: Cigarettes     Last attempt to quit: 1998     Years since quittin.9     Smokeless tobacco: Never Used   Substance Use Topics     Alcohol use: No     Alcohol/week: 0.0 standard drinks     Family History   Problem Relation Age of Onset     Diabetes Father         type 2       Arthritis Father      Glaucoma Father      Cancer Mother 58        smoker,metastatic adnoid cystic cancer,  early 60s     Arthritis Mother      Endocrine Disease Mother         thyroid     Hypertension Maternal Grandmother          at a young age     Hypertension Maternal Grandfather      Cancer Paternal Grandmother         stomach cancer     Endocrine Disease Sister         thyroid     Genitourinary Problems Daughter 32        renal failure      Endocrine Disease Sister         thyroid     Chronic Obstructive Pulmonary Disease Paternal Grandfather         smoking  young         Current Outpatient Medications   Medication Sig Dispense Refill     apixaban ANTICOAGULANT (ELIQUIS ANTICOAGULANT) 5 MG tablet TAKE 1 TABLET (5 MG) BY MOUTH 2 TIMES DAILY 180 tablet 1     levothyroxine (SYNTHROID/LEVOTHROID) 125 MCG tablet Take 1 tablet (125 mcg) by mouth daily 90 tablet 3     metoprolol tartrate (LOPRESSOR) 50 MG tablet TAKE 1 TABLET (50 MG) BY MOUTH 2 TIMES DAILY 180 tablet 1     mycophenolic acid (GENERIC EQUIVALENT) 180 MG EC tablet Take 3 tablets (540 mg) by mouth 2 times daily 540 tablet 3     tacrolimus (GENERIC EQUIVALENT) 0.5 MG capsule HOLD 180 capsule 3     tacrolimus (GENERIC EQUIVALENT) 1 MG capsule Take 3 capsules (3 mg) by mouth 2 times daily 540 capsule 3     VITAMIN D, CHOLECALCIFEROL, PO Take 1,000 Units by mouth daily       Allergies   Allergen Reactions     Dilaudid  "[Hydromorphone Hcl]      Patient unable to recall     Hydromorphone      Patient unable to recall     Sulfa Drugs Swelling     \"swelling\" - age 3       BP Readings from Last 3 Encounters:   11/27/19 (!) 141/79   10/11/19 126/69   08/22/19 109/66    Wt Readings from Last 3 Encounters:   11/27/19 90.7 kg (200 lb)   10/11/19 90.7 kg (200 lb)   08/22/19 90.7 kg (200 lb)           Reviewed and updated as needed this visit by Provider  Tobacco  Allergies  Meds  Problems  Med Hx  Surg Hx  Fam Hx         Review of Systems   ROS COMP: Constitutional, HEENT, cardiovascular, pulmonary, gi and gu systems are negative, except as otherwise noted.      Objective    BP (!) 141/79 (BP Location: Right arm, Patient Position: Chair, Cuff Size: Adult Large)   Pulse 72   Temp 97.3  F (36.3  C) (Oral)   Resp 16   Ht 1.676 m (5' 6\")   Wt 90.7 kg (200 lb)   LMP 10/25/2004 (Exact Date)   SpO2 99%   BMI 32.28 kg/m    Body mass index is 32.28 kg/m .  Physical Exam   GENERAL APPEARANCE: healthy, alert and no distress  EYES: Eyes grossly normal to inspection and conjunctivae and sclerae normal  HENT: nose and mouth without ulcers or lesions  NECK: no adenopathy and trachea midline and normal to palpation  RESP: lungs clear to auscultation - no rales, rhonchi or wheezes  CV: regular rates and rhythm, normal S1 S2, no S3 or S4 and no murmur, click or rub  ABDOMEN: soft, non-tender and no rebound or guarding   MS: extremities normal- no gross deformities noted and peripheral pulses normal  SKIN: no suspicious lesions or rashes  NEURO: Normal strength and tone, mentation intact and speech normal  PSYCH: mentation appears normal      Diagnostic Test Results:    Orders Only on 11/19/2019   Component Date Value Ref Range Status     WBC 11/19/2019 4.2  4.0 - 11.0 10e9/L Final     RBC Count 11/19/2019 4.30  3.8 - 5.2 10e12/L Final     Hemoglobin 11/19/2019 13.0  11.7 - 15.7 g/dL Final     Hematocrit 11/19/2019 40.4  35.0 - 47.0 % Final     " MCV 11/19/2019 94  78 - 100 fl Final     MCH 11/19/2019 30.2  26.5 - 33.0 pg Final     MCHC 11/19/2019 32.2  31.5 - 36.5 g/dL Final     RDW 11/19/2019 13.4  10.0 - 15.0 % Final     Platelet Count 11/19/2019 128* 150 - 450 10e9/L Final     Sodium 11/19/2019 138  133 - 144 mmol/L Final     Potassium 11/19/2019 4.3  3.4 - 5.3 mmol/L Final     Chloride 11/19/2019 107  94 - 109 mmol/L Final     Carbon Dioxide 11/19/2019 24  20 - 32 mmol/L Final     Anion Gap 11/19/2019 7  3 - 14 mmol/L Final     Glucose 11/19/2019 100* 70 - 99 mg/dL Final     Urea Nitrogen 11/19/2019 12  7 - 30 mg/dL Final     Creatinine 11/19/2019 0.56  0.52 - 1.04 mg/dL Final     GFR Estimate 11/19/2019 >90  >60 mL/min/[1.73_m2] Final    Comment: Non  GFR Calc  Starting 12/18/2018, serum creatinine based estimated GFR (eGFR) will be   calculated using the Chronic Kidney Disease Epidemiology Collaboration   (CKD-EPI) equation.       GFR Estimate If Black 11/19/2019 >90  >60 mL/min/[1.73_m2] Final    Comment:  GFR Calc  Starting 12/18/2018, serum creatinine based estimated GFR (eGFR) will be   calculated using the Chronic Kidney Disease Epidemiology Collaboration   (CKD-EPI) equation.       Calcium 11/19/2019 9.8  8.5 - 10.1 mg/dL Final     Tacrolimus Last Dose 11/19/2019 11/18/19 2000   Final     Tacrolimus Level 11/19/2019 3.6* 5.0 - 15.0 ug/L Final    Comment: Tacrolimus Reference Range  Kidney Transplant  Pediatric                      ug/L    0-3 months post transplant   10-12    3-6 months post transplant   8-10    6-12 months post transplant  6-8    >12 months post transplant   4-7  Adult    0-6 months post transplant   8-10    6-12 months post transplant  6-8    >12 months post transplant   4-6    >5 years post transplant     3-5  Heart Transplant  Pediatric    0-12 months post transplant  10-15    >12 months post transplant   5-10  Adult    0-3 months post transplant   10-15    3-6 months post transplant   8-12     6-12 months post transplant  6-12    >12 months post transplant   6-10  Lung Transplant    0-12 months post transplant  10-15    >12 months post transplant   8-12  Liver Transplant  Pediatric    0-3 months post transplant   10-15    3-6 months post transplant   8-10    >6 months post transplant    6-8  Adult    0-3 months post transplant   10-12    3-6 months post transplant   8-10    >6 months post transplant    6-8  Pancrea                           s Transplant    0-6 months post transplant   8-10    >6 months post transplant    5-8  This test was developed and its performance characteristics determined by the   [facility],  [facility]. It has   not been cleared or approved by the FDA. The laboratory is regulated under   CLIA as qualified to perform high-complexity testing. This test is used for   clinical purposes. It should not be regarded as investigational or for   research.       Protein Random Urine 11/19/2019 <0.05  g/L Final     Protein Total Urine g/gr Creatinine 11/19/2019 Unable to calculate due to low value  0 - 0.2 g/g Cr Final     TSH 11/19/2019 6.26* 0.40 - 4.00 mU/L Final     Creatinine Urine 11/19/2019 40  mg/dL Final     T4 Free 11/19/2019 0.94  0.76 - 1.46 ng/dL Final       Labs reviewed in Epic  No results found for any visits on 11/27/19.        Assessment & Plan     Bessy was seen today for thyroid problem and medication refill.    Diagnoses and all orders for this visit:    Thyroid disorder  -     levothyroxine (SYNTHROID/LEVOTHROID) 125 MCG tablet; Take 1 tablet (125 mcg) by mouth daily  -     T4 free; Future  -     TSH; Future  -History of radioactive iodine ablation  -TSH high  -increased dose of Levothyroxine to 125 mcg daily  -recheck labs in 8 weeks     Palpitations  -     EKG 12-lead complete w/read - Clinics  -no acute abnormal rhythms on EKG  -if symptoms recur then plan for Zio patch Holter monitor and possible Cardiology referral  "    Kidney replaced by transplant  -followed by Renal    Essential hypertension  -last 2 readings have been elevated  -normal at home  -will monitor blood pressure three times a day for a week and send me a log via My Chart, if high then medication will need adjustment  -will CC note to Transplant Nephrologist Dr Marquez    Screen for colon cancer  -FIT testing        BMI:   Estimated body mass index is 32.28 kg/m  as calculated from the following:    Height as of this encounter: 1.676 m (5' 6\").    Weight as of this encounter: 90.7 kg (200 lb).   Weight management plan: Discussed healthy diet and exercise guidelines        Return in about 1 week (around 12/4/2019) if symptoms worsen or fail to improve.    Gina Dc MD MPH  [facility]    "

## 2019-11-27 NOTE — Clinical Note
Bessy has had a few high blood pressure readings noted in clinic. We decided that she will maintain a log for a week and send it to me. If readings are consistently elevated, medication changes will need to be discussed. She requested that you be updated on her blood pressure.Thank you!Gina Lemus

## 2019-11-28 ENCOUNTER — MYC MEDICAL ADVICE (OUTPATIENT)
Dept: FAMILY MEDICINE | Facility: CLINIC | Age: 59
End: 2019-11-28

## 2019-12-17 DIAGNOSIS — Z48.298 AFTERCARE FOLLOWING ORGAN TRANSPLANT: ICD-10-CM

## 2019-12-17 DIAGNOSIS — E07.9 THYROID DISORDER: ICD-10-CM

## 2019-12-17 LAB
T4 FREE SERPL-MCNC: 0.95 NG/DL (ref 0.76–1.46)
TACROLIMUS BLD-MCNC: 4.4 UG/L (ref 5–15)
TME LAST DOSE: ABNORMAL H
TSH SERPL DL<=0.005 MIU/L-ACNC: 5.1 MU/L (ref 0.4–4)

## 2019-12-17 PROCEDURE — 80197 ASSAY OF TACROLIMUS: CPT | Performed by: INTERNAL MEDICINE

## 2019-12-17 PROCEDURE — 84439 ASSAY OF FREE THYROXINE: CPT | Performed by: PREVENTIVE MEDICINE

## 2019-12-17 PROCEDURE — 84443 ASSAY THYROID STIM HORMONE: CPT | Performed by: PREVENTIVE MEDICINE

## 2019-12-17 PROCEDURE — 36415 COLL VENOUS BLD VENIPUNCTURE: CPT | Performed by: PREVENTIVE MEDICINE

## 2019-12-18 NOTE — RESULT ENCOUNTER NOTE
Bessy,     Thyroid function is improving. Continue current dose of medication.We should recheck labs in 6 months.     Please do not hesitate to call us at [phone] if you have any questions or concerns.    Thank you,    Gina Dc MD MPH

## 2020-02-14 ENCOUNTER — DOCUMENTATION ONLY (OUTPATIENT)
Dept: LAB | Facility: CLINIC | Age: 60
End: 2020-02-14

## 2020-02-14 ENCOUNTER — OFFICE VISIT (OUTPATIENT)
Dept: FAMILY MEDICINE | Facility: CLINIC | Age: 60
End: 2020-02-14
Payer: COMMERCIAL

## 2020-02-14 VITALS
OXYGEN SATURATION: 96 % | WEIGHT: 201.8 LBS | RESPIRATION RATE: 18 BRPM | DIASTOLIC BLOOD PRESSURE: 73 MMHG | SYSTOLIC BLOOD PRESSURE: 116 MMHG | HEIGHT: 66 IN | BODY MASS INDEX: 32.43 KG/M2 | TEMPERATURE: 98.5 F | HEART RATE: 61 BPM

## 2020-02-14 DIAGNOSIS — R00.2 PALPITATIONS: Primary | ICD-10-CM

## 2020-02-14 DIAGNOSIS — Z48.298 AFTERCARE FOLLOWING ORGAN TRANSPLANT: ICD-10-CM

## 2020-02-14 DIAGNOSIS — Z79.899 ENCOUNTER FOR LONG-TERM CURRENT USE OF MEDICATION: ICD-10-CM

## 2020-02-14 DIAGNOSIS — E07.9 THYROID DISORDER: ICD-10-CM

## 2020-02-14 DIAGNOSIS — I10 ESSENTIAL HYPERTENSION: ICD-10-CM

## 2020-02-14 DIAGNOSIS — Z94.0 KIDNEY REPLACED BY TRANSPLANT: ICD-10-CM

## 2020-02-14 LAB
ANION GAP SERPL CALCULATED.3IONS-SCNC: 3 MMOL/L (ref 3–14)
BUN SERPL-MCNC: 15 MG/DL (ref 7–30)
CALCIUM SERPL-MCNC: 9.3 MG/DL (ref 8.5–10.1)
CHLORIDE SERPL-SCNC: 108 MMOL/L (ref 94–109)
CO2 SERPL-SCNC: 28 MMOL/L (ref 20–32)
CREAT SERPL-MCNC: 0.56 MG/DL (ref 0.52–1.04)
ERYTHROCYTE [DISTWIDTH] IN BLOOD BY AUTOMATED COUNT: 13.2 % (ref 10–15)
GFR SERPL CREATININE-BSD FRML MDRD: >90 ML/MIN/{1.73_M2}
GLUCOSE SERPL-MCNC: 103 MG/DL (ref 70–99)
HCT VFR BLD AUTO: 39.1 % (ref 35–47)
HGB BLD-MCNC: 12.5 G/DL (ref 11.7–15.7)
MCH RBC QN AUTO: 30 PG (ref 26.5–33)
MCHC RBC AUTO-ENTMCNC: 32 G/DL (ref 31.5–36.5)
MCV RBC AUTO: 94 FL (ref 78–100)
PLATELET # BLD AUTO: 136 10E9/L (ref 150–450)
POTASSIUM SERPL-SCNC: 4.1 MMOL/L (ref 3.4–5.3)
RBC # BLD AUTO: 4.16 10E12/L (ref 3.8–5.2)
SODIUM SERPL-SCNC: 139 MMOL/L (ref 133–144)
T4 FREE SERPL-MCNC: 1.1 NG/DL (ref 0.76–1.46)
TACROLIMUS BLD-MCNC: 3.9 UG/L (ref 5–15)
TME LAST DOSE: ABNORMAL H
TSH SERPL DL<=0.005 MIU/L-ACNC: 3.67 MU/L (ref 0.4–4)
WBC # BLD AUTO: 4 10E9/L (ref 4–11)

## 2020-02-14 PROCEDURE — 84443 ASSAY THYROID STIM HORMONE: CPT | Performed by: PREVENTIVE MEDICINE

## 2020-02-14 PROCEDURE — 99214 OFFICE O/P EST MOD 30 MIN: CPT | Performed by: PREVENTIVE MEDICINE

## 2020-02-14 PROCEDURE — 84439 ASSAY OF FREE THYROXINE: CPT | Performed by: PREVENTIVE MEDICINE

## 2020-02-14 PROCEDURE — 85027 COMPLETE CBC AUTOMATED: CPT | Performed by: INTERNAL MEDICINE

## 2020-02-14 PROCEDURE — 80048 BASIC METABOLIC PNL TOTAL CA: CPT | Performed by: INTERNAL MEDICINE

## 2020-02-14 PROCEDURE — 80197 ASSAY OF TACROLIMUS: CPT | Performed by: INTERNAL MEDICINE

## 2020-02-14 PROCEDURE — 36415 COLL VENOUS BLD VENIPUNCTURE: CPT | Performed by: PREVENTIVE MEDICINE

## 2020-02-14 ASSESSMENT — PAIN SCALES - GENERAL: PAINLEVEL: NO PAIN (0)

## 2020-02-14 ASSESSMENT — MIFFLIN-ST. JEOR: SCORE: 1497.11

## 2020-02-14 NOTE — PROGRESS NOTES
Patient has lab appointment Today for TSH and T4, no order please provide orders ASAP.  Blood been drawn.    Thank you!

## 2020-02-14 NOTE — RESULT ENCOUNTER NOTE
Bessy,     Thyroid function labs are in a normal range.     Please do not hesitate to call us at [phone] if you have any questions or concerns.    Thank you,    Gina Dc MD MPH

## 2020-02-14 NOTE — PROGRESS NOTES
Subjective     Bessy Spangler is a 59 year old female who presents to clinic today for the following health issues:    HPI     Follow up talk about her heart issues    Palpitations:  -episode was alarming to patient, 6 pm, no triggers, this was back in 11/19, we had done an EKG that was normal, she was reluctant to do further testing   -started suddenly when she was sitting  -got a drink of water, walked around, but did not get better  -lasted about 45 minutes  -pulse was 133  -history of renal transplant  -first episode  -couple of nights prior to this was not able to sleep  -first happened on 11/20/19  -no symptoms since then  -no change in caffeine intake  -has 2 cups in the morning   -no chest heaviness  -had not missed Toprol on a regular basis   -No dehydration   -had another episode of fast heart rate at night a few days ago again, one episode, heart rate was elevated  -has gotten a new machine and that showed possible ?atrial fibrillation   -recent change in anti rejection meds   -symptoms can last from 30 minutes to an hour  -NO chest pain       Hypertension Follow-up      Do you check your blood pressure regularly outside of the clinic? Yes     Are you following a low salt diet? Yes    Are your blood pressures ever more than 140 on the top number (systolic) OR more   than 90 on the bottom number (diastolic), for example 140/90? No    Hypothyroidism Follow-up      Since last visit, patient describes the following symptoms: Weight stable, no hair loss, no skin changes, no constipation, no loose stools    Patient Active Problem List   Diagnosis     Breast cancer (H)     Kidney replaced by transplant     CARDIOVASCULAR SCREENING; LDL GOAL LESS THAN 100     Immunosuppressed status (H)     Hypertension secondary to other renal disorders     Thyroid disorder     Post-menopausal     Conjunctival hemorrhage     Tear film insufficiency     Presbyopia - Both Eyes     Aftercare following organ transplant      Vestibular neuronitis of right ear     Acute deep vein thrombosis (DVT) of distal vein of right lower extremity (H)     Past Surgical History:   Procedure Laterality Date     APPENDECTOMY OPEN       AV FISTULA OR GRAFT ARTERIAL      left     C TRANSPLANT,PREP LIVING  RENAL GRAFT  2009    Hx of nephritis     LUMPECTOMY BREAST      left       Social History     Tobacco Use     Smoking status: Former Smoker     Packs/day: 1.00     Years: 20.00     Pack years: 20.00     Types: Cigarettes     Last attempt to quit: 1998     Years since quittin.1     Smokeless tobacco: Never Used   Substance Use Topics     Alcohol use: No     Alcohol/week: 0.0 standard drinks     Family History   Problem Relation Age of Onset     Diabetes Father         type 2       Arthritis Father      Glaucoma Father      Cancer Mother 58        smoker,metastatic adnoid cystic cancer,  early 60s     Arthritis Mother      Endocrine Disease Mother         thyroid     Hypertension Maternal Grandmother          at a young age     Kidney Disease Maternal Grandmother      Hypertension Maternal Grandfather      Cancer Paternal Grandmother         stomach cancer     Endocrine Disease Sister         thyroid     Genitourinary Problems Daughter 32        renal failure      Endocrine Disease Sister         thyroid     Chronic Obstructive Pulmonary Disease Paternal Grandfather         smoking  young     Heart Disease Maternal Half-Brother          Current Outpatient Medications   Medication Sig Dispense Refill     apixaban ANTICOAGULANT (ELIQUIS ANTICOAGULANT) 5 MG tablet TAKE 1 TABLET (5 MG) BY MOUTH 2 TIMES DAILY 180 tablet 1     levothyroxine (SYNTHROID/LEVOTHROID) 125 MCG tablet Take 1 tablet (125 mcg) by mouth daily 90 tablet 3     metoprolol tartrate (LOPRESSOR) 50 MG tablet TAKE 1 TABLET (50 MG) BY MOUTH 2 TIMES DAILY 180 tablet 1     mycophenolic acid (GENERIC EQUIVALENT) 180 MG EC tablet Take 3 tablets (540 mg) by mouth 2  "times daily 540 tablet 3     tacrolimus (GENERIC EQUIVALENT) 0.5 MG capsule HOLD 180 capsule 3     tacrolimus (GENERIC EQUIVALENT) 1 MG capsule Take 3 capsules (3 mg) by mouth 2 times daily 540 capsule 3     VITAMIN D, CHOLECALCIFEROL, PO Take 1,000 Units by mouth daily       Allergies   Allergen Reactions     Dilaudid [Hydromorphone Hcl]      Patient unable to recall     Hydromorphone      Patient unable to recall     Sulfa Drugs Swelling     \"swelling\" - age 3       BP Readings from Last 3 Encounters:   02/14/20 116/73   11/27/19 (!) 141/79   10/11/19 126/69    Wt Readings from Last 3 Encounters:   02/14/20 91.5 kg (201 lb 12.8 oz)   11/27/19 90.7 kg (200 lb)   10/11/19 90.7 kg (200 lb)           Reviewed and updated as needed this visit by Provider  Tobacco  Allergies  Meds  Problems  Med Hx  Surg Hx  Fam Hx         Review of Systems   ROS COMP: Constitutional, HEENT, cardiovascular, pulmonary, gi and gu systems are negative, except as otherwise noted.      Objective    /73 (BP Location: Left arm, Patient Position: Chair, Cuff Size: Adult Regular)   Pulse 61   Temp 98.5  F (36.9  C) (Oral)   Resp 18   Ht 1.676 m (5' 6\")   Wt 91.5 kg (201 lb 12.8 oz)   LMP 10/25/2004 (Exact Date)   SpO2 96%   Breastfeeding No   BMI 32.57 kg/m    Body mass index is 32.57 kg/m .  Physical Exam   GENERAL APPEARANCE: healthy, alert and no distress  EYES: Eyes grossly normal to inspection and conjunctivae and sclerae normal  NECK: no adenopathy and trachea midline and normal to palpation  RESP: lungs clear to auscultation - no rales, rhonchi or wheezes  CV: regular rates and rhythm, normal S1 S2, no S3 or S4 and no murmur, click or rub  ABDOMEN: soft, non-tender and no rebound or guarding   MS: extremities normal- no gross deformities noted and peripheral pulses normal, trace bilateral pedal edema   SKIN: no suspicious lesions or rashes  NEURO: Normal strength and tone, mentation intact and speech normal  PSYCH: " mentation appears normal      Diagnostic Test Results:  Labs reviewed in Epic  Results for orders placed or performed in visit on 02/14/20 (from the past 24 hour(s))   CBC with platelets   Result Value Ref Range    WBC 4.0 4.0 - 11.0 10e9/L    RBC Count 4.16 3.8 - 5.2 10e12/L    Hemoglobin 12.5 11.7 - 15.7 g/dL    Hematocrit 39.1 35.0 - 47.0 %    MCV 94 78 - 100 fl    MCH 30.0 26.5 - 33.0 pg    MCHC 32.0 31.5 - 36.5 g/dL    RDW 13.2 10.0 - 15.0 %    Platelet Count 136 (L) 150 - 450 10e9/L           Assessment & Plan     Bessy was seen today for heart problem.    Diagnoses and all orders for this visit:    Palpitations  -     Cardiac Event Monitor Adult Pediatric; Future  -     CARDIOLOGY EVAL ADULT REFERRAL; Future    Thyroid disorder  -     T4 free  -     TSH    Kidney replaced by transplant  -followed closely by Nephrology    Essential hypertension  -at goal  -continue current medication          Return in about 2 weeks (around 2/28/2020) for Routine Visit with Cardiology .    Gina Dc MD MPH    [facility]

## 2020-02-19 ENCOUNTER — ANCILLARY PROCEDURE (OUTPATIENT)
Dept: CARDIOLOGY | Facility: CLINIC | Age: 60
End: 2020-02-19
Attending: PREVENTIVE MEDICINE
Payer: COMMERCIAL

## 2020-02-19 DIAGNOSIS — R00.2 PALPITATIONS: ICD-10-CM

## 2020-02-19 PROCEDURE — 93272 ECG/REVIEW INTERPRET ONLY: CPT | Performed by: INTERNAL MEDICINE

## 2020-02-19 PROCEDURE — 93270 REMOTE 30 DAY ECG REV/REPORT: CPT | Performed by: INTERNAL MEDICINE

## 2020-02-19 NOTE — PATIENT INSTRUCTIONS
The patient was educated on the purpose and function of a cardiac event monitor as well as when and where to return the monitor.  After the patient demonstrated an understanding, monitor s/n [id number] was applied to the patient.

## 2020-05-04 DIAGNOSIS — I82.4Z1 ACUTE DEEP VEIN THROMBOSIS (DVT) OF DISTAL VEIN OF RIGHT LOWER EXTREMITY (H): ICD-10-CM

## 2020-05-06 RX ORDER — APIXABAN 5 MG/1
TABLET, FILM COATED ORAL
Qty: 180 TABLET | Refills: 1 | Status: SHIPPED | OUTPATIENT
Start: 2020-05-06 | End: 2020-11-03

## 2020-05-06 NOTE — TELEPHONE ENCOUNTER
Routing refill request to provider for review/approval because:  Labs not current:  PLT    Celena Ceja RN, [facility] Triage

## 2020-05-18 ENCOUNTER — VIRTUAL VISIT (OUTPATIENT)
Dept: CARDIOLOGY | Facility: CLINIC | Age: 60
End: 2020-05-18
Payer: COMMERCIAL

## 2020-05-18 ENCOUNTER — ANCILLARY PROCEDURE (OUTPATIENT)
Dept: CARDIOLOGY | Facility: CLINIC | Age: 60
End: 2020-05-18
Attending: INTERNAL MEDICINE
Payer: COMMERCIAL

## 2020-05-18 DIAGNOSIS — R00.2 PALPITATIONS: ICD-10-CM

## 2020-05-18 PROCEDURE — 93272 ECG/REVIEW INTERPRET ONLY: CPT | Performed by: INTERNAL MEDICINE

## 2020-05-18 PROCEDURE — 93270 REMOTE 30 DAY ECG REV/REPORT: CPT | Performed by: INTERNAL MEDICINE

## 2020-05-18 PROCEDURE — 99204 OFFICE O/P NEW MOD 45 MIN: CPT | Mod: GT | Performed by: INTERNAL MEDICINE

## 2020-05-18 NOTE — PATIENT INSTRUCTIONS
The patient was educated on the purpose and function of a cardiac event monitor as well as when and where to return the monitor.  After the patient demonstrated an understanding, the patient was home enrolled.

## 2020-05-18 NOTE — PROGRESS NOTES
"[facility] CONSULTATION VIDEO ENCOUNTER    Referring Provider:  Gina Dc  Primary Care Provider:   Mike Tan  Indication for Consultation:  Palpitations    Bessy Spangler is a 60 year old female who is being evaluated via a billable video visit.      The patient has been notified of following:   \"This video visit will be conducted via a call between you and your physician/provider. We have found that certain health care needs can be provided without the need for an in-person physical exam.  This service lets us provide the care you need with a video conversation.  If a prescription is necessary we can send it directly to your pharmacy.  If lab work is needed we can place an order for that and you can then stop by our lab to have the test done at a later time. Video visits are billed at different rates depending on your insurance coverage.  Please reach out to your insurance provider with any questions. If during the course of the call the physician/provider feels a video visit is not appropriate, you will not be charged for this service.\"    Patient has given verbal consent for Video visit? Yes    Video-Visit Details  Type of service:  Video Visit  Video start time: 8:12 AM  Video end time: 8:55 AM  Total video time: 43 min    Originating Location (pt. Location): Home  Distant Location (provider location):  Provider's home  Mode of Communication: Video Conference via [email or web address]    HPI: Bessy Spangler is a 60 year old female being seen today for evaluation of palpitations.   The patient's risk factor profile is: (+) HTN, (-) DM, (-) hypercholesterolemia, (+) prior 20 pack-year tobacco use (quit 25 yrs ago), (-) fam Hx premature CAD.  The patient has Hx ESRD with 1 year of HD prior to renal transplantation in July 2007.  The patient has no history of cardiovascular disease (CAD, CHF, arrhythmia, valvular heart disease).  The patient has no Hx of PAD or cerebrovascular disease.  " "The patient had normal ECHO and normal dobutamine ECHO in 2007 prior to consideration of renal transplantation.  The patient has not undergone prior cardiac catheterization, or EP study.   The patient was seen by her PCP, Dr. Dc, in Nov 2019 at which time she described palpitations lasting up to 45 minutes with rapid onset.  She recorded pulse 133 bpm. She has had episodes, on average, once a month.  She had no associated cardiopulmonary symptoms.  Specifically, she denies associated lightheadedness, and syncope.  The patient subsequently had her Synthroid increased and believes she has had more frequent palpitations. Her last episode occurred on 5/11/20 at which time her /74 and her pulse was 131.  She was symptomatic at that time.    She had a Biotelemetry patch placed in Feb 2020 for 14 days.  She had no symptoms while wearing the monitor.  She accidentally pushed the button on one occasion and the report showed NSR at that time.    The patient denies a history of chest discomfort, dyspnea, PND, orthopnea, or pedal edema.    The patient was diagnosed with a spontaneous RLE DVT several years ago and has been continued on Eliquis.      The following information was obtained from EMR via Dr. Pacheco:    \"She has Hx of grade 2 Infiltrating ductal carcinoma of the left breast diagnosed March of 2002, 1.1 cm, tumor stage T1c, N0, stage 1, ER/IN positive, and HER-2 3+ positive, treated with lumpectomy followed by 4 cycles of Adriamycin and cyclophosphamide followed by radiation. She was on on tamoxifen from July 2002 until June 2005. At that point it was changed to exemestane. In May 2008 patient elected to stop therapy. . . .  She then had PE in September 2009 in the post transplant setting. Factor V Leidin and Prothrombin gene mutations were negative.\"    In 2018 she \" developed a deep venous thrombosis along the right distal superficial femoral, popliteal, peroneal and posterior tibial veins, when she presented " "with a couple of days of right leg pain and swelling. . .  No local trauma apart from a sprained ankle in December 2017 when she was wearing a boot for a week but she was not bedbound at that time and she was able to walk even at that time she developed vertigo. Had a fall around end of May 2018 when she broke her left humerus.  She was started on Apixaban and was tolerating it well.  We did further thrombophilia workup and Protein C/protein S levels were normal.  Antithrombin III was also unremarkable.  Anti-beta-2 glycoprotein antibodies and anticardiolipin antibodies were negative.  Because of unprovoked nature of the blood clot we decided on indefinite anticoagulation.  She was continued on apixaban. She had a repeat ultrasound done improved clot burden and now nonocclusive thrombus in the right popliteal vein is seen.\"     She is compliant with her medications.   She has not had bleeding symptoms.    PAST MEDICAL HISTORY:  Past Medical History:   Diagnosis Date     Anemia of chronic renal failure      Breast cancer (H)      Chronic diarrhea      Chronic kidney disease (CKD), stage V (H)     Due to \"nephritis\". Biopsy done years ago. I do not know the histologic specifics     High risk medication use      Hyperphosphatemia      Hypertension      Hypothyroid      Immunosuppressed status (H)      Kidney replaced by transplant     nephritis as a child     PE (pulmonary embolism)     after transplant     Secondary hyperparathyroidism (of renal origin)        CURRENT MEDICATIONS:  Current Outpatient Medications   Medication Sig     ELIQUIS ANTICOAGULANT 5 MG tablet TAKE 1 TABLET BY MOUTH TWICE A DAY     levothyroxine (SYNTHROID/LEVOTHROID) 125 MCG tablet Take 1 tablet (125 mcg) by mouth daily     metoprolol tartrate (LOPRESSOR) 50 MG tablet TAKE 1 TABLET (50 MG) BY MOUTH 2 TIMES DAILY     mycophenolic acid (GENERIC EQUIVALENT) 180 MG EC tablet Take 3 tablets (540 mg) by mouth 2 times daily     tacrolimus (GENERIC " EQUIVALENT) 0.5 MG capsule HOLD     tacrolimus (GENERIC EQUIVALENT) 1 MG capsule Take 3 capsules (3 mg) by mouth 2 times daily     VITAMIN D, CHOLECALCIFEROL, PO Take 1,000 Units by mouth daily     No current facility-administered medications for this visit.        PAST SURGICAL HISTORY:  Past Surgical History:   Procedure Laterality Date     APPENDECTOMY OPEN       AV FISTULA OR GRAFT ARTERIAL      left     C TRANSPLANT,PREP LIVING  RENAL GRAFT  2009    Hx of nephritis     LUMPECTOMY BREAST      left       ALLERGIES  Dilaudid [hydromorphone hcl]; Hydromorphone; and Sulfa drugs    FAMILY HX:  Family History   Problem Relation Age of Onset     Diabetes Father         type 2       Arthritis Father      Glaucoma Father      Cancer Mother 58        smoker,metastatic adnoid cystic cancer,  early 60s     Arthritis Mother      Endocrine Disease Mother         thyroid     Hypertension Maternal Grandmother          at a young age     Kidney Disease Maternal Grandmother      Hypertension Maternal Grandfather      Cancer Paternal Grandmother         stomach cancer     Endocrine Disease Sister         thyroid     Genitourinary Problems Daughter 32        renal failure      Endocrine Disease Sister         thyroid     Chronic Obstructive Pulmonary Disease Paternal Grandfather         smoking  young     Heart Disease Maternal Half-Brother        SOCIAL HX:  Social History     Socioeconomic History     Marital status: Single     Spouse name: Not on file     Number of children: Not on file     Years of education: Not on file     Highest education level: Not on file   Occupational History     Not on file   Social Needs     Financial resource strain: Not on file     Food insecurity     Worry: Not on file     Inability: Not on file     Transportation needs     Medical: Not on file     Non-medical: Not on file   Tobacco Use     Smoking status: Former Smoker     Packs/day: 1.00     Years: 20.00     Pack years:  20.00     Types: Cigarettes     Last attempt to quit: 1998     Years since quittin.3     Smokeless tobacco: Never Used   Substance and Sexual Activity     Alcohol use: No     Alcohol/week: 0.0 standard drinks     Drug use: No     Sexual activity: Not Currently   Lifestyle     Physical activity     Days per week: Not on file     Minutes per session: Not on file     Stress: Not on file   Relationships     Social connections     Talks on phone: Not on file     Gets together: Not on file     Attends Voodoo service: Not on file     Active member of club or organization: Not on file     Attends meetings of clubs or organizations: Not on file     Relationship status: Not on file     Intimate partner violence     Fear of current or ex partner: Not on file     Emotionally abused: Not on file     Physically abused: Not on file     Forced sexual activity: Not on file   Other Topics Concern     Not on file   Social History Narrative    , 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  degree    Sal ,Betty  ( On dialysis), Vincenzo Alvares     Grandchildren Torri 2010, Krystina 10/2007 lives with her and Betty, son and family. Jeremías 2014    2 additional grandchildren will be born soon       ROS:  Constitutional: No fever, chills, or sweats. No weight gain/loss.   HEENT: No visual disturbance, ear ache, epistaxis, sore throat.   Allergies/Immunologic: Negative.   Respiratory: No cough, hemoptysis.   Cardiovascular: As per HPI.   GI: No nausea, vomiting, hematemesis, melena, or hematochezia.   : No urinary frequency, dysuria, or hematuria.   Integument: No rash.   Psychiatric: No anxiety / depression.   Neuro: No speech disturbance, focal sensory or motor deficit.   Endocrinology: No polyuria / polyphagia.   Musculoskeletal: No myalgia.    VITAL SIGNS:  LMP 10/25/2004 (Exact Date)   There is no height or weight on file to calculate BMI.  Wt Readings from Last 2 Encounters:    20 91.5 kg (201 lb 12.8 oz)   19 90.7 kg (200 lb)       PHYSICAL EXAM  GENERAL: Healthy, alert and no distress  EYES: Eyes grossly normal to inspection.  No discharge or erythema, or obvious scleral/conjunctival abnormalities.  RESP: No audible wheeze, cough, or visible cyanosis.  No visible retractions or increased work of breathing.    SKIN: Visible skin clear. No significant rash, abnormal pigmentation or lesions.  NEURO: Cranial nerves grossly intact.  Mentation and speech appropriate for age.  PSYCH: Mentation appears normal, affect normal/bright, judgement and insight intact, normal speech and appearance well-groomed.  .    LABS  Recent Labs   Lab Test 20  0802 19  0753   WBC 4.0 4.2   HGB 12.5 13.0   HCT 39.1 40.4   * 128*     Recent Labs   Lab Test 20  0802 19  0753    138   POTASSIUM 4.1 4.3   CHLORIDE 108 107   CO2 28 24   * 100*   BUN 15 12   CR 0.56 0.56   MARIA LUISA 9.3 9.8     Recent Labs   Lab Test 10/11/19  1006 18  0746 11/13/15  0753 10/28/14  0749   CHOL 177 166 181 224*   HDL 79 59 68 59   LDL 84 90 92 145*   TRIG 68 86 103 98   CHOLHDLRATIO  --   --  2.7 3.8   NHDL 98 107  --   --         EK2019  NSR.  Normal ECG.    ECHO: 2007  Borderline left atrial enlargement.   Ejection Fraction = >55%.   No regional wall motion abnormalities noted.   Although RV systolic pressure cannot be directly measured, there are no direct findings to suggest the prosence of moderate or severe pulmonary HTN such as RV overload or abnormal septal motion.     CARDIAC MRI: None    CORONARY CTA: None    DOBUTAMINE ECHO:  2007  Normal dobutamine echo without infarct or ischemia at adquate rate-pressure product  There is borderline concentric left ventricular hypertrophy.     CARDIAC CATH: None    ASSESSMENT AND PLAN:   1. Palpitations  -- Infrequent, without impairment in functional capacity  -- Initial monitor for 14 days did not identify  arrhythmia  -- Repeat Biotelemetry cardiac event monitor  -- If this fails to identify arrhythmia and patient continues to have symptoms, I would consider implantable cardiac monitor  -- Defer on medication  -- Ironically, if patient was experiencing PAF, she is already anticoagulated.    2. Thromboembolic Disorder, s/p PE (2009), s/p RLE DVT (2018)  -- Both events unprovoked  -- Remote Hx of CA  -- Continue anticoagulation (Eliquis) indefinitely.      FOLLOW UP: 2 months      ADDENDUM (2020): Patient experienced recurrent symptoms.   Telemetry looks like PAF. Patient already anticoagulated.  Check ECG to ensure QT interval normal. Start Sotalol 40 mg BID and monitor for recurrent symptoms.  Keep Biotelemetry patch on if possible.  If breakthrough symptoms, recheck ECG and then plan to increase to 80 mg BID if QTc not prolonged.  If PAF worsens, refer to EP.    EC2020  Sinus bradycardia  Otherwise normal ECG      Russell Cisse MD    Divisions of Cardiology  [place], [place]    CC  Patient Care Team:  Mike Tan MD as PCP - General (Family Practice)  Chris Marquez MD as MD (Nephrology)  Gina Swanson MD as Assigned PCP  GINA SWANSON

## 2020-05-18 NOTE — PATIENT INSTRUCTIONS
It was a pleasure to see you in the cardiology clinic today.    If you have any questions, you can reach my nurse, Amanda Beckford, at [phone].     Note the new medications: None  Stop the following medications: None    The results from today include: None    Tests ordered today: 30 Day cardiac event monitor (Repeat Cardionet monitor)    I would like you to follow up with Dr. Cisse in 2 months after monitor has been placed..    Sincerely,      Russell Cisse MD     [facility]

## 2020-05-22 ENCOUNTER — VIRTUAL VISIT (OUTPATIENT)
Dept: CONSULT | Facility: CLINIC | Age: 60
End: 2020-05-22
Attending: GENETIC COUNSELOR, MS
Payer: COMMERCIAL

## 2020-05-22 ENCOUNTER — MYC MEDICAL ADVICE (OUTPATIENT)
Dept: FAMILY MEDICINE | Facility: CLINIC | Age: 60
End: 2020-05-22

## 2020-05-22 ENCOUNTER — TELEPHONE (OUTPATIENT)
Dept: TRANSPLANT | Facility: CLINIC | Age: 60
End: 2020-05-22

## 2020-05-22 DIAGNOSIS — Z94.0 KIDNEY REPLACED BY TRANSPLANT: Primary | ICD-10-CM

## 2020-05-22 DIAGNOSIS — Z84.81 FAMILY HISTORY OF GENETIC DISEASE CARRIER: ICD-10-CM

## 2020-05-22 LAB — MISCELLANEOUS TEST: NORMAL

## 2020-05-22 PROCEDURE — 96040 ZZH GENETIC COUNSELING, EACH 30 MINUTES: CPT | Mod: TEL,ZF | Performed by: GENETIC COUNSELOR, MS

## 2020-05-22 NOTE — PROGRESS NOTES
Name: Bessy Spangler   : 1960  MRN: [id number]  Date of visit: 2020    Presenting information:   Bessy Spangler was seen for a genetic counseling appointment to discuss family variant testing for Alport syndrome.      Pertinent Medical History:   Bessy is a 60 year old female with a family history of Alport syndrome.      Bessy has a history of kidney failure diagnosed at ~48y s/p renal transplant ~49y (donor is apparently unaffected son). She reports having hypertension. She was diagnosed with chronic nephritis at 4yo and had a kidney biopsy at 4yo, but the results are unknown. She remembers having urine tests as a child that showed blood and protein in the urine. She had an audiology evaluation 2-3 years ago that was normal and does not endorse any hearing concerns. No ocular concerns (wears readers).     She also has a history for heart palpitations. She did not have any episodes to record during her first 30 day heart monitor. She is currently undergoing another 30 day heart monitor evaluation. Past EKGs have been normal. She thins she had a normal echo when she was evaluated for transplant. She was diagnosed with breast cancer at ~39 or 40 years old. She had considered genetic testing but was nervous about how it may impact her insurance coverage and cancelled her genetic counseling appointments.      Family History:  Relevant portions are described below:       Daughter with kidney failure diagnosed at 30y, on dialysis for past 6 years. She had dark urine as a child, multiple episodes of hematuria, and preeclampsia during pregnancy. She has not had a kidney biopsy. Genetic testing for Alport syndrome testing is pending.     One son with apparently no kidney symptoms that donated a kidney to her. Another son who has not had any nephrology work up.     Grandson with Alport syndrome, genetically confirmed. COL4A5 c.136G>T (p.Glu46*). He has ESRD and hearing loss.     Discussion:   Bessy wishes  to pursue genetic testing for Alport syndrome. We reviewed that we would only be testing for the specific variant found in her grandson Jonathan (COL4A5 c.136G>T (p.Glu46*)). We are not sequencing/analyzing the whole gene and are not testing any other genes. We reviewed possible results including positive, and negative as well as the implications of each of these results for Bessy and her children. I obtained consent, testing was ordered, and a saliva kit will be mailed to the patient's house. The sample collection and test is free of charge for Bessy, but she will be billed for today's appointment.      We reviewed that Alport syndrome is a genetic disorder characterized by kidney disease, hearing loss and eye abnormalities.  About two thirds of cases of Alport syndrome exhibit X-linked inheritance and are caused by mutations in the COL4A5 gene.  Conditions that exhibit X-linked inheritance are passed on to males through carrier females.  Males who inherit a mutation in COL4A5 will be affected as they only have one copy of this gene (only one X chromosome).  It is thought that all males with X-linked Alport syndrome develop progressive kidney disease, most develop hearing loss which may be progressive, and many develop eye abnormalities.  Females who have a mutation in one copy of COL4A5 are called carriers because they still have one working copy of COL4A5 (they have two X chromosomes).  Most female carriers of X-linked Alport syndrome present with microscopic amounts of blood in their urine (microscopic hematuria), proteinuria, and some women have more severe symptoms like renal failure.      Reviewed X linked inheritance. Females who have a pathogenic variant in COL4A5 have a 50% chance to pass on the variant to their child with each pregnancy. Sons who inherit the variant from their mother will have Alport syndrome, and daughters who inherit the variant from their mother can have a range of features (see  above). Males have a 50% chance of passing on their X chromosome with the variant, and a 50% chance of passing on their Y chromosome without the variant. Thus, males who have a pathogenic variant in COL4A5 cannot pass the variant to a son because it is inherited through the X chromosome, but all their daughters will inherit the variant can have a range of features (see above).     Also reviewed information on Genetic Information Nondiscrimination Act (YVETTE) related to genetic testing for her history of breast cancer in her 40s. Recommended her children and siblings notify their doctors of this family history so proper screening can be completed if Bessy chooses not to pursue genetic testing. Offered to provide information for cancer genetic counseling. Clarified that we would not be doing any cancer-related genetic testing today.     Plan:  1. Family variant testing through [email or web address] was ordered through the Friday portal and an order was placed in [employer] so results can be entered in our system once resulted.  2. I will call with results whether positive or negative. Return and follow up pending results of above testing  3. Contact information was provided should any questions arise in the future.       Janell Garcia MS, [facility]  Licensed & Certified Genetic Counselor   [facility]  Phone: [phone]  Fax: [phone]       Approximate Time Spent in Consultation: 30 minutes      CC: no letter

## 2020-05-22 NOTE — PROGRESS NOTES
"Bessy Spangler is a 60 year old female who is being evaluated via a billable telephone visit.      The patient has been notified of following:     \"This telephone visit will be conducted via a call between you and your physician/provider. We have found that certain health care needs can be provided without the need for a physical exam.  This service lets us provide the care you need with a short phone conversation.  If a prescription is necessary we can send it directly to your pharmacy.  If lab work is needed we can place an order for that and you can then stop by our lab to have the test done at a later time.    Telephone visits are billed at different rates depending on your insurance coverage. During this emergency period, for some insurers they may be billed the same as an in-person visit.  Please reach out to your insurance provider with any questions.    If during the course of the call the physician/provider feels a telephone visit is not appropriate, you will not be charged for this service.\"    Patient has given verbal consent for Telephone visit?  Yes    What phone number would you like to be contacted at? [phone]    How would you like to obtain your AVS? Shilpi Fuentes M.A.    Phone call duration: 30 minutes        "

## 2020-05-22 NOTE — TELEPHONE ENCOUNTER
Patient has had virtual visit with cardiologist on 05/18/2020.     Routing to provider to advise  if an office visit or virtual visit would be recommended to discuss BP medications with patient.     Latesha Dc,  My blood pressure has been sporadically running higher than I like:  ranging from 140's to 150's over 60-70.  It seams to be lower in the mornings 120's over 60's.    The palpitations are happening once or twice a month and lasting longer, so I did have a video consult with a cardiologist on 5/18/2020 and I started wearing the heart monitor 5/21/2020 and will wear for 30 days.  I did also put a call in to my transplant coordinator today because I was not sure who could adjust my blood pressure meds.  I left a message and have not heard back yet.  Thank you,  Bessy Fry, RN  [facility]

## 2020-05-22 NOTE — TELEPHONE ENCOUNTER
Voicemail  Date/Time: 5/22/20 at 8:44 am  Reason for call: has some concerns of high BP readings no details were left.

## 2020-05-22 NOTE — TELEPHONE ENCOUNTER
[place] message sent to patient.\  Suha Fry RN  [facility]

## 2020-05-22 NOTE — LETTER
Date:May 26, 2020      Provider requested that no letter be sent. Do not send.       [facility] Health Information

## 2020-05-22 NOTE — LETTER
2020      RE: Bessy Spangler  [address]  [facility] [address]       Name: Bessy Spangler   : 1960  MRN: [id number]  Date of visit: 2020    Presenting information:   Bessy Spangler was seen for a genetic counseling appointment to discuss family variant testing for Alport syndrome.      Pertinent Medical History:   Bessy is a 60 year old female with a family history of Alport syndrome.      Bessy has a history of kidney failure diagnosed at ~48y s/p renal transplant ~49y (donor is apparently unaffected son). She reports having hypertension. She was diagnosed with chronic nephritis at 4yo and had a kidney biopsy at 4yo, but the results are unknown. She remembers having urine tests as a child that showed blood and protein in the urine. She had an audiology evaluation 2-3 years ago that was normal and does not endorse any hearing concerns. No ocular concerns (wears readers).     She also has a history for heart palpitations. She did not have any episodes to record during her first 30 day heart monitor. She is currently undergoing another 30 day heart monitor evaluation. Past EKGs have been normal. She thins she had a normal echo when she was evaluated for transplant. She was diagnosed with breast cancer at ~39 or 40 years old. She had considered genetic testing but was nervous about how it may impact her insurance coverage and cancelled her genetic counseling appointments.      Family History:  Relevant portions are described below:       Daughter with kidney failure diagnosed at 30y, on dialysis for past 6 years. She had dark urine as a child, multiple episodes of hematuria, and preeclampsia during pregnancy. She has not had a kidney biopsy. Genetic testing for Alport syndrome testing is pending.     One son with apparently no kidney symptoms that donated a kidney to her. Another son who has not had any nephrology work up.     Grandson with Alport syndrome, genetically confirmed.  COL4A5 c.136G>T (p.Glu46*). He has ESRD and hearing loss.     Discussion:   Bessy wishes to pursue genetic testing for Alport syndrome. We reviewed that we would only be testing for the specific variant found in her grandson Jonathan (COL4A5 c.136G>T (p.Glu46*)). We are not sequencing/analyzing the whole gene and are not testing any other genes. We reviewed possible results including positive, and negative as well as the implications of each of these results for Bessy and her children. I obtained consent, testing was ordered, and a saliva kit will be mailed to the patient's house. The sample collection and test is free of charge for Bessy, but she will be billed for today's appointment.      We reviewed that Alport syndrome is a genetic disorder characterized by kidney disease, hearing loss and eye abnormalities.  About two thirds of cases of Alport syndrome exhibit X-linked inheritance and are caused by mutations in the COL4A5 gene.  Conditions that exhibit X-linked inheritance are passed on to males through carrier females.  Males who inherit a mutation in COL4A5 will be affected as they only have one copy of this gene (only one X chromosome).  It is thought that all males with X-linked Alport syndrome develop progressive kidney disease, most develop hearing loss which may be progressive, and many develop eye abnormalities.  Females who have a mutation in one copy of COL4A5 are called carriers because they still have one working copy of COL4A5 (they have two X chromosomes).  Most female carriers of X-linked Alport syndrome present with microscopic amounts of blood in their urine (microscopic hematuria), proteinuria, and some women have more severe symptoms like renal failure.      Reviewed X linked inheritance. Females who have a pathogenic variant in COL4A5 have a 50% chance to pass on the variant to their child with each pregnancy. Sons who inherit the variant from their mother will have Alport syndrome, and  "daughters who inherit the variant from their mother can have a range of features (see above). Males have a 50% chance of passing on their X chromosome with the variant, and a 50% chance of passing on their Y chromosome without the variant. Thus, males who have a pathogenic variant in COL4A5 cannot pass the variant to a son because it is inherited through the X chromosome, but all their daughters will inherit the variant can have a range of features (see above).     Also reviewed information on Genetic Information Nondiscrimination Act (YVETTE) related to genetic testing for her history of breast cancer in her 40s. Recommended her children and siblings notify their doctors of this family history so proper screening can be completed if Bessy chooses not to pursue genetic testing. Offered to provide information for cancer genetic counseling. Clarified that we would not be doing any cancer-related genetic testing today.     Plan:  1. Family variant testing through [employer] was ordered through the [employer] portal and an order was placed in [employer] so results can be entered in our system once resulted.  2. I will call with results whether positive or negative. Return and follow up pending results of above testing  3. Contact information was provided should any questions arise in the future.       Janell Garcia MS, [facility]  Licensed & Certified Genetic Counselor   [facility]  Phone: [phone]  Fax: [phone]       Approximate Time Spent in Consultation: 30 minutes      CC: no letter                Bessy Spangler is a 60 year old female who is being evaluated via a billable telephone visit.      The patient has been notified of following:     \"This telephone visit will be conducted via a call between you and your physician/provider. We have found that certain health care needs can be provided without the need for a physical exam.  This service lets us provide the care you need with a " "short phone conversation.  If a prescription is necessary we can send it directly to your pharmacy.  If lab work is needed we can place an order for that and you can then stop by our lab to have the test done at a later time.    Telephone visits are billed at different rates depending on your insurance coverage. During this emergency period, for some insurers they may be billed the same as an in-person visit.  Please reach out to your insurance provider with any questions.    If during the course of the call the physician/provider feels a telephone visit is not appropriate, you will not be charged for this service.\"    Patient has given verbal consent for Telephone visit?  Yes    What phone number would you like to be contacted at? [phone]    How would you like to obtain your AVS? Shilpi Fuentes M.A.    Phone call duration: 30 minutes          Janell Garcia GC  "

## 2020-05-22 NOTE — TELEPHONE ENCOUNTER
If patient has not heard back from her Transplant team by Monday, then please schedule a Virtual visit with me so we can discuss medication options.    Thank you,    Gina Dc MD MPH

## 2020-05-26 NOTE — TELEPHONE ENCOUNTER
Spoke to patient. She reports BP to be around 119-140/70's. ,131 on the higher BP readings. She reports no chest pain, no shortness of breath, no fatigue. She reports to be currently taking metoprolol lopressor 50 mg BID. She also reports to be currently wearing biotelemetry for 30 days, started last week per Cardiology regarding palpitations. Will send to Dr. Maruqez for update.  Jeri Ha LPN  Nephrology  [phone]

## 2020-05-28 NOTE — TELEPHONE ENCOUNTER
Reviewed with Dr. Marquez, below are his recommendations.  Patient reports that she hasnt had any episodes and will follow up with Cardiologist.    I would probably recommend increasing metoprolol to 75 mg bid, but she should check with her Cardiologist to see if they want any changes in the middle of her doing this test.     Jeri Ha LPN  Nephrology  [phone]

## 2020-06-02 ENCOUNTER — TELEPHONE (OUTPATIENT)
Dept: CARDIOLOGY | Facility: CLINIC | Age: 60
End: 2020-06-02

## 2020-06-02 DIAGNOSIS — I48.0 PAF (PAROXYSMAL ATRIAL FIBRILLATION) (H): Primary | ICD-10-CM

## 2020-06-03 NOTE — TELEPHONE ENCOUNTER
ADDENDUM (6/2/2020): Patient experienced recurrent symptoms.   Telemetry looks like PAF. Patient already anticoagulated.  Check ECG to ensure QT interval normal. Start Sotalol 40 mg BID and monitor for recurrent symptoms.  Keep Biotelemetry patch on if possible.  If breakthrough symptoms, recheck ECG and then plan to increase to 80 mg BID if QTc not prolonged.  If PAF worsens, refer to EP.     Called and left message for patient asking her to call back to receive recommendations from Dr Tanna Singleton, RN

## 2020-06-03 NOTE — TELEPHONE ENCOUNTER
Called and spoke to patient. Reviewed Dr Cisse's recommendations. She would like to be sure it is ok to tae the Sotalol with her kidney transplant, will send this question to Dr Cisse. She will come to [place] and have her EKG done after we get Dr Cisse's response regarding the Sotalol. If EKG normal, will send prescription in for her. Briefly discussed atrial fib.   WESTLEY Singleton     This office note has been dictated.

## 2020-06-05 ENCOUNTER — TELEPHONE (OUTPATIENT)
Dept: CARDIOLOGY | Facility: CLINIC | Age: 60
End: 2020-06-05

## 2020-06-05 DIAGNOSIS — I10 ESSENTIAL HYPERTENSION: ICD-10-CM

## 2020-06-05 NOTE — TELEPHONE ENCOUNTER
Russell Cisse MD Balcome, Debbie A, RN; P [facility] Cardiology Adult [place]    Caller: Unspecified (3 days ago,  4:20 PM)               Amanda,     That should not be an issue.  She is s/p renal transplant with a Cr 0.56 mg/dl.  Thanks for checking.      Called and spoke to patient and given above response. Will have scheduling call to set up an Ekg at [facility]   WESTLEY Singleton

## 2020-06-08 RX ORDER — METOPROLOL TARTRATE 50 MG
TABLET ORAL
Qty: 180 TABLET | Refills: 1 | Status: SHIPPED | OUTPATIENT
Start: 2020-06-08 | End: 2020-12-08

## 2020-06-08 NOTE — TELEPHONE ENCOUNTER
Prescription approved per [facility] Refill Protocol.  Juliann Spain RN  [facility]

## 2020-06-09 DIAGNOSIS — I48.0 PAF (PAROXYSMAL ATRIAL FIBRILLATION) (H): ICD-10-CM

## 2020-06-09 LAB — INTERPRETATION ECG - MUSE: NORMAL

## 2020-06-12 DIAGNOSIS — E07.9 THYROID DISORDER: Primary | ICD-10-CM

## 2020-06-12 DIAGNOSIS — Z94.0 KIDNEY REPLACED BY TRANSPLANT: ICD-10-CM

## 2020-06-12 DIAGNOSIS — Z48.298 AFTERCARE FOLLOWING ORGAN TRANSPLANT: ICD-10-CM

## 2020-06-12 DIAGNOSIS — E07.9 THYROID DISORDER: ICD-10-CM

## 2020-06-12 DIAGNOSIS — Z79.899 ENCOUNTER FOR LONG-TERM CURRENT USE OF MEDICATION: ICD-10-CM

## 2020-06-12 LAB
ANION GAP SERPL CALCULATED.3IONS-SCNC: 4 MMOL/L (ref 3–14)
BUN SERPL-MCNC: 15 MG/DL (ref 7–30)
CALCIUM SERPL-MCNC: 9.3 MG/DL (ref 8.5–10.1)
CHLORIDE SERPL-SCNC: 108 MMOL/L (ref 94–109)
CO2 SERPL-SCNC: 27 MMOL/L (ref 20–32)
CREAT SERPL-MCNC: 0.65 MG/DL (ref 0.52–1.04)
CREAT UR-MCNC: 19 MG/DL
ERYTHROCYTE [DISTWIDTH] IN BLOOD BY AUTOMATED COUNT: 12.6 % (ref 10–15)
GFR SERPL CREATININE-BSD FRML MDRD: >90 ML/MIN/{1.73_M2}
GLUCOSE SERPL-MCNC: 104 MG/DL (ref 70–99)
HCT VFR BLD AUTO: 39.8 % (ref 35–47)
HGB BLD-MCNC: 12.9 G/DL (ref 11.7–15.7)
MCH RBC QN AUTO: 29.4 PG (ref 26.5–33)
MCHC RBC AUTO-ENTMCNC: 32.4 G/DL (ref 31.5–36.5)
MCV RBC AUTO: 91 FL (ref 78–100)
PLATELET # BLD AUTO: 141 10E9/L (ref 150–450)
POTASSIUM SERPL-SCNC: 4.7 MMOL/L (ref 3.4–5.3)
PROT UR-MCNC: <0.05 G/L
PROT/CREAT 24H UR: NORMAL G/G CR (ref 0–0.2)
RBC # BLD AUTO: 4.39 10E12/L (ref 3.8–5.2)
SODIUM SERPL-SCNC: 139 MMOL/L (ref 133–144)
TACROLIMUS BLD-MCNC: 3.8 UG/L (ref 5–15)
TME LAST DOSE: ABNORMAL H
TSH SERPL DL<=0.005 MIU/L-ACNC: 2.17 MU/L (ref 0.4–4)
WBC # BLD AUTO: 4.5 10E9/L (ref 4–11)

## 2020-06-12 PROCEDURE — 36415 COLL VENOUS BLD VENIPUNCTURE: CPT | Performed by: PREVENTIVE MEDICINE

## 2020-06-12 PROCEDURE — 84156 ASSAY OF PROTEIN URINE: CPT | Performed by: INTERNAL MEDICINE

## 2020-06-12 PROCEDURE — 80048 BASIC METABOLIC PNL TOTAL CA: CPT | Performed by: INTERNAL MEDICINE

## 2020-06-12 PROCEDURE — 84443 ASSAY THYROID STIM HORMONE: CPT | Performed by: PREVENTIVE MEDICINE

## 2020-06-12 PROCEDURE — 80197 ASSAY OF TACROLIMUS: CPT | Performed by: INTERNAL MEDICINE

## 2020-06-12 PROCEDURE — 85027 COMPLETE CBC AUTOMATED: CPT | Performed by: INTERNAL MEDICINE

## 2020-06-12 NOTE — RESULT ENCOUNTER NOTE
Bessy,     Thyroid function is in the normal range.     Please do not hesitate to call us at [phone] if you have any questions or concerns.    Thank you,    Gina Dc MD MPH

## 2020-06-15 ENCOUNTER — TELEPHONE (OUTPATIENT)
Dept: NEPHROLOGY | Facility: CLINIC | Age: 60
End: 2020-06-15

## 2020-06-15 ENCOUNTER — TELEPHONE (OUTPATIENT)
Dept: TRANSPLANT | Facility: CLINIC | Age: 60
End: 2020-06-15

## 2020-06-15 ENCOUNTER — TELEPHONE (OUTPATIENT)
Dept: CARDIOLOGY | Facility: CLINIC | Age: 60
End: 2020-06-15

## 2020-06-15 DIAGNOSIS — Z94.0 IMMUNOSUPPRESSIVE MANAGEMENT ENCOUNTER FOLLOWING KIDNEY TRANSPLANT: ICD-10-CM

## 2020-06-15 DIAGNOSIS — Z94.0 KIDNEY REPLACED BY TRANSPLANT: Primary | ICD-10-CM

## 2020-06-15 DIAGNOSIS — Z79.899 IMMUNOSUPPRESSIVE MANAGEMENT ENCOUNTER FOLLOWING KIDNEY TRANSPLANT: ICD-10-CM

## 2020-06-15 DIAGNOSIS — I48.0 PAROXYSMAL ATRIAL FIBRILLATION (H): Primary | ICD-10-CM

## 2020-06-15 DIAGNOSIS — Z48.298 AFTERCARE FOLLOWING ORGAN TRANSPLANT: ICD-10-CM

## 2020-06-15 RX ORDER — TACROLIMUS 0.5 MG/1
CAPSULE ORAL
Qty: 180 CAPSULE | Refills: 3 | Status: SHIPPED | OUTPATIENT
Start: 2020-06-15 | End: 2020-10-06

## 2020-06-15 RX ORDER — SOTALOL HYDROCHLORIDE 80 MG/1
40 TABLET ORAL 2 TIMES DAILY
Qty: 90 TABLET | Refills: 1 | Status: SHIPPED | OUTPATIENT
Start: 2020-06-15 | End: 2020-07-20

## 2020-06-15 RX ORDER — TACROLIMUS 1 MG/1
3 CAPSULE ORAL 2 TIMES DAILY
Qty: 540 CAPSULE | Refills: 3 | Status: SHIPPED | OUTPATIENT
Start: 2020-06-15 | End: 2020-08-27

## 2020-06-15 NOTE — TELEPHONE ENCOUNTER
LM for patient to call back to discuss results and plan.     Marcelo Retana RN   [facility] Care Coordinator  [facility]

## 2020-06-15 NOTE — TELEPHONE ENCOUNTER
Celena,     I would like to start with low dose and see how patient does.     Maureen,     Can you arrange for a repeat ECG after the patient has had 4 doses of Sotalol?     Russell Cisse MD

## 2020-06-15 NOTE — TELEPHONE ENCOUNTER
Per Dr. Cisse:    Amanda (or covering RN),     Please start patient on Sotalol 40 mg BID   QTc 0.40.   Have patient report symptom changes in 1-2 weeks.     Last Comprehensive Metabolic Panel:            ----------   Creatinine        Date                     Value               Ref Range           Status                06/12/2020               0.65                0.52 - 1.04 mg*     Final             ----------   GFR Estimate        Date                     Value               Ref Range           Status                06/12/2020               >90                 >60 mL/min/[1.*     Final          Clinic to contact patient to discuss.     Amanda Castaneda, RN, BSN  [facility]

## 2020-06-15 NOTE — TELEPHONE ENCOUNTER
Contacted patient with recommendation below per Dr. Cisse. Sotalol sent to pharmacy. Patient will monitor BP and pulse at home about 2 hours after taking medication. Clinic to call patient in 1-2 weeks to see how she is tolerating medication. Patient stated that symptoms were pretty infrequent therefore she is unsure if she would even notice improvement in 1-2 weeks. Advised watch and wait approach and encouraged her to call sooner if any concerns arise after starting medication.    Patient verbalized understanding.    Amanda Castaneda RN, BSN  [facility]

## 2020-06-15 NOTE — TELEPHONE ENCOUNTER
Message routed to procedure coordinator to assist with scheduling ECG in the imaging dept on 6/18.  Celena Romo, RNCC  Neurology

## 2020-06-15 NOTE — TELEPHONE ENCOUNTER
Pharmacy called to get clarification on whether or not the provider is aware that QT prolongation could occur with Sotalol. RN advised that they did perform an ECG and that he was aware. She wanted to point out that the Tacrolimus (not prescribed by Dr Cisse) can also cause QT prolongation.   RN explained that it should be fine but that I will route a message to Dr Cisse to review and advise.  Celena Romo, WESTLEYCC  Neurology

## 2020-06-15 NOTE — TELEPHONE ENCOUNTER
Tacrolimus = 3.8  (6/12/20)  Last feb 2020 = 3.9  Goal 4-6  Current Tac dose 3 mg BID    Levels at goal after dose increased late last year.  Now, levels below goal again.    PLAN:   Call Bessy Spangler and confirm this was a good 12-hour trough. Verify dose 3 mg BID.   Confirm no new medications or illness (macey. Diarrhea). Missed doses?  If good trough, increase dose to 3.5 mg BID and recheck level in 2 weeks    OUTCOME:  Per Bessy no new meds and a good 12 hour level.  States she will start on a new med that was just prescribed today Betapace.  Rx sent to:  [facility] [phone] (Phone)  [phone] (Fax)   Lab order placed.  [facility]

## 2020-06-16 ENCOUNTER — TELEPHONE (OUTPATIENT)
Dept: TRANSPLANT | Facility: CLINIC | Age: 60
End: 2020-06-16

## 2020-06-16 NOTE — TELEPHONE ENCOUNTER
Ron Lipscomb, [facility]  Sofia Rose RN               Hi Wilian,   EKG earlier this month was good.  There was another ordered and performed yesterday.  No read yet.  The MD is monitoring which is what is recommended.     Good as long as she is being monitored.   Gregg Dobbins message sent.

## 2020-06-16 NOTE — TELEPHONE ENCOUNTER
Writer has spoken with the patient, she is aware that a repeat ECG will be done on Friday June 19 at 9 a.m.    Please contact the patient with results. Thank you.    Xuan [facility]  Adult Med Spec/Surg Spec   [phone]

## 2020-06-19 DIAGNOSIS — I48.0 PAROXYSMAL ATRIAL FIBRILLATION (H): ICD-10-CM

## 2020-06-22 LAB — LAB SCANNED RESULT: ABNORMAL

## 2020-06-23 ENCOUNTER — TELEPHONE (OUTPATIENT)
Dept: CONSULT | Facility: CLINIC | Age: 60
End: 2020-06-23

## 2020-06-29 DIAGNOSIS — Z94.0 IMMUNOSUPPRESSIVE MANAGEMENT ENCOUNTER FOLLOWING KIDNEY TRANSPLANT: ICD-10-CM

## 2020-06-29 DIAGNOSIS — Z79.899 IMMUNOSUPPRESSIVE MANAGEMENT ENCOUNTER FOLLOWING KIDNEY TRANSPLANT: ICD-10-CM

## 2020-06-29 DIAGNOSIS — Z94.0 KIDNEY REPLACED BY TRANSPLANT: ICD-10-CM

## 2020-06-29 DIAGNOSIS — Z94.0 KIDNEY TRANSPLANTED: Primary | ICD-10-CM

## 2020-06-29 DIAGNOSIS — Z48.298 AFTERCARE FOLLOWING ORGAN TRANSPLANT: ICD-10-CM

## 2020-06-29 RX ORDER — MYCOPHENOLIC ACID 180 MG/1
540 TABLET, DELAYED RELEASE ORAL 2 TIMES DAILY
Qty: 540 TABLET | Refills: 0 | Status: SHIPPED | OUTPATIENT
Start: 2020-06-29 | End: 2021-02-01

## 2020-07-16 NOTE — PROGRESS NOTES
"[facility] FOLLOW UP TELEPHONE ENCOUNTER:    Bessy Spangler is a 60 year old female who is being evaluated via a billable telephone visit.      The patient has been notified of following:     \"This telephone visit will be conducted via a call between you and your physician/provider. We have found that certain health care needs can be provided without the need for a physical exam.  This service lets us provide the care you need with a short phone conversation.  If a prescription is necessary we can send it directly to your pharmacy.  If lab work is needed we can place an order for that and you can then stop by our lab to have the test done at a later time.    Telephone visits are billed at different rates depending on your insurance coverage. During this emergency period, for some insurers they may be billed the same as an in-person visit.  Please reach out to your insurance provider with any questions.    If during the course of the call the physician/provider feels a telephone visit is not appropriate, you will not be charged for this service.\"    Patient has given verbal consent for Telephone visit?  Yes    What phone number would you like to be contacted at? [phone]    How would you like to obtain your AVS? MyChart      Telephone Visit Details  Type of service:  Telephone Visit  Telephone visit start time: 9:03 AM  Telephone end time: 9:27 AM  Total telephone time: 24 min    HPI: Bessy Spangler is a 60 year old female being seen today for evaluation of palpitations.   The patient's risk factor profile is: (+) HTN, (-) DM, (-) hypercholesterolemia, (+) prior 20 pack-year tobacco use (quit 25 yrs ago), (-) fam Hx premature CAD.  The patient has Hx ESRD with 1 year of HD prior to renal transplantation in July 2007.  The patient has no history of cardiovascular disease (CAD, CHF, arrhythmia, valvular heart disease).  The patient has no Hx of PAD or cerebrovascular disease.  The patient had normal " "ECHO and normal dobutamine ECHO in 2007 prior to consideration of renal transplantation.  The patient has not undergone prior cardiac catheterization, or EP study.   The patient was seen by her PCP, Dr. Dc, in Nov 2019 at which time she described palpitations lasting up to 45 minutes with rapid onset.  She recorded pulse 133 bpm. She has had episodes, on average, once a month.  She had no associated cardiopulmonary symptoms.  Specifically, she denies associated lightheadedness, and syncope.  The patient subsequently had her Synthroid increased.  Her last episode occurred on 5/11/20 at which time her /74 and her pulse was 131.  She was symptomatic at that time.    She had a Biotelemetry patch placed in Feb 2020 for 14 days.  She had no symptoms while wearing the monitor.  She accidentally pushed the button on one occasion and the report showed NSR at that time.  She had a repeat Biotelemetry patch in May 2020 and had symptomatic AF while wearing the patch.    She was started on Sotalol 40 mg BID in June 2020 and has done well.  She missed the medication on one occasion and had palpitations later that day.    The patient denies recent chest discomfort, dyspnea, PND, orthopnea.  She notes pedal edema.    The patient was diagnosed with a spontaneous RLE DVT several years ago and has been continued on Eliquis.      The following information was obtained from EMR via Dr. Pacheco:    \"She has Hx of grade 2 Infiltrating ductal carcinoma of the left breast diagnosed March of 2002, 1.1 cm, tumor stage T1c, N0, stage 1, ER/IN positive, and HER-2 3+ positive, treated with lumpectomy followed by 4 cycles of Adriamycin and cyclophosphamide followed by radiation. She was on on tamoxifen from July 2002 until June 2005. At that point it was changed to exemestane. In May 2008 patient elected to stop therapy. . . .  She then had PE in September 2009 in the post transplant setting. Factor V Leidin and Prothrombin gene mutations " "were negative.\"    In 2018 she \" developed a deep venous thrombosis along the right distal superficial femoral, popliteal, peroneal and posterior tibial veins, when she presented with a couple of days of right leg pain and swelling. . .  No local trauma apart from a sprained ankle in December 2017 when she was wearing a boot for a week but she was not bedbound at that time and she was able to walk even at that time she developed vertigo. Had a fall around end of May 2018 when she broke her left humerus.  She was started on Apixaban and was tolerating it well.  We did further thrombophilia workup and Protein C/protein S levels were normal.  Antithrombin III was also unremarkable.  Anti-beta-2 glycoprotein antibodies and anticardiolipin antibodies were negative.  Because of unprovoked nature of the blood clot we decided on indefinite anticoagulation.  She was continued on apixaban. She had a repeat ultrasound done improved clot burden and now nonocclusive thrombus in the right popliteal vein is seen.\"     She is compliant with her medications.   She has not had bleeding symptoms.      PAST MEDICAL HISTORY:  Past Medical History:   Diagnosis Date     Anemia of chronic renal failure      Breast cancer (H)      Chronic diarrhea      Chronic kidney disease (CKD), stage V (H)     Due to \"nephritis\". Biopsy done years ago. I do not know the histologic specifics     High risk medication use      Hyperphosphatemia      Hypertension      Hypothyroid      Immunosuppressed status (H)      Kidney replaced by transplant     nephritis as a child     PE (pulmonary embolism)     after transplant     Secondary hyperparathyroidism (of renal origin)        CURRENT MEDICATIONS:  Current Outpatient Medications   Medication Sig     ELIQUIS ANTICOAGULANT 5 MG tablet TAKE 1 TABLET BY MOUTH TWICE A DAY     levothyroxine (SYNTHROID/LEVOTHROID) 125 MCG tablet Take 1 tablet (125 mcg) by mouth daily     metoprolol tartrate (LOPRESSOR) 50 MG " tablet TAKE 1 TABLET BY MOUTH TWICE A DAY     mycophenolic acid (GENERIC EQUIVALENT) 180 MG EC tablet Take 3 tablets (540 mg) by mouth 2 times daily     sotalol (BETAPACE) 80 MG tablet Take 0.5 tablets (40 mg) by mouth 2 times daily     tacrolimus (GENERIC EQUIVALENT) 0.5 MG capsule Total dose: 3.5 mg twice daily     tacrolimus (GENERIC EQUIVALENT) 1 MG capsule Take 3 capsules (3 mg) by mouth 2 times daily Total dose: 3.5 mg twice daily     VITAMIN D, CHOLECALCIFEROL, PO Take 1,000 Units by mouth daily     No current facility-administered medications for this visit.        PAST SURGICAL HISTORY:  Past Surgical History:   Procedure Laterality Date     APPENDECTOMY OPEN       AV FISTULA OR GRAFT ARTERIAL      left     C TRANSPLANT,PREP LIVING  RENAL GRAFT  2009    Hx of nephritis     LUMPECTOMY BREAST      left       ALLERGIES  Dilaudid [hydromorphone hcl]; Hydromorphone; and Sulfa drugs    FAMILY HX:  Family History   Problem Relation Age of Onset     Diabetes Father         type 2       Arthritis Father      Glaucoma Father      Cancer Mother 58        smoker,metastatic adnoid cystic cancer,  early 60s     Arthritis Mother      Endocrine Disease Mother         thyroid     Hypertension Maternal Grandmother          at a young age     Kidney Disease Maternal Grandmother      Hypertension Maternal Grandfather      Cancer Paternal Grandmother         stomach cancer     Endocrine Disease Sister         thyroid     Genitourinary Problems Daughter 32        renal failure      Endocrine Disease Sister         thyroid     Chronic Obstructive Pulmonary Disease Paternal Grandfather         smoking  young     Heart Disease Maternal Half-Brother        SOCIAL HX:  Social History     Socioeconomic History     Marital status: Single     Spouse name: Not on file     Number of children: Not on file     Years of education: Not on file     Highest education level: Not on file   Occupational History     Not on  file   Social Needs     Financial resource strain: Not on file     Food insecurity     Worry: Not on file     Inability: Not on file     Transportation needs     Medical: Not on file     Non-medical: Not on file   Tobacco Use     Smoking status: Former Smoker     Packs/day: 1.00     Years: 20.00     Pack years: 20.00     Types: Cigarettes     Last attempt to quit: 1998     Years since quittin.3     Smokeless tobacco: Never Used   Substance and Sexual Activity     Alcohol use: No     Alcohol/week: 0.0 standard drinks     Drug use: No     Sexual activity: Not Currently   Lifestyle     Physical activity     Days per week: Not on file     Minutes per session: Not on file     Stress: Not on file   Relationships     Social connections     Talks on phone: Not on file     Gets together: Not on file     Attends Oriental orthodox service: Not on file     Active member of club or organization: Not on file     Attends meetings of clubs or organizations: Not on file     Relationship status: Not on file     Intimate partner violence     Fear of current or ex partner: Not on file     Emotionally abused: Not on file     Physically abused: Not on file     Forced sexual activity: Not on file   Other Topics Concern     Not on file   Social History Narrative    , 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  degree    Sal 1981,Betty  ( On dialysis), Vincenzo Alvares 1988    Grandchildren Torri 2010, Krystina 10/2007 lives with her and Betty, son and family. Jeremías     2 additional grandchildren will be born soon       ROS:  Constitutional: No fever, chills, or sweats. No weight gain/loss.   HEENT: No visual disturbance, ear ache, epistaxis, sore throat.   Allergies/Immunologic: Negative.   Respiratory: No cough, hemoptysis.   Cardiovascular: As per HPI.   GI: No nausea, vomiting, hematemesis, melena, or hematochezia.   : No urinary frequency, dysuria, or hematuria.   Integument: No rash.    Psychiatric: No anxiety / depression.   Neuro: No speech disturbance, focal sensory or motor deficit.   Endocrinology: No polyuria / polyphagia.   Musculoskeletal: No myalgia.    VITAL SIGNS:  LMP 10/25/2004 (Exact Date)   There is no height or weight on file to calculate BMI.  Wt Readings from Last 2 Encounters:   20 91.5 kg (201 lb 12.8 oz)   19 90.7 kg (200 lb)       PHYSICAL EXAM  This was a telephone encounter..    LABS  Recent Labs   Lab Test 20  0802 19  0753   WBC 4.0 4.2   HGB 12.5 13.0   HCT 39.1 40.4   * 128*     Recent Labs   Lab Test 20  0802 19  0753    138   POTASSIUM 4.1 4.3   CHLORIDE 108 107   CO2 28 24   * 100*   BUN 15 12   CR 0.56 0.56   MARIA LUISA 9.3 9.8     Recent Labs   Lab Test 10/11/19  1006 18  0746 11/13/15  0753 10/28/14  0749   CHOL 177 166 181 224*   HDL 79 59 68 59   LDL 84 90 92 145*   TRIG 68 86 103 98   CHOLHDLRATIO  --   --  2.7 3.8   NHDL 98 107  --   --         EC2020  Sinus bradycardia  Otherwise normal ECG    EK2019  NSR.  Normal ECG.    ECHO: 2007  Borderline left atrial enlargement.   Ejection Fraction = >55%.   No regional wall motion abnormalities noted.   Although RV systolic pressure cannot be directly measured, there are no direct findings to suggest the prosence of moderate or severe pulmonary HTN such as RV overload or abnormal septal motion.     CARDIAC MRI: None    CORONARY CTA: None    DOBUTAMINE ECHO:  2007  Normal dobutamine echo without infarct or ischemia at adquate rate-pressure product  There is borderline concentric left ventricular hypertrophy.     CARDIAC CATH: None    ASSESSMENT AND PLAN:   1. Paroxysmal Atrial Fibrillation  -- Well controlled on Sotalol  -- If symptoms increase in frequency, plan to increase Sotalol to 80 mg BID.  -- Continue Eliquis indefinitely  -- Routine TFTs to ensure her thyroid function remains within normal range.    2. Thromboembolic Disorder,  s/p PE (2009), s/p RLE DVT (2018)  -- Both events unprovoked  -- Remote Hx of CA  -- Continue anticoagulation (Eliquis) indefinitely.      FOLLOW UP: 6 months      Russell Cisse MD    Divisions of Cardiology  [place], [place]    CC  Patient Care Team:  Mike Tan MD as PCP - General (Family Practice)  [facility] [place] MD ([place]phrology)  Gina wSanson MD as Assigned PCP  GINA SWANSON

## 2020-07-20 ENCOUNTER — VIRTUAL VISIT (OUTPATIENT)
Dept: CARDIOLOGY | Facility: CLINIC | Age: 60
End: 2020-07-20
Payer: COMMERCIAL

## 2020-07-20 DIAGNOSIS — I48.0 PAROXYSMAL ATRIAL FIBRILLATION (H): ICD-10-CM

## 2020-07-20 PROCEDURE — 99214 OFFICE O/P EST MOD 30 MIN: CPT | Mod: TEL | Performed by: INTERNAL MEDICINE

## 2020-07-20 RX ORDER — SOTALOL HYDROCHLORIDE 80 MG/1
40 TABLET ORAL 2 TIMES DAILY
Qty: 90 TABLET | Refills: 3 | Status: SHIPPED | OUTPATIENT
Start: 2020-07-20 | End: 2021-09-01

## 2020-07-20 NOTE — PATIENT INSTRUCTIONS
It was a pleasure to see you in the cardiology clinic today.    If you have any questions, you can reach my nurse, Amanda Beckford, at [phone].     Note the new medications: None    Stop the following medications: None    The results from today include: None    Tests ordered today: None    I would like you to follow up with Dr. Cisse in one year.    Sincerely,      Russell Cisse MD     [facility]

## 2020-08-12 ENCOUNTER — MYC MEDICAL ADVICE (OUTPATIENT)
Dept: TRANSPLANT | Facility: CLINIC | Age: 60
End: 2020-08-12

## 2020-08-27 ENCOUNTER — VIRTUAL VISIT (OUTPATIENT)
Dept: NEPHROLOGY | Facility: CLINIC | Age: 60
End: 2020-08-27
Attending: INTERNAL MEDICINE
Payer: COMMERCIAL

## 2020-08-27 DIAGNOSIS — I15.1 HTN, KIDNEY TRANSPLANT RELATED: ICD-10-CM

## 2020-08-27 DIAGNOSIS — E55.9 VITAMIN D DEFICIENCY: ICD-10-CM

## 2020-08-27 DIAGNOSIS — D84.9 IMMUNOSUPPRESSION (H): ICD-10-CM

## 2020-08-27 DIAGNOSIS — Q87.81 X-LINKED ALPORT SYNDROME IN HETEROZYGOUS FEMALE: ICD-10-CM

## 2020-08-27 DIAGNOSIS — Z48.298 AFTERCARE FOLLOWING ORGAN TRANSPLANT: ICD-10-CM

## 2020-08-27 DIAGNOSIS — Z94.0 HTN, KIDNEY TRANSPLANT RELATED: ICD-10-CM

## 2020-08-27 DIAGNOSIS — I48.0 PAROXYSMAL ATRIAL FIBRILLATION (H): ICD-10-CM

## 2020-08-27 DIAGNOSIS — Z94.0 KIDNEY REPLACED BY TRANSPLANT: Primary | ICD-10-CM

## 2020-08-27 RX ORDER — TACROLIMUS 1 MG/1
4 CAPSULE ORAL 2 TIMES DAILY
Qty: 720 CAPSULE | Refills: 3 | Status: SHIPPED | OUTPATIENT
Start: 2020-08-27 | End: 2020-10-06

## 2020-08-27 ASSESSMENT — PAIN SCALES - GENERAL: PAINLEVEL: NO PAIN (0)

## 2020-08-27 NOTE — LETTER
"8/27/2020      RE: Bessy Spangler  [address] [place] [address]       Bessy Spangler is a 60 year old female who is being evaluated via a billable video visit.      The patient has been notified of following:     \"This video visit will be conducted via a call between you and your physician/provider. We have found that certain health care needs can be provided without the need for an in-person physical exam.  This service lets us provide the care you need with a video conversation.  If a prescription is necessary we can send it directly to your pharmacy.  If lab work is needed we can place an order for that and you can then stop by our lab to have the test done at a later time.    Video visits are billed at different rates depending on your insurance coverage.  Please reach out to your insurance provider with any questions.    If during the course of the call the physician/provider feels a video visit is not appropriate, you will not be charged for this service.\"    Patient has given verbal consent for Video visit? Yes  How would you like to obtain your AVS? Mail a copy  Will anyone else be joining your video visit? No    Video-Visit Details    Type of service:  Video Visit    Video Start Time: 1308  Video End Time: 1336    Originating Location (pt. Location): Home    Distant Location (provider location):  [facility]     Platform used for Video Visit: Enplug    Chris Marquez MD      CHRONIC TRANSPLANT NEPHROLOGY VISIT    Assessment & Plan   # LDKT: Stable   - Baseline Cr ~ 0.6-0.8   - Proteinuria: Normal (<0.2 grams)   - Date DSA Last Checked: Oct/2009      Latest DSA: Not checked recently due to time from transplant   - BK Viremia: Not checked recently due to time from transplant   - Kidney Tx Biopsy: No    # Immunosuppression: Tacrolimus immediate release (goal 4-6) and Mycophenolic acid (dose 540 mg every 12 hours)   - Changes: Yes - With tacrolimus level slightly below " goal, will increase tacrolimus dose ot 4 mg every 12 hours.    # Infection Prophylaxis:   - PJP: None    # Hypertension: Controlled;  Goal BP: < 130/80   - Changes: No    # Mineral Bone Disorder:   - Vitamin D; level: Low        On supplement: Yes  - Calcium; level: Normal        On supplement: No    # Paroxysmal Atrial Fibrillation: No recent episodes or symptoms since starting sotalol.  Patient remains on chronic anticoagulation with apixaban.    # H/o DVT and h/o PE: Remains on chronic anticoagulation with apixaban.    # Obesity: Stable to slight increase in weight.   - Recommend weight loss for overall health by increasing exercise and watching caloric intake.    # Skin Cancer: New lesions: none   - Discussed sun protection and recommend regular follow up with Dermatology.    # Medical Compliance: No.  Evidence of labs less than recommended.  - Discussed importance of checking labs regularly as recommended, taking medications as prescribed and attending scheduled medical appointments.    # COVID-19 Virus Review: Discussed COVID-19 virus and the potential medical risks.  Reviewed preventative health recommendations, which includes washing hands for 20 seconds, avoid touching your face, and social distancing.  Asked patient to inform the transplant center if they are exposed or diagnosed with this virus.    # Transplant History:  Etiology of Kidney Failure: Chronic glomerulonephritis (GN)  Tx: LDKT  Transplant: 7/30/2009 (Kidney)  Donor Type: Living Donor Class: Standard Criteria Donor  Significant changes in immunosuppression: None  Significant transplant-related complications: None    Transplant Office Phone Number: [phone]    Assessment and plan was discussed with the patient and she voiced her understanding and agreement.    Return visit: Return in about 1 year (around 8/27/2021).    Chris Marquez MD    Chief Complaint   Ms. Spangler is a 60 year old here for kidney transplant and immunosuppression  "management.    History of Present Illness    Ms. Spangler reports feeling good overall with some medical complaints.  No recent hospitalizations, but has had new medical issues.  Patient was having palpitations that would come and go, but some would last 45-60 minutes.  As part of the work up, she was found to have paroxysmal atrial fibrillation.  Patient was started on sotalol with no recent palpitations.  She also remains on anticoagulation with apixaban, which she was already taking for her previous history of DVT and PE.    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  Her grandson is 10 years old and was found to have kidney failure.  As part of the work up, he was diagnosed with Alport's syndrome.  Genetic testing found patient's daughter to be an X-linked carrier, similar to the patient.  Patient's kidney failure, however, was not likely due to Alport's syndrome as she was diagnosed with kidney disease at age 3 and told it was \"nephritis.\"  She doesn't really remember if she was treated or what happened, but she says it was thought she might not live through it, but her kidney function improved and she then did well for decades until having decreased kidney function in the early 2000s.    Her energy level is okay, but she says it is \"hard to know.\"  She has been working at home for the past 5 months due to the pandemic and doesn't really do much.  She is somewhat active, but really gets minimal exercise.  Denies any chest pain or shortness of breath with exertion.  Appetite is good and her weight may be up a few pounds, but she hasn't weighed herself.  No nausea, vomiting or diarrhea.  No fever, sweats or chills.  A little leg swelling at the end of the day.    Recent Hospitalizations:  [x] No [] Yes    New Medical Issues: [] No [x] Yes See above   Decreased energy: [x] No [] Yes    Chest pain or SOB with exertion:  [x] No [] Yes    Appetite change or weight change: [x] No [] " "Yes    Nausea, vomiting or diarrhea:  [x] No [] Yes    Fever, sweats or chills: [x] No [] Yes    Leg swelling: [] No [x] Yes A little leg swelling     Home BP: 120s systolic    Review of Systems   A comprehensive review of systems was obtained and negative, except as noted in the HPI or PMH.    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     Vitamin D deficiency     Paroxysmal atrial fibrillation (H)       Social History   Social History     Tobacco Use     Smoking status: Former Smoker     Packs/day: 1.00     Years: 20.00     Pack years: 20.00     Types: Cigarettes     Last attempt to quit: 1998     Years since quittin.6     Smokeless tobacco: Never Used   Substance Use Topics     Alcohol use: No     Alcohol/week: 0.0 standard drinks     Drug use: No       Allergies   Allergies   Allergen Reactions     Dilaudid [Hydromorphone Hcl]      Patient unable to recall     Hydromorphone      Patient unable to recall     Sulfa Drugs Swelling     \"swelling\" - age 3         Medications   Current Outpatient Medications   Medication Sig     ELIQUIS ANTICOAGULANT 5 MG tablet TAKE 1 TABLET BY MOUTH TWICE A DAY     levothyroxine (SYNTHROID/LEVOTHROID) 125 MCG tablet Take 1 tablet (125 mcg) by mouth daily     metoprolol tartrate (LOPRESSOR) 50 MG tablet TAKE 1 TABLET BY MOUTH TWICE A DAY     mycophenolic acid (GENERIC EQUIVALENT) 180 MG EC tablet Take 3 tablets (540 mg) by mouth 2 times daily     sotalol (BETAPACE) " 80 MG tablet Take 0.5 tablets (40 mg) by mouth 2 times daily     tacrolimus (GENERIC EQUIVALENT) 0.5 MG capsule Total dose: 3.5 mg twice daily     tacrolimus (GENERIC EQUIVALENT) 1 MG capsule Take 4 capsules (4 mg) by mouth 2 times daily     VITAMIN D, CHOLECALCIFEROL, PO Take 1,000 Units by mouth daily     No current facility-administered medications for this visit.      Medications Discontinued During This Encounter   Medication Reason     tacrolimus (GENERIC EQUIVALENT) 1 MG capsule        Physical Exam   Vital signs were deferred for this telemedicine visit.    GENERAL APPEARANCE: alert and no distress  HENT: no obvious abnormalities on appearance  RESP: breathing appears unremarkable with normal rate, no audible wheezing or cough and no apparent shortness of breath with conversation  MS: extremities normal - no gross deformities noted  SKIN: no apparent rash and normal skin tone  NEURO: speech is clear with no obvious neurological deficits  PSYCH: mentation appears normal and affect normal    Data     Renal Latest Ref Rng & Units 6/12/2020 2/14/2020 11/19/2019   Na 133 - 144 mmol/L 139 139 138   K 3.4 - 5.3 mmol/L 4.7 4.1 4.3   Cl 94 - 109 mmol/L 108 108 107   CO2 20 - 32 mmol/L 27 28 24   BUN 7 - 30 mg/dL 15 15 12   Cr 0.52 - 1.04 mg/dL 0.65 0.56 0.56   Glucose 70 - 99 mg/dL 104(H) 103(H) 100(H)   Ca  8.5 - 10.1 mg/dL 9.3 9.3 9.8   Mg 1.6 - 2.3 mg/dL - - -     Bone Health Latest Ref Rng & Units 9/12/2019 8/26/2014 1/27/2010   Phos 2.5 - 4.5 mg/dL - - 3.3   PTHi 12 - 72 pg/mL - 175(H) -   Vit D Def 20 - 75 ug/L 29 19(L) -     Heme Latest Ref Rng & Units 6/12/2020 2/14/2020 11/19/2019   WBC 4.0 - 11.0 10e9/L 4.5 4.0 4.2   Hgb 11.7 - 15.7 g/dL 12.9 12.5 13.0   Plt 150 - 450 10e9/L 141(L) 136(L) 128(L)   ABSOLUTE NEUTROPHIL 1.6 - 8.3 10e9/L - - -   ABSOLUTE LYMPHOCYTES 0.8 - 5.3 10e9/L - - -   ABSOLUTE MONOCYTES 0.0 - 1.3 10e9/L - - -   ABSOLUTE EOSINOPHILS 0.0 - 0.7 10e9/L - - -   ABSOLUTE BASOPHILS 0.0 - 0.2  10e9/L - - -   ABS IMMATURE GRANULOCYTES 0 - 0.4 10e9/L - - -   ABSOLUTE NUCLEATED RBC - - - -     Liver Latest Ref Rng & Units 8/25/2011 2/16/2011 8/25/2010   AP 40 - 150 U/L 66 70 67   TBili 0.2 - 1.3 mg/dL 0.2 <0.1(L) 0.3   ALT 0 - 50 U/L 10 25 7   AST 0 - 45 U/L 21 16 19   Tot Protein 6.8 - 8.8 g/dL 7.2 6.8 6.9   Albumin 3.9 - 5.1 g/dL 4.3 4.2 4.4     Pancreas Latest Ref Rng & Units 7/29/2009   A1C 4.3 - 6.0 % 5.0     Iron studies Latest Ref Rng & Units 8/26/2014 6/20/2012 3/23/2012   Iron 35 - 180 ug/dL 76 71 97   Iron sat 15 - 46 % 27 23 32   Ferritin 10 - 300 ng/mL 365(H) 344(H) 328(H)     [facility] Txp Virology Latest Ref Rng & Units 3/4/2013 1/23/2012 8/25/2011   CMV IgG EU/mL - - -   CVM DNA Quant - Whole blood, EDTA anticoagulant - -   CMV Quant <100 Copies/mL <100  No CMV DNA detected. - -   CMV QT Log <2.0 Log copies/mL <2.0  The Cytomegalovirus DNA Quantitation assay is a real-time polymerase chain   reaction (PCR) utilizing analyte specific reagents manufactured by [employer]   [employer]. Analyte Specific Reagents (ASRs) are used in many laboratory   tests necessary for standard medical care and generally do not require FDA   approval.   This test was developed and its performance characteristics determined by   [facility].  It has not been   cleared or approved by the US Food and Drug Administration. - -   BK Spec - - Plasma Plasma, EDTA anticoagulant  CORRECTED ON 08/29 AT 1009: PREVIOUSLY REPORTED AS Whole blood, EDTA   anticoagulant   BK Res <1000 copies/mL - <1000 <1000   BK Log <3.0 Log copies/mL - <3.0  The lower limit of detection for this assay is 1000 copies/mL.  Real-time TaqMan   PCR was performed using BK primers and probe for the detection of a 90 bp   portion of the  1 gene.  The performance characteristics were validated by   the [facility].   It has not been cleared or approved by the U.S. Food and Drug  Administration. <3.0  The lower limit of detection for this assay is 1000 copies/mL.  Real-time TaqMan   PCR was performed using BK primers and probe for the detection of a 90 bp   portion of the  1 gene.  The performance characteristics were validated by   the [facility] [place].   It has not been cleared or approved by the U.S. Food and Drug Administration.   EBV IgG - - - -   Hep B Core NEG - - -   Hep B Surf - - - -   HIV 1&2 NEG - - -        Recent Labs   Lab Test 12/17/19  0755 02/14/20  0802 06/12/20  0753   DOSTAC 2000 on 12.16.19 2000 02.14.20 8:00PM 6/11   TACROL 4.4* 3.9* 3.8*     Recent Labs   Lab Test 04/04/14  0747 07/02/14  0802 09/12/19  0754   DOSMPA 2000 04/03/2014 07/01/14 2000 PM 9/11/19 2000   MPACID 1.45 1.24 2.59   MPAG 50.2 46.4 33.6     Chris Marquez MD

## 2020-08-27 NOTE — LETTER
"8/27/2020       RE: Bessy Spangler  [id number]  [facility] [id number]     Dear Colleague,    Thank you for referring your patient, Bessy Spangler, to the [facility] NEPHROLOGY at [facility]. Please see a copy of my visit note below.    Bessy Spangler is a 60 year old female who is being evaluated via a billable video visit.      The patient has been notified of following:     \"This video visit will be conducted via a call between you and your physician/provider. We have found that certain health care needs can be provided without the need for an in-person physical exam.  This service lets us provide the care you need with a video conversation.  If a prescription is necessary we can send it directly to your pharmacy.  If lab work is needed we can place an order for that and you can then stop by our lab to have the test done at a later time.    Video visits are billed at different rates depending on your insurance coverage.  Please reach out to your insurance provider with any questions.    If during the course of the call the physician/provider feels a video visit is not appropriate, you will not be charged for this service.\"    Patient has given verbal consent for Video visit? Yes  How would you like to obtain your AVS? Mail a copy  Will anyone else be joining your video visit? No    Video-Visit Details    Type of service:  Video Visit    Video Start Time: 1308  Video End Time: 1336    Originating Location (pt. Location): Home    Distant Location (provider location):  [facility] NEPHROLOGY     Platform used for Video Visit: [facility]    Chris Marquez MD      CHRONIC TRANSPLANT NEPHROLOGY VISIT    Assessment & Plan   # LDKT: Stable   - Baseline Cr ~ 0.6-0.8   - Proteinuria: Normal (<0.2 grams)   - Date DSA Last Checked: Oct/2009      Latest DSA: Not checked recently due to time from transplant   - BK Viremia: Not checked recently due to time from " transplant   - Kidney Tx Biopsy: No    # Immunosuppression: Tacrolimus immediate release (goal 4-6) and Mycophenolic acid (dose 540 mg every 12 hours)   - Changes: Yes - With tacrolimus level slightly below goal, will increase tacrolimus dose ot 4 mg every 12 hours.    # Infection Prophylaxis:   - PJP: None    # Hypertension: Controlled;  Goal BP: < 130/80   - Changes: No    # Mineral Bone Disorder:   - Vitamin D; level: Low        On supplement: Yes  - Calcium; level: Normal        On supplement: No    # Paroxysmal Atrial Fibrillation: No recent episodes or symptoms since starting sotalol.  Patient remains on chronic anticoagulation with apixaban.    # H/o DVT and h/o PE: Remains on chronic anticoagulation with apixaban.    # Obesity: Stable to slight increase in weight.   - Recommend weight loss for overall health by increasing exercise and watching caloric intake.    # Skin Cancer: New lesions: none   - Discussed sun protection and recommend regular follow up with Dermatology.    # Medical Compliance: No.  Evidence of labs less than recommended.  - Discussed importance of checking labs regularly as recommended, taking medications as prescribed and attending scheduled medical appointments.    # COVID-19 Virus Review: Discussed COVID-19 virus and the potential medical risks.  Reviewed preventative health recommendations, which includes washing hands for 20 seconds, avoid touching your face, and social distancing.  Asked patient to inform the transplant center if they are exposed or diagnosed with this virus.    # Transplant History:  Etiology of Kidney Failure: Chronic glomerulonephritis (GN)  Tx: LDKT  Transplant: 7/30/2009 (Kidney)  Donor Type: Living Donor Class: Standard Criteria Donor  Significant changes in immunosuppression: None  Significant transplant-related complications: None    Transplant Office Phone Number: [phone]    Assessment and plan was discussed with the patient and she voiced her  "understanding and agreement.    Return visit: Return in about 1 year (around 8/27/2021).    Chris Marquez MD    Chief Complaint   Ms. Spangler is a 60 year old here for kidney transplant and immunosuppression management.    History of Present Illness    Ms. Spangler reports feeling good overall with some medical complaints.  No recent hospitalizations, but has had new medical issues.  Patient was having palpitations that would come and go, but some would last 45-60 minutes.  As part of the work up, she was found to have paroxysmal atrial fibrillation.  Patient was started on sotalol with no recent palpitations.  She also remains on anticoagulation with apixaban, which she was already taking for her previous history of DVT and PE.    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  Her grandson is 10 years old and was found to have kidney failure.  As part of the work up, he was diagnosed with Alport's syndrome.  Genetic testing found patient's daughter to be an X-linked carrier, similar to the patient.  Patient's kidney failure, however, was not likely due to Alport's syndrome as she was diagnosed with kidney disease at age 3 and told it was \"nephritis.\"  She doesn't really remember if she was treated or what happened, but she says it was thought she might not live through it, but her kidney function improved and she then did well for decades until having decreased kidney function in the early 2000s.    Her energy level is okay, but she says it is \"hard to know.\"  She has been working at home for the past 5 months due to the pandemic and doesn't really do much.  She is somewhat active, but really gets minimal exercise.  Denies any chest pain or shortness of breath with exertion.  Appetite is good and her weight may be up a few pounds, but she hasn't weighed herself.  No nausea, vomiting or diarrhea.  No fever, sweats or chills.  A little leg swelling at the end of the day.    Recent " "Hospitalizations:  [x] No [] Yes    New Medical Issues: [] No [x] Yes See above   Decreased energy: [x] No [] Yes    Chest pain or SOB with exertion:  [x] No [] Yes    Appetite change or weight change: [x] No [] Yes    Nausea, vomiting or diarrhea:  [x] No [] Yes    Fever, sweats or chills: [x] No [] Yes    Leg swelling: [] No [x] Yes A little leg swelling     Home BP: 120s systolic    Review of Systems   A comprehensive review of systems was obtained and negative, except as noted in the HPI or PMH.    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     Vitamin D deficiency     Paroxysmal atrial fibrillation (H)       Social History   Social History     Tobacco Use     Smoking status: Former Smoker     Packs/day: 1.00     Years: 20.00     Pack years: 20.00     Types: Cigarettes     Last attempt to quit: 1998     Years since quittin.6     Smokeless tobacco: Never Used   Substance Use Topics     Alcohol use: No     Alcohol/week: 0.0 standard drinks     Drug use: No       Allergies   Allergies   Allergen Reactions     Dilaudid [Hydromorphone Hcl]      Patient unable to recall     Hydromorphone      Patient unable to recall     Sulfa Drugs Swelling     \"swelling\" - age 3         Medications   Current Outpatient Medications   Medication Sig     ELIQUIS ANTICOAGULANT 5 MG tablet TAKE 1 TABLET BY MOUTH TWICE A DAY     levothyroxine (SYNTHROID/LEVOTHROID) 125 MCG tablet Take 1 tablet (125 mcg) by mouth daily "     metoprolol tartrate (LOPRESSOR) 50 MG tablet TAKE 1 TABLET BY MOUTH TWICE A DAY     mycophenolic acid (GENERIC EQUIVALENT) 180 MG EC tablet Take 3 tablets (540 mg) by mouth 2 times daily     sotalol (BETAPACE) 80 MG tablet Take 0.5 tablets (40 mg) by mouth 2 times daily     tacrolimus (GENERIC EQUIVALENT) 0.5 MG capsule Total dose: 3.5 mg twice daily     tacrolimus (GENERIC EQUIVALENT) 1 MG capsule Take 4 capsules (4 mg) by mouth 2 times daily     VITAMIN D, CHOLECALCIFEROL, PO Take 1,000 Units by mouth daily     No current facility-administered medications for this visit.      Medications Discontinued During This Encounter   Medication Reason     tacrolimus (GENERIC EQUIVALENT) 1 MG capsule        Physical Exam   Vital signs were deferred for this telemedicine visit.    GENERAL APPEARANCE: alert and no distress  HENT: no obvious abnormalities on appearance  RESP: breathing appears unremarkable with normal rate, no audible wheezing or cough and no apparent shortness of breath with conversation  MS: extremities normal - no gross deformities noted  SKIN: no apparent rash and normal skin tone  NEURO: speech is clear with no obvious neurological deficits  PSYCH: mentation appears normal and affect normal    Data     Renal Latest Ref Rng & Units 6/12/2020 2/14/2020 11/19/2019   Na 133 - 144 mmol/L 139 139 138   K 3.4 - 5.3 mmol/L 4.7 4.1 4.3   Cl 94 - 109 mmol/L 108 108 107   CO2 20 - 32 mmol/L 27 28 24   BUN 7 - 30 mg/dL 15 15 12   Cr 0.52 - 1.04 mg/dL 0.65 0.56 0.56   Glucose 70 - 99 mg/dL 104(H) 103(H) 100(H)   Ca  8.5 - 10.1 mg/dL 9.3 9.3 9.8   Mg 1.6 - 2.3 mg/dL - - -     Bone Health Latest Ref Rng & Units 9/12/2019 8/26/2014 1/27/2010   Phos 2.5 - 4.5 mg/dL - - 3.3   PTHi 12 - 72 pg/mL - 175(H) -   Vit D Def 20 - 75 ug/L 29 19(L) -     Heme Latest Ref Rng & Units 6/12/2020 2/14/2020 11/19/2019   WBC 4.0 - 11.0 10e9/L 4.5 4.0 4.2   Hgb 11.7 - 15.7 g/dL 12.9 12.5 13.0   Plt 150 - 450 10e9/L 141(L) 136(L) 128(L)    ABSOLUTE NEUTROPHIL 1.6 - 8.3 10e9/L - - -   ABSOLUTE LYMPHOCYTES 0.8 - 5.3 10e9/L - - -   ABSOLUTE MONOCYTES 0.0 - 1.3 10e9/L - - -   ABSOLUTE EOSINOPHILS 0.0 - 0.7 10e9/L - - -   ABSOLUTE BASOPHILS 0.0 - 0.2 10e9/L - - -   ABS IMMATURE GRANULOCYTES 0 - 0.4 10e9/L - - -   ABSOLUTE NUCLEATED RBC - - - -     Liver Latest Ref Rng & Units 8/25/2011 2/16/2011 8/25/2010   AP 40 - 150 U/L 66 70 67   TBili 0.2 - 1.3 mg/dL 0.2 <0.1(L) 0.3   ALT 0 - 50 U/L 10 25 7   AST 0 - 45 U/L 21 16 19   Tot Protein 6.8 - 8.8 g/dL 7.2 6.8 6.9   Albumin 3.9 - 5.1 g/dL 4.3 4.2 4.4     Pancreas Latest Ref Rng & Units 7/29/2009   A1C 4.3 - 6.0 % 5.0     Iron studies Latest Ref Rng & Units 8/26/2014 6/20/2012 3/23/2012   Iron 35 - 180 ug/dL 76 71 97   Iron sat 15 - 46 % 27 23 32   Ferritin 10 - 300 ng/mL 365(H) 344(H) 328(H)     [facility] Txp Virology Latest Ref Rng & Units 3/4/2013 1/23/2012 8/25/2011   CMV IgG EU/mL - - -   CVM DNA Quant - Whole blood, EDTA anticoagulant - -   CMV Quant <100 Copies/mL <100  No CMV DNA detected. - -   CMV QT Log <2.0 Log copies/mL <2.0  The Cytomegalovirus DNA Quantitation assay is a real-time polymerase chain   reaction (PCR) utilizing analyte specific reagents manufactured by [employer]   [employer]. Analyte Specific Reagents (ASRs) are used in many laboratory   tests necessary for standard medical care and generally do not require FDA   approval.   This test was developed and its performance characteristics determined by   [facility].  It has not been   cleared or approved by the [place] Food and Drug Administration. - -   BK Spec - - Plasma Plasma, EDTA anticoagulant  CORRECTED ON 08/29 AT 1009: PREVIOUSLY REPORTED AS Whole blood, EDTA   anticoagulant   BK Res <1000 copies/mL - <1000 <1000   BK Log <3.0 Log copies/mL - <3.0  The lower limit of detection for this assay is 1000 copies/mL.  Real-time TaqMan   PCR was performed using BK primers and probe for the detection  of a 90 bp   portion of the  1 gene.  The performance characteristics were validated by   the [facility].   It has not been cleared or approved by the U.S. Food and Drug Administration. <3.0  The lower limit of detection for this assay is 1000 copies/mL.  Real-time TaqMan   PCR was performed using BK primers and probe for the detection of a 90 bp   portion of the  1 gene.  The performance characteristics were validated by   the [facility].   It has not been cleared or approved by the U.S. Food and Drug Administration.   EBV IgG - - - -   Hep B Core NEG - - -   Hep B Surf - - - -   HIV 1&2 NEG - - -        Recent Labs   Lab Test 12/17/19  0755 02/14/20  0802 06/12/20  0753   DOSTAC 2000 on 12.16.19 2000 02.14.20 8:00PM 6/11   TACROL 4.4* 3.9* 3.8*     Recent Labs   Lab Test 04/04/14  0747 07/02/14  0802 09/12/19  0754   DOSMPA 2000 04/03/2014 07/01/14 2000 PM 9/11/19 2000   MPACID 1.45 1.24 2.59   MPAG 50.2 46.4 33.6       Again, thank you for allowing me to participate in the care of your patient.      Sincerely,    Kidney/Pancreas Recipient

## 2020-08-27 NOTE — PROGRESS NOTES
"Bessy Spangler is a 60 year old female who is being evaluated via a billable video visit.      The patient has been notified of following:     \"This video visit will be conducted via a call between you and your physician/provider. We have found that certain health care needs can be provided without the need for an in-person physical exam.  This service lets us provide the care you need with a video conversation.  If a prescription is necessary we can send it directly to your pharmacy.  If lab work is needed we can place an order for that and you can then stop by our lab to have the test done at a later time.    Video visits are billed at different rates depending on your insurance coverage.  Please reach out to your insurance provider with any questions.    If during the course of the call the physician/provider feels a video visit is not appropriate, you will not be charged for this service.\"    Patient has given verbal consent for Video visit? Yes  How would you like to obtain your AVS? Mail a copy  Will anyone else be joining your video visit? No    Video-Visit Details    Type of service:  Video Visit    Video Start Time: 1308  Video End Time: 1336    Originating Location (pt. Location): Home    Distant Location (provider location):  [facility]     Platform used for Video Visit: Press-sense    Chris Marquez MD      CHRONIC TRANSPLANT NEPHROLOGY VISIT    Assessment & Plan   # LDKT: Stable   - Baseline Cr ~ 0.6-0.8   - Proteinuria: Normal (<0.2 grams)   - Date DSA Last Checked: Oct/2009      Latest DSA: Not checked recently due to time from transplant   - BK Viremia: Not checked recently due to time from transplant   - Kidney Tx Biopsy: No    # Immunosuppression: Tacrolimus immediate release (goal 4-6) and Mycophenolic acid (dose 540 mg every 12 hours)   - Changes: Yes - With tacrolimus level slightly below goal, will increase tacrolimus dose ot 4 mg every 12 hours.    # Infection Prophylaxis:   - " PJP: None    # Hypertension: Controlled;  Goal BP: < 130/80   - Changes: No    # Mineral Bone Disorder:   - Vitamin D; level: Low        On supplement: Yes  - Calcium; level: Normal        On supplement: No    # Paroxysmal Atrial Fibrillation: No recent episodes or symptoms since starting sotalol.  Patient remains on chronic anticoagulation with apixaban.    # H/o DVT and h/o PE: Remains on chronic anticoagulation with apixaban.    # Obesity: Stable to slight increase in weight.   - Recommend weight loss for overall health by increasing exercise and watching caloric intake.    # Skin Cancer: New lesions: none   - Discussed sun protection and recommend regular follow up with Dermatology.    # Medical Compliance: No.  Evidence of labs less than recommended.  - Discussed importance of checking labs regularly as recommended, taking medications as prescribed and attending scheduled medical appointments.    # COVID-19 Virus Review: Discussed COVID-19 virus and the potential medical risks.  Reviewed preventative health recommendations, which includes washing hands for 20 seconds, avoid touching your face, and social distancing.  Asked patient to inform the transplant center if they are exposed or diagnosed with this virus.    # Transplant History:  Etiology of Kidney Failure: Chronic glomerulonephritis (GN)  Tx: LDKT  Transplant: 7/30/2009 (Kidney)  Donor Type: Living Donor Class: Standard Criteria Donor  Significant changes in immunosuppression: None  Significant transplant-related complications: None    Transplant Office Phone Number: [phone]    Assessment and plan was discussed with the patient and she voiced her understanding and agreement.    Return visit: Return in about 1 year (around 8/27/2021).    Chris Marquez MD    Chief Complaint   Ms. Spangler is a 60 year old here for kidney transplant and immunosuppression management.    History of Present Illness    Ms. Spangler reports feeling good overall with  "some medical complaints.  No recent hospitalizations, but has had new medical issues.  Patient was having palpitations that would come and go, but some would last 45-60 minutes.  As part of the work up, she was found to have paroxysmal atrial fibrillation.  Patient was started on sotalol with no recent palpitations.  She also remains on anticoagulation with apixaban, which she was already taking for her previous history of DVT and PE.    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  Her grandson is 10 years old and was found to have kidney failure.  As part of the work up, he was diagnosed with Alport's syndrome.  Genetic testing found patient's daughter to be an X-linked carrier, similar to the patient.  Patient's kidney failure, however, was not likely due to Alport's syndrome as she was diagnosed with kidney disease at age 3 and told it was \"nephritis.\"  She doesn't really remember if she was treated or what happened, but she says it was thought she might not live through it, but her kidney function improved and she then did well for decades until having decreased kidney function in the early 2000s.    Her energy level is okay, but she says it is \"hard to know.\"  She has been working at home for the past 5 months due to the pandemic and doesn't really do much.  She is somewhat active, but really gets minimal exercise.  Denies any chest pain or shortness of breath with exertion.  Appetite is good and her weight may be up a few pounds, but she hasn't weighed herself.  No nausea, vomiting or diarrhea.  No fever, sweats or chills.  A little leg swelling at the end of the day.    Recent Hospitalizations:  [x] No [] Yes    New Medical Issues: [] No [x] Yes See above   Decreased energy: [x] No [] Yes    Chest pain or SOB with exertion:  [x] No [] Yes    Appetite change or weight change: [x] No [] Yes    Nausea, vomiting or diarrhea:  [x] No [] Yes    Fever, sweats or chills: [x] No [] " "Yes    Leg swelling: [] No [x] Yes A little leg swelling     Home BP: 120s systolic    Review of Systems   A comprehensive review of systems was obtained and negative, except as noted in the HPI or PMH.    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     Vitamin D deficiency     Paroxysmal atrial fibrillation (H)       Social History   Social History     Tobacco Use     Smoking status: Former Smoker     Packs/day: 1.00     Years: 20.00     Pack years: 20.00     Types: Cigarettes     Last attempt to quit: 1998     Years since quittin.6     Smokeless tobacco: Never Used   Substance Use Topics     Alcohol use: No     Alcohol/week: 0.0 standard drinks     Drug use: No       Allergies   Allergies   Allergen Reactions     Dilaudid [Hydromorphone Hcl]      Patient unable to recall     Hydromorphone      Patient unable to recall     Sulfa Drugs Swelling     \"swelling\" - age 3         Medications   Current Outpatient Medications   Medication Sig     ELIQUIS ANTICOAGULANT 5 MG tablet TAKE 1 TABLET BY MOUTH TWICE A DAY     levothyroxine (SYNTHROID/LEVOTHROID) 125 MCG tablet Take 1 tablet (125 mcg) by mouth daily     metoprolol tartrate (LOPRESSOR) 50 MG tablet TAKE 1 TABLET BY MOUTH TWICE A DAY     mycophenolic acid (GENERIC EQUIVALENT) 180 MG EC tablet Take 3 tablets (540 mg) by mouth 2 times daily     sotalol (BETAPACE) 80 MG tablet Take 0.5 tablets (40 mg) by mouth 2 times daily     tacrolimus (GENERIC " EQUIVALENT) 0.5 MG capsule Total dose: 3.5 mg twice daily     tacrolimus (GENERIC EQUIVALENT) 1 MG capsule Take 4 capsules (4 mg) by mouth 2 times daily     VITAMIN D, CHOLECALCIFEROL, PO Take 1,000 Units by mouth daily     No current facility-administered medications for this visit.      Medications Discontinued During This Encounter   Medication Reason     tacrolimus (GENERIC EQUIVALENT) 1 MG capsule        Physical Exam   Vital signs were deferred for this telemedicine visit.    GENERAL APPEARANCE: alert and no distress  HENT: no obvious abnormalities on appearance  RESP: breathing appears unremarkable with normal rate, no audible wheezing or cough and no apparent shortness of breath with conversation  MS: extremities normal - no gross deformities noted  SKIN: no apparent rash and normal skin tone  NEURO: speech is clear with no obvious neurological deficits  PSYCH: mentation appears normal and affect normal    Data     Renal Latest Ref Rng & Units 6/12/2020 2/14/2020 11/19/2019   Na 133 - 144 mmol/L 139 139 138   K 3.4 - 5.3 mmol/L 4.7 4.1 4.3   Cl 94 - 109 mmol/L 108 108 107   CO2 20 - 32 mmol/L 27 28 24   BUN 7 - 30 mg/dL 15 15 12   Cr 0.52 - 1.04 mg/dL 0.65 0.56 0.56   Glucose 70 - 99 mg/dL 104(H) 103(H) 100(H)   Ca  8.5 - 10.1 mg/dL 9.3 9.3 9.8   Mg 1.6 - 2.3 mg/dL - - -     Bone Health Latest Ref Rng & Units 9/12/2019 8/26/2014 1/27/2010   Phos 2.5 - 4.5 mg/dL - - 3.3   PTHi 12 - 72 pg/mL - 175(H) -   Vit D Def 20 - 75 ug/L 29 19(L) -     Heme Latest Ref Rng & Units 6/12/2020 2/14/2020 11/19/2019   WBC 4.0 - 11.0 10e9/L 4.5 4.0 4.2   Hgb 11.7 - 15.7 g/dL 12.9 12.5 13.0   Plt 150 - 450 10e9/L 141(L) 136(L) 128(L)   ABSOLUTE NEUTROPHIL 1.6 - 8.3 10e9/L - - -   ABSOLUTE LYMPHOCYTES 0.8 - 5.3 10e9/L - - -   ABSOLUTE MONOCYTES 0.0 - 1.3 10e9/L - - -   ABSOLUTE EOSINOPHILS 0.0 - 0.7 10e9/L - - -   ABSOLUTE BASOPHILS 0.0 - 0.2 10e9/L - - -   ABS IMMATURE GRANULOCYTES 0 - 0.4 10e9/L - - -   ABSOLUTE NUCLEATED RBC -  - - -     Liver Latest Ref Rng & Units 8/25/2011 2/16/2011 8/25/2010   AP 40 - 150 U/L 66 70 67   TBili 0.2 - 1.3 mg/dL 0.2 <0.1(L) 0.3   ALT 0 - 50 U/L 10 25 7   AST 0 - 45 U/L 21 16 19   Tot Protein 6.8 - 8.8 g/dL 7.2 6.8 6.9   Albumin 3.9 - 5.1 g/dL 4.3 4.2 4.4     Pancreas Latest Ref Rng & Units 7/29/2009   A1C 4.3 - 6.0 % 5.0     Iron studies Latest Ref Rng & Units 8/26/2014 6/20/2012 3/23/2012   Iron 35 - 180 ug/dL 76 71 97   Iron sat 15 - 46 % 27 23 32   Ferritin 10 - 300 ng/mL 365(H) 344(H) 328(H)     [facility] Txp Virology Latest Ref Rng & Units 3/4/2013 1/23/2012 8/25/2011   CMV IgG EU/mL - - -   CVM DNA Quant - Whole blood, EDTA anticoagulant - -   CMV Quant <100 Copies/mL <100  No CMV DNA detected. - -   CMV QT Log <2.0 Log copies/mL <2.0  The Cytomegalovirus DNA Quantitation assay is a real-time polymerase chain   reaction (PCR) utilizing analyte specific reagents manufactured by [employer]   [employer]. Analyte Specific Reagents (ASRs) are used in many laboratory   tests necessary for standard medical care and generally do not require FDA   approval.   This test was developed and its performance characteristics determined by   [facility].  It has not been   cleared or approved by the US Food and Drug Administration. - -   BK Spec - - Plasma Plasma, EDTA anticoagulant  CORRECTED ON 08/29 AT 1009: PREVIOUSLY REPORTED AS Whole blood, EDTA   anticoagulant   BK Res <1000 copies/mL - <1000 <1000   BK Log <3.0 Log copies/mL - <3.0  The lower limit of detection for this assay is 1000 copies/mL.  Real-time TaqMan   PCR was performed using BK primers and probe for the detection of a 90 bp   portion of the  1 gene.  The performance characteristics were validated by   the [facility].   It has not been cleared or approved by the U.S. Food and Drug Administration. <3.0  The lower limit of detection for this assay is 1000 copies/mL.   Real-time TaqMan   PCR was performed using BK primers and probe for the detection of a 90 bp   portion of the  1 gene.  The performance characteristics were validated by   the [facility], [place].   It has not been cleared or approved by the U.S. Food and Drug Administration.   EBV IgG - - - -   Hep B Core NEG - - -   Hep B Surf - - - -   HIV 1&2 NEG - - -        Recent Labs   Lab Test 12/17/19  0755 02/14/20  0802 06/12/20  0753   DOSTAC 2000 on 12.16.19 2000 02.14.20 8:00PM 6/11   TACROL 4.4* 3.9* 3.8*     Recent Labs   Lab Test 04/04/14  0747 07/02/14  0802 09/12/19  0754   DOSMPA 2000 04/03/2014 07/01/14 2000 PM 9/11/19 2000   MPACID 1.45 1.24 2.59   MPAG 50.2 46.4 33.6

## 2020-08-29 PROBLEM — I48.0 PAROXYSMAL ATRIAL FIBRILLATION (H): Status: ACTIVE | Noted: 2020-08-29

## 2020-09-03 DIAGNOSIS — E07.9 THYROID DISORDER: ICD-10-CM

## 2020-09-03 RX ORDER — LEVOTHYROXINE SODIUM 125 UG/1
125 TABLET ORAL DAILY
Qty: 5 TABLET | Refills: 0 | Status: SHIPPED | OUTPATIENT
Start: 2020-09-03 | End: 2020-09-09

## 2020-09-05 DIAGNOSIS — E07.9 THYROID DISORDER: ICD-10-CM

## 2020-09-09 RX ORDER — LEVOTHYROXINE SODIUM 125 UG/1
TABLET ORAL
Qty: 90 TABLET | Refills: 0 | Status: SHIPPED | OUTPATIENT
Start: 2020-09-09 | End: 2020-10-20

## 2020-09-09 NOTE — TELEPHONE ENCOUNTER
Prescription approved per G Refill Protocol.    Celena Ceja RN, [facility] Triage

## 2020-10-06 DIAGNOSIS — Z94.0 KIDNEY REPLACED BY TRANSPLANT: ICD-10-CM

## 2020-10-06 RX ORDER — TACROLIMUS 0.5 MG/1
CAPSULE ORAL
Qty: 180 CAPSULE | Refills: 3 | Status: SHIPPED | OUTPATIENT
Start: 2020-10-06 | End: 2020-10-13

## 2020-10-06 RX ORDER — TACROLIMUS 1 MG/1
3 CAPSULE ORAL 2 TIMES DAILY
Qty: 540 CAPSULE | Refills: 3 | Status: SHIPPED | OUTPATIENT
Start: 2020-10-06 | End: 2020-10-13

## 2020-10-13 DIAGNOSIS — Z48.298 AFTERCARE FOLLOWING ORGAN TRANSPLANT: ICD-10-CM

## 2020-10-13 DIAGNOSIS — Z94.0 KIDNEY REPLACED BY TRANSPLANT: Primary | ICD-10-CM

## 2020-10-13 DIAGNOSIS — Z79.899 ENCOUNTER FOR LONG-TERM CURRENT USE OF MEDICATION: ICD-10-CM

## 2020-10-13 RX ORDER — TACROLIMUS 0.5 MG/1
0.5 CAPSULE ORAL 2 TIMES DAILY
Qty: 180 CAPSULE | Refills: 3 | Status: SHIPPED | OUTPATIENT
Start: 2020-10-13 | End: 2021-09-09

## 2020-10-13 RX ORDER — TACROLIMUS 1 MG/1
3 CAPSULE ORAL 2 TIMES DAILY
Qty: 540 CAPSULE | Refills: 3 | Status: SHIPPED | OUTPATIENT
Start: 2020-10-13 | End: 2021-11-29

## 2020-10-13 NOTE — TELEPHONE ENCOUNTER
[facility] Call Center    Phone Message    May a detailed message be left on voicemail: yes     Reason for Call: Medication Refill Request    Has the patient contacted the pharmacy for the refill? Yes   Name of medication being requested: tacrolimus (GENERIC EQUIVALENT) 1 MG capsule   Provider who prescribed the medication: Dr. Marquez  Pharmacy: [facility] [place], [place]  Date medication is needed: asap         Action Taken: Message routed to:  [facility] ([facility] neph    Travel Screening: Not Applicable

## 2020-10-20 ENCOUNTER — VIRTUAL VISIT (OUTPATIENT)
Dept: FAMILY MEDICINE | Facility: CLINIC | Age: 60
End: 2020-10-20
Payer: COMMERCIAL

## 2020-10-20 DIAGNOSIS — Z00.00 ROUTINE GENERAL MEDICAL EXAMINATION AT A HEALTH CARE FACILITY: Primary | ICD-10-CM

## 2020-10-20 DIAGNOSIS — Z12.11 SPECIAL SCREENING FOR MALIGNANT NEOPLASMS, COLON: ICD-10-CM

## 2020-10-20 DIAGNOSIS — D84.9 IMMUNOSUPPRESSION (H): ICD-10-CM

## 2020-10-20 DIAGNOSIS — I48.0 PAROXYSMAL ATRIAL FIBRILLATION (H): ICD-10-CM

## 2020-10-20 DIAGNOSIS — Z12.31 ENCOUNTER FOR SCREENING MAMMOGRAM FOR BREAST CANCER: ICD-10-CM

## 2020-10-20 DIAGNOSIS — E07.9 THYROID DISORDER: ICD-10-CM

## 2020-10-20 PROCEDURE — 99396 PREV VISIT EST AGE 40-64: CPT | Mod: GT | Performed by: PREVENTIVE MEDICINE

## 2020-10-20 RX ORDER — LEVOTHYROXINE SODIUM 125 UG/1
125 TABLET ORAL DAILY
Qty: 90 TABLET | Refills: 3 | Status: SHIPPED | OUTPATIENT
Start: 2020-10-20 | End: 2021-11-24

## 2020-10-20 NOTE — PROGRESS NOTES
SUBJECTIVE:   CC: Bessy Spangler is an 60 year old woman who presents for preventive health visit.     This was a visit done Virtually during Covid 19 pandemic    Video visit start time: 1115 AM  Video visit end time: 1127 AM  [facility] used  Patient at home  [facility]     Patient has been advised of split billing requirements and indicates understanding: Yes  Healthy Habits:    Getting at least 3 servings of Calcium per day:  Yes    Bi-annual eye exam:  NO    Dental care twice a year:  Yes    Sleep apnea or symptoms of sleep apnea:  None    Diet:  Regular (no restrictions)    Frequency of exercise:  2-3 days/week    Duration of exercise:  30-45 minutes    Taking medications regularly:  No    Barriers to taking medications:  Not applicable    Medication side effects:  Not applicable    PHQ-2 Total Score:          Hypothyroidism Follow-up      Since last visit, patient describes the following symptoms: Weight stable, no hair loss, no skin changes, no constipation, no loose stools      Today's PHQ-2 Score:   PHQ-2 (  [employer]) 2020   Q1: Little interest or pleasure in doing things 0   Q2: Feeling down, depressed or hopeless 0   PHQ-2 Score 0       Abuse: Current or Past (Physical, Sexual or Emotional) - No  Do you feel safe in your environment? Yes    Have you ever done Advance Care Planning? (For example, a Health Directive, POLST, or a discussion with a medical provider or your loved ones about your wishes): Yes, advance care planning is on file.    Social History     Tobacco Use     Smoking status: Former Smoker     Packs/day: 1.00     Years: 20.00     Pack years: 20.00     Types: Cigarettes     Quit date: 1998     Years since quittin.8     Smokeless tobacco: Never Used   Substance Use Topics     Alcohol use: No     Alcohol/week: 0.0 standard drinks         No flowsheet data found.    Reviewed orders with patient.  Reviewed health maintenance and updated orders  accordingly - Yes  Labs reviewed in EPIC  BP Readings from Last 3 Encounters:   20 116/73   19 (!) 141/79   10/11/19 126/69    Wt Readings from Last 3 Encounters:   20 91.5 kg (201 lb 12.8 oz)   19 90.7 kg (200 lb)   10/11/19 90.7 kg (200 lb)                  Patient Active Problem List   Diagnosis     Breast cancer (H)     Kidney replaced by transplant     CARDIOVASCULAR SCREENING; LDL GOAL LESS THAN 100     Immunosuppression (H)     HTN, kidney transplant related     Thyroid disorder     Post-menopausal     Conjunctival hemorrhage     Tear film insufficiency     Presbyopia - Both Eyes     Aftercare following organ transplant     Vestibular neuronitis of right ear     Acute deep vein thrombosis (DVT) of distal vein of right lower extremity (H)     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     Vitamin D deficiency     Paroxysmal atrial fibrillation (H)     Past Surgical History:   Procedure Laterality Date     APPENDECTOMY OPEN       AV FISTULA OR GRAFT ARTERIAL      left     C TRANSPLANT,PREP LIVING  RENAL GRAFT  2009    Hx of nephritis     LUMPECTOMY BREAST      left       Social History     Tobacco Use     Smoking status: Former Smoker     Packs/day: 1.00     Years: 20.00     Pack years: 20.00     Types: Cigarettes     Quit date: 1998     Years since quittin.8     Smokeless tobacco: Never Used   Substance Use Topics     Alcohol use: No     Alcohol/week: 0.0 standard drinks     Family History   Problem Relation Age of Onset     Diabetes Father         type 2       Arthritis Father      Glaucoma Father      Cancer Mother 58        smoker,metastatic adnoid cystic cancer,  early 60s     Arthritis Mother      Endocrine Disease Mother         thyroid     Hypertension Maternal Grandmother          at a young age     Kidney Disease Maternal Grandmother      Hypertension Maternal Grandfather      Cancer Paternal Grandmother         stomach cancer     Endocrine  "Disease Sister         thyroid     Genitourinary Problems Daughter 32        renal failure      Endocrine Disease Sister         thyroid     Chronic Obstructive Pulmonary Disease Paternal Grandfather         smoking  young     Heart Disease Maternal Half-Brother          Current Outpatient Medications   Medication Sig Dispense Refill     levothyroxine (SYNTHROID/LEVOTHROID) 125 MCG tablet Take 1 tablet (125 mcg) by mouth daily 90 tablet 3     ELIQUIS ANTICOAGULANT 5 MG tablet TAKE 1 TABLET BY MOUTH TWICE A  tablet 1     metoprolol tartrate (LOPRESSOR) 50 MG tablet TAKE 1 TABLET BY MOUTH TWICE A  tablet 1     mycophenolic acid (GENERIC EQUIVALENT) 180 MG EC tablet Take 3 tablets (540 mg) by mouth 2 times daily 540 tablet 0     sotalol (BETAPACE) 80 MG tablet Take 0.5 tablets (40 mg) by mouth 2 times daily 90 tablet 3     tacrolimus (GENERIC EQUIVALENT) 0.5 MG capsule Take 1 capsule (0.5 mg) by mouth 2 times daily Total dose: 3.5 mg twice daily 180 capsule 3     tacrolimus (GENERIC EQUIVALENT) 1 MG capsule Take 3 capsules (3 mg) by mouth 2 times daily 540 capsule 3     VITAMIN D, CHOLECALCIFEROL, PO Take 1,000 Units by mouth daily       Allergies   Allergen Reactions     Dilaudid [Hydromorphone Hcl]      Patient unable to recall     Hydromorphone      Patient unable to recall     Sulfa Drugs Swelling     \"swelling\" - age 3         Mammogram Screening: Patient over age 50, mutual decision to screen reflected in health maintenance.  Alternate mammogram schedule due to breast cancer history     Pertinent mammograms are reviewed under the imaging tab.  History of abnormal Pap smear: NO   Cervical Cytology Screening Guidelines  PAP / HPV Latest Ref Rng & Units 10/11/2019 10/16/2014   PAP - NIL NIL   HPV 16 DNA NEG:Negative Negative -   HPV 18 DNA NEG:Negative Negative -   OTHER HR HPV NEG:Negative Negative -     Reviewed and updated as needed this visit by clinical staff  Tobacco  Allergies  Meds  " "Problems  Med Hx  Surg Hx  Fam Hx          Reviewed and updated as needed this visit by Provider  Tobacco  Allergies  Meds  Problems  Med Hx  Surg Hx  Fam Hx         Past Medical History:   Diagnosis Date     Anemia of chronic renal failure      Breast cancer (H)      Chronic diarrhea      Chronic kidney disease (CKD), stage V (H)     Due to \"nephritis\". Biopsy done years ago. I do not know the histologic specifics     High risk medication use      Hyperphosphatemia      Hypertension      Hypothyroid      Immunosuppressed status (H)      Kidney replaced by transplant     nephritis as a child     PE (pulmonary embolism)     after transplant     Secondary hyperparathyroidism (of renal origin)       Past Surgical History:   Procedure Laterality Date     APPENDECTOMY OPEN  1999     AV FISTULA OR GRAFT ARTERIAL      left     C TRANSPLANT,PREP LIVING  RENAL GRAFT  7/30/2009    Hx of nephritis     LUMPECTOMY BREAST      left       Review of Systems  CONSTITUTIONAL: NEGATIVE for fever, chills, change in weight  INTEGUMENTARY/SKIN: NEGATIVE for worrisome rashes, moles or lesions  EYES: NEGATIVE for vision changes or irritation  ENT: NEGATIVE for ear, mouth and throat problems  RESP: NEGATIVE for significant cough or SOB  BREAST: NEGATIVE for masses, tenderness or discharge  CV: NEGATIVE for chest pain, palpitations or peripheral edema  GI: NEGATIVE for nausea, abdominal pain, heartburn, or change in bowel habits  : NEGATIVE for unusual urinary or vaginal symptoms. No vaginal bleeding.  MUSCULOSKELETAL: NEGATIVE for significant arthralgias or myalgia  NEURO: NEGATIVE for weakness, dizziness or paresthesias  ENDOCRINE: NEGATIVE for temperature intolerance, skin/hair changes  HEME/ALLERGY/IMMUNE: NEGATIVE for bleeding problems  PSYCHIATRIC: NEGATIVE for changes in mood or affect      OBJECTIVE:   LMP 10/25/2004 (Exact Date)      No vitals taken due to Virtual visit  Limited physical exam due to Virtual " visit    Physical Exam  GENERAL APPEARANCE: healthy, alert and no distress  EYES: Eyes grossly normal to inspection, conjunctivae and sclerae normal  RESP: no audible wheezing   SKIN: no suspicious lesions or rashes  NEURO: Normal strength and tone, sensory exam grossly normal, mentation intact and speech normal  PSYCH: mentation appears normal and affect normal/bright    Diagnostic Test Results:  Labs reviewed in Epic  No results found for this or any previous visit (from the past 24 hour(s)).    ASSESSMENT/PLAN:   Bessy was seen today for physical.    Diagnoses and all orders for this visit:    Routine general medical examination at a health care facility  -     Lipid panel reflex to direct LDL Non-fasting; Future  -     Hemoglobin A1c; Future  -Shingrix not indicated due to renal transplant history  -will get Flu vaccine at her pharmacy     Thyroid disorder  -     TSH; Future  -     T4 free; Future  -     levothyroxine (SYNTHROID/LEVOTHROID) 125 MCG tablet; Take 1 tablet (125 mcg) by mouth daily  -await labs, dose may need to be adjusted     Immunosuppression (H)  -     Hemoglobin A1c; Future  -Kidney replaced by transplant  -saw nephrology 8/2020    Paroxysmal atrial fibrillation (H)  -on sotalol  -on Apixaban due to history of DVT and PE  -saw cardiology 7/20/2020  -she is also on Metoprolol for Hypertension    Encounter for screening mammogram for breast cancer  -     *MA Screening Digital Bilateral; Future    Special screening for malignant neoplasms, colon  -     Fecal colorectal cancer screen (FIT); Future        Patient has been advised of split billing requirements and indicates understanding: Yes  COUNSELING:  Reviewed preventive health counseling, as reflected in patient instructions       Regular exercise       Healthy diet/nutrition       Immunizations    Vaccinated for: Influenza             Colon cancer screening    Estimated body mass index is 32.57 kg/m  as calculated from the following:     "Height as of 2/14/20: 1.676 m (5' 6\").    Weight as of 2/14/20: 91.5 kg (201 lb 12.8 oz).    Weight management plan: Discussed healthy diet and exercise guidelines    She reports that she quit smoking about 22 years ago. Her smoking use included cigarettes. She has a 20.00 pack-year smoking history. She has never used smokeless tobacco.      Counseling Resources:  ATP IV Guidelines  Pooled Cohorts Equation Calculator  Breast Cancer Risk Calculator  BRCA-Related Cancer Risk Assessment: FHS-7 Tool  FRAX Risk Assessment  ICSI Preventive Guidelines  Dietary Guidelines for Americans, 2010  USDA's MyPlate  ASA Prophylaxis  Lung CA Screening    Gina Dc MD MPH     [facility]  "

## 2020-10-20 NOTE — PROGRESS NOTES
"Bessy Spangler is a 60 year old female who is being evaluated via a billable video visit.      The patient has been notified of following:     \"This video visit will be conducted via a call between you and your physician/provider. We have found that certain health care needs can be provided without the need for an in-person physical exam.  This service lets us provide the care you need with a video conversation.  If a prescription is necessary we can send it directly to your pharmacy.  If lab work is needed we can place an order for that and you can then stop by our lab to have the test done at a later time.    Video visits are billed at different rates depending on your insurance coverage.  Please reach out to your insurance provider with any questions.    If during the course of the call the physician/provider feels a video visit is not appropriate, you will not be charged for this service.\"    Patient has given verbal consent for Video visit? Yes  How would you like to obtain your AVS? Mail a copy  If you are dropped from the video visit, the video invite should be resent to: Text to cell phone: [phone]  Will anyone else be joining your video visit? No  {If patient encounters technical issues they should call [phone] :[id number]}    Subjective     Bessy Spangler is a 60 year old female who presents today via video visit for the following health issues:    HPI     "

## 2020-10-23 NOTE — TELEPHONE ENCOUNTER
Discussed positive Alport syndrome testing: COL4A5 c.36G>T (p.Glu46*). Reviewed symptoms and inheritance.    This, along with symptoms, are consistent with a diagnosis of Alport syndrome. Continue to be managed by PCP and nephrologist.     All questions answered.

## 2020-10-31 DIAGNOSIS — I82.4Z1 ACUTE DEEP VEIN THROMBOSIS (DVT) OF DISTAL VEIN OF RIGHT LOWER EXTREMITY (H): ICD-10-CM

## 2020-11-03 RX ORDER — APIXABAN 5 MG/1
TABLET, FILM COATED ORAL
Qty: 180 TABLET | Refills: 1 | Status: SHIPPED | OUTPATIENT
Start: 2020-11-03 | End: 2021-05-11

## 2020-11-03 NOTE — TELEPHONE ENCOUNTER
Routing refill request to provider for review/approval because:  Labs out of range:  platelets      Requested Prescriptions   Pending Prescriptions Disp Refills     ELIQUIS ANTICOAGULANT 5 MG tablet [Pharmacy Med Name: ELIQUIS 5 MG TABLET] 180 tablet 1     Sig: TAKE 1 TABLET BY MOUTH TWICE A DAY       Direct Oral Anticoagulant Agents Failed - 11/3/2020  1:25 PM        Failed - Normal Platelets on file in past 12 months     Recent Labs   Lab Test 06/12/20  0753   *               Passed - Medication is active on med list        Passed - Patient is 18-79 years of age        Passed - Serum creatinine less than or equal to 1.4 on file in past 12 mos     Recent Labs   Lab Test 06/12/20  0753 04/01/18  1942 04/01/18  1942 05/28/14  0844 05/28/14  0844   CR 0.65   < >  --    < >  --    CREAT  --   --  0.6  --   --    CRPOC  --   --   --   --  0.7    < > = values in this interval not displayed.       Ok to refill medication if creatinine is low          Passed - Weight is greater than 60 kg for the past year     Wt Readings from Last 3 Encounters:   02/14/20 91.5 kg (201 lb 12.8 oz)   11/27/19 90.7 kg (200 lb)   10/11/19 90.7 kg (200 lb)             Passed - No active pregnancy on record        Passed - No positive pregnancy test within past 12 months        Passed - Recent (6 mo) or future (30 days) visit within the authorizing provider's specialty               Prashant Gu RN, BSN, PHN

## 2020-11-27 DIAGNOSIS — Z79.899 ENCOUNTER FOR LONG-TERM CURRENT USE OF MEDICATION: ICD-10-CM

## 2020-11-27 DIAGNOSIS — Z94.0 KIDNEY REPLACED BY TRANSPLANT: ICD-10-CM

## 2020-11-27 DIAGNOSIS — Z48.298 AFTERCARE FOLLOWING ORGAN TRANSPLANT: ICD-10-CM

## 2020-11-27 DIAGNOSIS — E07.9 THYROID DISORDER: ICD-10-CM

## 2020-11-27 DIAGNOSIS — D84.9 IMMUNOSUPPRESSION (H): ICD-10-CM

## 2020-11-27 DIAGNOSIS — Z00.00 ROUTINE GENERAL MEDICAL EXAMINATION AT A HEALTH CARE FACILITY: ICD-10-CM

## 2020-11-27 LAB
ANION GAP SERPL CALCULATED.3IONS-SCNC: 5 MMOL/L (ref 3–14)
BUN SERPL-MCNC: 10 MG/DL (ref 7–30)
CALCIUM SERPL-MCNC: 9 MG/DL (ref 8.5–10.1)
CHLORIDE SERPL-SCNC: 107 MMOL/L (ref 94–109)
CHOLEST SERPL-MCNC: 184 MG/DL
CO2 SERPL-SCNC: 29 MMOL/L (ref 20–32)
CREAT SERPL-MCNC: 0.67 MG/DL (ref 0.52–1.04)
CREAT UR-MCNC: 189 MG/DL
ERYTHROCYTE [DISTWIDTH] IN BLOOD BY AUTOMATED COUNT: 13 % (ref 10–15)
GFR SERPL CREATININE-BSD FRML MDRD: >90 ML/MIN/{1.73_M2}
GLUCOSE SERPL-MCNC: 99 MG/DL (ref 70–99)
HBA1C MFR BLD: 5 % (ref 0–5.6)
HCT VFR BLD AUTO: 39.7 % (ref 35–47)
HDLC SERPL-MCNC: 83 MG/DL
HGB BLD-MCNC: 12.8 G/DL (ref 11.7–15.7)
LDLC SERPL CALC-MCNC: 78 MG/DL
MCH RBC QN AUTO: 30 PG (ref 26.5–33)
MCHC RBC AUTO-ENTMCNC: 32.2 G/DL (ref 31.5–36.5)
MCV RBC AUTO: 93 FL (ref 78–100)
NONHDLC SERPL-MCNC: 101 MG/DL
PLATELET # BLD AUTO: 118 10E9/L (ref 150–450)
POTASSIUM SERPL-SCNC: 4.5 MMOL/L (ref 3.4–5.3)
PROT UR-MCNC: 0.21 G/L
PROT/CREAT 24H UR: 0.11 G/G CR (ref 0–0.2)
RBC # BLD AUTO: 4.26 10E12/L (ref 3.8–5.2)
SODIUM SERPL-SCNC: 141 MMOL/L (ref 133–144)
T4 FREE SERPL-MCNC: 0.96 NG/DL (ref 0.76–1.46)
TRIGL SERPL-MCNC: 114 MG/DL
TSH SERPL DL<=0.005 MIU/L-ACNC: 4.72 MU/L (ref 0.4–4)
WBC # BLD AUTO: 3.5 10E9/L (ref 4–11)

## 2020-11-27 PROCEDURE — 84439 ASSAY OF FREE THYROXINE: CPT | Performed by: INTERNAL MEDICINE

## 2020-11-27 PROCEDURE — 84156 ASSAY OF PROTEIN URINE: CPT | Performed by: INTERNAL MEDICINE

## 2020-11-27 PROCEDURE — 85027 COMPLETE CBC AUTOMATED: CPT | Performed by: INTERNAL MEDICINE

## 2020-11-27 PROCEDURE — 80048 BASIC METABOLIC PNL TOTAL CA: CPT | Performed by: INTERNAL MEDICINE

## 2020-11-27 PROCEDURE — 80197 ASSAY OF TACROLIMUS: CPT | Performed by: INTERNAL MEDICINE

## 2020-11-27 PROCEDURE — 80061 LIPID PANEL: CPT | Performed by: INTERNAL MEDICINE

## 2020-11-27 PROCEDURE — 84443 ASSAY THYROID STIM HORMONE: CPT | Performed by: INTERNAL MEDICINE

## 2020-11-27 PROCEDURE — 83036 HEMOGLOBIN GLYCOSYLATED A1C: CPT | Performed by: INTERNAL MEDICINE

## 2020-11-27 PROCEDURE — 36415 COLL VENOUS BLD VENIPUNCTURE: CPT | Performed by: INTERNAL MEDICINE

## 2020-11-28 LAB
TACROLIMUS BLD-MCNC: 4.5 UG/L (ref 5–15)
TME LAST DOSE: ABNORMAL H

## 2020-11-28 NOTE — RESULT ENCOUNTER NOTE
Bessy,     Three month glucose number is normal, you do not have diabetes or pre diabetes.  Cholesterol is at goal for you.  Thyroid function is in a normal range, continue current dose of medication.     Please do not hesitate to call us at [phone] if you have any questions or concerns.    Thank you,    Gina Dc MD MPH

## 2020-12-03 ENCOUNTER — MYC MEDICAL ADVICE (OUTPATIENT)
Dept: FAMILY MEDICINE | Facility: CLINIC | Age: 60
End: 2020-12-03

## 2020-12-03 NOTE — TELEPHONE ENCOUNTER
Routing to the provider as FYI.  Cindy Wright MA  [facility]  2nd Floor  Primary Care

## 2020-12-05 DIAGNOSIS — I10 ESSENTIAL HYPERTENSION: ICD-10-CM

## 2020-12-08 RX ORDER — METOPROLOL TARTRATE 50 MG
TABLET ORAL
Qty: 180 TABLET | Refills: 0 | Status: SHIPPED | OUTPATIENT
Start: 2020-12-08 | End: 2021-03-05

## 2020-12-08 NOTE — TELEPHONE ENCOUNTER
Prescription approved per [facility] Refill Protocol.  Juliann Spain RN  [facility]

## 2020-12-14 ENCOUNTER — TELEPHONE (OUTPATIENT)
Dept: TRANSPLANT | Facility: CLINIC | Age: 60
End: 2020-12-14

## 2020-12-14 NOTE — TELEPHONE ENCOUNTER
[facility] Message:  Dr. Marquez,  Sending this message because I am very concerned about my current health and the kidney.  I thought I had a sinus cold the week of Thanksgiving, but I did receive a positive notification for COVID on 12/1/2020.  My symptoms (cough, diarrea, fever) were fairly mild until the 2nd week of December, my temps are getting higher and I am exhausted.  I think I do have a sinus infection combined with the COVID systems.  My temp has run as high as 102.2.  I have been taking Tylenol for 2 1/2 weeks now.  I am concerned about my kidney with the fever and low WBC on last labs.  Do you have any recommendations.  Can I take an antibiotic for sinus infection while having COVID?  Thank you,  Bessy Spangler    Baseline IS meds:    Tac 4-6     mg BID    Message sent to Dr. Acosta.    12/15 - Message sent to Dr. Marquez.

## 2020-12-15 NOTE — TELEPHONE ENCOUNTER
Chris Marquez MD Ututalum, Teresa, RN             She can take antibiotics, but she needs to be seen by her PCP to manage.  Yes, agree with lowering mycophenolic acid to 180 mg bid.  No prednisone.  If improving next week, then would go up to 360 mg bid on MPA.        Called Bessy Spangler and discussed recommendations to:  See PCP for management of sinus infection.  Decrease MPA to 180 mg BID.  If Sx improve next week will plan to increase to 360 mg BID.    Continues to have cough, diarrhea, fever (today 100F) and fatigue.  Reports O2 sats 92%-96%

## 2020-12-22 ENCOUNTER — TELEPHONE (OUTPATIENT)
Dept: TRANSPLANT | Facility: CLINIC | Age: 60
End: 2020-12-22

## 2020-12-22 DIAGNOSIS — Z94.0 KIDNEY REPLACED BY TRANSPLANT: ICD-10-CM

## 2020-12-22 DIAGNOSIS — Z94.0 KIDNEY TRANSPLANTED: ICD-10-CM

## 2020-12-22 DIAGNOSIS — Z94.0 IMMUNOSUPPRESSIVE MANAGEMENT ENCOUNTER FOLLOWING KIDNEY TRANSPLANT: ICD-10-CM

## 2020-12-22 DIAGNOSIS — Z79.899 IMMUNOSUPPRESSIVE MANAGEMENT ENCOUNTER FOLLOWING KIDNEY TRANSPLANT: ICD-10-CM

## 2020-12-22 DIAGNOSIS — Z48.298 AFTERCARE FOLLOWING ORGAN TRANSPLANT: ICD-10-CM

## 2020-12-22 NOTE — TELEPHONE ENCOUNTER
Weekly COVID-19 check-in / update:  Not had a fever since 12/15  Still has a bit of a cough  Stomach still a bit sensitive. --- Had diarrhea with salad and certain food. 90% back to normal  Confirms continues on  mg BID (orig dose 540 mg bid)      Covid 19 Review Meeting Recommendations:  Increase MPA to 360 mg BID.    PLAN:  Call Bessy Spangler with update.    OUTCOME:  Left a message    ADDENDUM:  Bessy Spangler called back.  Verbalized understanding and agreement to plan.

## 2020-12-29 ENCOUNTER — TELEPHONE (OUTPATIENT)
Dept: TRANSPLANT | Facility: CLINIC | Age: 60
End: 2020-12-29

## 2020-12-29 NOTE — TELEPHONE ENCOUNTER
Weekly COVID-19 check-in / update:  Left VM.  [employer] message sent.    Bessy Spangler  to Me          12/29/20 11:20 AM  Latesha Sofia,  Doing better.  No fevers, cough has improved to almost no cough.  Stomach is doing better, close to normal (90% better) had another salad with better outcome :).  Yes, on Mycophenolic Acid 360 mg twice a day.  Thank you,  Bessy Spangler      Covid 19 Review Meeting Recommendations:  Increase MPA to 540 mg BID.  Recheck COVID-19 PCR    Replied to [employer] message with recommendations.

## 2021-01-15 ENCOUNTER — HEALTH MAINTENANCE LETTER (OUTPATIENT)
Age: 61
End: 2021-01-15

## 2021-01-27 ENCOUNTER — MYC MEDICAL ADVICE (OUTPATIENT)
Dept: FAMILY MEDICINE | Facility: CLINIC | Age: 61
End: 2021-01-27

## 2021-01-27 NOTE — TELEPHONE ENCOUNTER
Printed form and placed in Dr Dc's red folder.  Cindy Wright [facility]  2nd Floor  Primary Care

## 2021-02-01 DIAGNOSIS — Z48.298 AFTERCARE FOLLOWING ORGAN TRANSPLANT: ICD-10-CM

## 2021-02-01 DIAGNOSIS — Z94.0 IMMUNOSUPPRESSIVE MANAGEMENT ENCOUNTER FOLLOWING KIDNEY TRANSPLANT: ICD-10-CM

## 2021-02-01 DIAGNOSIS — Z94.0 KIDNEY REPLACED BY TRANSPLANT: Primary | ICD-10-CM

## 2021-02-01 DIAGNOSIS — Z79.899 IMMUNOSUPPRESSIVE MANAGEMENT ENCOUNTER FOLLOWING KIDNEY TRANSPLANT: ICD-10-CM

## 2021-02-01 DIAGNOSIS — Z94.0 KIDNEY TRANSPLANTED: ICD-10-CM

## 2021-02-01 RX ORDER — MYCOPHENOLIC ACID 180 MG/1
540 TABLET, DELAYED RELEASE ORAL 2 TIMES DAILY
Qty: 540 TABLET | Refills: 3 | Status: SHIPPED | OUTPATIENT
Start: 2021-02-01 | End: 2022-01-12

## 2021-02-09 NOTE — TELEPHONE ENCOUNTER
Mailing form to patient's home address with a note that a waist circumference is still needed. Copy to TC and abstracting.  Cindy Wright [facility]  2nd Floor  Primary Care

## 2021-02-26 NOTE — MR AVS SNAPSHOT
After Visit Summary   4/11/2018    Bessy Spangler    MRN: [id number]           Patient Information     Date Of Birth          1960        Visit Information        Provider Department      4/11/2018 10:00 AM Adalid Timmons AuD [facility]        Today's Diagnoses     Sensorineural hearing loss (SNHL) of both ears    -  1       Follow-ups after your visit        Your next 10 appointments already scheduled     Apr 13, 2018  8:00 AM CDT   New Visit with Pino Monteiro MD   [facility] ([facility])    [address] [address] [place] [id number] [phone]              Who to contact     If you have questions or need follow up information about today's clinic visit or your schedule please contact [facility] directly at [phone].  Normal or non-critical lab and imaging results will be communicated to you by MyChart, letter or phone within 4 business days after the clinic has received the results. If you do not hear from us within 7 days, please contact the clinic through [employer] or phone. If you have a critical or abnormal lab result, we will notify you by phone as soon as possible.  Submit refill requests through [employer] or call your pharmacy and they will forward the refill request to us. Please allow 3 business days for your refill to be completed.          Additional Information About Your Visit        MyChart Information     [employer] gives you secure access to your electronic health record. If you see a primary care provider, you can also send messages to your care team and make appointments. If you have questions, please call your primary care clinic.  If you do not have a primary care provider, please call [phone] and they will assist you.      [employer] is an electronic gateway that provides easy, online access to your medical records. With [employer], you can request a clinic appointment, read your test  Transition of Care Plan:    Disposition: Return to [address], [facility]  Follow up appointments: PCP - pt has no PCP, will need to establish new patient appointment, BS Provider List provided to pt, encouraged pt to select preferred office. DME needed: No DME at baseline - PT/OT consults in place, needs TBD  Transportation at Discharge: Will need Medicaid transportation arranged  Keys or means to access home: Pt does not have his keys, states someone will let him in at facility     IM Medicare letter: N/A  Caregiver Contact: Pt's mother, Angela Bermudez) [phone]   Discharge Caregiver contacted prior to discharge? Unit CM to follow. Reason for Admission:   Right LE cellulitis, B/L leg edema, hyperglycemia, hypoalbuminemia                   RUR Score: 9% low risk for readmission                     Plan for utilizing home health: Anticipating need for home health services at discharge, pt does not currently have primary care physician, will need PCP in place to be able to arrange services. PCP: First and Last name: None - pt does not have PCP    Name of Practice:    Are you a current patient: Yes/No:    Approximate date of last visit:    Can you participate in a virtual visit with your PCP:                     Current Advanced Directive/Advance Care Plan: Julee Barrera (ACP) Conversation      Date of Conversation: 2/26/2021  Conducted with: Patient with Decision Making Capacity    Healthcare Decision Maker:       Content/Action Overview:   DECLINED ACP conversation - will revisit periodically   Reviewed DNR/DNI and patient elects Full Code (Attempt Resuscitation)    Length of Voluntary ACP Conversation in minutes:  <16 minutes (Non-Billable)    [place]: Pt has no ACP documents, healthcare decision maker would be legal next of kin.                            Transition of Care Plan: Return to [facility], need for home health services    CM reviewed chart. CM completed assessment with pt at bedside. CM introduced self/role, verified demographics, and discussed discharge planning. Pt reports that he resides where he works, at [employer]. He states that he has family that want him to move in with them, but that they live on [address] and that he has a hard time with transportation to get back and forth to work. Pt is vague about his living situation, uncertain if this is an apartment or if he lives with anyone. 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 \" Pt reports having a supportive family and boss. Pt uses Medicaid transportation to attend appointments. He endorses that he sees an endocrinologist and oncologist at [facility]. He states that he has tried to obtain PCP at [facility] and was told that they couldn't see him until September. Pt is open to obtaining PCP at [facility] office as long as they accept his insurance [facility]). He reports that he uses [address] for medications. Pt does not drive at baseline. He uses no DME, and needed no assistance previously with adl's. He voices concerns about being able to do his wound care at home and feels that he will need assistance. CM acknowledges consult for assistance with obtaining home health services. Pt will need Medicaid transport arranged at d/c. Unit care management will continue to follow for transition of care planning needs. Care Management Interventions  PCP Verified by CM: No(Pt does not have PCP, needs to establish with a doctor)  Palliative Care Criteria Met (RRAT>21 & CHF Dx)?: No  Mode of Transport at Discharge:  Other (see comment)(Will need Medicaid cab at d/c)  Transition of Care Consult (CM Consult): Discharge Planning  Discharge Durable Medical Equipment: No(Pt has no DME)  Physical Therapy Consult: results, renew a prescription or communicate with your care team.     To access your existing account, please contact your [facility] or call [phone] for assistance.        Care EveryWhere ID     This is your Care EveryWhere ID. This could be used by other organizations to access your [facility] medical records  [id number]        Your Vitals Were     Last Period                   10/25/2004            Blood Pressure from Last 3 Encounters:   04/01/18 157/64   12/24/17 152/82   11/15/17 132/81    Weight from Last 3 Encounters:   04/01/18 217 lb (98.4 kg)   12/24/17 215 lb (97.5 kg)   11/15/17 216 lb 9.6 oz (98.2 kg)              We Performed the Following     AUDIOGRAM/TYMPANOGRAM - INTERFACE     COMPREHENSIVE HEARING TEST     TYMPANOMETRY AND REFLEX THRESHOLD MEASUREMENTS        Primary Care Provider Office Phone # Fax #    Mike Tan [phone] [address] [facility] [address]        Equal Access to Services     KARL DOAN : Hadii aad ku hadasho Soomaali, waaxda luqadaha, qaybta kaalmada adeegyada, waxay idiin hayaan adeeg kharamarta carnes . So [facility] [phone].    ATENCIÓN: Si habla español, tiene a garcia disposición servicios gratuitos de asistencia lingüística. Llame al [phone].    We comply with applicable federal civil rights laws and [place] laws. We do not discriminate on the basis of race, color, national origin, age, disability, sex, sexual orientation, or gender identity.            Thank you!     Thank you for choosing [facility]  for your care. Our goal is always to provide you with excellent care. Hearing back from our patients is one way we can continue to improve our services. Please take a few minutes to complete the written survey that you may receive in the mail after your visit with us. Thank you!             Your Updated Medication List - Protect others around you: Learn how to safely use, store  Yes  Occupational Therapy Consult: Yes  Speech Therapy Consult: No  Current Support Network: Assisted Living(Pt reports that he resides at [facility], also works here?  States that his boss and family are his main support system)  Confirm Follow Up Transport: Cab(Medicaid transport)  Discharge Location  Discharge Placement: Home with home health    Caty Clarke, [address] [place] [address], [address] [place] [address] and throw away your medicines at [email or web address].org.          This list is accurate as of 4/11/18 11:04 AM.  Always use your most recent med list.                   Brand Name Dispense Instructions for use Diagnosis    levothyroxine 112 MCG tablet    SYNTHROID/LEVOTHROID    90 tablet    Take 1 tablet (112 mcg) by mouth daily    Thyroid disorder       LORazepam 1 MG tablet    ATIVAN    9 tablet    Take 1 tablet (1 mg) by mouth every 8 hours as needed for nausea        meclizine 12.5 MG tablet    ANTIVERT    30 tablet    Take 2 tablets (25 mg) by mouth 4 times daily as needed for dizziness        metoprolol tartrate 50 MG tablet    LOPRESSOR    180 tablet    TAKE 1 TABLET (50 MG) BY MOUTH 2 TIMES DAILY    Hypertension       mycophenolic acid 180 MG EC tablet     540 tablet    Take 3 tablets (540 mg) by mouth 2 times daily    Kidney transplanted       * tacrolimus 1 MG capsule    GENERIC EQUIVALENT    540 capsule    Take 3 capsules (3 mg) by mouth 2 times daily    Kidney transplanted       * tacrolimus 1 MG capsule    GENERIC EQUIVALENT    540 capsule    Take 3 capsules (3 mg) by mouth 2 times daily    Kidney replaced by transplant       VITAMIN E PO           * Notice:  This list has 2 medication(s) that are the same as other medications prescribed for you. Read the directions carefully, and ask your doctor or other care provider to review them with you.

## 2021-03-05 ENCOUNTER — TELEPHONE (OUTPATIENT)
Dept: FAMILY MEDICINE | Facility: CLINIC | Age: 61
End: 2021-03-05

## 2021-03-05 DIAGNOSIS — I10 ESSENTIAL HYPERTENSION: ICD-10-CM

## 2021-03-05 RX ORDER — METOPROLOL TARTRATE 50 MG
TABLET ORAL
Qty: 180 TABLET | Refills: 0 | Status: SHIPPED | OUTPATIENT
Start: 2021-03-05 | End: 2021-05-31

## 2021-03-05 NOTE — TELEPHONE ENCOUNTER
Pharmacist asking for refill and clarification that pt should be on metoprolol and sotolal.  Val Hinojosa BSN, RN

## 2021-03-09 NOTE — TELEPHONE ENCOUNTER
Yes, she is on both Sotalol and Metoprolol per last Cardiology note.     Thank you,  Gina Dc MD MPH

## 2021-03-09 NOTE — CONFIDENTIAL NOTE
Yes, she is on both Sotalol and Metoprolol per last Cardiology note.    Thank you,  Gina Dc MD MPH

## 2021-03-16 ENCOUNTER — MYC MEDICAL ADVICE (OUTPATIENT)
Dept: FAMILY MEDICINE | Facility: CLINIC | Age: 61
End: 2021-03-16

## 2021-03-16 ENCOUNTER — IMMUNIZATION (OUTPATIENT)
Dept: PEDIATRICS | Facility: CLINIC | Age: 61
End: 2021-03-16
Payer: COMMERCIAL

## 2021-03-16 DIAGNOSIS — E07.9 THYROID DISORDER: Primary | ICD-10-CM

## 2021-03-16 PROCEDURE — 91300 PR COVID VAC PFIZER DIL RECON 30 MCG/0.3 ML IM: CPT

## 2021-03-16 PROCEDURE — 0001A PR COVID VAC PFIZER DIL RECON 30 MCG/0.3 ML IM: CPT

## 2021-03-22 DIAGNOSIS — Z94.0 KIDNEY REPLACED BY TRANSPLANT: ICD-10-CM

## 2021-03-22 DIAGNOSIS — Z79.899 ENCOUNTER FOR LONG-TERM CURRENT USE OF MEDICATION: ICD-10-CM

## 2021-03-22 DIAGNOSIS — E07.9 THYROID DISORDER: ICD-10-CM

## 2021-03-22 DIAGNOSIS — Z48.298 AFTERCARE FOLLOWING ORGAN TRANSPLANT: ICD-10-CM

## 2021-03-22 LAB
ANION GAP SERPL CALCULATED.3IONS-SCNC: 2 MMOL/L (ref 3–14)
BUN SERPL-MCNC: 21 MG/DL (ref 7–30)
CALCIUM SERPL-MCNC: 9.2 MG/DL (ref 8.5–10.1)
CHLORIDE SERPL-SCNC: 109 MMOL/L (ref 94–109)
CO2 SERPL-SCNC: 27 MMOL/L (ref 20–32)
CREAT SERPL-MCNC: 0.76 MG/DL (ref 0.52–1.04)
ERYTHROCYTE [DISTWIDTH] IN BLOOD BY AUTOMATED COUNT: 12.8 % (ref 10–15)
GFR SERPL CREATININE-BSD FRML MDRD: 84 ML/MIN/{1.73_M2}
GLUCOSE SERPL-MCNC: 105 MG/DL (ref 70–99)
HCT VFR BLD AUTO: 38.3 % (ref 35–47)
HGB BLD-MCNC: 12.4 G/DL (ref 11.7–15.7)
MCH RBC QN AUTO: 30 PG (ref 26.5–33)
MCHC RBC AUTO-ENTMCNC: 32.4 G/DL (ref 31.5–36.5)
MCV RBC AUTO: 93 FL (ref 78–100)
PLATELET # BLD AUTO: 167 10E9/L (ref 150–450)
POTASSIUM SERPL-SCNC: 4.9 MMOL/L (ref 3.4–5.3)
RBC # BLD AUTO: 4.14 10E12/L (ref 3.8–5.2)
SODIUM SERPL-SCNC: 138 MMOL/L (ref 133–144)
T4 FREE SERPL-MCNC: 1 NG/DL (ref 0.76–1.46)
TACROLIMUS BLD-MCNC: 6.2 UG/L (ref 5–15)
TME LAST DOSE: NORMAL H
TSH SERPL DL<=0.005 MIU/L-ACNC: 2.46 MU/L (ref 0.4–4)
WBC # BLD AUTO: 5.2 10E9/L (ref 4–11)

## 2021-03-22 PROCEDURE — 85027 COMPLETE CBC AUTOMATED: CPT | Performed by: INTERNAL MEDICINE

## 2021-03-22 PROCEDURE — 84443 ASSAY THYROID STIM HORMONE: CPT | Performed by: PREVENTIVE MEDICINE

## 2021-03-22 PROCEDURE — 36415 COLL VENOUS BLD VENIPUNCTURE: CPT | Performed by: PREVENTIVE MEDICINE

## 2021-03-22 PROCEDURE — 80048 BASIC METABOLIC PNL TOTAL CA: CPT | Performed by: INTERNAL MEDICINE

## 2021-03-22 PROCEDURE — 84439 ASSAY OF FREE THYROXINE: CPT | Performed by: PREVENTIVE MEDICINE

## 2021-03-22 PROCEDURE — 80197 ASSAY OF TACROLIMUS: CPT | Performed by: INTERNAL MEDICINE

## 2021-03-23 NOTE — RESULT ENCOUNTER NOTE
Bessy,     Thyroid function is in a normal range.     Please do not hesitate to call us at [phone] if you have any questions or concerns.    Thank you,    Gina Dc MD MPH

## 2021-04-06 ENCOUNTER — IMMUNIZATION (OUTPATIENT)
Dept: PEDIATRICS | Facility: CLINIC | Age: 61
End: 2021-04-06
Attending: INTERNAL MEDICINE
Payer: COMMERCIAL

## 2021-04-06 PROCEDURE — 91300 PR COVID VAC PFIZER DIL RECON 30 MCG/0.3 ML IM: CPT

## 2021-04-06 PROCEDURE — 0002A PR COVID VAC PFIZER DIL RECON 30 MCG/0.3 ML IM: CPT

## 2021-04-18 NOTE — PROGRESS NOTES
[place] CARDIOLOGY FOLLOW UP VISIT:    HPI: Bessy Spanlger is a 61 year old female being seen today for evaluation of palpitations.   The patient's risk factor profile is: (+) HTN, (-) DM, (-) hypercholesterolemia, (+) prior 20 pack-year tobacco use (quit 25 yrs ago), (-) fam Hx premature CAD.  The patient has Hx ESRD with 1 year of HD prior to renal transplantation in July 2007.  The patient has no history of cardiovascular disease (CAD, CHF, arrhythmia, valvular heart disease).  The patient has no Hx of PAD or cerebrovascular disease.  The patient had normal ECHO and normal dobutamine ECHO in 2007 prior to consideration of renal transplantation.  The patient has not undergone prior cardiac catheterization, or EP study.   The patient was seen by her PCP, Dr. Dc, in Nov 2019 at which time she described palpitations lasting up to 45 minutes with rapid onset.  She recorded pulse 133 bpm. She has had episodes, on average, once a month.  She had no associated cardiopulmonary symptoms.  Specifically, she denies associated lightheadedness, and syncope.  The patient subsequently had her Synthroid increased.  Her last episode occurred on 5/11/20 at which time her /74 and her pulse was 131.  She was symptomatic at that time.    She had a Biotelemetry patch placed in Feb 2020 for 14 days.  She had no symptoms while wearing the monitor.  She accidentally pushed the button on one occasion and the report showed NSR at that time.  She had a repeat Biotelemetry patch in May 2020 and had symptomatic AF while wearing the patch.    She was started on Sotalol 40 mg BID in June 2020 and has done well.  She missed the medication on one occasion and had palpitations later that day.    Comes in today because of ongoing palpitations. These happen sporadically and last for a few mins to hours. Has taken her pulse and these have been 130-150s bpm. Does not have associated symptoms with these. Otherwise denies fever, chills,  "nights sweats, sore throat, rhinorrhea, cough, vision changes, chest pain, shortness of breath, abdominal pain, nausea, vomiting, diarrhea, constipation, melena, hematochezia, dysuria, hematuria, PND, orthopnea, presyncope, syncope, focal tingling or weakness.   patient denies recent chest discomfort, dyspnea, PND, orthopnea.     PAST MEDICAL HISTORY:  Past Medical History:   Diagnosis Date     Anemia of chronic renal failure      Breast cancer (H)      Chronic diarrhea      Chronic kidney disease (CKD), stage V (H)     Due to \"nephritis\". Biopsy done years ago. I do not know the histologic specifics     High risk medication use      Hyperphosphatemia      Hypertension      Hypothyroid      Immunosuppressed status (H)      Kidney replaced by transplant     nephritis as a child     PE (pulmonary embolism)     after transplant     Secondary hyperparathyroidism (of renal origin)        CURRENT MEDICATIONS:  Current Outpatient Medications   Medication Sig     ELIQUIS ANTICOAGULANT 5 MG tablet TAKE 1 TABLET BY MOUTH TWICE A DAY     levothyroxine (SYNTHROID/LEVOTHROID) 125 MCG tablet Take 1 tablet (125 mcg) by mouth daily     metoprolol tartrate (LOPRESSOR) 50 MG tablet TAKE 1 TABLET BY MOUTH TWICE A DAY     mycophenolic acid (GENERIC EQUIVALENT) 180 MG EC tablet Take 3 tablets (540 mg) by mouth 2 times daily     sotalol (BETAPACE) 80 MG tablet Take 0.5 tablets (40 mg) by mouth 2 times daily     tacrolimus (GENERIC EQUIVALENT) 0.5 MG capsule Take 1 capsule (0.5 mg) by mouth 2 times daily Total dose: 3.5 mg twice daily     tacrolimus (GENERIC EQUIVALENT) 1 MG capsule Take 3 capsules (3 mg) by mouth 2 times daily     VITAMIN D, CHOLECALCIFEROL, PO Take 1,000 Units by mouth daily     No current facility-administered medications for this visit.        PAST SURGICAL HISTORY:  Past Surgical History:   Procedure Laterality Date     APPENDECTOMY OPEN  1999     AV FISTULA OR GRAFT ARTERIAL      left     C TRANSPLANT,PREP LIVING  " RENAL GRAFT  2009    Hx of nephritis     LUMPECTOMY BREAST      left       ALLERGIES  Dilaudid [hydromorphone hcl], Hydromorphone, and Sulfa drugs    FAMILY HX:  Family History   Problem Relation Age of Onset     Diabetes Father         type 2  2015     Arthritis Father      Glaucoma Father      Cancer Mother 58        smoker,metastatic adnoid cystic cancer,  early 60s     Arthritis Mother      Endocrine Disease Mother         thyroid     Hypertension Maternal Grandmother          at a young age     Kidney Disease Maternal Grandmother      Hypertension Maternal Grandfather      Cancer Paternal Grandmother         stomach cancer     Endocrine Disease Sister         thyroid     Genitourinary Problems Daughter 32        renal failure      Endocrine Disease Sister         thyroid     Chronic Obstructive Pulmonary Disease Paternal Grandfather         smoking  young     Heart Disease Maternal Half-Brother        SOCIAL HX:  Social History     Socioeconomic History     Marital status: Single     Spouse name: Not on file     Number of children: Not on file     Years of education: Not on file     Highest education level: Not on file   Occupational History     Not on file   Social Needs     Financial resource strain: Not on file     Food insecurity     Worry: Not on file     Inability: Not on file     Transportation needs     Medical: Not on file     Non-medical: Not on file   Tobacco Use     Smoking status: Former Smoker     Packs/day: 1.00     Years: 20.00     Pack years: 20.00     Types: Cigarettes     Last attempt to quit: 1998     Years since quittin.3     Smokeless tobacco: Never Used   Substance and Sexual Activity     Alcohol use: No     Alcohol/week: 0.0 standard drinks     Drug use: No     Sexual activity: Not Currently   Lifestyle     Physical activity     Days per week: Not on file     Minutes per session: Not on file     Stress: Not on file   Relationships     Social connections      Talks on phone: Not on file     Gets together: Not on file     Attends Yarsanism service: Not on file     Active member of club or organization: Not on file     Attends meetings of clubs or organizations: Not on file     Relationship status: Not on file     Intimate partner violence     Fear of current or ex partner: Not on file     Emotionally abused: Not on file     Physically abused: Not on file     Forced sexual activity: Not on file   Other Topics Concern     Not on file   Social History Narrative    , 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  degree    Sal 1981,Betty 1983 ( On dialysis), Vincenzo Alvares 1988    Grandchildren Torri 11/2010, Krystina 10/2007 lives with her and Betty, son and family. Jeremías 2014    2 additional grandchildren will be born soon       ROS:  Negative unless in HPI.     VITAL SIGNS:  BP (!) 155/78 (BP Location: Left arm, Patient Position: Sitting, Cuff Size: Adult Large)   Pulse 79   LMP 10/25/2004 (Exact Date)   SpO2 99%   There is no height or weight on file to calculate BMI.  Wt Readings from Last 2 Encounters:   02/14/20 91.5 kg (201 lb 12.8 oz)   11/27/19 90.7 kg (200 lb)     PHYSICAL EXAM  Bessy Spangler is a 61 year old female.in no acute distress.  HEENT: Eyes Nonicteric.  Neck: JVP 2cm H20.  Carotids +2 bilaterally without bruits.  Lungs: CTA.  Cor: RRR. Normal S1 and S2.  No murmur, rub, or gallop.  PMI in Lf 5th ICS.  Abd: Soft, nontender, nondistended.  NABS.  No pulsatile mass.  Extremities: No C/C/E.  Pulses +3/3 symmetric in upper and lower extremities.  Neuro: Grossly intact.  Psych: A&O x 3.  Skin: No rash.    LABS  Recent Labs   Lab Test 03/22/21  0746 11/27/20  0756   WBC 5.2 3.5*   HGB 12.4 12.8   HCT 38.3 39.7    118*     Recent Labs   Lab Test 03/22/21  0746 11/27/20  0756    141   POTASSIUM 4.9 4.5   CHLORIDE 109 107   CO2 27 29   * 99   BUN 21 10   CR 0.76 0.67   MARIA LUISA 9.2 9.0     Recent Labs   Lab Test  20  0756 10/11/19  1006 11/13/15  0753 11/13/15  0753 10/28/14  0749   CHOL 184 177   < > 181 224*   HDL 83 79   < > 68 59   LDL 78 84   < > 92 145*   TRIG 114 68   < > 103 98   CHOLHDLRATIO  --   --   --  2.7 3.8   NHDL 101 98   < >  --   --     < > = values in this interval not displayed.        EC2020  Sinus bradycardia  Otherwise normal ECG    EK2019  NSR.  Normal ECG.    ECHO: 2007  Borderline left atrial enlargement.   Ejection Fraction = >55%.   No regional wall motion abnormalities noted.   Although RV systolic pressure cannot be directly measured, there are no direct findings to suggest the prosence of moderate or severe pulmonary HTN such as RV overload or abnormal septal motion.     CARDIAC MRI: None    CORONARY CTA: None    DOBUTAMINE ECHO:  2007  Normal dobutamine echo without infarct or ischemia at adquate rate-pressure product  There is borderline concentric left ventricular hypertrophy.     CARDIAC CATH: None    ASSESSMENT AND PLAN:   1. Paroxysmal Atrial Fibrillation (CHADVASC 2)  - Continue sotalol for now; will repeat Zio Patch and if evidence of afib might either incease dose or increase BB since patient is not very symptomatic during episodes and with renal transplant might be better off a medication renally excreted; controlled on Sotalol  - Echo pending  - Continue NOAC  - Continue to monitor thyroid levels (have been normal)    2. Thromboembolic Disorder, s/p PE (), s/p RLE DVT ()  - Continue NOAC  Indefinitely    3. HTN  Could be from lifestyle changes, calcineurin induced, some microvascular changes in her graft or a combination of these.   - Might switch to coreg following zio  - Follow up with nephrology  - DASH diet      FOLLOW UP: 6 months    Murray Bee [facility]  Cardiology Fellow      ATTENDING NOTE:  Patient has been seen and evaluated by me on 2021. I have reviewed the documentation above.  I have reviewed today's vital signs,  medications, labs, and imaging results.  I have reviewed and edited, as necessary, the history, review of systems, physical examination, and assessment and plan.  I have discussed my assessment and plan with the cardiology fellow.  Bessy Spangler is a 61 year old female with risk factor profile (+) HTN, (-) DM, (-) hypercholesterolemia, (-) tobacco use, (-) fam Hx premature CAD, Hx PE (2009), RLE DVT (2018), PAF, returns for routine clinic follow up.  She described palpitations at the time of the initial consultation (May 2020), and had Ziopatch showing PAF.  She was started on Eliquis 5 BID, Lopressor 50 BID and Sotalol 80 BID.  She is s/p renal transplantation, Cr 0.6 mg/dl.  She notes increasing palpitations, no associated symptoms, over the past few weeks.  Check Ziopatch to see if symptoms correlate with recurrent AF.  If so, consider increasing Sotalol, particularly if rate controlled.  If HR > 120 bpm, I would focus on increased beta blocker.    Russell Cisse MD     Cardiovascular Division    CC  Patient Care Team:  Gina Swanson MD as PCP - General (Family Medicine)  Chris Marquez MD as MD (Nephrology)  Gina Swanson MD as Assigned PCP  Russell Cisse MD as Assigned Heart and Vascular Provider  GINA SWANSON

## 2021-04-19 ENCOUNTER — OFFICE VISIT (OUTPATIENT)
Dept: CARDIOLOGY | Facility: CLINIC | Age: 61
End: 2021-04-19
Payer: COMMERCIAL

## 2021-04-19 ENCOUNTER — ANCILLARY PROCEDURE (OUTPATIENT)
Dept: CARDIOLOGY | Facility: CLINIC | Age: 61
End: 2021-04-19
Attending: STUDENT IN AN ORGANIZED HEALTH CARE EDUCATION/TRAINING PROGRAM
Payer: COMMERCIAL

## 2021-04-19 VITALS — DIASTOLIC BLOOD PRESSURE: 78 MMHG | SYSTOLIC BLOOD PRESSURE: 155 MMHG | HEART RATE: 79 BPM | OXYGEN SATURATION: 99 %

## 2021-04-19 DIAGNOSIS — I48.0 PAROXYSMAL ATRIAL FIBRILLATION (H): ICD-10-CM

## 2021-04-19 DIAGNOSIS — I48.0 PAROXYSMAL ATRIAL FIBRILLATION (H): Primary | ICD-10-CM

## 2021-04-19 PROCEDURE — 99214 OFFICE O/P EST MOD 30 MIN: CPT | Mod: GC | Performed by: INTERNAL MEDICINE

## 2021-04-19 PROCEDURE — 93241 XTRNL ECG REC>48HR<7D: CPT | Performed by: INTERNAL MEDICINE

## 2021-04-19 ASSESSMENT — PAIN SCALES - GENERAL: PAINLEVEL: NO PAIN (0)

## 2021-04-19 NOTE — PATIENT INSTRUCTIONS
It was a pleasure to see you in the cardiology clinic today.    If you have any questions, you can reach my nurse, Amanda Beckford, at [phone].     Note the new medications: None    Stop the following medications: None    The results from today include: None    Tests ordered today: Hal, 14 days    I would like you to follow up with Dr. Cisse in one year.    Sincerely,      Rusesll Cisse MD     [facility]

## 2021-04-19 NOTE — NURSING NOTE
Bessy Spangler's goals for this visit include:   Chief Complaint   Patient presents with     RECHECK       She requests these members of her care team be copied on today's visit information: no    PCP: Gina Dc    Referring Provider:  No referring provider defined for this encounter.    BP (!) 155/78 (BP Location: Left arm, Patient Position: Sitting, Cuff Size: Adult Large)   Pulse 79   LMP 10/25/2004 (Exact Date)   SpO2 99%     Do you need any medication refills at today's visit? No    Maureen Downey CMA......April 19, 2021     1:42 PM

## 2021-04-20 NOTE — PATIENT INSTRUCTIONS
Patient has been prescribed a ZioPatch holter for 14 days. Patient was instructed regarding the indication, function, care and prompt return of the ZioPatch holter monitor. The monitor, with S/N [id number],  was placed on the patient with instructions regarding care of the skin and monitor, as well as documentation in the patient diary. Patient demonstrated understanding of this information and [facility] questions or concerns.

## 2021-05-09 DIAGNOSIS — I82.4Z1 ACUTE DEEP VEIN THROMBOSIS (DVT) OF DISTAL VEIN OF RIGHT LOWER EXTREMITY (H): ICD-10-CM

## 2021-05-10 NOTE — TELEPHONE ENCOUNTER
Requested Prescriptions   Pending Prescriptions Disp Refills    ELIQUIS ANTICOAGULANT 5 MG tablet [Pharmacy Med Name: ELIQUIS 5 MG TABLET] 180 tablet 1     Sig: TAKE 1 TABLET BY MOUTH TWICE A DAY       Direct Oral Anticoagulant Agents Failed - 5/9/2021  9:27 AM        Failed - Weight is greater than 60 kg for the past year     Wt Readings from Last 3 Encounters:   02/14/20 91.5 kg (201 lb 12.8 oz)   11/27/19 90.7 kg (200 lb)   10/11/19 90.7 kg (200 lb)             Failed - Recent (6 mo) or future (30 days) visit within the authorizing provider's specialty        Passed - Normal Platelets on file in past 12 months     Recent Labs   Lab Test 03/22/21  0746                  Passed - Medication is active on med list        Passed - Patient is 18-79 years of age        Passed - Serum creatinine less than or equal to 1.4 on file in past 12 mos     Recent Labs   Lab Test 03/22/21  0746 04/01/18  1942 04/01/18  1942 05/28/14  0844 05/28/14  0844   CR 0.76   < >  --    < >  --    CREAT  --   --  0.6  --   --    CRPOC  --   --   --   --  0.7    < > = values in this interval not displayed.       Ok to refill medication if creatinine is low          Passed - No active pregnancy on record        Passed - No positive pregnancy test within past 12 months

## 2021-05-11 RX ORDER — APIXABAN 5 MG/1
TABLET, FILM COATED ORAL
Qty: 180 TABLET | Refills: 1 | Status: SHIPPED | OUTPATIENT
Start: 2021-05-11 | End: 2021-11-18

## 2021-05-26 ENCOUNTER — RECORDS - HEALTHEAST (OUTPATIENT)
Dept: ADMINISTRATIVE | Facility: CLINIC | Age: 61
End: 2021-05-26

## 2021-05-28 DIAGNOSIS — I10 ESSENTIAL HYPERTENSION: ICD-10-CM

## 2021-05-28 NOTE — TELEPHONE ENCOUNTER
Routing refill request to provider for review/approval because:  BP Readings from Last 3 Encounters:   04/19/21 (!) 155/78   02/14/20 116/73   11/27/19 (!) 141/79     Val PEDERSENN, RN

## 2021-05-31 RX ORDER — METOPROLOL TARTRATE 50 MG
TABLET ORAL
Qty: 180 TABLET | Refills: 0 | Status: SHIPPED | OUTPATIENT
Start: 2021-05-31 | End: 2021-08-24

## 2021-05-31 NOTE — TELEPHONE ENCOUNTER
"Please see \"Imaging\" tab under Chart Review for full report.  This ultrasound was performed in the [facility], and may be located under Care Everywhere.    Kaitlynn Cox MD  Maternal Fetal Medicine    " Received following provider notification for patient. Called and spoke to her. She did feel the arrhythmia start and pushed the button. She states this is the palpations she has been feeling that had been lasting about 30 min, now can last for up to one hour. No chest pain, but at times feels a tightness. No shortness of breath. Reviewed medications and they are current. She was informed when to get medical attention.  Advised will send message to Dr Cisse and call back with recommendations. She does not have a current BP for us.   DBalcome, RN  Get Prompt Medical Attention  if any of the following occur together with palpitations:    Weakness, dizziness, light-headed or fainting    Chest pain or shortness of breath    Rapid heart rate (over 120 beats per minute, at rest)    Palpitations that lasts over 20 minutes    Weakness of an arm or leg or one side of the face    Difficulty with speech or vision

## 2021-08-20 ENCOUNTER — LAB (OUTPATIENT)
Dept: LAB | Facility: CLINIC | Age: 61
End: 2021-08-20
Payer: COMMERCIAL

## 2021-08-20 DIAGNOSIS — Z79.899 ENCOUNTER FOR LONG-TERM CURRENT USE OF MEDICATION: ICD-10-CM

## 2021-08-20 DIAGNOSIS — Z94.0 KIDNEY REPLACED BY TRANSPLANT: ICD-10-CM

## 2021-08-20 DIAGNOSIS — Z48.298 AFTERCARE FOLLOWING ORGAN TRANSPLANT: ICD-10-CM

## 2021-08-20 LAB
ANION GAP SERPL CALCULATED.3IONS-SCNC: 5 MMOL/L (ref 3–14)
BUN SERPL-MCNC: 11 MG/DL (ref 7–30)
CALCIUM SERPL-MCNC: 9.1 MG/DL (ref 8.5–10.1)
CHLORIDE BLD-SCNC: 108 MMOL/L (ref 94–109)
CO2 SERPL-SCNC: 25 MMOL/L (ref 20–32)
CREAT SERPL-MCNC: 0.68 MG/DL (ref 0.52–1.04)
CREAT UR-MCNC: 95 MG/DL
ERYTHROCYTE [DISTWIDTH] IN BLOOD BY AUTOMATED COUNT: 12.9 % (ref 10–15)
GFR SERPL CREATININE-BSD FRML MDRD: >90 ML/MIN/1.73M2
GLUCOSE BLD-MCNC: 98 MG/DL (ref 70–99)
HCT VFR BLD AUTO: 36.8 % (ref 35–47)
HGB BLD-MCNC: 12 G/DL (ref 11.7–15.7)
MCH RBC QN AUTO: 30.1 PG (ref 26.5–33)
MCHC RBC AUTO-ENTMCNC: 32.6 G/DL (ref 31.5–36.5)
MCV RBC AUTO: 92 FL (ref 78–100)
PLATELET # BLD AUTO: 141 10E3/UL (ref 150–450)
POTASSIUM BLD-SCNC: 4.5 MMOL/L (ref 3.4–5.3)
PROT UR-MCNC: 0.11 G/L
PROT/CREAT 24H UR: 0.12 G/G CR (ref 0–0.2)
RBC # BLD AUTO: 3.99 10E6/UL (ref 3.8–5.2)
SODIUM SERPL-SCNC: 138 MMOL/L (ref 133–144)
TACROLIMUS BLD-MCNC: 4.6 UG/L (ref 5–15)
TME LAST DOSE: ABNORMAL H
TME LAST DOSE: ABNORMAL H
WBC # BLD AUTO: 5.3 10E3/UL (ref 4–11)

## 2021-08-20 PROCEDURE — 85027 COMPLETE CBC AUTOMATED: CPT

## 2021-08-20 PROCEDURE — 80197 ASSAY OF TACROLIMUS: CPT

## 2021-08-20 PROCEDURE — 80048 BASIC METABOLIC PNL TOTAL CA: CPT

## 2021-08-20 PROCEDURE — 84156 ASSAY OF PROTEIN URINE: CPT

## 2021-08-20 PROCEDURE — 36415 COLL VENOUS BLD VENIPUNCTURE: CPT

## 2021-08-24 DIAGNOSIS — I10 ESSENTIAL HYPERTENSION: ICD-10-CM

## 2021-08-25 RX ORDER — METOPROLOL TARTRATE 50 MG
TABLET ORAL
Qty: 180 TABLET | Refills: 0 | Status: SHIPPED | OUTPATIENT
Start: 2021-08-25 | End: 2021-11-23

## 2021-08-28 DIAGNOSIS — I48.0 PAROXYSMAL ATRIAL FIBRILLATION (H): Primary | ICD-10-CM

## 2021-09-01 RX ORDER — SOTALOL HYDROCHLORIDE 80 MG/1
40 TABLET ORAL 2 TIMES DAILY
Qty: 90 TABLET | Refills: 2 | Status: SHIPPED | OUTPATIENT
Start: 2021-09-01 | End: 2022-04-12

## 2021-09-09 ENCOUNTER — TELEPHONE (OUTPATIENT)
Dept: NEPHROLOGY | Facility: CLINIC | Age: 61
End: 2021-09-09

## 2021-09-09 DIAGNOSIS — Z94.0 KIDNEY REPLACED BY TRANSPLANT: Primary | ICD-10-CM

## 2021-09-09 RX ORDER — TACROLIMUS 0.5 MG/1
0.5 CAPSULE ORAL 2 TIMES DAILY
Qty: 180 CAPSULE | Refills: 0 | Status: SHIPPED | OUTPATIENT
Start: 2021-09-09 | End: 2021-09-20

## 2021-09-09 NOTE — TELEPHONE ENCOUNTER
Tac Rx sent    LPN task:  Call and invite back for Annual appointment with Transplant Nephrology (Virtual Visit).  Send a message to Scheduling pool to set-up visit.  Please send a letter if unable to discuss on the phone.

## 2021-09-09 NOTE — LETTER
Bessy Spangler  [id number]  [place] [address]                September 9, 2021    This letter is regarding your medication refills.    For the best outcome for your transplant and in order for us to refill your prescriptions, we encourage you to have a yearly clinic appointment with a physician and to have current labs. If you are unable to return to our transplant center there are several alternatives. Please call your coordinator to discuss them. .  To make an appointment with a physician here at [facility], please call:  The Transplant Office at [phone] or [phone].  .      The Transplant Staff at [facility]

## 2021-09-10 ENCOUNTER — TELEPHONE (OUTPATIENT)
Dept: TRANSPLANT | Facility: CLINIC | Age: 61
End: 2021-09-10

## 2021-09-10 NOTE — TELEPHONE ENCOUNTER
September 10, 2021 1:38 PM - Sal Tena CNA: called pt to [facility] neph visit 12/20 w oscar jerez

## 2021-09-20 DIAGNOSIS — Z94.0 KIDNEY REPLACED BY TRANSPLANT: Primary | ICD-10-CM

## 2021-09-20 RX ORDER — TACROLIMUS 0.5 MG/1
0.5 CAPSULE ORAL 2 TIMES DAILY
Qty: 180 CAPSULE | Refills: 3 | Status: SHIPPED | OUTPATIENT
Start: 2021-09-20 | End: 2022-11-08

## 2021-09-21 ENCOUNTER — IMMUNIZATION (OUTPATIENT)
Dept: NURSING | Facility: CLINIC | Age: 61
End: 2021-09-21
Payer: COMMERCIAL

## 2021-09-21 PROCEDURE — 91300 PR COVID VAC PFIZER DIL RECON 30 MCG/0.3 ML IM: CPT

## 2021-09-21 PROCEDURE — 0003A PR COVID VAC PFIZER DIL RECON 30 MCG/0.3 ML IM: CPT

## 2021-09-25 ENCOUNTER — HEALTH MAINTENANCE LETTER (OUTPATIENT)
Age: 61
End: 2021-09-25

## 2021-11-10 DIAGNOSIS — Z94.0 KIDNEY TRANSPLANTED: Primary | ICD-10-CM

## 2021-11-10 DIAGNOSIS — Z48.298 AFTERCARE FOLLOWING ORGAN TRANSPLANT: ICD-10-CM

## 2021-11-10 DIAGNOSIS — Z94.0 IMMUNOSUPPRESSIVE MANAGEMENT ENCOUNTER FOLLOWING KIDNEY TRANSPLANT: ICD-10-CM

## 2021-11-10 DIAGNOSIS — Z79.899 IMMUNOSUPPRESSIVE MANAGEMENT ENCOUNTER FOLLOWING KIDNEY TRANSPLANT: ICD-10-CM

## 2021-11-10 NOTE — PROGRESS NOTES
Chris Marquez MD [place], Teresa, RN       ----- Message -----   From: Roxanna Willis   Sent: 11/10/2021   8:17 AM CST   To: Chris Marquez MD   Subject: needs new standing lab orders                     Patient has an up coming lab appointment on 2021. Her standing lab orders have . Please review and place new standing orders that may be needed.     Thank you   Roxanna Willis MLT ([facility])

## 2021-11-19 ENCOUNTER — LAB (OUTPATIENT)
Dept: LAB | Facility: CLINIC | Age: 61
End: 2021-11-19
Payer: COMMERCIAL

## 2021-11-19 DIAGNOSIS — Z94.0 IMMUNOSUPPRESSIVE MANAGEMENT ENCOUNTER FOLLOWING KIDNEY TRANSPLANT: ICD-10-CM

## 2021-11-19 DIAGNOSIS — Z79.899 IMMUNOSUPPRESSIVE MANAGEMENT ENCOUNTER FOLLOWING KIDNEY TRANSPLANT: ICD-10-CM

## 2021-11-19 DIAGNOSIS — Z94.0 KIDNEY TRANSPLANTED: ICD-10-CM

## 2021-11-19 DIAGNOSIS — Z48.298 AFTERCARE FOLLOWING ORGAN TRANSPLANT: ICD-10-CM

## 2021-11-19 LAB
ANION GAP SERPL CALCULATED.3IONS-SCNC: 5 MMOL/L (ref 3–14)
BUN SERPL-MCNC: 14 MG/DL (ref 7–30)
CALCIUM SERPL-MCNC: 9.6 MG/DL (ref 8.5–10.1)
CHLORIDE BLD-SCNC: 108 MMOL/L (ref 94–109)
CO2 SERPL-SCNC: 26 MMOL/L (ref 20–32)
CREAT SERPL-MCNC: 0.68 MG/DL (ref 0.52–1.04)
CREAT UR-MCNC: 154 MG/DL
ERYTHROCYTE [DISTWIDTH] IN BLOOD BY AUTOMATED COUNT: 12.9 % (ref 10–15)
GFR SERPL CREATININE-BSD FRML MDRD: >90 ML/MIN/1.73M2
GLUCOSE BLD-MCNC: 104 MG/DL (ref 70–99)
HCT VFR BLD AUTO: 38 % (ref 35–47)
HGB BLD-MCNC: 12.1 G/DL (ref 11.7–15.7)
MCH RBC QN AUTO: 30 PG (ref 26.5–33)
MCHC RBC AUTO-ENTMCNC: 31.8 G/DL (ref 31.5–36.5)
MCV RBC AUTO: 94 FL (ref 78–100)
PLATELET # BLD AUTO: 157 10E3/UL (ref 150–450)
POTASSIUM BLD-SCNC: 4.5 MMOL/L (ref 3.4–5.3)
PROT UR-MCNC: 0.13 G/L
PROT/CREAT 24H UR: 0.08 G/G CR (ref 0–0.2)
RBC # BLD AUTO: 4.04 10E6/UL (ref 3.8–5.2)
SODIUM SERPL-SCNC: 139 MMOL/L (ref 133–144)
TACROLIMUS BLD-MCNC: 5.3 UG/L (ref 5–15)
TME LAST DOSE: NORMAL H
TME LAST DOSE: NORMAL H
WBC # BLD AUTO: 5.3 10E3/UL (ref 4–11)

## 2021-11-19 PROCEDURE — 84156 ASSAY OF PROTEIN URINE: CPT

## 2021-11-19 PROCEDURE — 80048 BASIC METABOLIC PNL TOTAL CA: CPT

## 2021-11-19 PROCEDURE — 85027 COMPLETE CBC AUTOMATED: CPT

## 2021-11-19 PROCEDURE — 80197 ASSAY OF TACROLIMUS: CPT

## 2021-11-19 PROCEDURE — 36415 COLL VENOUS BLD VENIPUNCTURE: CPT

## 2021-11-20 ENCOUNTER — HEALTH MAINTENANCE LETTER (OUTPATIENT)
Age: 61
End: 2021-11-20

## 2021-11-29 DIAGNOSIS — Z94.0 KIDNEY REPLACED BY TRANSPLANT: Primary | ICD-10-CM

## 2021-11-29 RX ORDER — TACROLIMUS 1 MG/1
3 CAPSULE ORAL 2 TIMES DAILY
Qty: 540 CAPSULE | Refills: 3 | Status: SHIPPED | OUTPATIENT
Start: 2021-11-29 | End: 2021-12-07

## 2021-12-07 DIAGNOSIS — Z94.0 KIDNEY REPLACED BY TRANSPLANT: Primary | ICD-10-CM

## 2021-12-08 RX ORDER — TACROLIMUS 1 MG/1
3 CAPSULE ORAL 2 TIMES DAILY
Qty: 540 CAPSULE | Refills: 3 | Status: SHIPPED | OUTPATIENT
Start: 2021-12-08 | End: 2023-02-02

## 2021-12-16 DIAGNOSIS — I10 ESSENTIAL HYPERTENSION: ICD-10-CM

## 2021-12-16 NOTE — TELEPHONE ENCOUNTER
"Requested Prescriptions   Pending Prescriptions Disp Refills    metoprolol tartrate (LOPRESSOR) 50 MG tablet [Pharmacy Med Name: METOPROLOL TARTRATE 50 MG TAB] 60 tablet 0     Sig: TAKE 1 TABLET BY MOUTH TWICE A DAY        Beta-Blockers Protocol Failed - 12/16/2021  8:30 AM        Failed - Blood pressure under 140/90 in past 12 months       BP Readings from Last 3 Encounters:   04/19/21 (!) 155/78   02/14/20 116/73   11/27/19 (!) 141/79                 Failed - Recent (12 mo) or future (30 days) visit within the authorizing provider's specialty     Patient has had an office visit with the authorizing provider or a provider within the authorizing providers department within the previous 12 mos or has a future within next 30 days. See \"Patient Info\" tab in inbasket, or \"Choose Columns\" in Meds & Orders section of the refill encounter.              Passed - Patient is age 6 or older        Passed - Medication is active on med list              "

## 2021-12-17 RX ORDER — METOPROLOL TARTRATE 50 MG
TABLET ORAL
Qty: 60 TABLET | Refills: 0 | Status: SHIPPED | OUTPATIENT
Start: 2021-12-17 | End: 2022-01-21

## 2021-12-20 ENCOUNTER — VIRTUAL VISIT (OUTPATIENT)
Dept: NEPHROLOGY | Facility: CLINIC | Age: 61
End: 2021-12-20
Attending: INTERNAL MEDICINE
Payer: COMMERCIAL

## 2021-12-20 DIAGNOSIS — D84.9 IMMUNOSUPPRESSION (H): ICD-10-CM

## 2021-12-20 DIAGNOSIS — Z48.298 AFTERCARE FOLLOWING ORGAN TRANSPLANT: ICD-10-CM

## 2021-12-20 DIAGNOSIS — Z94.0 KIDNEY REPLACED BY TRANSPLANT: Primary | ICD-10-CM

## 2021-12-20 DIAGNOSIS — Z94.0 HTN, KIDNEY TRANSPLANT RELATED: ICD-10-CM

## 2021-12-20 DIAGNOSIS — E55.9 VITAMIN D DEFICIENCY: ICD-10-CM

## 2021-12-20 DIAGNOSIS — I15.1 HTN, KIDNEY TRANSPLANT RELATED: ICD-10-CM

## 2021-12-20 PROCEDURE — 99214 OFFICE O/P EST MOD 30 MIN: CPT | Mod: GT | Performed by: INTERNAL MEDICINE

## 2021-12-20 ASSESSMENT — PAIN SCALES - GENERAL: PAINLEVEL: NO PAIN (0)

## 2021-12-20 NOTE — PATIENT INSTRUCTIONS
Recommend checking blood pressure at home on a regular basis, such as once every week or two, and follow up with PCP if above goal of less than 130/80.

## 2021-12-20 NOTE — PROGRESS NOTES
Bessy is a 61 year old who is being evaluated via a billable video visit.    How would you like to obtain your AVS? MyChart  If the video visit is dropped, the invitation should be resent by: Text to cell phone: [phone]  Will anyone else be joining your video visit? No    Video Start Time: 1605  Video-Visit Details    Type of service:  Video Visit    Video End Time:1625    Originating Location (pt. Location): Home    Distant Location (provider location):  [facility] [place]     Platform used for Video Visit: [employer]      TRANSPLANT NEPHROLOGY CHRONIC POST TRANSPLANT VISIT    Assessment & Plan   # LDKT: Stable   - Baseline Creatinine:  ~ 0.6-0.8   - Proteinuria: Normal (<0.2 grams)   - Date DSA Last Checked: Oct/2009      Latest DSA: No   - BK Viremia: Not checked recently due to time from transplant   - Kidney Tx Biopsy: No    # Immunosuppression: Tacrolimus immediate release (goal 4-6) and Mycophenolic acid (dose 540 mg every 12 hours)   - Continue with intensive monitoring of immunosuppression for efficacy and toxicity.   - Changes: No    # Infection Prophylaxis:   Last CD4 Level: 290 (Aug/2011)  - PJP: None    # Hypertension: Not checked recently;  Goal BP: < 130/80   - Changes: Not at this time; Recommend patient check blood pressure at home on a regular basis, such as once every week or two, and follow up with PCP if above goal.    # Mineral Bone Disorder:   - Vitamin D; level: Not checked recently        On supplement: Yes  - Calcium; level: Normal        On supplement: No    # Paroxysmal Atrial Fibrillation: Only rare, brief episodes if patient forgets her medications (sotalol and beta blocker).  Patient remains on chronic anticoagulation with apixaban.     # H/o DVT and h/o PE: Remains on chronic anticoagulation with apixaban.    # Obesity, Class II (BMI = 36): Patient feels she has gained over 20 lbs, but not exactly sure.   - Recommend weight loss for overall health by  "increasing exercise and watching caloric intake.    # Skin Cancer: New lesions: none   - Discussed sun protection and recommend regular follow up with Dermatology.    # Medical Compliance: Yes    # COVID-19 Virus Review: Discussed COVID-19 virus and the potential medical risks.  Reviewed preventative health recommendations, including wearing a mask where appropriate.  Recommended COVID vaccination should be up to date with either an initial vaccination or booster shot when appropriate.  Asked the patient to inform the transplant center if they are exposed or diagnosed with this virus.    # COVID Vaccination Up To Date: Yes    # Transplant History:  Etiology of Kidney Failure: Alport's syndrome  Tx: LDKT  Transplant: 2009 (Kidney)  Significant changes in immunosuppression: None  Significant transplant-related complications: None    Transplant Office Phone Number: [phone]    Assessment and plan was discussed with the patient and she voiced her understanding and agreement.    Return visit: Return in about 8 months (around 2022).    Chris Marquez MD    Chief Complaint   Ms. Spangler is a 61 year old here for kidney transplant and immunosuppression management.    History of Present Illness    Ms. Spangler reports feeling good overall with some medical complaints.  Since last clinic visit, patient reports no hospitalizations or new medical complaints and has been doing well overall.  She has had a lot of family stress lately.  Patient's daughter, who also had Alport's disease with kidney failure and on dialysis for 7 years,  in August.  Her daughter was found down from what was felt was a \"brain bleed,\" but no autopsy was done.  The patient's grandson is now living with her after his mother .  He too has Alport's syndrome and recently received a kidney transplant.    Her energy level has been a bit lower with the family situation, but not a big change.  She is active, but only gets minimal " "exercise.  With her family living with her, she doesn't have room for a treadmill and hasn't gone out much with the pandemic.  She is working at home.  Denies any chest pain, but a little shortness of breath with exertion, as she feels she is out of shape.  Rare, brief palpitations, mostly if she forgets to take her morning medications.  Rare leg swelling.    Appetite is good and with the lack of exercise, she has gained about 20 lbs or more.  No nausea, vomiting or diarrhea.  No fever, sweats or chills.    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     Vitamin D deficiency     Paroxysmal atrial fibrillation (H)       Allergies   Allergies   Allergen Reactions     Dilaudid [Hydromorphone Hcl]      Patient unable to recall     Hydromorphone      Patient unable to recall     Sulfa Drugs Swelling     \"swelling\" - age 3         Medications   Current Outpatient Medications   Medication Sig     ELIQUIS ANTICOAGULANT 5 MG tablet TAKE 1 TABLET BY MOUTH TWICE A DAY     levothyroxine (SYNTHROID/LEVOTHROID) 125 MCG tablet TAKE 1 TABLET BY MOUTH EVERY DAY     metoprolol tartrate (LOPRESSOR) 50 MG tablet TAKE 1 TABLET BY MOUTH TWICE A DAY     mycophenolic acid (GENERIC EQUIVALENT) 180 MG EC tablet Take 3 tablets (540 mg) by mouth 2 times daily     sotalol (BETAPACE) 80 MG tablet Take 0.5 tablets (40 mg) by mouth 2 times daily     tacrolimus (GENERIC EQUIVALENT) 0.5 MG capsule Take 1 capsule (0.5 " mg) by mouth 2 times daily     tacrolimus (GENERIC EQUIVALENT) 1 MG capsule Take 3 capsules (3 mg) by mouth 2 times daily     VITAMIN D, CHOLECALCIFEROL, PO Take 1,000 Units by mouth daily     No current facility-administered medications for this visit.     There are no discontinued medications.    Physical Exam   Vital Signs: Deferred for this telemedicine visit.    GENERAL APPEARANCE: alert and no distress  HENT: no obvious abnormalities on appearance  RESP: breathing appears unremarkable with normal rate, no audible wheezing or cough and no apparent shortness of breath with conversation  MS: extremities normal - no gross deformities noted  SKIN: no apparent rash and normal skin tone  NEURO: speech is clear with no obvious neurological deficits  PSYCH: mentation appears normal and affect normal    Data     Renal Latest Ref Rng & Units 11/19/2021 8/20/2021 3/22/2021   Na 133 - 144 mmol/L 139 138 138   K 3.4 - 5.3 mmol/L 4.5 4.5 4.9   Cl 94 - 109 mmol/L 108 108 109   CO2 20 - 32 mmol/L 26 25 27   BUN 7 - 30 mg/dL 14 11 21   Cr 0.52 - 1.04 mg/dL 0.68 0.68 0.76   Glucose 70 - 99 mg/dL 104(H) 98 105(H)   Ca  8.5 - 10.1 mg/dL 9.6 9.1 9.2   Mg 1.6 - 2.3 mg/dL - - -     Bone Health Latest Ref Rng & Units 9/12/2019 8/26/2014 1/27/2010   Phos 2.5 - 4.5 mg/dL - - 3.3   PTHi 12 - 72 pg/mL - 175(H) -   Vit D Def 20 - 75 ug/L 29 19(L) -     Heme Latest Ref Rng & Units 11/19/2021 8/20/2021 3/22/2021   WBC 4.0 - 11.0 10e3/uL 5.3 5.3 5.2   Hgb 11.7 - 15.7 g/dL 12.1 12.0 12.4   Plt 150 - 450 10e3/uL 157 141(L) 167   ABSOLUTE NEUTROPHIL 1.6 - 8.3 10e9/L - - -   ABSOLUTE LYMPHOCYTES 0.8 - 5.3 10e9/L - - -   ABSOLUTE MONOCYTES 0.0 - 1.3 10e9/L - - -   ABSOLUTE EOSINOPHILS 0.0 - 0.7 10e9/L - - -   ABSOLUTE BASOPHILS 0.0 - 0.2 10e9/L - - -   ABS IMMATURE GRANULOCYTES 0 - 0.4 10e9/L - - -   ABSOLUTE NUCLEATED RBC - - - -     Liver Latest Ref Rng & Units 8/25/2011 2/16/2011 8/25/2010   AP 40 - 150 U/L 66 70 67   TBili 0.2 - 1.3 mg/dL 0.2  <0.1(L) 0.3   ALT 0 - 50 U/L 10 25 7   AST 0 - 45 U/L 21 16 19   Tot Protein 6.8 - 8.8 g/dL 7.2 6.8 6.9   Albumin 3.9 - 5.1 g/dL 4.3 4.2 4.4     Pancreas Latest Ref Rng & Units 11/27/2020 7/29/2009   A1C 0 - 5.6 % 5.0 5.0     Iron studies Latest Ref Rng & Units 8/26/2014 6/20/2012 3/23/2012   Iron 35 - 180 ug/dL 76 71 97   Iron sat 15 - 46 % 27 23 32   Ferritin 10 - 300 ng/mL 365(H) 344(H) 328(H)     [facility] Txp Virology Latest Ref Rng & Units 3/4/2013 1/23/2012 8/25/2011   CMV IgG EU/mL - - -   CVM DNA Quant - Whole blood, EDTA anticoagulant - -   CMV Quant <100 Copies/mL <100  No CMV DNA detected. - -   CMV QT Log <2.0 Log copies/mL <2.0  The Cytomegalovirus DNA Quantitation assay is a real-time polymerase chain   reaction (PCR) utilizing analyte specific reagents manufactured by [employer]   [facility]. Analyte Specific Reagents (ASRs) are used in many laboratory   tests necessary for standard medical care and generally do not require FDA   approval.   This test was developed and its performance characteristics determined by   [facility].  It has not been   cleared or approved by the US Food and Drug Administration. - -   BK Spec - - Plasma Plasma, EDTA anticoagulant  CORRECTED ON 08/29 AT 1009: PREVIOUSLY REPORTED AS Whole blood, EDTA   anticoagulant   BK Res <1000 copies/mL - <1000 <1000   BK Log <3.0 Log copies/mL - <3.0  The lower limit of detection for this assay is 1000 copies/mL.  Real-time TaqMan   PCR was performed using BK primers and probe for the detection of a 90 bp   portion of the  1 gene.  The performance characteristics were validated by   the [facility].   It has not been cleared or approved by the U.S. Food and Drug Administration. <3.0  The lower limit of detection for this assay is 1000 copies/mL.  Real-time TaqMan   PCR was performed using BK primers and probe for the detection of a 90 bp   portion of the  1  gene.  The performance characteristics were validated by   the [facility], [place].   It has not been cleared or approved by the U.S. Food and Drug Administration.   EBV IgG - - - -   Hep B Core NEG - - -   Hep B Surf - - - -   HIV 1&2 NEG - - -        Recent Labs   Lab Test 03/22/21  0746 08/20/21  0801 11/19/21  0749   DOSTAC 3/21/21 2000 8/19/2021 11/18/2021   TACROL 6.2 4.6* 5.3     Recent Labs   Lab Test 04/04/14  0747 07/02/14  0802 09/12/19  0754   DOSMPA 2000 04/03/2014 07/01/14 2000 PM 9/11/19 2000   MPACID 1.45 1.24 2.59   MPAG 50.2 46.4 33.6

## 2021-12-20 NOTE — LETTER
12/20/2021    RE: Bessy QUEEN Spangler  [address] [place] [address]     Bessy is a 61 year old who is being evaluated via a billable video visit.    How would you like to obtain your AVS? MyChart  If the video visit is dropped, the invitation should be resent by: Text to cell phone: [phone]  Will anyone else be joining your video visit? No    Video Start Time: 1605  Video-Visit Details    Type of service:  Video Visit    Video End Time:1625    Originating Location (pt. Location): Home    Distant Location (provider location):  [facility] [place]     Platform used for Video Visit: "[employer]"      TRANSPLANT NEPHROLOGY CHRONIC POST TRANSPLANT VISIT    Assessment & Plan   # LDKT: Stable   - Baseline Creatinine:  ~ 0.6-0.8   - Proteinuria: Normal (<0.2 grams)   - Date DSA Last Checked: Oct/2009      Latest DSA: No   - BK Viremia: Not checked recently due to time from transplant   - Kidney Tx Biopsy: No    # Immunosuppression: Tacrolimus immediate release (goal 4-6) and Mycophenolic acid (dose 540 mg every 12 hours)   - Continue with intensive monitoring of immunosuppression for efficacy and toxicity.   - Changes: No    # Infection Prophylaxis:   Last CD4 Level: 290 (Aug/2011)  - PJP: None    # Hypertension: Not checked recently;  Goal BP: < 130/80   - Changes: Not at this time; Recommend patient check blood pressure at home on a regular basis, such as once every week or two, and follow up with PCP if above goal.    # Mineral Bone Disorder:   - Vitamin D; level: Not checked recently        On supplement: Yes  - Calcium; level: Normal        On supplement: No    # Paroxysmal Atrial Fibrillation: Only rare, brief episodes if patient forgets her medications (sotalol and beta blocker).  Patient remains on chronic anticoagulation with apixaban.     # H/o DVT and h/o PE: Remains on chronic anticoagulation with apixaban.    # Obesity, Class II (BMI = 36): Patient feels she has  "gained over 20 lbs, but not exactly sure.   - Recommend weight loss for overall health by increasing exercise and watching caloric intake.    # Skin Cancer: New lesions: none   - Discussed sun protection and recommend regular follow up with Dermatology.    # Medical Compliance: Yes    # COVID-19 Virus Review: Discussed COVID-19 virus and the potential medical risks.  Reviewed preventative health recommendations, including wearing a mask where appropriate.  Recommended COVID vaccination should be up to date with either an initial vaccination or booster shot when appropriate.  Asked the patient to inform the transplant center if they are exposed or diagnosed with this virus.    # COVID Vaccination Up To Date: Yes    # Transplant History:  Etiology of Kidney Failure: Alport's syndrome  Tx: LDKT  Transplant: 2009 (Kidney)  Significant changes in immunosuppression: None  Significant transplant-related complications: None    Transplant Office Phone Number: [phone]    Assessment and plan was discussed with the patient and she voiced her understanding and agreement.    Return visit: Return in about 8 months (around 2022).    Chris Marquez MD    Chief Complaint   Ms. Spangler is a 61 year old here for kidney transplant and immunosuppression management.    History of Present Illness    Ms. Spangler reports feeling good overall with some medical complaints.  Since last clinic visit, patient reports no hospitalizations or new medical complaints and has been doing well overall.  She has had a lot of family stress lately.  Patient's daughter, who also had Alport's disease with kidney failure and on dialysis for 7 years,  in August.  Her daughter was found down from what was felt was a \"brain bleed,\" but no autopsy was done.  The patient's grandson is now living with her after his mother .  He too has Alport's syndrome and recently received a kidney transplant.    Her energy level has been a bit lower " "with the family situation, but not a big change.  She is active, but only gets minimal exercise.  With her family living with her, she doesn't have room for a treadmill and hasn't gone out much with the pandemic.  She is working at home.  Denies any chest pain, but a little shortness of breath with exertion, as she feels she is out of shape.  Rare, brief palpitations, mostly if she forgets to take her morning medications.  Rare leg swelling.    Appetite is good and with the lack of exercise, she has gained about 20 lbs or more.  No nausea, vomiting or diarrhea.  No fever, sweats or chills.    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     Vitamin D deficiency     Paroxysmal atrial fibrillation (H)       Allergies   Allergies   Allergen Reactions     Dilaudid [Hydromorphone Hcl]      Patient unable to recall     Hydromorphone      Patient unable to recall     Sulfa Drugs Swelling     \"swelling\" - age 3         Medications   Current Outpatient Medications   Medication Sig     ELIQUIS ANTICOAGULANT 5 MG tablet TAKE 1 TABLET BY MOUTH TWICE A DAY     levothyroxine (SYNTHROID/LEVOTHROID) 125 MCG tablet TAKE 1 TABLET BY MOUTH EVERY DAY     metoprolol tartrate (LOPRESSOR) 50 MG tablet TAKE 1 TABLET BY MOUTH TWICE A DAY     mycophenolic acid (GENERIC EQUIVALENT) 180 MG EC tablet Take 3 tablets (540 mg) by mouth 2 times daily     sotalol (BETAPACE) 80 MG tablet Take 0.5 tablets (40 mg) by " mouth 2 times daily     tacrolimus (GENERIC EQUIVALENT) 0.5 MG capsule Take 1 capsule (0.5 mg) by mouth 2 times daily     tacrolimus (GENERIC EQUIVALENT) 1 MG capsule Take 3 capsules (3 mg) by mouth 2 times daily     VITAMIN D, CHOLECALCIFEROL, PO Take 1,000 Units by mouth daily     No current facility-administered medications for this visit.     There are no discontinued medications.    Physical Exam   Vital Signs: Deferred for this telemedicine visit.    GENERAL APPEARANCE: alert and no distress  HENT: no obvious abnormalities on appearance  RESP: breathing appears unremarkable with normal rate, no audible wheezing or cough and no apparent shortness of breath with conversation  MS: extremities normal - no gross deformities noted  SKIN: no apparent rash and normal skin tone  NEURO: speech is clear with no obvious neurological deficits  PSYCH: mentation appears normal and affect normal    Data     Renal Latest Ref Rng & Units 11/19/2021 8/20/2021 3/22/2021   Na 133 - 144 mmol/L 139 138 138   K 3.4 - 5.3 mmol/L 4.5 4.5 4.9   Cl 94 - 109 mmol/L 108 108 109   CO2 20 - 32 mmol/L 26 25 27   BUN 7 - 30 mg/dL 14 11 21   Cr 0.52 - 1.04 mg/dL 0.68 0.68 0.76   Glucose 70 - 99 mg/dL 104(H) 98 105(H)   Ca  8.5 - 10.1 mg/dL 9.6 9.1 9.2   Mg 1.6 - 2.3 mg/dL - - -     Bone Health Latest Ref Rng & Units 9/12/2019 8/26/2014 1/27/2010   Phos 2.5 - 4.5 mg/dL - - 3.3   PTHi 12 - 72 pg/mL - 175(H) -   Vit D Def 20 - 75 ug/L 29 19(L) -     Heme Latest Ref Rng & Units 11/19/2021 8/20/2021 3/22/2021   WBC 4.0 - 11.0 10e3/uL 5.3 5.3 5.2   Hgb 11.7 - 15.7 g/dL 12.1 12.0 12.4   Plt 150 - 450 10e3/uL 157 141(L) 167   ABSOLUTE NEUTROPHIL 1.6 - 8.3 10e9/L - - -   ABSOLUTE LYMPHOCYTES 0.8 - 5.3 10e9/L - - -   ABSOLUTE MONOCYTES 0.0 - 1.3 10e9/L - - -   ABSOLUTE EOSINOPHILS 0.0 - 0.7 10e9/L - - -   ABSOLUTE BASOPHILS 0.0 - 0.2 10e9/L - - -   ABS IMMATURE GRANULOCYTES 0 - 0.4 10e9/L - - -   ABSOLUTE NUCLEATED RBC - - - -     Liver Latest Ref Rng &  Units 8/25/2011 2/16/2011 8/25/2010   AP 40 - 150 U/L 66 70 67   TBili 0.2 - 1.3 mg/dL 0.2 <0.1(L) 0.3   ALT 0 - 50 U/L 10 25 7   AST 0 - 45 U/L 21 16 19   Tot Protein 6.8 - 8.8 g/dL 7.2 6.8 6.9   Albumin 3.9 - 5.1 g/dL 4.3 4.2 4.4     Pancreas Latest Ref Rng & Units 11/27/2020 7/29/2009   A1C 0 - 5.6 % 5.0 5.0     Iron studies Latest Ref Rng & Units 8/26/2014 6/20/2012 3/23/2012   Iron 35 - 180 ug/dL 76 71 97   Iron sat 15 - 46 % 27 23 32   Ferritin 10 - 300 ng/mL 365(H) 344(H) 328(H)     [facility] Txp Virology Latest Ref Rng & Units 3/4/2013 1/23/2012 8/25/2011   CMV IgG EU/mL - - -   CVM DNA Quant - Whole blood, EDTA anticoagulant - -   CMV Quant <100 Copies/mL <100  No CMV DNA detected. - -   CMV QT Log <2.0 Log copies/mL <2.0  The Cytomegalovirus DNA Quantitation assay is a real-time polymerase chain   reaction (PCR) utilizing analyte specific reagents manufactured by [employer]   [facility]. Analyte Specific Reagents (ASRs) are used in many laboratory   tests necessary for standard medical care and generally do not require FDA   approval.   This test was developed and its performance characteristics determined by   [facility].  It has not been   cleared or approved by the US Food and Drug Administration. - -   BK Spec - - Plasma Plasma, EDTA anticoagulant  CORRECTED ON 08/29 AT 1009: PREVIOUSLY REPORTED AS Whole blood, EDTA   anticoagulant   BK Res <1000 copies/mL - <1000 <1000   BK Log <3.0 Log copies/mL - <3.0  The lower limit of detection for this assay is 1000 copies/mL.  Real-time TaqMan   PCR was performed using BK primers and probe for the detection of a 90 bp   portion of the  1 gene.  The performance characteristics were validated by   the [facility].   It has not been cleared or approved by the U.S. Food and Drug Administration. <3.0  The lower limit of detection for this assay is 1000 copies/mL.  Real-time TaqMan   PCR was  performed using BK primers and probe for the detection of a 90 bp   portion of the  1 gene.  The performance characteristics were validated by   the [facility], [place].   It has not been cleared or approved by the U.S. Food and Drug Administration.   EBV IgG - - - -   Hep B Core NEG - - -   Hep B Surf - - - -   HIV 1&2 NEG - - -        Recent Labs   Lab Test 03/22/21  0746 08/20/21  0801 11/19/21  0749   DOSTAC 3/21/21 2000 8/19/2021 11/18/2021   TACROL 6.2 4.6* 5.3     Recent Labs   Lab Test 04/04/14  0747 07/02/14  0802 09/12/19  0754   DOSMPA 2000 04/03/2014 07/01/14 2000 PM 9/11/19 2000   MPACID 1.45 1.24 2.59   MPAG 50.2 46.4 33.6       Chris Marquez MD

## 2021-12-20 NOTE — LETTER
12/20/2021     RE: Bessy Spangler  [id number] [address] [facility] [id number]     Dear Colleague,    Thank you for referring your patient, Bessy Spangler, to the [facility] [place] at [facility]. Please see a copy of my visit note below.    Bessy is a 61 year old who is being evaluated via a billable video visit.    How would you like to obtain your AVS? MyChart  If the video visit is dropped, the invitation should be resent by: Text to cell phone: [phone]  Will anyone else be joining your video visit? No    Video Start Time: 1605  Video-Visit Details    Type of service:  Video Visit    Video End Time:1625    Originating Location (pt. Location): Home    Distant Location (provider location):  [facility] [place]     Platform used for Video Visit: [employer]      TRANSPLANT NEPHROLOGY CHRONIC POST TRANSPLANT VISIT    Assessment & Plan   # LDKT: Stable   - Baseline Creatinine:  ~ 0.6-0.8   - Proteinuria: Normal (<0.2 grams)   - Date DSA Last Checked: Oct/2009      Latest DSA: No   - BK Viremia: Not checked recently due to time from transplant   - Kidney Tx Biopsy: No    # Immunosuppression: Tacrolimus immediate release (goal 4-6) and Mycophenolic acid (dose 540 mg every 12 hours)   - Continue with intensive monitoring of immunosuppression for efficacy and toxicity.   - Changes: No    # Infection Prophylaxis:   Last CD4 Level: 290 (Aug/2011)  - PJP: None    # Hypertension: Not checked recently;  Goal BP: < 130/80   - Changes: Not at this time; Recommend patient check blood pressure at home on a regular basis, such as once every week or two, and follow up with PCP if above goal.    # Mineral Bone Disorder:   - Vitamin D; level: Not checked recently        On supplement: Yes  - Calcium; level: Normal        On supplement: No    # Paroxysmal Atrial Fibrillation: Only rare, brief episodes if patient forgets her  medications (sotalol and beta blocker).  Patient remains on chronic anticoagulation with apixaban.     # H/o DVT and h/o PE: Remains on chronic anticoagulation with apixaban.    # Obesity, Class II (BMI = 36): Patient feels she has gained over 20 lbs, but not exactly sure.   - Recommend weight loss for overall health by increasing exercise and watching caloric intake.    # Skin Cancer: New lesions: none   - Discussed sun protection and recommend regular follow up with Dermatology.    # Medical Compliance: Yes    # COVID-19 Virus Review: Discussed COVID-19 virus and the potential medical risks.  Reviewed preventative health recommendations, including wearing a mask where appropriate.  Recommended COVID vaccination should be up to date with either an initial vaccination or booster shot when appropriate.  Asked the patient to inform the transplant center if they are exposed or diagnosed with this virus.    # COVID Vaccination Up To Date: Yes    # Transplant History:  Etiology of Kidney Failure: Alport's syndrome  Tx: LDKT  Transplant: 2009 (Kidney)  Significant changes in immunosuppression: None  Significant transplant-related complications: None    Transplant Office Phone Number: [phone]    Assessment and plan was discussed with the patient and she voiced her understanding and agreement.    Return visit: Return in about 8 months (around 2022).    Chris Marquez MD    Chief Complaint   Ms. Spangler is a 61 year old here for kidney transplant and immunosuppression management.    History of Present Illness    Ms. Spangler reports feeling good overall with some medical complaints.  Since last clinic visit, patient reports no hospitalizations or new medical complaints and has been doing well overall.  She has had a lot of family stress lately.  Patient's daughter, who also had Alport's disease with kidney failure and on dialysis for 7 years,  in August.  Her daughter was found down from what was felt  "was a \"brain bleed,\" but no autopsy was done.  The patient's grandson is now living with her after his mother .  He too has Alport's syndrome and recently received a kidney transplant.    Her energy level has been a bit lower with the family situation, but not a big change.  She is active, but only gets minimal exercise.  With her family living with her, she doesn't have room for a treadmill and hasn't gone out much with the pandemic.  She is working at home.  Denies any chest pain, but a little shortness of breath with exertion, as she feels she is out of shape.  Rare, brief palpitations, mostly if she forgets to take her morning medications.  Rare leg swelling.    Appetite is good and with the lack of exercise, she has gained about 20 lbs or more.  No nausea, vomiting or diarrhea.  No fever, sweats or chills.    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     Vitamin D deficiency     Paroxysmal atrial fibrillation (H)       Allergies   Allergies   Allergen Reactions     Dilaudid [Hydromorphone Hcl]      Patient unable to recall     Hydromorphone      Patient unable to recall     Sulfa Drugs Swelling     \"swelling\" - age 3         Medications   Current Outpatient Medications   Medication Sig     ELIQUIS ANTICOAGULANT 5 MG tablet TAKE 1 TABLET BY MOUTH TWICE A DAY     levothyroxine (SYNTHROID/LEVOTHROID) 125 MCG tablet TAKE 1 TABLET BY MOUTH EVERY DAY     metoprolol tartrate " (LOPRESSOR) 50 MG tablet TAKE 1 TABLET BY MOUTH TWICE A DAY     mycophenolic acid (GENERIC EQUIVALENT) 180 MG EC tablet Take 3 tablets (540 mg) by mouth 2 times daily     sotalol (BETAPACE) 80 MG tablet Take 0.5 tablets (40 mg) by mouth 2 times daily     tacrolimus (GENERIC EQUIVALENT) 0.5 MG capsule Take 1 capsule (0.5 mg) by mouth 2 times daily     tacrolimus (GENERIC EQUIVALENT) 1 MG capsule Take 3 capsules (3 mg) by mouth 2 times daily     VITAMIN D, CHOLECALCIFEROL, PO Take 1,000 Units by mouth daily     No current facility-administered medications for this visit.     There are no discontinued medications.    Physical Exam   Vital Signs: Deferred for this telemedicine visit.    GENERAL APPEARANCE: alert and no distress  HENT: no obvious abnormalities on appearance  RESP: breathing appears unremarkable with normal rate, no audible wheezing or cough and no apparent shortness of breath with conversation  MS: extremities normal - no gross deformities noted  SKIN: no apparent rash and normal skin tone  NEURO: speech is clear with no obvious neurological deficits  PSYCH: mentation appears normal and affect normal    Data     Renal Latest Ref Rng & Units 11/19/2021 8/20/2021 3/22/2021   Na 133 - 144 mmol/L 139 138 138   K 3.4 - 5.3 mmol/L 4.5 4.5 4.9   Cl 94 - 109 mmol/L 108 108 109   CO2 20 - 32 mmol/L 26 25 27   BUN 7 - 30 mg/dL 14 11 21   Cr 0.52 - 1.04 mg/dL 0.68 0.68 0.76   Glucose 70 - 99 mg/dL 104(H) 98 105(H)   Ca  8.5 - 10.1 mg/dL 9.6 9.1 9.2   Mg 1.6 - 2.3 mg/dL - - -     Bone Health Latest Ref Rng & Units 9/12/2019 8/26/2014 1/27/2010   Phos 2.5 - 4.5 mg/dL - - 3.3   PTHi 12 - 72 pg/mL - 175(H) -   Vit D Def 20 - 75 ug/L 29 19(L) -     Heme Latest Ref Rng & Units 11/19/2021 8/20/2021 3/22/2021   WBC 4.0 - 11.0 10e3/uL 5.3 5.3 5.2   Hgb 11.7 - 15.7 g/dL 12.1 12.0 12.4   Plt 150 - 450 10e3/uL 157 141(L) 167   ABSOLUTE NEUTROPHIL 1.6 - 8.3 10e9/L - - -   ABSOLUTE LYMPHOCYTES 0.8 - 5.3 10e9/L - - -   ABSOLUTE  MONOCYTES 0.0 - 1.3 10e9/L - - -   ABSOLUTE EOSINOPHILS 0.0 - 0.7 10e9/L - - -   ABSOLUTE BASOPHILS 0.0 - 0.2 10e9/L - - -   ABS IMMATURE GRANULOCYTES 0 - 0.4 10e9/L - - -   ABSOLUTE NUCLEATED RBC - - - -     Liver Latest Ref Rng & Units 8/25/2011 2/16/2011 8/25/2010   AP 40 - 150 U/L 66 70 67   TBili 0.2 - 1.3 mg/dL 0.2 <0.1(L) 0.3   ALT 0 - 50 U/L 10 25 7   AST 0 - 45 U/L 21 16 19   Tot Protein 6.8 - 8.8 g/dL 7.2 6.8 6.9   Albumin 3.9 - 5.1 g/dL 4.3 4.2 4.4     Pancreas Latest Ref Rng & Units 11/27/2020 7/29/2009   A1C 0 - 5.6 % 5.0 5.0     Iron studies Latest Ref Rng & Units 8/26/2014 6/20/2012 3/23/2012   Iron 35 - 180 ug/dL 76 71 97   Iron sat 15 - 46 % 27 23 32   Ferritin 10 - 300 ng/mL 365(H) 344(H) 328(H)     [facility] Txp Virology Latest Ref Rng & Units 3/4/2013 1/23/2012 8/25/2011   CMV IgG EU/mL - - -   CVM DNA Quant - Whole blood, EDTA anticoagulant - -   CMV Quant <100 Copies/mL <100  No CMV DNA detected. - -   CMV QT Log <2.0 Log copies/mL <2.0  The Cytomegalovirus DNA Quantitation assay is a real-time polymerase chain   reaction (PCR) utilizing analyte specific reagents manufactured by [employer]   [employer]. Analyte Specific Reagents (ASRs) are used in many laboratory   tests necessary for standard medical care and generally do not require FDA   approval.   This test was developed and its performance characteristics determined by   [facility].  It has not been   cleared or approved by the US Food and Drug Administration. - -   BK Spec - - Plasma Plasma, EDTA anticoagulant  CORRECTED ON 08/29 AT 1009: PREVIOUSLY REPORTED AS Whole blood, EDTA   anticoagulant   BK Res <1000 copies/mL - <1000 <1000   BK Log <3.0 Log copies/mL - <3.0  The lower limit of detection for this assay is 1000 copies/mL.  Real-time TaqMan   PCR was performed using BK primers and probe for the detection of a 90 bp   portion of the  1 gene.  The performance characteristics were validated  by   the [facility].   It has not been cleared or approved by the U.S. Food and Drug Administration. <3.0  The lower limit of detection for this assay is 1000 copies/mL.  Real-time TaqMan   PCR was performed using BK primers and probe for the detection of a 90 bp   portion of the  1 gene.  The performance characteristics were validated by   the [facility].   It has not been cleared or approved by the U.S. Food and Drug Administration.   EBV IgG - - - -   Hep B Core NEG - - -   Hep B Surf - - - -   HIV 1&2 NEG - - -        Recent Labs   Lab Test 03/22/21  0746 08/20/21  0801 11/19/21  0749   DOSTAC 3/21/21 2000 8/19/2021 11/18/2021   TACROL 6.2 4.6* 5.3     Recent Labs   Lab Test 04/04/14  0747 07/02/14  0802 09/12/19  0754   DOSMPA 2000 04/03/2014 07/01/14 2000 PM 9/11/19 2000   MPACID 1.45 1.24 2.59   MPAG 50.2 46.4 33.6     Again, thank you for allowing me to participate in the care of your patient.      Sincerely,    Chris Marquez MD

## 2021-12-21 ENCOUNTER — TELEPHONE (OUTPATIENT)
Dept: TRANSPLANT | Facility: CLINIC | Age: 61
End: 2021-12-21
Payer: COMMERCIAL

## 2021-12-21 DIAGNOSIS — Z48.298 AFTERCARE FOLLOWING ORGAN TRANSPLANT: ICD-10-CM

## 2021-12-21 DIAGNOSIS — Z94.0 KIDNEY TRANSPLANTED: Primary | ICD-10-CM

## 2021-12-21 NOTE — TELEPHONE ENCOUNTER
Chris Marquez MD Ututalum, Teresa, RN  Please check the following labs: Vitamin D.     Vitamin D orders placed.

## 2022-01-04 ENCOUNTER — TELEPHONE (OUTPATIENT)
Dept: NEPHROLOGY | Facility: CLINIC | Age: 62
End: 2022-01-04
Payer: COMMERCIAL

## 2022-01-04 NOTE — TELEPHONE ENCOUNTER
Called patient to follow up on blood pressure management. Patient to start checking and follow up with PCP.    Patient has not been checking blood pressure since visit with Dr. Marquez. Told patient to take BP a few times this week and send in her readings via [email or web address]. If cause for concern will notify Dr. Marquez in interim of her seeing PCP. Patient due to see PCP on 2/8. Patient verbalized understanding and had no further questions or concerns.

## 2022-01-12 DIAGNOSIS — Z48.298 AFTERCARE FOLLOWING ORGAN TRANSPLANT: ICD-10-CM

## 2022-01-12 DIAGNOSIS — Z79.899 IMMUNOSUPPRESSIVE MANAGEMENT ENCOUNTER FOLLOWING KIDNEY TRANSPLANT: ICD-10-CM

## 2022-01-12 DIAGNOSIS — Z94.0 KIDNEY REPLACED BY TRANSPLANT: ICD-10-CM

## 2022-01-12 DIAGNOSIS — Z94.0 KIDNEY TRANSPLANTED: Primary | ICD-10-CM

## 2022-01-12 DIAGNOSIS — Z94.0 IMMUNOSUPPRESSIVE MANAGEMENT ENCOUNTER FOLLOWING KIDNEY TRANSPLANT: ICD-10-CM

## 2022-01-12 RX ORDER — MYCOPHENOLIC ACID 180 MG/1
540 TABLET, DELAYED RELEASE ORAL 2 TIMES DAILY
Qty: 540 TABLET | Refills: 3 | Status: SHIPPED | OUTPATIENT
Start: 2022-01-12 | End: 2023-02-02

## 2022-01-18 ENCOUNTER — MYC MEDICAL ADVICE (OUTPATIENT)
Dept: FAMILY MEDICINE | Facility: CLINIC | Age: 62
End: 2022-01-18
Payer: COMMERCIAL

## 2022-01-19 ENCOUNTER — VIRTUAL VISIT (OUTPATIENT)
Dept: FAMILY MEDICINE | Facility: CLINIC | Age: 62
End: 2022-01-19
Payer: COMMERCIAL

## 2022-01-19 DIAGNOSIS — D84.9 IMMUNOSUPPRESSION (H): ICD-10-CM

## 2022-01-19 DIAGNOSIS — I10 ESSENTIAL HYPERTENSION: Primary | ICD-10-CM

## 2022-01-19 DIAGNOSIS — I48.0 PAROXYSMAL ATRIAL FIBRILLATION (H): ICD-10-CM

## 2022-01-19 DIAGNOSIS — Z94.0 KIDNEY REPLACED BY TRANSPLANT: ICD-10-CM

## 2022-01-19 PROCEDURE — 99215 OFFICE O/P EST HI 40 MIN: CPT | Mod: 95 | Performed by: PREVENTIVE MEDICINE

## 2022-01-19 RX ORDER — NIFEDIPINE 30 MG
30 TABLET, EXTENDED RELEASE ORAL DAILY
Qty: 30 TABLET | Refills: 1 | Status: SHIPPED | OUTPATIENT
Start: 2022-01-19 | End: 2022-02-08

## 2022-01-19 NOTE — Clinical Note
Good Afternoon Dr. Marquez,    I spoke with Bessy today and we decided to proceed with Nifedipine for blood pressure management. She had requested that you be updated on the plan.    Thank you,    Gina

## 2022-01-19 NOTE — TELEPHONE ENCOUNTER
Routing to provider to review and advise, see [employer] message below.    Estefanía Kiran RN  [facility]

## 2022-01-19 NOTE — PROGRESS NOTES
Bessy is a 61 year old who is being evaluated via a billable telephone visit.      What phone number would you like to be contacted at? Home number   How would you like to obtain your AVS? MyChart    Assessment & Plan     Essential hypertension  -has had elevated blood pressure readings.  -was stated on Amlodipine 5 mg per Renal Transplant, patient felt this made her heart rate go up and this is better since stopping the medication  -however, blood pressure readings are still high  -Did not tolerate Amlodipine due to possible increase in heart rate   -reviewed information on Up to Date, will proceed with Nifedipine prior to considering diuretics or ACEi/ARBs  -there is a Category C interaction between Nifedipine and Beta blockers, will monitor closely for Hypotension   - NIFEdipine ER (ADALAT CC) 30 MG 24 hr tablet  Dispense: 30 tablet; Refill: 1    Reviewed the following from Up to Date:    ?Compared with placebo or no treatment, calcium channel blockers were associated with a significant decrease in allograft loss (RR 0.75, 95% CI 0.59-0.99) and improvement in glomerular filtration rate (GFR; mean difference of 4.45 mL/min, 95% CI 2.22-6.68).    ?Compared with calcium channel blockers, ACE inhibitors were associated with a decrease in GFR (mean difference of 11.48 mL/min, 95% CI -5.75 to -7.21) and increased incidence of hyperkalemia (RR 3.76, 95% CI 1.89-7.43).      Paroxysmal atrial fibrillation (H)  -patient will reach out to her cardiologist with update  -last saw Cards 4/21 at which time Zio monitor was done   -on metoprolol and sotalol   -On Apixaban     Immunosuppression (H)  -continue    Kidney replaced by transplant  - NIFEdipine ER (ADALAT CC) 30 MG 24 hr tablet  Dispense: 30 tablet; Refill: 1      Prescription drug management  40 minutes spent on the date of the encounter doing chart review, history and exam, documentation and further activities per the note         Return in about 5 days (around  1/24/2022) if symptoms worsen or fail to improve.    Gina Dc MD MPH    [facility] REGINA Doherty is a 61 year old who presents for the following health issues:    HPI     Discuss Blood pressure:     History of renal transplant. On Immunosuppressants+  Has been checking BP more since seen [facility] by renal DrNikolai Alma   Was started on Amlodipine, thinks she had a reaction it.   Stopped taking Amlodipine yesterday evening and feels somewhat better.   Not so fuzzy headed  Chest is not heavy.  This AM readings were 161/79 Pulse 63 and later 154/75 pulse 62.   At lunch 146/72 pulse 59.   Feels the pulse went up after starting Amlodipine     Per recent My Chart message:      My blood pressure is high, Dr. Egan had me start taking 5 MG Amplodipine 1 tablet in evening.  My 1st dose was Saturday, 1/15/22, but I started having heart palpitations, heavy chest and worse blood pressure results.  Dr. Egan, had me stop taking the Amplodipine  on 1/18/22 and asked that I try to see you sooner if my blood pressures got worse (see below).  My appointment with you is not until 2/8/22, but I do not want to risk damaging my kidney.  Do you have a recommendation of a kidney friendly blood pressure med I can take to get this under control, that is also safe with the rest of the meds I am currently taking?  1-10-22:  141/75  P 61  1-13-22:  140/70  P 66  1-16-22:  140/70  P 66  1-16-22:  146/74  P 67  1-17-22:   146/72  P 66  1-17-22:  145/73  P66  1-17-22:  138/62  P65  1-17-22 2AM:  140/81  P104 (heart palpitations)  1-17-22 2AM:  121/76  P 106 (heart palpitations)  1-18-22 6AM:  161/93  P118 (heart palpitations)  1-18-22:  151/90  P 118  1-18-22 10AM:  129/64  P62  1-18-22 11AM:  129/65  P63  1-18-22 2PM:  129.65  P 66  1-18-22 8PM:  158/73  P 71  1-18-22 8:45PM:  155/75 P68      Review of Systems   Constitutional, HEENT, cardiovascular, pulmonary, gi and gu systems are negative, except as  otherwise noted.      Objective           Vitals:  No vitals were obtained today due to virtual visit.    Physical Exam   healthy, alert and no distress  PSYCH: Alert and oriented times 3; coherent speech, normal   rate and volume, able to articulate logical thoughts, able   to abstract reason, no tangential thoughts, no hallucinations   or delusions  Her affect is normal  RESP: No cough, no audible wheezing, able to talk in full sentences  Remainder of exam unable to be completed due to telephone visits    No results found for any visits on 01/19/22.          Phone call duration: 14 minutes

## 2022-01-24 NOTE — TELEPHONE ENCOUNTER
Recommended patient schedule virtual visit to discuss blood pressure and symptoms, per provider advisement.    Instructions of how to set up a virtual visit sent to patient.    Lisa Bergman RN  [facility]

## 2022-01-25 ENCOUNTER — VIRTUAL VISIT (OUTPATIENT)
Dept: FAMILY MEDICINE | Facility: CLINIC | Age: 62
End: 2022-01-25
Payer: COMMERCIAL

## 2022-01-25 DIAGNOSIS — I15.1 HTN, KIDNEY TRANSPLANT RELATED: Primary | ICD-10-CM

## 2022-01-25 DIAGNOSIS — D84.9 IMMUNOSUPPRESSION (H): ICD-10-CM

## 2022-01-25 DIAGNOSIS — Z94.0 HTN, KIDNEY TRANSPLANT RELATED: Primary | ICD-10-CM

## 2022-01-25 PROCEDURE — 99213 OFFICE O/P EST LOW 20 MIN: CPT | Mod: 95 | Performed by: PREVENTIVE MEDICINE

## 2022-01-25 NOTE — PROGRESS NOTES
"Bessy is a 61 year old who is being evaluated via a billable video visit.      How would you like to obtain your AVS? MyChart  If the video visit is dropped, the invitation should be resent by: Text to cell phone: in chart  Will anyone else be joining your video visit? No      Video Start Time: 4:25 PM    Assessment & Plan     HTN, kidney transplant related  -has had elevated readings  -Had added Nifedipine ER 30 mg daily, today is day 5 of the medication   -has had some readings in the 60s diastolic.  -discussed with patient to continue current dose  -patient will also reach out to her Transplant team       Immunosuppression (H)  -On Tacrolimus       20 minutes spent on the date of the encounter doing chart review, history and exam, documentation and further activities per the note         Return in about 1 week (around 2/1/2022) if symptoms worsen or fail to improve.    Gina Dc MD MPH    [facility]    Valencia Doherty is a 61 year old who presents for the following health issues:    HPI       Hypertension:  Does not feel like she is having side effects  Slight headache the first day or two.  Blood pressure readings checked at home+  Has not been dizzy or light headed   No orthostatic changes       Per My chart message:    \"I started the Nifedipine ER 30 MG Tablets on 1/20/2022 in AM per the pharmacist.  Here are my readings since:  1/20/22:  9:15 AM  135/73  P61  10:00 AM  140/68  P61  12:30 /67  P61  2:00 /70  P61  1/21/22:  6:00 /79  P66  10:30 /72  P63  2:15 /65  P60  1/22/22:  3:00 /71  P70  8:30 /82  P66  1/23//22:  10:00 /62  P61  3:00 /70  P66  7:00 /69  P66  1/24/22:  7AM 157/76  P63\"      Review of Systems         Objective           Vitals:  No vitals were obtained today due to virtual visit.    Physical Exam   GENERAL: Healthy, alert and no distress  EYES: Eyes grossly normal to inspection.  No discharge " or erythema, or obvious scleral/conjunctival abnormalities.  RESP: No audible wheeze, cough, or visible cyanosis.  No visible retractions or increased work of breathing.    SKIN: Visible skin clear. No significant rash, abnormal pigmentation or lesions.  NEURO: Cranial nerves grossly intact.  Mentation and speech appropriate for age.  PSYCH: Mentation appears normal, affect normal/bright, judgement and insight intact, normal speech and appearance well-groomed.    No results found for any visits on 01/25/22.          Video-Visit Details    Type of service:  Video Visit    Video End Time:4:34 PM    Originating Location (pt. Location): Home    Distant Location (provider location):  [facility]     Platform used for Video Visit: [employer]

## 2022-01-28 NOTE — PROGRESS NOTES
HPI    This is a 58 year old patient who has been having vertigo, dizziness and balance issues for the past 2 weeks. She was at work and her symptoms started as spinning sensation, nausea, vomiting for several hours. Felt dizzy and off-balanced next day. Next morning, her symptoms continued with vertigo and she was seen in the ER. Her symptoms aggravate when she lays down, rolls over, or turns head quickly. Feels ear pressure and tinnitus in her right ear. Denies any drainage, head trauma, head and neck surgery, back issues, Migraine, weakness, numbness or tingling sensation. She is premenopausal. She has a hx of kidney transplantation, thyroidal issues, and breast Ca. Her BP is under control. No hx of diabetes. She was given vestibular suppressors which did not seem to be helping. Her CT of head was noncontributory. She was diagnosed with BPPV and later vestibular neuronitis. She was given vestibular rehab exercises. Her sister has a hx of vertigo otherwise family hx is negative for vertigo.    Review of Systems   Constitutional: Negative.    HENT: Positive for hearing loss and tinnitus. Negative for congestion, ear discharge, ear pain, nosebleeds, sinus pain and sore throat.    Eyes: Negative for blurred vision, double vision and photophobia.   Respiratory: Negative for cough, hemoptysis, sputum production and stridor.    Gastrointestinal: Negative for heartburn, nausea and vomiting.   Musculoskeletal: Negative for neck pain.   Skin: Negative.    Neurological: Positive for dizziness. Negative for tingling, tremors and headaches.   Endo/Heme/Allergies: Positive for environmental allergies. Does not bruise/bleed easily.         Physical Exam   Constitutional: She is well-developed, well-nourished, and in no distress.   HENT:   Head: Normocephalic and atraumatic.   Right Ear: Tympanic membrane, external ear and ear canal normal. No drainage, swelling or tenderness. No middle ear effusion. Decreased hearing is noted.  Telephone call to patient and situation discussed. Patient will be having a right total knee replacement with Dr. Amy Grimaldo in February. Patient does qualify for a disability parking placard based on his osteoarthritis of his knees.   I have started a form   Left Ear: Tympanic membrane, external ear and ear canal normal. No drainage, swelling or tenderness.  No middle ear effusion. Decreased hearing is noted.   Nose: Nose normal. No mucosal edema, rhinorrhea or septal deviation.   Mouth/Throat: Uvula is midline, oropharynx is clear and moist and mucous membranes are normal. No oropharyngeal exudate.   Eyes: Pupils are equal, round, and reactive to light.   Neck: Neck supple. No tracheal deviation present. No thyromegaly present.   Lymphadenopathy:     She has no cervical adenopathy.   Head shake test: Positive with rotatory nystagmus.  Walking: WNLs.  Nystagmus: present to the left.    A/P    This pleasant patient is having peripheral vertigo; likely due to vestibular neuronitis. Her hearing test showed mild SNHL especially in higher frequencies. I would like to request an MRI of the brain, comprehensive vestibular testing if she can tolerate and Richardson hallpike. In the meantime, good hydration and resting recommended. She will stop her vestibular suppressors 48 hours before testing. Her questions were answered.

## 2022-01-31 ENCOUNTER — TELEPHONE (OUTPATIENT)
Dept: FAMILY MEDICINE | Facility: CLINIC | Age: 62
End: 2022-01-31
Payer: COMMERCIAL

## 2022-02-01 NOTE — TELEPHONE ENCOUNTER
PRIOR AUTHORIZATION DENIED    Medication: ELIQUIS ANTICOAGULANT 5 MG tablet-DENIED    Denial Date: 2/1/2022    Denial Rational: Patient must have a history of trial & failure to the formulary alternative(s) or have a contraindication or intolerance to the formulary alternatives:Xarelto        Appeal Information:     If provider would like to appeal please provide a letter of medical necessity stating why formulary alternatives would not be clinically appropriate for patient and route back to the PA team.

## 2022-02-01 NOTE — TELEPHONE ENCOUNTER
Prior Authorization Retail Medication Request    Medication/Dose:   ELIQUIS ANTICOAGULANT 5 MG tablet    Is there an alternative available for patient to try?    Can also send request for PA to [facility] MED ([place]) if a prior authorization is wanted.

## 2022-02-01 NOTE — TELEPHONE ENCOUNTER
Central Prior Authorization Team   Phone: [phone]      PA Initiation    Medication: ELIQUIS ANTICOAGULANT 5 MG tablet-Initiated  Insurance Company: [employer] - Phone [phone] Fax [phone]  Pharmacy Filling the Rx: [facility] [id number] [place] [facility] [address] [facility]  Filling Pharmacy Phone: [phone]  Filling Pharmacy Fax:    Start Date: 2/1/2022

## 2022-02-02 ENCOUNTER — MYC MEDICAL ADVICE (OUTPATIENT)
Dept: CARDIOLOGY | Facility: CLINIC | Age: 62
End: 2022-02-02
Payer: COMMERCIAL

## 2022-02-02 DIAGNOSIS — I48.0 PAROXYSMAL ATRIAL FIBRILLATION (H): Primary | ICD-10-CM

## 2022-02-02 NOTE — TELEPHONE ENCOUNTER
Please CALL patient:    Her insurance has declined to fill the ELIQUIS. We submitted a request for a Prior Authorization but it was denied. The medication was initially written by Cardiology I believe. I would recommend reaching out to Cardiology to see what the next steps should be.    Thank you,    Gina Dc MD MPH

## 2022-02-03 NOTE — TELEPHONE ENCOUNTER
Kaveh Maynard MD  You; [facility] Adult Nimisha Morley 5 minutes ago (3:59 PM)     PN    Ok to switch - xarelto 20 mg every day.

## 2022-02-08 ENCOUNTER — OFFICE VISIT (OUTPATIENT)
Dept: FAMILY MEDICINE | Facility: CLINIC | Age: 62
End: 2022-02-08
Payer: COMMERCIAL

## 2022-02-08 VITALS
HEART RATE: 78 BPM | DIASTOLIC BLOOD PRESSURE: 70 MMHG | BODY MASS INDEX: 37.05 KG/M2 | WEIGHT: 222.4 LBS | SYSTOLIC BLOOD PRESSURE: 122 MMHG | OXYGEN SATURATION: 96 % | HEIGHT: 65 IN | TEMPERATURE: 96.7 F

## 2022-02-08 DIAGNOSIS — Z79.899 IMMUNOSUPPRESSIVE MANAGEMENT ENCOUNTER FOLLOWING KIDNEY TRANSPLANT: ICD-10-CM

## 2022-02-08 DIAGNOSIS — Z23 ENCOUNTER FOR IMMUNIZATION: ICD-10-CM

## 2022-02-08 DIAGNOSIS — I48.0 PAROXYSMAL ATRIAL FIBRILLATION (H): ICD-10-CM

## 2022-02-08 DIAGNOSIS — E07.9 THYROID DISORDER: ICD-10-CM

## 2022-02-08 DIAGNOSIS — F43.21 GRIEF REACTION: ICD-10-CM

## 2022-02-08 DIAGNOSIS — Z00.00 ROUTINE GENERAL MEDICAL EXAMINATION AT A HEALTH CARE FACILITY: Primary | ICD-10-CM

## 2022-02-08 DIAGNOSIS — E66.01 MORBID OBESITY (H): ICD-10-CM

## 2022-02-08 DIAGNOSIS — Z94.0 KIDNEY REPLACED BY TRANSPLANT: ICD-10-CM

## 2022-02-08 DIAGNOSIS — Z94.0 IMMUNOSUPPRESSIVE MANAGEMENT ENCOUNTER FOLLOWING KIDNEY TRANSPLANT: ICD-10-CM

## 2022-02-08 DIAGNOSIS — Z48.298 AFTERCARE FOLLOWING ORGAN TRANSPLANT: ICD-10-CM

## 2022-02-08 DIAGNOSIS — D84.9 IMMUNOSUPPRESSION (H): ICD-10-CM

## 2022-02-08 DIAGNOSIS — I82.4Z1 ACUTE DEEP VEIN THROMBOSIS (DVT) OF DISTAL VEIN OF RIGHT LOWER EXTREMITY (H): ICD-10-CM

## 2022-02-08 DIAGNOSIS — Z12.31 ENCOUNTER FOR SCREENING MAMMOGRAM FOR BREAST CANCER: ICD-10-CM

## 2022-02-08 DIAGNOSIS — I15.1 HTN, KIDNEY TRANSPLANT RELATED: ICD-10-CM

## 2022-02-08 DIAGNOSIS — Z94.0 HTN, KIDNEY TRANSPLANT RELATED: ICD-10-CM

## 2022-02-08 DIAGNOSIS — Z94.0 KIDNEY TRANSPLANTED: ICD-10-CM

## 2022-02-08 DIAGNOSIS — Z12.11 COLON CANCER SCREENING: ICD-10-CM

## 2022-02-08 DIAGNOSIS — R06.83 SNORING: ICD-10-CM

## 2022-02-08 DIAGNOSIS — I10 ESSENTIAL HYPERTENSION: ICD-10-CM

## 2022-02-08 LAB
ANION GAP SERPL CALCULATED.3IONS-SCNC: 6 MMOL/L (ref 3–14)
BUN SERPL-MCNC: 17 MG/DL (ref 7–30)
CALCIUM SERPL-MCNC: 9.5 MG/DL (ref 8.5–10.1)
CHLORIDE BLD-SCNC: 108 MMOL/L (ref 94–109)
CHOLEST SERPL-MCNC: 159 MG/DL
CMV DNA SPEC NAA+PROBE-ACNC: NOT DETECTED IU/ML
CO2 SERPL-SCNC: 25 MMOL/L (ref 20–32)
CREAT SERPL-MCNC: 0.71 MG/DL (ref 0.52–1.04)
CREAT UR-MCNC: 313 MG/DL
DEPRECATED CALCIDIOL+CALCIFEROL SERPL-MC: 31 UG/L (ref 20–75)
ERYTHROCYTE [DISTWIDTH] IN BLOOD BY AUTOMATED COUNT: 12.6 % (ref 10–15)
FASTING STATUS PATIENT QL REPORTED: YES
GFR SERPL CREATININE-BSD FRML MDRD: >90 ML/MIN/1.73M2
GLUCOSE BLD-MCNC: 124 MG/DL (ref 70–99)
HBA1C MFR BLD: 5.5 % (ref 0–5.6)
HCT VFR BLD AUTO: 40.8 % (ref 35–47)
HDLC SERPL-MCNC: 65 MG/DL
HGB BLD-MCNC: 13.4 G/DL (ref 11.7–15.7)
LDLC SERPL CALC-MCNC: 73 MG/DL
MCH RBC QN AUTO: 29.7 PG (ref 26.5–33)
MCHC RBC AUTO-ENTMCNC: 32.8 G/DL (ref 31.5–36.5)
MCV RBC AUTO: 91 FL (ref 78–100)
MICROALBUMIN UR-MCNC: 40 MG/L
MICROALBUMIN/CREAT UR: 12.78 MG/G CR (ref 0–25)
NONHDLC SERPL-MCNC: 94 MG/DL
PLATELET # BLD AUTO: 179 10E3/UL (ref 150–450)
POTASSIUM BLD-SCNC: 4.3 MMOL/L (ref 3.4–5.3)
RBC # BLD AUTO: 4.51 10E6/UL (ref 3.8–5.2)
SODIUM SERPL-SCNC: 139 MMOL/L (ref 133–144)
T4 FREE SERPL-MCNC: 1.58 NG/DL (ref 0.76–1.46)
TACROLIMUS BLD-MCNC: 5 UG/L (ref 5–15)
TME LAST DOSE: NORMAL H
TME LAST DOSE: NORMAL H
TRIGL SERPL-MCNC: 107 MG/DL
TSH SERPL DL<=0.005 MIU/L-ACNC: 0.25 MU/L (ref 0.4–4)
WBC # BLD AUTO: 5.2 10E3/UL (ref 4–11)

## 2022-02-08 PROCEDURE — 84439 ASSAY OF FREE THYROXINE: CPT | Performed by: PREVENTIVE MEDICINE

## 2022-02-08 PROCEDURE — 99396 PREV VISIT EST AGE 40-64: CPT | Mod: 25 | Performed by: PREVENTIVE MEDICINE

## 2022-02-08 PROCEDURE — 80197 ASSAY OF TACROLIMUS: CPT | Performed by: PREVENTIVE MEDICINE

## 2022-02-08 PROCEDURE — 80048 BASIC METABOLIC PNL TOTAL CA: CPT | Performed by: PREVENTIVE MEDICINE

## 2022-02-08 PROCEDURE — 84443 ASSAY THYROID STIM HORMONE: CPT | Performed by: PREVENTIVE MEDICINE

## 2022-02-08 PROCEDURE — 85027 COMPLETE CBC AUTOMATED: CPT | Performed by: PREVENTIVE MEDICINE

## 2022-02-08 PROCEDURE — 82043 UR ALBUMIN QUANTITATIVE: CPT | Performed by: PREVENTIVE MEDICINE

## 2022-02-08 PROCEDURE — 36415 COLL VENOUS BLD VENIPUNCTURE: CPT | Performed by: PREVENTIVE MEDICINE

## 2022-02-08 PROCEDURE — 99214 OFFICE O/P EST MOD 30 MIN: CPT | Mod: 25 | Performed by: PREVENTIVE MEDICINE

## 2022-02-08 PROCEDURE — 83036 HEMOGLOBIN GLYCOSYLATED A1C: CPT | Performed by: PREVENTIVE MEDICINE

## 2022-02-08 PROCEDURE — 82306 VITAMIN D 25 HYDROXY: CPT | Performed by: PREVENTIVE MEDICINE

## 2022-02-08 PROCEDURE — 80061 LIPID PANEL: CPT | Performed by: PREVENTIVE MEDICINE

## 2022-02-08 PROCEDURE — 90714 TD VACC NO PRESV 7 YRS+ IM: CPT | Performed by: PREVENTIVE MEDICINE

## 2022-02-08 PROCEDURE — 90472 IMMUNIZATION ADMIN EACH ADD: CPT | Performed by: PREVENTIVE MEDICINE

## 2022-02-08 PROCEDURE — 90682 RIV4 VACC RECOMBINANT DNA IM: CPT | Performed by: PREVENTIVE MEDICINE

## 2022-02-08 PROCEDURE — 90471 IMMUNIZATION ADMIN: CPT | Performed by: PREVENTIVE MEDICINE

## 2022-02-08 RX ORDER — NIFEDIPINE 30 MG
30 TABLET, EXTENDED RELEASE ORAL DAILY
Qty: 90 TABLET | Refills: 1 | Status: SHIPPED | OUTPATIENT
Start: 2022-02-08 | End: 2022-07-27

## 2022-02-08 ASSESSMENT — ENCOUNTER SYMPTOMS
SHORTNESS OF BREATH: 0
WEAKNESS: 0
DIARRHEA: 0
HEMATOCHEZIA: 0
CONSTIPATION: 0
HEMATURIA: 0
EYE PAIN: 0
FREQUENCY: 1
FEVER: 0
HEADACHES: 1
BREAST MASS: 0
DYSURIA: 0
JOINT SWELLING: 0
NERVOUS/ANXIOUS: 0
PALPITATIONS: 0
CHILLS: 0
COUGH: 0
ABDOMINAL PAIN: 0
DIZZINESS: 0
PARESTHESIAS: 0
MYALGIAS: 0
HEARTBURN: 0
SORE THROAT: 0
ARTHRALGIAS: 0
NAUSEA: 0

## 2022-02-08 ASSESSMENT — PAIN SCALES - GENERAL: PAINLEVEL: NO PAIN (0)

## 2022-02-08 ASSESSMENT — MIFFLIN-ST. JEOR: SCORE: 1565.29

## 2022-02-08 NOTE — PROGRESS NOTES
SUBJECTIVE:   CC: Bessy Spangler is an 61 year old woman who presents for preventive health visit.       Patient has been advised of split billing requirements and indicates understanding: Yes  Healthy Habits:   PHQ-2 Total Score: 0    Physical:  Frequency of exercise:: None  Getting at least 3 servings of Calcium per day:: No  Diet:: low salt  Taking medications regularly:: No  Bi-annual eye exam:: No  Dental care twice a year:: Yes  Sleep apnea or symptoms of sleep apnea:: No  Associated symptoms:   Positive: frequency, headaches  Negative: abdominal pain, Blood in stool, Blood in urine, chest pain, chills, congestion, constipation, cough, diarrhea, dizziness, ear pain, eye pain, nervous/anxious, fever, genital sores, hearing loss, heartburn, arthralgias, joint swelling, peripheral edema, mood changes, myalgias, nausea, dysuria, palpitations, Skin sensation changes, sore throat, urgency, rash, shortness of breath, visual disturbance, weakness  pelvic pain: No  vaginal bleeding: No  vaginal discharge: No  tenderness: No  breast mass: No  breast discharge: No  Additional concerns today:: No    Daughter passed away in 8/21. Grandson found her.  Now she is guardian for her grand kids.  No grief counseling, open to getting counseling       Due for DERM follow up.    Grand son with CMV and was advised by Nephrology team to avoid utensils etc.  Shares house and bathroom with him   Patient was tested for CMV in 2013 and was negative.    Hypertension Follow-up      Do you check your blood pressure regularly outside of the clinic? Yes     Are you following a low salt diet? Yes    Are your blood pressures ever more than 140 on the top number (systolic) OR more   than 90 on the bottom number (diastolic), for example 140/90? No    Hypothyroidism Follow-up      Since last visit, patient describes the following symptoms: Weight stable, no hair loss, no skin changes, no constipation, no loose stools      Today's PHQ-2 Score:    PHQ-2 (  [facility]) 2022   Q1: Little interest or pleasure in doing things 0   Q2: Feeling down, depressed or hopeless 0   PHQ-2 Score 0   PHQ-2 Total Score (12-17 Years)- Positive if 3 or more points; Administer PHQ-A if positive -   Q1: Little interest or pleasure in doing things Not at all   Q2: Feeling down, depressed or hopeless Not at all   PHQ-2 Score 0     Does snore+ and may gasp for air.      Afib:  -pulse has gone up to 126  -woke up with a headache at 1 am and pulse was 120   -pulse again at 131 and then to 163.  -had appointment for , but needs to establish with a different provider   -switched to xarelto due to insurance reasons    Weight:  -has gone up     Abuse: Current or Past (Physical, Sexual or Emotional) No  Do you feel safe in your environment? Yes        Social History     Tobacco Use     Smoking status: Former Smoker     Packs/day: 1.00     Years: 20.00     Pack years: 20.00     Types: Cigarettes     Quit date: 1998     Years since quittin.1     Smokeless tobacco: Never Used   Substance Use Topics     Alcohol use: No     Alcohol/week: 0.0 standard drinks     If you drink alcohol do you typically have >3 drinks per day or >7 drinks per week? No    Alcohol Use 2022   Prescreen: >3 drinks/day or >7 drinks/week? Not Applicable       Reviewed orders with patient.  Reviewed health maintenance and updated orders accordingly - Yes  Lab work is in process  Labs reviewed in EPIC  BP Readings from Last 3 Encounters:   22 122/70   21 (!) 155/78   20 116/73    Wt Readings from Last 3 Encounters:   22 100.9 kg (222 lb 6.4 oz)   20 91.5 kg (201 lb 12.8 oz)   19 90.7 kg (200 lb)                  Patient Active Problem List   Diagnosis     Breast cancer (H)     Kidney replaced by transplant     CARDIOVASCULAR SCREENING; LDL GOAL LESS THAN 100     Immunosuppression (H)     HTN, kidney transplant related     Thyroid disorder     Post-menopausal      Conjunctival hemorrhage     Tear film insufficiency     Presbyopia - Both Eyes     Aftercare following organ transplant     Vestibular neuronitis of right ear     Acute deep vein thrombosis (DVT) of distal vein of right lower extremity (H)     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     Vitamin D deficiency     Paroxysmal atrial fibrillation (H)     Morbid obesity (H)     Past Surgical History:   Procedure Laterality Date     APPENDECTOMY OPEN       AV FISTULA OR GRAFT ARTERIAL      left     LUMPECTOMY BREAST      left     ZZC TRANSPLANT,PREP LIVING  RENAL GRAFT  2009    Hx of nephritis       Social History     Tobacco Use     Smoking status: Former Smoker     Packs/day: 1.00     Years: 20.00     Pack years: 20.00     Types: Cigarettes     Quit date: 1998     Years since quittin.1     Smokeless tobacco: Never Used   Substance Use Topics     Alcohol use: No     Alcohol/week: 0.0 standard drinks     Family History   Problem Relation Age of Onset     Diabetes Father         type 2       Arthritis Father      Glaucoma Father      Cancer Mother 58        smoker,metastatic adnoid cystic cancer,  early 60s     Arthritis Mother      Endocrine Disease Mother         thyroid     Hypertension Maternal Grandmother          at a young age     Kidney Disease Maternal Grandmother      Hypertension Maternal Grandfather      Cancer Paternal Grandmother         stomach cancer     Endocrine Disease Sister         thyroid     Genitourinary Problems Daughter 32        renal failure      Endocrine Disease Sister         thyroid     Chronic Obstructive Pulmonary Disease Paternal Grandfather         smoking  young     Heart Disease Maternal Half-Brother          Current Outpatient Medications   Medication Sig Dispense Refill     levothyroxine (SYNTHROID/LEVOTHROID) 125 MCG tablet TAKE 1 TABLET BY MOUTH EVERY DAY 90 tablet 0     metoprolol tartrate (LOPRESSOR) 50 MG tablet TAKE 1 TABLET BY MOUTH  "TWICE A  tablet 0     mycophenolic acid (GENERIC EQUIVALENT) 180 MG EC tablet Take 3 tablets (540 mg) by mouth 2 times daily 540 tablet 3     NIFEdipine ER (ADALAT CC) 30 MG 24 hr tablet Take 1 tablet (30 mg) by mouth daily For blood pressure 90 tablet 1     rivaroxaban ANTICOAGULANT (XARELTO ANTICOAGULANT) 20 MG TABS tablet Take 1 tablet (20 mg) by mouth daily (with dinner) 90 tablet 3     sotalol (BETAPACE) 80 MG tablet Take 0.5 tablets (40 mg) by mouth 2 times daily 90 tablet 2     tacrolimus (GENERIC EQUIVALENT) 0.5 MG capsule Take 1 capsule (0.5 mg) by mouth 2 times daily 180 capsule 3     tacrolimus (GENERIC EQUIVALENT) 1 MG capsule Take 3 capsules (3 mg) by mouth 2 times daily 540 capsule 3     VITAMIN D, CHOLECALCIFEROL, PO Take 1,000 Units by mouth daily       Allergies   Allergen Reactions     Dilaudid [Hydromorphone Hcl]      Patient unable to recall     Hydromorphone      Patient unable to recall     Sulfa Drugs Swelling     \"swelling\" - age 3         Breast Cancer Screening:  Any new diagnosis of family breast, ovarian, or bowel cancer? No    FHS-7: No flowsheet data found.    Mammogram Screening: Recommended mammography every 1-2 years with patient discussion and risk factor consideration  Pertinent mammograms are reviewed under the imaging tab.    History of abnormal Pap smear: NO - age 30-65 PAP every 5 years with negative HPV co-testing recommended  PAP / HPV Latest Ref Rng & Units 10/11/2019 10/16/2014   PAP (Historical) - NIL NIL   HPV16 NEG:Negative Negative -   HPV18 NEG:Negative Negative -   HRHPV NEG:Negative Negative -     Reviewed and updated as needed this visit by clinical staff  Tobacco  Allergies  Meds  Problems  Med Hx  Surg Hx  Fam Hx  Soc Hx         Reviewed and updated as needed this visit by Provider  Tobacco  Allergies  Meds  Problems  Med Hx  Surg Hx  Fam Hx        Past Medical History:   Diagnosis Date     Anemia of chronic renal failure      Breast cancer " "(H)      Chronic diarrhea      Chronic kidney disease (CKD), stage V (H)     Due to \"nephritis\". Biopsy done years ago. I do not know the histologic specifics     High risk medication use      Hyperphosphatemia      Hypertension      Hypothyroid      Immunosuppressed status (H)      Kidney replaced by transplant     nephritis as a child     PE (pulmonary embolism)     after transplant     Secondary hyperparathyroidism (of renal origin)       Past Surgical History:   Procedure Laterality Date     APPENDECTOMY OPEN  1999     AV FISTULA OR GRAFT ARTERIAL      left     LUMPECTOMY BREAST      left     ZZC TRANSPLANT,PREP LIVING  RENAL GRAFT  7/30/2009    Hx of nephritis       Review of Systems  CONSTITUTIONAL: NEGATIVE for fever, chills, change in weight  INTEGUMENTARY/SKIN: NEGATIVE for worrisome rashes, moles or lesions  EYES: NEGATIVE for vision changes or irritation  ENT: NEGATIVE for ear, mouth and throat problems  RESP: NEGATIVE for significant cough or SOB  BREAST: NEGATIVE for masses, tenderness or discharge  CV: NEGATIVE for chest pain, palpitations or peripheral edema  GI: NEGATIVE for nausea, abdominal pain, heartburn, or change in bowel habits  : NEGATIVE for unusual urinary or vaginal symptoms. No vaginal bleeding.  MUSCULOSKELETAL: NEGATIVE for significant arthralgias or myalgia  NEURO: NEGATIVE for weakness, dizziness or paresthesias  HEME/ALLERGY/IMMUNE: NEGATIVE for bleeding problems  PSYCHIATRIC: NEGATIVE for changes in mood or affect      OBJECTIVE:   /70 (BP Location: Right arm, Patient Position: Sitting, Cuff Size: Adult Large)   Pulse 78   Temp (!) 96.7  F (35.9  C) (Tympanic)   Ht 1.652 m (5' 5.04\")   Wt 100.9 kg (222 lb 6.4 oz)   LMP 10/25/2004 (Exact Date)   SpO2 96%   BMI 36.96 kg/m    Physical Exam  GENERAL APPEARANCE: healthy, alert and no distress  EYES: Eyes grossly normal to inspection and conjunctivae and sclerae normal  NECK: no adenopathy and trachea midline and normal to " palpation  RESP: lungs clear to auscultation - no rales, rhonchi or wheezes  CV: regular rates and rhythm, normal S1 S2  ABDOMEN: soft, non-tender and no rebound or guarding   MS: extremities normal- no gross deformities noted and peripheral pulses normal, AV fistula left upper extremity+   SKIN: no suspicious lesions or rashes  NEURO: Normal strength and tone, mentation intact and speech normal  PSYCH: mentation appears normal      Diagnostic Test Results:  Labs reviewed in Epic  Results for orders placed or performed in visit on 02/08/22 (from the past 24 hour(s))   Hemoglobin A1c   Result Value Ref Range    Hemoglobin A1C 5.5 0.0 - 5.6 %   CBC with platelets   Result Value Ref Range    WBC Count 5.2 4.0 - 11.0 10e3/uL    RBC Count 4.51 3.80 - 5.20 10e6/uL    Hemoglobin 13.4 11.7 - 15.7 g/dL    Hematocrit 40.8 35.0 - 47.0 %    MCV 91 78 - 100 fL    MCH 29.7 26.5 - 33.0 pg    MCHC 32.8 31.5 - 36.5 g/dL    RDW 12.6 10.0 - 15.0 %    Platelet Count 179 150 - 450 10e3/uL       ASSESSMENT/PLAN:   Bessy was seen today for physical.    Diagnoses and all orders for this visit:    Routine general medical examination at a health care facility  -preventive guidelines reviewed   -     Lipid panel reflex to direct LDL Non-fasting    Morbid obesity (H)  -has gained weight  Wt Readings from Last 2 Encounters:   02/08/22 100.9 kg (222 lb 6.4 oz)   02/14/20 91.5 kg (201 lb 12.8 oz)     Paroxysmal atrial fibrillation (H)  -     SLEEP EVALUATION & MANAGEMENT REFERRAL - ADULT -; Future  -will follow up with Cardiology     Acute deep vein thrombosis (DVT) of distal vein of right lower extremity (H)  -on Xarelto  -history of DVT    Immunosuppression (H)  -On Tacrolimus    HTN, kidney transplant related  -blood pressure readings are better since Nifedipine was added, continue at current dose   -     Vitamin D Deficiency  -     Hemoglobin A1c  -     Albumin Random Urine Quantitative with Creat Ratio    Thyroid disorder  -  Await  labs  -continue Levothyroxine   -     TSH  -     T4 free    Encounter for screening mammogram for breast cancer  -     *MA Screening Digital Bilateral; Future    Colon cancer screening  -     Fecal colorectal cancer screen (FIT); Future    Encounter for immunization  -     TD PRESERV FREE, IM (7+ YRS) (DECAVAC/TENIVAC)  -     INFLUENZA QUAD, PF (RIV4) (FLUBLOK)    Grief reaction  -     Adult Mental Health  Referral; Future  -daughter passed away of a brain bleed in 8/21     Kidney replaced by transplant  -   Followed by Nephrology   -     NIFEdipine ER (ADALAT CC) 30 MG 24 hr tablet; Take 1 tablet (30 mg) by mouth daily For blood pressure  -     CMV DNA quantification  -     Vitamin D Deficiency  -grand son who also had renal disease was told by his Nephrologist that he had CMV, patient lives in the same house and shares a bathroom.  -CMV was negative in 2013.     Essential hypertension  -     NIFEdipine ER (ADALAT CC) 30 MG 24 hr tablet; Take 1 tablet (30 mg) by mouth daily For blood pressure    Snoring  -     SLEEP EVALUATION & MANAGEMENT REFERRAL - ADULT -; Future  -no past evaluation for sleep apnea     Kidney transplanted  -     Tacrolimus by [employer] Mass Spectrometry  -     CBC with platelets  -     Basic metabolic panel    Aftercare following organ transplant  -     Tacrolimus by Tandem Mass Spectrometry  -     CBC with platelets  -     Basic metabolic panel    Immunosuppressive management encounter following kidney transplant  -     Tacrolimus by Tandem Mass Spectrometry  -     CBC with platelets  -     Basic metabolic panel        Patient has been advised of split billing requirements and indicates understanding: Yes    COUNSELING:  Reviewed preventive health counseling, as reflected in patient instructions       Regular exercise       Healthy diet/nutrition       Vision screening       Immunizations    Vaccinated for: Influenza and Td             Colorectal Cancer Screening    Estimated body mass  "index is 36.96 kg/m  as calculated from the following:    Height as of this encounter: 1.652 m (5' 5.04\").    Weight as of this encounter: 100.9 kg (222 lb 6.4 oz).    Weight management plan: Discussed healthy diet and exercise guidelines    She reports that she quit smoking about 24 years ago. Her smoking use included cigarettes. She has a 20.00 pack-year smoking history. She has never used smokeless tobacco.      Counseling Resources:  ATP IV Guidelines  Pooled Cohorts Equation Calculator  Breast Cancer Risk Calculator  BRCA-Related Cancer Risk Assessment: FHS-7 Tool  FRAX Risk Assessment  ICSI Preventive Guidelines  Dietary Guidelines for Americans, 2010  USDA's MyPlate  ASA Prophylaxis  Lung CA Screening    Gina Dc MD MPH    [facility]  "

## 2022-02-08 NOTE — RESULT ENCOUNTER NOTE
Bessy,     Three month glucose number is normal, you do not have diabetes or pre diabetes. Other labs are pending.     Please do not hesitate to call us at [phone] if you have any questions or concerns.    Thank you,    Gina Dc MD MPH

## 2022-02-08 NOTE — PROGRESS NOTES
Prior to immunization administration, verified patients identity using patient s name and date of birth. Please see Immunization Activity for additional information.     Screening Questionnaire for Adult Immunization    Are you sick today?   No   Do you have allergies to medications, food, a vaccine component or latex?   No   Have you ever had a serious reaction after receiving a vaccination?   No   Do you have a long-term health problem with heart, lung, kidney, or metabolic disease (e.g., diabetes), asthma, a blood disorder, no spleen, complement component deficiency, a cochlear implant, or a spinal fluid leak?  Are you on long-term aspirin therapy?   No   Do you have cancer, leukemia, HIV/AIDS, or any other immune system problem?   No   Do you have a parent, brother, or sister with an immune system problem?   No   In the past 3 months, have you taken medications that affect  your immune system, such as prednisone, other steroids, or anticancer drugs; drugs for the treatment of rheumatoid arthritis, Crohn s disease, or psoriasis; or have you had radiation treatments?   No   Have you had a seizure, or a brain or other nervous system problem?   No   During the past year, have you received a transfusion of blood or blood    products, or been given immune (gamma) globulin or antiviral drug?   No   For women: Are you pregnant or is there a chance you could become       pregnant during the next month?   No   Have you received any vaccinations in the past 4 weeks?   No     Immunization questionnaire answers were all negative.        Per orders of Dr. Dc, injection of Flublok and TD given by Leah Santana RN. Patient instructed to remain in clinic for 15 minutes afterwards, and to report any adverse reaction to me immediately.       Screening performed by Leah Santana RN on 2/8/2022 at 8:01 AM.

## 2022-02-08 NOTE — RESULT ENCOUNTER NOTE
Bessy,     Urine sample is not showing any abnormal protein.  Cholesterol is at goal.  Thyroid labs are indicating need to reduce the dose of the thyroid medication. This may be contributing to some of the palpations and elevated heart rate. Take the thyroid medication 5 times a week instead of daily. I would like to recheck labs in 8 weeks, future labs orders are in the system, you can schedule a lab only visit.     Please do not hesitate to call us at [phone] if you have any questions or concerns.    Thank you,    Gina Dc MD MPH

## 2022-02-09 PROCEDURE — 82274 ASSAY TEST FOR BLOOD FECAL: CPT | Performed by: PREVENTIVE MEDICINE

## 2022-02-09 NOTE — RESULT ENCOUNTER NOTE
Bessy,     CMV test was negative.  Vitamin D levels are normal.     Please do not hesitate to call us at [phone] if you have any questions or concerns.    Thank you,    Gina Dc MD MPH

## 2022-02-24 ENCOUNTER — ANCILLARY PROCEDURE (OUTPATIENT)
Dept: MAMMOGRAPHY | Facility: CLINIC | Age: 62
End: 2022-02-24
Attending: PREVENTIVE MEDICINE
Payer: COMMERCIAL

## 2022-02-24 DIAGNOSIS — Z12.31 ENCOUNTER FOR SCREENING MAMMOGRAM FOR BREAST CANCER: ICD-10-CM

## 2022-02-24 PROCEDURE — 77067 SCR MAMMO BI INCL CAD: CPT | Mod: GC | Performed by: RADIOLOGY

## 2022-04-07 ENCOUNTER — LAB (OUTPATIENT)
Dept: LAB | Facility: CLINIC | Age: 62
End: 2022-04-07
Payer: COMMERCIAL

## 2022-04-07 DIAGNOSIS — E07.9 THYROID DISORDER: ICD-10-CM

## 2022-04-07 LAB
T4 FREE SERPL-MCNC: 0.84 NG/DL (ref 0.76–1.46)
TSH SERPL DL<=0.005 MIU/L-ACNC: 12.02 MU/L (ref 0.4–4)

## 2022-04-07 PROCEDURE — 84439 ASSAY OF FREE THYROXINE: CPT

## 2022-04-07 PROCEDURE — 36415 COLL VENOUS BLD VENIPUNCTURE: CPT

## 2022-04-07 PROCEDURE — 84443 ASSAY THYROID STIM HORMONE: CPT

## 2022-04-07 NOTE — RESULT ENCOUNTER NOTE
Bessy,     Free thyroid hormone level is improved from last check and is in a normal range. TSH is not in an optimal range but for now let's continue with the same plan on the thyroid medication. Hope the palpitations are better and I see that you are scheduled to follow up with Cardiology.     Please do not hesitate to call us at [phone] if you have any questions or concerns.    Thank you,    Gina Dc MD MPH

## 2022-04-11 NOTE — PROGRESS NOTES
"I am delighted to see Bessy Spangler as a new patient in [place] cardiology clinic for evaluation of atrial fibrillation.    History of Present Illness:  The patient is a 62 year old  Female with palpitations starting November 2019, HRs 130s. AFib documented on 30 day monitor May 2020, started on sotalol 40 bid. She had episodes 1-2 times per week before starting sotalol, which decreased to 1-2 times per month on sotalol. She last saw Dr. Russell Cisse 4/19/2021 who ordered a patch monitor. She did have some palpitations while wearing monitor, she did not recall getting results, and there were no medication changes. She had increased palpitations last several months and was found to be hyperthyroid, her synthroid supplement dose was reduced, and she has not had any palpitations since then.     Denies chest discomfort/shortness of breath/dizziness when she had AFib. Her AF episodes generally occurred in the evenings or at night.    She lives at home, daughter passed away last year and she is now taking care of her teenage grandson. Admits to lack of motivation in her own health. She reported snoring to her PCP and has an appointment with sleep clinic upcoming.     Past Medical History:  1. PAF, no cardioversions, on sotalol 40 bid since May 2020  2. Breast CA s/p left lumpectomy  3. CKD s/p kidney transplant 2009  4. Hypertension  5. Hypothyroidism  5. DVT/Pulmonary embolism post kidney transplant 2009  6. RLE DVT June 2018, started Xarelto  7. ? DEVORA - being evaluated     Medications:   Sotalol 40 bid  Rivaroxaban 20 every day  Metoprolol tartrate 50 bid  Nifedipine ER 30 every day    Tacrolimus  Mycophenolate  Levothyroxine 125 mg every day    Allergies:    Allergies   Allergen Reactions     Dilaudid [Hydromorphone Hcl]      Patient unable to recall     Hydromorphone      Patient unable to recall     Sulfa Drugs Swelling     \"swelling\" - age 3         Psychosocial history:  Smoke: quit over 25 years " ago  Alcohol: rarely    Physical examination  Vitals: 130/77, HR 67  BMI= 38 (105.5 g)    Constitutional: In general, the patient is a pleasant female in no acute distress.    Cardiovascular: Carotids +2/2 bilaterally without bruits.  No jugular venous distension. Regular rate and rhythm. Normal S1, S2. No murmur, rub, click, or gallop.   Extremities: Pulses are normal bilaterally throughout. No peripheral edema.  Respiratory: Clear to asculation.  No ronchi, wheezes, rales.  No dullness to percussion.     I have personally and independently reviewed the following:  Labs:   2022: chol 159, HDL 65, LDL 73, , K 4.3, cr 0.71, TSH, hgb 13, plt 179K, A1C 5.5  2022: TSH 12.02, free T4 0.84    EK2022: sinus 64 bpm, normal intervals, QTc 430 ms    Patch monitor -2021: PAF, burden 5%, average 110 bpm, longest episode 6 hrs started at 3am    Assessment :  Paroxysmal atrial fibrillation, no recent events or symptoms. She expressed an interest in stopping sotalol, which is medically reasonable to do - she is on a very low dose, and her symptoms were only palpitations. After much discussion, she has decided to continue since it is at low dose. Should she had increased or prolonged episode, we can then discuss the possibility of increasing sotalol dose. EKG has normal QTc intervals so safe to continue at the present time. I encouraged her to work on lifestyle modifications which can help reduce further AF - sleep study, increasing exercise, aggressive control of blood pressure. [id number] score is 2 for HTN and female, so she does not require long term anticoagulation for PAF; however she is on chronic Xarelto for recurrent DVTs.      Plan:  Continue sotalol 40 bid  Return in 1 year for EKG      I spent a total of 30 minutes face to face with  Bessy Spangler during today's office visit. I have spend an additional 30 minutes today on chart review and documentation.    The patient is to return  as above . The patient understood the treatment plan as outlined above.  There were no barriers to learning.      Tennille Parker MD

## 2022-04-12 ENCOUNTER — OFFICE VISIT (OUTPATIENT)
Dept: CARDIOLOGY | Facility: CLINIC | Age: 62
End: 2022-04-12
Payer: COMMERCIAL

## 2022-04-12 VITALS
OXYGEN SATURATION: 96 % | BODY MASS INDEX: 38.64 KG/M2 | SYSTOLIC BLOOD PRESSURE: 130 MMHG | WEIGHT: 232.5 LBS | DIASTOLIC BLOOD PRESSURE: 77 MMHG | HEART RATE: 67 BPM

## 2022-04-12 DIAGNOSIS — I10 BENIGN ESSENTIAL HYPERTENSION: Primary | ICD-10-CM

## 2022-04-12 DIAGNOSIS — I48.0 PAROXYSMAL ATRIAL FIBRILLATION (H): ICD-10-CM

## 2022-04-12 PROCEDURE — 99215 OFFICE O/P EST HI 40 MIN: CPT | Performed by: INTERNAL MEDICINE

## 2022-04-12 PROCEDURE — 93000 ELECTROCARDIOGRAM COMPLETE: CPT | Performed by: INTERNAL MEDICINE

## 2022-04-12 RX ORDER — SOTALOL HYDROCHLORIDE 80 MG/1
40 TABLET ORAL 2 TIMES DAILY
Qty: 90 TABLET | Refills: 3 | Status: SHIPPED | OUTPATIENT
Start: 2022-04-12 | End: 2023-04-14

## 2022-04-12 NOTE — NURSING NOTE
Bessy Spangler's goals for this visit include: Checking in. Would like to get off the medications.     She requests these members of her care team be copied on today's visit information: PCP    PCP: Gina Dc    Referring Provider:  No referring provider defined for this encounter.    /77 (BP Location: Left arm, Patient Position: Sitting, Cuff Size: Adult Regular)   Pulse 67   Wt 105.5 kg (232 lb 8 oz)   LMP 10/25/2004 (Exact Date)   SpO2 96%   BMI 38.64 kg/m      Do you need any medication refills at today's visit? No.    Salo Amato, EMT  Clinic Support  [facility]    [phone]    Employed by [facility] [employer]

## 2022-04-12 NOTE — PATIENT INSTRUCTIONS
The following is a summary of your office visit today:        Diagnosis: atrial fibrillation      Recommendations: increase walking, keep sleep appointment      Follow up: 1 year        If you have had any blood work, imaging or other testing completed we will be in touch within 1-2 weeks regarding the results. If you have any questions, concerns or need to schedule a follow up, please contact us at [phone] If you are needing refills please contact your pharmacy. For urgent after hour care please call the [facility] at [phone] or the [facility] at [phone] and ask to speak to the cardiologist on call.    It was a pleasure meeting with you today. Please let us know if there is anything else we can do for you so that we can be sure you are leaving completely satisfied with your care experience.     Your Cardiology Team at [facility]  Phone: [phone] Fax: [phone]  RN Care Coordinators: Lisandro  Support Staff: Salo Goldsmith            HOW TO CHECK YOUR BLOOD PRESSURE AT HOME:     Avoid eating, smoking, and exercising for at least 30 minutes before taking a reading.    Be sure you have taken your BP medication at least 2-3 hours before you check it.     Sit quietly for 10 minutes before a reading.     Sit in a chair with your feet flat on the floor. Rest your  arm on a table so that the arm cuff is at the same level as your heart.    Remain still during the reading.    Record your blood pressure and pulse in a log and bring to your next appointment.

## 2022-04-25 PROBLEM — I82.4Z1 ACUTE DEEP VEIN THROMBOSIS (DVT) OF DISTAL VEIN OF RIGHT LOWER EXTREMITY (H): Status: RESOLVED | Noted: 2018-07-24 | Resolved: 2022-04-25

## 2022-04-25 PROBLEM — Q87.81: Chronic | Status: ACTIVE | Noted: 2020-08-27

## 2022-04-25 PROBLEM — Z86.718 HISTORY OF DVT (DEEP VEIN THROMBOSIS): Chronic | Status: ACTIVE | Noted: 2022-04-25

## 2022-04-25 PROBLEM — H81.21 VESTIBULAR NEURONITIS OF RIGHT EAR: Status: RESOLVED | Noted: 2018-04-16 | Resolved: 2022-04-25

## 2022-04-25 PROBLEM — E55.9 VITAMIN D DEFICIENCY: Status: ACTIVE | Noted: 2020-08-29

## 2022-04-25 PROBLEM — Z48.298 AFTERCARE FOLLOWING ORGAN TRANSPLANT: Status: ACTIVE | Noted: 2017-08-14

## 2022-04-25 PROBLEM — Z86.711 HISTORY OF PULMONARY EMBOLISM: Status: ACTIVE | Noted: 2022-04-25

## 2022-04-25 PROBLEM — E66.01 MORBID OBESITY (H): Chronic | Status: ACTIVE | Noted: 2022-02-08

## 2022-04-26 ENCOUNTER — TELEPHONE (OUTPATIENT)
Dept: SLEEP MEDICINE | Facility: CLINIC | Age: 62
End: 2022-04-26

## 2022-04-26 ENCOUNTER — VIRTUAL VISIT (OUTPATIENT)
Dept: SLEEP MEDICINE | Facility: CLINIC | Age: 62
End: 2022-04-26
Attending: PREVENTIVE MEDICINE
Payer: COMMERCIAL

## 2022-04-26 VITALS
DIASTOLIC BLOOD PRESSURE: 77 MMHG | HEIGHT: 65 IN | BODY MASS INDEX: 38.65 KG/M2 | WEIGHT: 232 LBS | SYSTOLIC BLOOD PRESSURE: 130 MMHG

## 2022-04-26 DIAGNOSIS — I48.0 PAROXYSMAL ATRIAL FIBRILLATION (H): ICD-10-CM

## 2022-04-26 DIAGNOSIS — R53.81 MALAISE AND FATIGUE: ICD-10-CM

## 2022-04-26 DIAGNOSIS — E66.01 MORBID OBESITY (H): Primary | Chronic | ICD-10-CM

## 2022-04-26 DIAGNOSIS — F51.04 CHRONIC INSOMNIA: ICD-10-CM

## 2022-04-26 DIAGNOSIS — R53.83 MALAISE AND FATIGUE: ICD-10-CM

## 2022-04-26 DIAGNOSIS — R06.83 SNORING: ICD-10-CM

## 2022-04-26 DIAGNOSIS — G25.81 RESTLESS LEGS SYNDROME (RLS): ICD-10-CM

## 2022-04-26 PROCEDURE — 99204 OFFICE O/P NEW MOD 45 MIN: CPT | Mod: 95 | Performed by: INTERNAL MEDICINE

## 2022-04-26 ASSESSMENT — SLEEP AND FATIGUE QUESTIONNAIRES
HOW LIKELY ARE YOU TO NOD OFF OR FALL ASLEEP WHILE LYING DOWN TO REST IN THE AFTERNOON WHEN CIRCUMSTANCES PERMIT: MODERATE CHANCE OF DOZING
HOW LIKELY ARE YOU TO NOD OFF OR FALL ASLEEP WHILE SITTING INACTIVE IN A PUBLIC PLACE: WOULD NEVER DOZE
HOW LIKELY ARE YOU TO NOD OFF OR FALL ASLEEP IN A CAR, WHILE STOPPED FOR A FEW MINUTES IN TRAFFIC: WOULD NEVER DOZE
HOW LIKELY ARE YOU TO NOD OFF OR FALL ASLEEP WHILE SITTING AND TALKING TO SOMEONE: WOULD NEVER DOZE
HOW LIKELY ARE YOU TO NOD OFF OR FALL ASLEEP WHEN YOU ARE A PASSENGER IN A CAR FOR AN HOUR WITHOUT A BREAK: SLIGHT CHANCE OF DOZING
HOW LIKELY ARE YOU TO NOD OFF OR FALL ASLEEP WHILE SITTING QUIETLY AFTER LUNCH WITHOUT ALCOHOL: WOULD NEVER DOZE
HOW LIKELY ARE YOU TO NOD OFF OR FALL ASLEEP WHILE SITTING AND READING: WOULD NEVER DOZE
HOW LIKELY ARE YOU TO NOD OFF OR FALL ASLEEP WHILE WATCHING TV: SLIGHT CHANCE OF DOZING

## 2022-04-26 ASSESSMENT — PAIN SCALES - GENERAL: PAINLEVEL: NO PAIN (0)

## 2022-04-26 NOTE — PATIENT INSTRUCTIONS
Read the book Say Good Night To Insomnia          Your BMI is Body mass index is 38.61 kg/m .    What is BMI?  Body mass index (BMI) is one way to tell whether you are at a healthy weight, overweight, or obese. It measures your weight in relation to your height.  A BMI of 18.5 to 24.9 is in the healthy range. A person with a BMI of 25 to 29.9 is considered overweight, and someone with a BMI of 30 or greater is considered obese.  Another way to find out if you are at risk for health problems caused by overweight and obesity is to measure your waist. If you are a woman and your waist is more than 35 inches, or if you are a man and your waist is more than 40 inches, your risk of disease may be higher.  More than two-thirds of American adults are considered overweight or obese. Being overweight or obese increases the risk for further weight gain.  Excess weight may lead to heart disease and diabetes. Creating and following plans for healthy eating and physical activity may help you improve your health.    Methods for maintaining or losing weight.  Weight control is part of healthy lifestyle and includes exercise, emotional health, and healthy eating habits.  Careful eating habits lifelong is the mainstay of weight control.  Though there are significant health benefits from weight loss, long-term weight loss with diet alone may be very difficult to achieve- studies show long-term success with dietary management in less than 10% of people. Attaining a healthy weight may be especially difficult to achieve in those with severe obesity. In some cases, medications, devices and surgical management might be considered.    What can you do?  If you are overweight or obese and are interested in methods for weight loss, you should discuss this with your provider. In addition, we recommend that you review healthy life styles and methods for weight loss available through the National Institutes of Health patient information sites:    http://win.niddk.nih.gov/publications/index.htm

## 2022-04-26 NOTE — PROGRESS NOTES
Bessy is a 62 year old who is being evaluated via a billable video visit.      How would you like to obtain your AVS? MyChart  If the video visit is dropped, the invitation should be resent by: Text to cell phone: [phone]  Will anyone else be joining your video visit? No      NAHUM Raza    Video Start Time: 7:30 AM    Video-Visit Details    Type of service:  Video Visit    Video End Time: 8:07 AM    Originating Location (pt. Location): Home    Distant Location (provider location):  [facility]     Platform used for Video Visit: [facility]       Outpatient Sleep Medicine Consultation:      Name: Bessy Spangler MRN# [id number]   Age: 62 year old YOB: 1960     Date of Consultation: April 26, 2022  Consultation is requested by: Gina Dc MD  [id number]  [facility]  [place] [address] Gina Dc  Primary care provider: Gina cD       Reason for Sleep Consult:     Bessy Spangler is sent by Gina Dc for a sleep consultation regarding snoring, paroxysmal atrial fibrillation .    Chief Complaint   Patient presents with     Referral     Referred by her primary and cardiologist doctors, palpitations               Assessment and Plan:     Snoring, gasp arousals, no current bed partner, sleep maintenance difficulties, ?fatigue (ESS 4), crowded oropharynx, obesity  Comorbid paroxysmal atrial fibrillation  - Patient appears to be a good candidate for Home Sleep Test STOPBANG 4    Sleep maintenance difficulties  Suspect psychophysiologic insomnia though sleep disordered breathing may play a role  We discussed stimulus control, sleep hygiene  -Read the book Say Good Night To Insomnia       Restless leg syndrome   - Check ferritin  - No treatment indicated at this time    Obesity  We discussed the link between obesity, sleep apnea, and health outcomes.   - See patient instructions       Summary Counseling:    Sleep Testing Reviewed  Obstructive Sleep  Apnea Reviewed  Complications of Untreated Sleep Apnea Reviewed      I spent 40 minutes with patient including counseling, and 15 minutes with chart review, and documentation     CC: Gina Dc          History of Present Illness:       SLEEP-WAKE SCHEDULE:     Work/School Days:     Usually gets into bed at  8 PM to relax or fall asleep  \Has trouble falling asleep 0 nights per week  Wakes up in the middle of the night 1 times.  Wakes up due to use bathroom  She has trouble falling back asleep 5 times a week due to an overactive mind  She will watch TV, use her phone  It usually takes   to get back to sleep  Patient is usually up at  6-630 AM   Uses alarm:  Yes    Weekends/Non-work Days/All Other Days:  Schedule is similar  Uses alarm:      Bessy Spangler prefers to sleep in this position(s):  side  Patient states they do the following activities in bed:  TV, phone    Naps  Patient takes a purposeful nap infrequently  She dozes off unintentionally 0 days per week  Patient has not had a driving accident or near-miss due to sleepiness/drowsiness:        SLEEP DISRUPTIONS:    Breathing/Snoring  Patient snores: Yes  Other people complain about her snoring:  No bed partner  Patient has been told she stops breathing in her sleep: No   She has issues with the following:  No morning headaches    Movement:  Patient gets pain, discomfort, with an urge to move:  Yes 1-2/week  Does interefere with sleep  Has to move legs, rub them together, achey  It happens more at night: Yes  5-8 PM  Patient has been told she kicks her legs at night:        Behaviors in Sleep:  Bessy Spangler has experienced the following behaviors while sleeping:    No dream enactment    Is there anything else you would like your sleep provider to know:        CAFFEINE AND OTHER SUBSTANCES:    Patient consumes caffeinated beverages per day:   1 cup/day         SCALES:    EPWORTH SLEEPINESS SCALE      [place] Sleepiness Scale ( M.UMU Farfan  1990-1997ESS  "- [place]/English - Final version - 21 Nov 07 - [facility] [place].) 4/26/2022   Sitting and reading Would never doze   Watching TV Slight chance of dozing   Sitting, inactive in a public place (e.g. a theatre or a meeting) Would never doze   As a passenger in a car for an hour without a break Slight chance of dozing   Lying down to rest in the afternoon when circumstances permit Moderate chance of dozing   Sitting and talking to someone Would never doze   Sitting quietly after a lunch without alcohol Would never doze   In a car, while stopped for a few minutes in traffic Would never doze   [place] Score (MC) 0   [place] Score (Sleep) 4     INSOMNIA SEVERITY INDEX (BILLY)      Insomnia Severity Index (BILLY) 4/26/2022   Difficulty falling asleep 0   Difficulty staying asleep 2   Problems waking up too early 2   How SATISFIED/DISSATISFIED are you with your CURRENT sleep pattern? 3   How NOTICEABLE to others do you think your sleep problem is in terms of impairing the quality of your life? 0   How WORRIED/DISTRESSED are you about your current sleep problem? 0   To what extent do you consider your sleep problem to INTERFERE with your daily functioning (e.g. daytime fatigue, mood, ability to function at work/daily chores, concentration, memory, mood, etc.) CURRENTLY? 1   BILLY Total Score 8     Guidelines for Scoring/Interpretation:  Total score categories:  0-7 = No clinically significant insomnia   8-14 = Subthreshold insomnia   15-21 = Clinical insomnia (moderate severity)  22-28 = Clinical insomnia (severe)  Used via courtesy of www.Autopilot (formerly Bislr)th.va.gov with permission from Seb Clinton PhD., [facility]            Allergies:    Allergies   Allergen Reactions     Dilaudid [Hydromorphone Hcl]      Patient unable to recall     Hydromorphone      Patient unable to recall     Sulfa Drugs Swelling     \"swelling\" - age 3         Medications:    Current Outpatient Medications   Medication Sig Dispense Refill     " levothyroxine (SYNTHROID/LEVOTHROID) 125 MCG tablet TAKE 1 TABLET BY MOUTH EVERY DAY (Patient taking differently: Not taking thurs or Sun) 90 tablet 0     metoprolol tartrate (LOPRESSOR) 50 MG tablet TAKE 1 TABLET BY MOUTH TWICE A  tablet 0     mycophenolic acid (GENERIC EQUIVALENT) 180 MG EC tablet Take 3 tablets (540 mg) by mouth 2 times daily 540 tablet 3     NIFEdipine ER (ADALAT CC) 30 MG 24 hr tablet Take 1 tablet (30 mg) by mouth daily For blood pressure 90 tablet 1     rivaroxaban ANTICOAGULANT (XARELTO ANTICOAGULANT) 20 MG TABS tablet Take 1 tablet (20 mg) by mouth daily (with dinner) 90 tablet 3     sotalol (BETAPACE) 80 MG tablet Take 0.5 tablets (40 mg) by mouth in the morning and 0.5 tablets (40 mg) in the evening. 90 tablet 3     tacrolimus (GENERIC EQUIVALENT) 0.5 MG capsule Take 1 capsule (0.5 mg) by mouth 2 times daily 180 capsule 3     tacrolimus (GENERIC EQUIVALENT) 1 MG capsule Take 3 capsules (3 mg) by mouth 2 times daily 540 capsule 3     VITAMIN D, CHOLECALCIFEROL, PO Take 1,000 Units by mouth daily         Problem List:  Patient Active Problem List    Diagnosis Date Noted     Kidney replaced by transplant 07/30/2009     Priority: High     Kidney failure as a child   Etiology of Kidney Failure: Alport's syndrome  Tx: LDKT  Transplant: 7/30/2009 (Kidney)       History of breast cancer 06/03/2002     Priority: High     Infiltrating ductal carcinoma of the left breast diagnosed March of 2002, 1.1 cm, tumor stage T1c, N0, stage 1, ER/HI positive, and HER-2 3+ positive, grade 2.   TREATMENT: Partial mastectomy and then 4 cycles of Adriamycin and cyclophosphamide in following that radiation. The patient then took tamoxifen from July 2002 until June 2005. At that point we changed to exemestane. In May 2008 patient elected to stop therapy.        History of DVT (deep vein thrombosis) and PE (pulmonary embolism) 04/25/2022     Priority: Medium     Pulmonary embolism post kidney transplant  "9/4/2009  Right lower extremity DVT 6/2018       Morbid obesity (H) 02/08/2022     Priority: Medium     Paroxysmal atrial fibrillation (H) 08/29/2020     Priority: Medium     Palpitations. 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 08/27/2020     Priority: Medium     Immunosuppression (H)      Priority: Medium     Will not use shingles vaccine including Shingrix       HTN, kidney transplant related      Priority: Medium     Vitamin D deficiency 08/29/2020     Priority: Low     Aftercare following organ transplant 08/14/2017     Priority: Low     Tear film insufficiency 04/09/2014     Priority: Low     Presbyopia - Both Eyes 04/09/2014     Priority: Low     Acquired hypothyroidism 10/25/2012     Priority: Low     She had thyroid ablation, now hypothyoid       Post-menopausal 10/25/2012     Priority: Low     CARDIOVASCULAR SCREENING; LDL GOAL LESS THAN 100 10/31/2010     Priority: Low        Past Medical/Surgical History:  Past Medical History:   Diagnosis Date     Acute deep vein thrombosis (DVT) of distal vein of right lower extremity (H) 07/24/2018     Breast cancer (H)      Chronic kidney disease (CKD), stage V (H)     Due to \"nephritis\". Biopsy done years ago. I do not know the histologic specifics     Conjunctival hemorrhage 04/09/2014     Hypertension      Hypothyroid      Kidney replaced by transplant     nephritis as a child     PE (pulmonary embolism) 09/04/2009    Pulmonary embolism post kidney transplant 2009     Vestibular neuronitis of right ear 04/16/2018     Past Surgical History:   Procedure Laterality Date     APPENDECTOMY OPEN  01/01/1999     AV FISTULA OR GRAFT ARTERIAL      left     LUMPECTOMY BREAST  2002    left breast partial mastectomy     ZZC TRANSPLANT,PREP LIVING  RENAL GRAFT  07/30/2009    Hx of nephritis       Social History:  Social History     Socioeconomic History     Marital status: Single     Spouse name: Not on file     Number " of children: Not on file     Years of education: Not on file     Highest education level: Not on file   Occupational History     Occupation: , manager     Employer: [employer]   Tobacco Use     Smoking status: Former Smoker     Packs/day: 1.00     Years: 20.00     Pack years: 20.00     Types: Cigarettes     Quit date: 1998     Years since quittin.3     Smokeless tobacco: Never Used   Substance and Sexual Activity     Alcohol use: No     Alcohol/week: 0.0 standard drinks     Drug use: No     Sexual activity: Not Currently   Other Topics Concern     Not on file   Social History Narrative    , 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  degree    Sal ,Betty  ( On dialysis), Vincenzo Alvares     Grandchildren Torri 2010, Krystina 10/2007 lives with her and Betty, son and family. Jeremías     2 additional grandchildren will be born soon     Social Determinants of Health     Financial Resource Strain: Not on file   Food Insecurity: Not on file   Transportation Needs: Not on file   Physical Activity: Not on file   Stress: Not on file   Social Connections: Not on file   Intimate Partner Violence: Not on file   Housing Stability: Not on file       Family History:  Family History   Problem Relation Age of Onset     Diabetes Father         type 2       Arthritis Father      Glaucoma Father      Cancer Mother 58        smoker,metastatic adnoid cystic cancer,  early 60s     Arthritis Mother      Endocrine Disease Mother         thyroid     Hypertension Maternal Grandmother          at a young age     Kidney Disease Maternal Grandmother      Hypertension Maternal Grandfather      Cancer Paternal Grandmother         stomach cancer     Endocrine Disease Sister         thyroid     Genitourinary Problems Daughter 32        renal failure      Endocrine Disease Sister         thyroid     Chronic Obstructive Pulmonary Disease Paternal  "Grandfather         smoking  young     Heart Disease Maternal Half-Brother        Review of Systems:  A complete review of systems reviewed by me is negative with the exeption of what has been mentioned in the history of present illness.        Physical Examination:  Vitals: /77   Ht 1.651 m (5' 5\")   Wt 105.2 kg (232 lb)   LMP 10/25/2004 (Exact Date)   BMI 38.61 kg/m    BMI= Body mass index is 38.61 kg/m .    SpO2 Readings from Last 4 Encounters:   22 96%   22 96%   21 99%   20 96%     GENERAL APPEARANCE: alert and no distress  EYES: Eyes grossly normal to inspection  HENT: mouth without ulcers or lesions  NECK: generous size  LUNGS: no shortness of breath , cough  NEURO: mentation intact, speech normal and cranial nerves 2-12 appear intact  PSYCH: affect normal/bright  Mallampati Class: 4           Data: All pertinent previous laboratory data reviewed     Recent Labs   Lab Test 22  0814 21  0749    139   POTASSIUM 4.3 4.5   CHLORIDE 108 108   CO2 25 26   ANIONGAP 6 5   * 104*   BUN 17 14   CR 0.71 0.68   MARIA LUISA 9.5 9.6       Recent Labs   Lab Test 22  0814   WBC 5.2   RBC 4.51   HGB 13.4   HCT 40.8   MCV 91   MCH 29.7   MCHC 32.8   RDW 12.6          TSH (mU/L)   Date Value   2022 12.02 (H)   2022 0.25 (L)   2021 2.46   2020 4.72 (H)       Iron Saturation Index   Date/Time Value Ref Range Status   2014 07:37 AM 27 15 - 46 % Final     Ferritin   Date/Time Value Ref Range Status   2014 07:37  (H) 10 - 300 ng/mL Final         Fransisco Burgos MD 2022         "

## 2022-04-30 DIAGNOSIS — E07.9 THYROID DISORDER: ICD-10-CM

## 2022-05-02 NOTE — TELEPHONE ENCOUNTER
"Requested Prescriptions   Pending Prescriptions Disp Refills    levothyroxine (SYNTHROID/LEVOTHROID) 125 MCG tablet [Pharmacy Med Name: LEVOTHYROXINE 125 MCG TABLET] 90 tablet 0     Sig: TAKE 1 TABLET BY MOUTH EVERY DAY        Thyroid Protocol Failed - 4/30/2022  7:42 AM        Failed - Normal TSH on file in past 12 months     Recent Labs   Lab Test 04/07/22  0706   TSH 12.02*                Passed - Patient is 12 years or older        Passed - Recent (12 mo) or future (30 days) visit within the authorizing provider's specialty     Patient has had an office visit with the authorizing provider or a provider within the authorizing providers department within the previous 12 mos or has a future within next 30 days. See \"Patient Info\" tab in inbasket, or \"Choose Columns\" in Meds & Orders section of the refill encounter.              Passed - Medication is active on med list        Passed - No active pregnancy on record     If patient is pregnant or has had a positive pregnancy test, please check TSH.          Passed - No positive pregnancy test in past 12 months     If patient is pregnant or has had a positive pregnancy test, please check TSH.                "

## 2022-05-03 RX ORDER — LEVOTHYROXINE SODIUM 125 UG/1
TABLET ORAL
Qty: 90 TABLET | Refills: 0 | Status: SHIPPED | OUTPATIENT
Start: 2022-05-03 | End: 2022-06-01

## 2022-05-03 NOTE — TELEPHONE ENCOUNTER
Called and left message that medication was sent to pharmacy.  Luz QUEEN [facility]  2nd Floor  Primary Care

## 2022-05-09 ENCOUNTER — OFFICE VISIT (OUTPATIENT)
Dept: DERMATOLOGY | Facility: CLINIC | Age: 62
End: 2022-05-09
Attending: PREVENTIVE MEDICINE
Payer: COMMERCIAL

## 2022-05-09 DIAGNOSIS — L82.0 SEBORRHEIC KERATOSES, INFLAMED: ICD-10-CM

## 2022-05-09 DIAGNOSIS — D84.9 IMMUNOSUPPRESSED STATUS (H): ICD-10-CM

## 2022-05-09 DIAGNOSIS — D22.9 MULTIPLE BENIGN NEVI: ICD-10-CM

## 2022-05-09 DIAGNOSIS — Z85.828 HISTORY OF NONMELANOMA SKIN CANCER: Primary | ICD-10-CM

## 2022-05-09 DIAGNOSIS — D48.5 NEOPLASM OF UNCERTAIN BEHAVIOR OF SKIN: ICD-10-CM

## 2022-05-09 DIAGNOSIS — L81.4 SOLAR LENTIGO: ICD-10-CM

## 2022-05-09 DIAGNOSIS — L82.1 SEBORRHEIC KERATOSES: ICD-10-CM

## 2022-05-09 DIAGNOSIS — D18.01 CHERRY ANGIOMA: ICD-10-CM

## 2022-05-09 PROCEDURE — 11102 TANGNTL BX SKIN SINGLE LES: CPT | Mod: CS | Performed by: DERMATOLOGY

## 2022-05-09 PROCEDURE — 88305 TISSUE EXAM BY PATHOLOGIST: CPT | Performed by: PATHOLOGY

## 2022-05-09 PROCEDURE — 17110 DESTRUCTION B9 LES UP TO 14: CPT | Performed by: DERMATOLOGY

## 2022-05-09 PROCEDURE — 99203 OFFICE O/P NEW LOW 30 MIN: CPT | Mod: 25 | Performed by: DERMATOLOGY

## 2022-05-09 NOTE — PROGRESS NOTES
[facility] Dermatology Note  Encounter Date: May 9, 2022  Office Visit     Dermatology Problem List:  0. NUB - left thigh - bx 5/9/22  1. Hx NMSC   - SCCIS, left lateral arm, bx 10/30/2012, removed in bx  2. History of kidney transplant, currently on mycophenolic acid acid and tacrolimus  3. ISK s/p cryo  ____________________________________________    Assessment & Plan:    # History of nonmelanoma skin cancer, no clincial evidence of recurrence.  - ABCDEs: Counseled ABCDEs of melanoma: Asymmetry, Border (irregularity), Color (not uniform, changes in color), Diameter (greater than 6 mm which is about the size of a pencil eraser), and Evolving (any changes in preexisting moles).  - Sun protection: Counseled SPF30+ sunscreen, UPF clothing, sun avoidance, tanning bed avoidance.    # Immunosuppressed, currently on mycophenolic acid acid and tacrolimus  - ABCDEs: Counseled ABCDEs of melanoma: Asymmetry, Border (irregularity), Color (not uniform, changes in color), Diameter (greater than 6 mm which is about the size of a pencil eraser), and Evolving (any changes in preexisting moles).  - Sun protection: Counseled SPF30+ sunscreen, UPF clothing, sun avoidance, tanning bed avoidance.  - Recommend yearly skin checks      # Seborrheic keratosis, inflamed - right neck  - Based on the location and chronic irritation to this lesion, treatment with cryotherapy is warranted. See procedure note below.    # Neoplasm of uncertain behavior on the left thigh. The differential diagnosis includes ISK. Rule out SCC vs BCC.   - See procedure note.      # Benign lesions: Multiple benign nevi, solar lentigos, seborrheic keratoses, cherry angiomas. Explained to patient benign nature of lesion. No treatment is necessary at this time unless the lesion changes or becomes symptomatic.   - ABCDs of melanoma were discussed and self skin checks were advised.  - Sun precaution was advised including the use of sun screens of SPF 30  or higher, sun protective clothing, and avoidance of tanning beds.  \  Procedures Performed:   - Cryotherapy procedure note, location(s): right neck. After verbal consent and discussion of risks and benefits including, but not limited to, dyspigmentation/scar, blister, and pain, 1 ISK was(were) treated with 1-2 mm freeze border for 3 cycles with liquid nitrogen. Post cryotherapy instructions were provided.    - Shave biopsy procedure note, location(s): left thigh. After discussion of benefits and risks including but not limited to bleeding, infection, scar, incomplete removal, recurrence, and non-diagnostic biopsy, written consent and photographs were obtained. The area was cleaned with isopropyl alcohol. 0.5mL of 1% lidocaine with epinephrine was injected to obtain adequate anesthesia of lesion(s). Shave biopsy at site(s) performed. Hemostasis was achieved with aluminium chloride. Petrolatum ointment and a sterile dressing were applied. The patient tolerated the procedure and no complications were noted. The patient was provided with verbal and written post care instructions.     Follow-up: pending path results    Staff and Scribe:     Scribe Disclosure:   I, Dangelo Weiner, am serving as a scribe to document services personally performed by this physician, Dr. Anthony Garcia, based on data collection and the provider's statements to me.     Provider Disclosure:   The documentation recorded by the scribe accurately reflects the services I personally performed and the decisions made by me.    Anthony Garcia MD    Department of Dermatology  [facility]: Phone: [phone], Fax:[phone]  [facility]: Phone: [phone] Fax: [phone]  ____________________________________________    CC: Skin Check (FBSE. Area of concern-neck, left leg. Hx of SCC, kidney transplant)    HPI:  Ms. Doherty  BRET Spangler is a(n) 62 year old female who presents today as a new patient for a skin check.    Referred to derm by Dr. Dc for kidney transplant history. Note from 2/8/22 reviewed. HPI notes patient due for derm follow up.    Today, she has an area of concern on the neck and left leg. She does pick at these areas, as they frequently become itchy and inflamed. The lesion on the left thigh is tender to the touch.    The patient otherwise denies any new or concerning lesions. No bleeding, painful, pruritic, or changing lesions. They report a personal history of skin cancer. There is a family history of skin cancer (sister with unknown type). She is immunosuppressed. Positive history of indoor tanning (roughly 30-50 times). She typically wears sunscreen when she is outdoors. Health otherwise stable. No other skin concerns.     Labs Reviewed:  N/A    Physical Exam:  Vitals: LMP 10/25/2004 (Exact Date)   SKIN: Full skin, which includes the head/face, both arms, chest, back, abdomen,both legs, genitalia and/or groin buttocks, digits and/or nails, was examined.  - Well healed scar in the area of past NMSC  - There are dome shaped bright red papules on the trunk and extremities.   - Multiple regular brown pigmented macules and papules are identified on the trunk and extremities.   - Scattered brown macules on sun exposed areas.  - There are waxy stuck on tan to brown papules on the trunk and extremities.   - There is a tan to brown waxy stuck on papule with surrounding erythema on the right neck.   - Left Thigh: well demarcated pink excoriated papule with central erosion and nonspecific dilated blood vessles   - No other lesions of concern on areas examined.             Medications:  Current Outpatient Medications   Medication     levothyroxine (SYNTHROID/LEVOTHROID) 125 MCG tablet     metoprolol tartrate (LOPRESSOR) 50 MG tablet     mycophenolic acid (GENERIC EQUIVALENT) 180 MG EC tablet     NIFEdipine ER (ADALAT CC) 30 MG  "24 hr tablet     rivaroxaban ANTICOAGULANT (XARELTO ANTICOAGULANT) 20 MG TABS tablet     sotalol (BETAPACE) 80 MG tablet     tacrolimus (GENERIC EQUIVALENT) 0.5 MG capsule     tacrolimus (GENERIC EQUIVALENT) 1 MG capsule     VITAMIN D, CHOLECALCIFEROL, PO     No current facility-administered medications for this visit.      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 Biopsy done years ago. 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  " Allergy;

## 2022-05-09 NOTE — PATIENT INSTRUCTIONS
Wound Care After a Biopsy    What is a skin biopsy?  A skin biopsy allows the doctor to examine a very small piece of tissue under the microscope to determine the diagnosis and the best treatment for the skin condition. A local anesthetic (numbing medicine)  is injected with a very small needle into the skin area to be tested. A small piece of skin is taken from the area. Sometimes a suture (stitch) is used.     What are the risks of a skin biopsy?  I will experience scar, bleeding, swelling, pain, crusting and redness. I may experience incomplete removal or recurrence. Risks of this procedure are excessive bleeding, bruising, infection, nerve damage, numbness, thick (hypertrophic or keloidal) scar and non-diagnostic biopsy.    How should I care for my wound for the first 24 hours?  Keep the wound dry and covered for 24 hours  If it bleeds, hold direct pressure on the area for 15 minutes. If bleeding does not stop then go to the emergency room  Avoid strenuous exercise the first 1-2 days or as your doctor instructs you    How should I care for the wound after 24 hours?  After 24 hours, remove the bandage  You may bathe or shower as normal  If you had a scalp biopsy, you can shampoo as usual and can use shower water to clean the biopsy site daily  Clean the wound twice a day with gentle soap and water  Do not scrub, be gentle  Apply white petroleum/Vaseline after cleaning the wound with a cotton swab or a clean finger, and keep the site covered with a Bandaid /bandage. Bandages are not necessary with a scalp biopsy  If you are unable to cover the site with a Bandaid /bandage, re-apply ointment 2-3 times a day to keep the site moist. Moisture will help with healing  Avoid strenuous activity for first 1-2 days  Avoid lakes, rivers, pools, and oceans until the stitches are removed or the site is healed    How do I clean my wound?  Wash hands thoroughly with soap or use hand  before all wound care  Clean the  wound with gentle soap and water  Apply white petroleum/Vaseline  to wound after it is clean  Replace the Bandaid /bandage to keep the wound covered for the first few days or as instructed by your doctor  If you had a scalp biopsy, warm shower water to the area on a daily basis should suffice    What should I use to clean my wound?   Cotton-tipped applicators (Qtips )  White petroleum jelly (Vaseline ). Use a clean new container and use Q-tips to apply.  Bandaids   as needed  Gentle soap     How should I care for my wound long term?  Do not get your wound dirty  Keep up with wound care for one week or until the area is healed.  A small scab will form and fall off by itself when the area is completely healed. The area will be red and will become pink in color as it heals. Sun protection is very important for how your scar will turn out. Sunscreen with an SPF 30 or greater is recommended once the area is healed.  You should have some soreness but it should be mild and slowly go away over several days. Talk to your doctor about using tylenol for pain,    When should I call my doctor?  If you have increased:   Pain or swelling  Pus or drainage (clear or slightly yellow drainage is ok)  Temperature over 100F  Spreading redness or warmth around wound    When will I hear about my results?  The biopsy results can take 2 weeks to come back.  Your results will automatically release to [employer] before your provider has even reviewed them.  The clinic will call you with the results, send you a [employer] message, or have you schedule a follow-up clinic or phone time to discuss the results.  Contact our clinics if you do not hear from us in 2 weeks.    Who should I call with questions?  [facility]: [phone]  [facility]: [phone]  For urgent needs outside of business hours call the [facility] at [phone] and ask for the dermatology resident on  call    Cryotherapy    What is it?  Use of a very cold liquid, such as liquid nitrogen, to freeze and destroy abnormal skin cells that need to be removed    What should I expect?  Tenderness and redness  A small blister that might grow and fill with dark purple blood. There may be crusting.  More than one treatment may be needed if the lesions do not go away.    How do I care for the treated area?  Gently wash the area with your hands when bathing.  Use a thin layer of Vaseline to help with healing. You may use a Band-Aid.   The area should heal within 7-10 days and may leave behind a pink or lighter color.   Do not use an antibiotic or Neosporin ointment.   You may take acetaminophen (Tylenol) for pain.     Call your doctor if you have:  Severe pain  Signs of infection (warmth, redness, cloudy yellow drainage, and or a bad smell)  Questions or concerns    Who should I call with questions?      [facility]: [phone]      [facility]: [phone]      For urgent needs outside of business hours call the [facility] at [phone] and ask for the dermatology resident on call

## 2022-05-09 NOTE — NURSING NOTE
The following medication was given:     MEDICATION:  Lidocaine with epinephrine 1% [id number]  ROUTE: SQ  SITE: see procedure note  DOSE: see procedure note  LOT #: [id number]  : [place]  EXPIRATION DATE: 12/1/22  NDC#: 3945-3311-98  Was there drug waste? no  Multi-dose vial: Yes            Leticia Gates on 5/9/2022 at 3:00 PM

## 2022-05-09 NOTE — NURSING NOTE
Bessy Spangler's goals for this visit include:   Chief Complaint   Patient presents with     Skin Check     FBSE. Area of concern-neck, left leg. Hx of SCC, kidney transplant       She requests these members of her care team be copied on today's visit information:     PCP: Gina Dc    Referring Provider:  Gina Dc MD  [address] [place] [address]    [facility] 10/25/2004 (Exact Date)     Do you need any medication refills at today's visit? Suyapa Gates on 5/9/2022 at 2:26 PM

## 2022-05-09 NOTE — LETTER
5/9/2022         RE: Bessy Spangler  [address] [place] [address]        Dear Colleague,    Thank you for referring your patient, Bessy Spangler, to the [facility]. Please see a copy of my visit note below.    [facility] Dermatology Note  Encounter Date: May 9, 2022  Office Visit     Dermatology Problem List:  0. NUB - left thigh - bx 5/9/22  1. Hx NMSC   - SCCIS, left lateral arm, bx 10/30/2012, removed in bx  2. History of kidney transplant, currently on mycophenolic acid acid and tacrolimus  3. ISK s/p cryo  ____________________________________________    Assessment & Plan:    # History of nonmelanoma skin cancer, no clincial evidence of recurrence.  - ABCDEs: Counseled ABCDEs of melanoma: Asymmetry, Border (irregularity), Color (not uniform, changes in color), Diameter (greater than 6 mm which is about the size of a pencil eraser), and Evolving (any changes in preexisting moles).  - Sun protection: Counseled SPF30+ sunscreen, UPF clothing, sun avoidance, tanning bed avoidance.    # Immunosuppressed, currently on mycophenolic acid acid and tacrolimus  - ABCDEs: Counseled ABCDEs of melanoma: Asymmetry, Border (irregularity), Color (not uniform, changes in color), Diameter (greater than 6 mm which is about the size of a pencil eraser), and Evolving (any changes in preexisting moles).  - Sun protection: Counseled SPF30+ sunscreen, UPF clothing, sun avoidance, tanning bed avoidance.  - Recommend yearly skin checks      # Seborrheic keratosis, inflamed - right neck  - Based on the location and chronic irritation to this lesion, treatment with cryotherapy is warranted. See procedure note below.    # Neoplasm of uncertain behavior on the left thigh. The differential diagnosis includes ISK. Rule out SCC vs BCC.   - See procedure note.      # Benign lesions: Multiple benign nevi, solar lentigos, seborrheic keratoses, cherry angiomas. Explained to  patient benign nature of lesion. No treatment is necessary at this time unless the lesion changes or becomes symptomatic.   - ABCDs of melanoma were discussed and self skin checks were advised.  - Sun precaution was advised including the use of sun screens of SPF 30 or higher, sun protective clothing, and avoidance of tanning beds.  \  Procedures Performed:   - Cryotherapy procedure note, location(s): right neck. After verbal consent and discussion of risks and benefits including, but not limited to, dyspigmentation/scar, blister, and pain, 1 ISK was(were) treated with 1-2 mm freeze border for 3 cycles with liquid nitrogen. Post cryotherapy instructions were provided.    - Shave biopsy procedure note, location(s): left thigh. After discussion of benefits and risks including but not limited to bleeding, infection, scar, incomplete removal, recurrence, and non-diagnostic biopsy, written consent and photographs were obtained. The area was cleaned with isopropyl alcohol. 0.5mL of 1% lidocaine with epinephrine was injected to obtain adequate anesthesia of lesion(s). Shave biopsy at site(s) performed. Hemostasis was achieved with aluminium chloride. Petrolatum ointment and a sterile dressing were applied. The patient tolerated the procedure and no complications were noted. The patient was provided with verbal and written post care instructions.     Follow-up: pending path results    Staff and Scribe:     Scribe Disclosure:   I, Dangelo Weiner, am serving as a scribe to document services personally performed by this physician, Dr. Anthony Garcia, based on data collection and the provider's statements to me.     Provider Disclosure:   The documentation recorded by the scribe accurately reflects the services I personally performed and the decisions made by me.    Anthony Garcia MD    Department of Dermatology  [facility]: Phone: [phone],  Fax:[phone]  [facility]: Phone: [phone] Fax: [phone]  ____________________________________________    CC: Skin Check (FBSE. Area of concern-neck, left leg. Hx of SCC, kidney transplant)    HPI:  Ms. Bessy Spangler is a(n) 62 year old female who presents today as a new patient for a skin check.    Referred to derm by Dr. Dc for kidney transplant history. Note from 2/8/22 reviewed. HPI notes patient due for derm follow up.    Today, she has an area of concern on the neck and left leg. She does pick at these areas, as they frequently become itchy and inflamed. The lesion on the left thigh is tender to the touch.    The patient otherwise denies any new or concerning lesions. No bleeding, painful, pruritic, or changing lesions. They report a personal history of skin cancer. There is a family history of skin cancer (sister with unknown type). She is immunosuppressed. Positive history of indoor tanning (roughly 30-50 times). She typically wears sunscreen when she is outdoors. Health otherwise stable. No other skin concerns.     Labs Reviewed:  N/A    Physical Exam:  Vitals: LMP 10/25/2004 (Exact Date)   SKIN: Full skin, which includes the head/face, both arms, chest, back, abdomen,both legs, genitalia and/or groin buttocks, digits and/or nails, was examined.  - Well healed scar in the area of past NMSC  - There are dome shaped bright red papules on the trunk and extremities.   - Multiple regular brown pigmented macules and papules are identified on the trunk and extremities.   - Scattered brown macules on sun exposed areas.  - There are waxy stuck on tan to brown papules on the trunk and extremities.   - There is a tan to brown waxy stuck on papule with surrounding erythema on the right neck.   - Left Thigh: well demarcated pink excoriated papule with central erosion and nonspecific dilated blood vessles   - No other lesions of concern on areas examined.  "            Medications:  Current Outpatient Medications   Medication     levothyroxine (SYNTHROID/LEVOTHROID) 125 MCG tablet     metoprolol tartrate (LOPRESSOR) 50 MG tablet     mycophenolic acid (GENERIC EQUIVALENT) 180 MG EC tablet     NIFEdipine ER (ADALAT CC) 30 MG 24 hr tablet     rivaroxaban ANTICOAGULANT (XARELTO ANTICOAGULANT) 20 MG TABS tablet     sotalol (BETAPACE) 80 MG tablet     tacrolimus (GENERIC EQUIVALENT) 0.5 MG capsule     tacrolimus (GENERIC EQUIVALENT) 1 MG capsule     VITAMIN D, CHOLECALCIFEROL, PO     No current facility-administered medications for this visit.      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 Biopsy done years ago. 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      Again, thank you for allowing me to participate in the care of your " patient.        Sincerely,        Anthony Garcia MD

## 2022-05-10 ENCOUNTER — TELEPHONE (OUTPATIENT)
Dept: DERMATOLOGY | Facility: CLINIC | Age: 62
End: 2022-05-10
Payer: COMMERCIAL

## 2022-05-10 NOTE — TELEPHONE ENCOUNTER
5/10 Provided phone number [phone] to schedule follow up  in about 1 year (around 5/9/2023) for skin check.    Aster martinez Procedure   Orthopedics, Podiatry, Sports Medicine, ENT/Eye Specialties  [facility]   [phone]

## 2022-05-11 ENCOUNTER — TELEPHONE (OUTPATIENT)
Dept: DERMATOLOGY | Facility: CLINIC | Age: 62
End: 2022-05-11
Payer: COMMERCIAL

## 2022-05-11 LAB
PATH REPORT.COMMENTS IMP SPEC: NORMAL
PATH REPORT.COMMENTS IMP SPEC: NORMAL
PATH REPORT.FINAL DX SPEC: NORMAL
PATH REPORT.GROSS SPEC: NORMAL
PATH REPORT.MICROSCOPIC SPEC OTHER STN: NORMAL
PATH REPORT.RELEVANT HX SPEC: NORMAL

## 2022-05-11 NOTE — TELEPHONE ENCOUNTER
Excision previsit information                                                    Excision of: basal cell carcinoma  Site(s): Left thigh    -if site is the groin or below knee, send to RN for initiation of antibiotic prophylaxis protocol.    Medication & Allergy Information                                                    CURRENT MEDICATIONS:   Current Outpatient Medications   Medication Sig Dispense Refill     amoxicillin-clavulanate (AUGMENTIN) 875-125 MG tablet TAKE 1 TABLET BY MOUTH TWO TIMES A DAY FOR 10 DAYS. TAKE WITH FOOD OR MILK.       levothyroxine (SYNTHROID/LEVOTHROID) 125 MCG tablet Take one tablet by mouth daily except on Thursday and Sunday. 90 tablet 0     metoprolol tartrate (LOPRESSOR) 50 MG tablet TAKE 1 TABLET BY MOUTH TWICE A  tablet 0     mycophenolic acid (GENERIC EQUIVALENT) 180 MG EC tablet Take 3 tablets (540 mg) by mouth 2 times daily 540 tablet 3     NIFEdipine ER (ADALAT CC) 30 MG 24 hr tablet Take 1 tablet (30 mg) by mouth daily For blood pressure 90 tablet 1     rivaroxaban ANTICOAGULANT (XARELTO ANTICOAGULANT) 20 MG TABS tablet Take 1 tablet (20 mg) by mouth daily (with dinner) 90 tablet 3     sotalol (BETAPACE) 80 MG tablet Take 0.5 tablets (40 mg) by mouth in the morning and 0.5 tablets (40 mg) in the evening. 90 tablet 3     tacrolimus (GENERIC EQUIVALENT) 0.5 MG capsule Take 1 capsule (0.5 mg) by mouth 2 times daily 180 capsule 3     tacrolimus (GENERIC EQUIVALENT) 1 MG capsule Take 3 capsules (3 mg) by mouth 2 times daily 540 capsule 3     VITAMIN D, CHOLECALCIFEROL, PO Take 1,000 Units by mouth daily         Do you take the following medications:  Coumadin, Eliquis, Pradaxa, Xarelto:   YES   -If on Coumadin, INR should be checked within 7 days of surgery.  Range should be 3.5 or less or within therapeutic range.    Do you have an ALLERGIC REACTION to any medications:  YES   Dilaudid [hydromorphone hcl], Hydromorphone, and Sulfa drugs    Past Medical History                                                     Do you currently or have you previously had any of the following conditions:       HIV/AIDS:  NO    Hepatitis:  NO    Suppressed immune system/Organ transplant:  YES      Autoimmune disorder (eg RA, lupus):  NO    Prolonged bleeding or bleeding disorder:  NO    History of cold sores or shingles at surgical site:  NO  If yes, notify provider for possible need for Valtrex.    Pacemaker or Defibrillator:  NO.      History of artificial or heart valve replacement:  NO    Endocarditis/Rheumatic fever: NO    Have you been told you should take antibiotic prior to dental work or surgery:  NO    Joint replacement within past 2 years: NO    Previous prosthetic joint infection: NO    Diabetes: NO    Pregnant or Breastfeeding: NO    Keloids or abnormal scars: NO    Mobility device (wheelchair, transfer difficulty): NO    Tobacco use:  NO.      If any positives, send to RN for further review  WESTLEY Rivera RN   5/11/2022  4:24 PM CDT Back to Top        RN notified pt of results and 's recommendation. Pt scheduled for an Excision and Previsit completed. 6 month skin check also scheduled..Omaira Garcia MD   5/11/2022  3:45 PM CDT         Can we call patient and let her know the spot on the left thigh was a BCC. She will need excision. Can you schedule with Dr. Nice and then follow-up with me in 6 months? Thanks!     Anthony Garcia MD  Pronouns: he/him/his    Department of Dermatology  [facility]: Phone: [phone], Fax:[phone]  [facility]: Phone: [phone] Fax: [phone]

## 2022-05-11 NOTE — RESULT ENCOUNTER NOTE
Can we call patient and let her know the spot on the left thigh was a BCC. She will need excision. Can you schedule with Dr. Nice and then follow-up with me in 6 months? Thanks!    Anthony Garcia MD  Pronouns: he/him/his    Department of Dermatology  [facility]: Phone: [phone], Fax:[phone]  [facility]: Phone: [phone] Fax: [phone]

## 2022-05-19 ENCOUNTER — MYC MEDICAL ADVICE (OUTPATIENT)
Dept: FAMILY MEDICINE | Facility: CLINIC | Age: 62
End: 2022-05-19
Payer: COMMERCIAL

## 2022-05-25 NOTE — PATIENT INSTRUCTIONS
Preventive Health Recommendations  Female Ages 50 - 64    Yearly exam: See your health care provider every year in order to  o Review health changes.   o Discuss preventive care.    o Review your medicines if your doctor has prescribed any.      Get a Pap test every three years (unless you have an abnormal result and your provider advises testing more often).    If you get Pap tests with HPV test, you only need to test every 5 years, unless you have an abnormal result.     You do not need a Pap test if your uterus was removed (hysterectomy) and you have not had cancer.    You should be tested each year for STDs (sexually transmitted diseases) if you're at risk.     Have a mammogram every 1 to 2 years.    Have a colonoscopy at age 50, or have a yearly FIT test (stool test). These exams screen for colon cancer.      Have a cholesterol test every 5 years, or more often if advised.    Have a diabetes test (fasting glucose) every three years. If you are at risk for diabetes, you should have this test more often.     If you are at risk for osteoporosis (brittle bone disease), think about having a bone density scan (DEXA).    Shots: Get a flu shot each year. Get a tetanus shot every 10 years.    Nutrition:     Eat at least 5 servings of fruits and vegetables each day.    Eat whole-grain bread, whole-wheat pasta and brown rice instead of white grains and rice.    Get adequate Calcium and Vitamin D.     Lifestyle    Exercise at least 150 minutes a week (30 minutes a day, 5 days a week). This will help you control your weight and prevent disease.    Limit alcohol to one drink per day.    No smoking.     Wear sunscreen to prevent skin cancer.     See your dentist every six months for an exam and cleaning.    See your eye doctor every 1 to 2 years.    At [facility], we strive to deliver an exceptional experience to you, every time we see you.  If you receive a survey in the mail, please send us back your  Last refill of tramadol 50 mg #60 with 1 refills 4/25/2022.   Last office visit re: chronic pain, med f/u 4/8/2022. Establish care visit is already scheduled with Dr. Andrade for 6/1/2022.     thoughts. We really do value your feedback.    Based on your medical history, these are the current health maintenance/preventive care services that you are due for (some may have been done at this visit.)  Health Maintenance Due   Topic Date Due     PNEUMOCOCCAL IMMUNIZATION 19-64 HIGHEST RISK (2 of 3 - PCV13) 02/18/2006     ZOSTER IMMUNIZATION (1 of 2) 03/03/2008     COLONOSCOPY  03/03/2018     PHQ-2  01/01/2019     MAMMO SCREENING  08/10/2019     INFLUENZA VACCINE (1) 09/01/2019     PREVENTIVE CARE VISIT  09/18/2019     HPV  10/16/2019     PAP  10/16/2019         Suggested websites for health information:  Www.Nutrisystem.org : Up to date and easily searchable information on multiple topics.  Www.medlineplus.gov : medication info, interactive tutorials, watch real surgeries online  Www.familydoctor.org : good info from the Academy of Family Physicians  Www.cdc.gov : public health info, travel advisories, epidemics (H1N1)  Www.aap.org : children's health info, normal development, vaccinations  Www.health.[facility].[place].us : [place] dept of health, public health issues in [place], N1N1    Your care team:                            Family Medicine Internal Medicine   MD Mack Singh MD Shantel Branch-Fleming, MD Katya Georgiev PA-C Nam Ho, MD Pediatrics   GISEL Donaldson, PATRICIA Bird APRN MD Marissa Jaeger MD Deborah Mielke, MD Kim Thein, APRN CNP      Clinic hours: Monday - Thursday 7 am-7 pm; Fridays 7 am-5 pm.   Urgent care: Monday - Friday 11 am-9 pm; Saturday and Sunday 9 am-5 pm.  Pharmacy : Monday -Thursday 8 am-8 pm; Friday 8 am-6 pm; Saturday and Sunday 9 am-5 pm.     Clinic: [phone]   Pharmacy: [phone]

## 2022-06-01 ENCOUNTER — OFFICE VISIT (OUTPATIENT)
Dept: FAMILY MEDICINE | Facility: CLINIC | Age: 62
End: 2022-06-01
Payer: COMMERCIAL

## 2022-06-01 VITALS
DIASTOLIC BLOOD PRESSURE: 63 MMHG | OXYGEN SATURATION: 97 % | TEMPERATURE: 98.5 F | WEIGHT: 231.2 LBS | HEART RATE: 60 BPM | HEIGHT: 65 IN | BODY MASS INDEX: 38.52 KG/M2 | RESPIRATION RATE: 18 BRPM | SYSTOLIC BLOOD PRESSURE: 120 MMHG

## 2022-06-01 DIAGNOSIS — Z87.09 HISTORY OF BRONCHITIS: ICD-10-CM

## 2022-06-01 DIAGNOSIS — Z94.0 KIDNEY REPLACED BY TRANSPLANT: ICD-10-CM

## 2022-06-01 DIAGNOSIS — Z23 HIGH PRIORITY FOR 2019-NCOV VACCINE: ICD-10-CM

## 2022-06-01 DIAGNOSIS — I15.1 HTN, KIDNEY TRANSPLANT RELATED: ICD-10-CM

## 2022-06-01 DIAGNOSIS — E07.9 THYROID DISORDER: ICD-10-CM

## 2022-06-01 DIAGNOSIS — D84.9 IMMUNOSUPPRESSION (H): ICD-10-CM

## 2022-06-01 DIAGNOSIS — Z94.0 HTN, KIDNEY TRANSPLANT RELATED: ICD-10-CM

## 2022-06-01 DIAGNOSIS — I48.0 PAROXYSMAL ATRIAL FIBRILLATION (H): Primary | ICD-10-CM

## 2022-06-01 DIAGNOSIS — E66.01 MORBID OBESITY (H): ICD-10-CM

## 2022-06-01 PROCEDURE — 0054A COVID-19,PF,PFIZER (12+ YRS): CPT | Performed by: PREVENTIVE MEDICINE

## 2022-06-01 PROCEDURE — 99215 OFFICE O/P EST HI 40 MIN: CPT | Mod: 25 | Performed by: PREVENTIVE MEDICINE

## 2022-06-01 PROCEDURE — 91305 COVID-19,PF,PFIZER (12+ YRS): CPT | Performed by: PREVENTIVE MEDICINE

## 2022-06-01 RX ORDER — LEVOTHYROXINE SODIUM 125 UG/1
125 TABLET ORAL DAILY
Qty: 90 TABLET | Refills: 0 | Status: SHIPPED | OUTPATIENT
Start: 2022-06-01 | End: 2022-12-05

## 2022-06-01 ASSESSMENT — PAIN SCALES - GENERAL: PAINLEVEL: NO PAIN (0)

## 2022-06-01 NOTE — PATIENT INSTRUCTIONS
At [facility], we strive to deliver an exceptional experience to you, every time we see you. If you receive a survey, please complete it as we do value your feedback.  If you have MyChart, you can expect to receive results automatically within 24 hours of their completion.  Your provider will send a note interpreting your results as well.   If you do not have MyChart, you should receive your results in about a week by mail.    Your care team:                            Family Medicine Internal Medicine   MD Mack Singh MD Shantel Branch-Fleming, MD Srinivasa Vaka, MD Katya Belousova, DIAMANTE Bynum CNP, MD ([facility]) Pediatrics   Gregg Pal, MD Alicia Nina MD Amelia Massimini APRN CNP Kim Thein, APRN CNP Bethany Templen, MD             Clinic hours: Monday - Thursday 7 am-6 pm; Fridays 7 am-5 pm.   Urgent care: Monday - Friday 10 am- 8 pm; Saturday and Sunday 9 am-5 pm.    Clinic: [phone]       [facility]: Monday - Thursday 8 am - 7 pm; Friday 8 am - 6 pm  [facility]: [phone]

## 2022-06-01 NOTE — NURSING NOTE
Prior to immunization administration, verified patients identity using patient s name and date of birth. Please see Immunization Activity for additional information.     Screening Questionnaire for Adult Immunization    Are you sick today?   No   Do you have allergies to medications, food, a vaccine component or latex?   No   Have you ever had a serious reaction after receiving a vaccination?   No   Do you have a long-term health problem with heart, lung, kidney, or metabolic disease (e.g., diabetes), asthma, a blood disorder, no spleen, complement component deficiency, a cochlear implant, or a spinal fluid leak?  Are you on long-term aspirin therapy?   No   Do you have cancer, leukemia, HIV/AIDS, or any other immune system problem?   No   Do you have a parent, brother, or sister with an immune system problem?   No   In the past 3 months, have you taken medications that affect  your immune system, such as prednisone, other steroids, or anticancer drugs; drugs for the treatment of rheumatoid arthritis, Crohn s disease, or psoriasis; or have you had radiation treatments?   No   Have you had a seizure, or a brain or other nervous system problem?   No   During the past year, have you received a transfusion of blood or blood    products, or been given immune (gamma) globulin or antiviral drug?   No   For women: Are you pregnant or is there a chance you could become       pregnant during the next month?   No   Have you received any vaccinations in the past 4 weeks?   No     Immunization questionnaire answers were all negative.        Per orders of Dr. Dc, injection of Pfizer booster given by Tash Hightower RN. Patient instructed to remain in clinic for 15 minutes afterwards, and to report any adverse reaction to me immediately.       Screening performed by Tash Hightower RN on 6/1/2022 at 9:17 AM.

## 2022-06-01 NOTE — PROGRESS NOTES
Assessment & Plan     Paroxysmal atrial fibrillation (H)  -no recurrent symptoms  -followed by Cardiology  -on beta blockers  -on Xarelto   - CBC with Platelets & Differential  - Comprehensive metabolic panel    HTN, kidney transplant related  -readings are better at home  -will defer medication changes for now   - CBC with Platelets & Differential  - Comprehensive metabolic panel    Morbid obesity (H)  Wt Readings from Last 2 Encounters:   06/01/22 104.9 kg (231 lb 3.2 oz)   04/26/22 105.2 kg (232 lb)     -has started to get more walking     Immunosuppression (H)  -followed by Transplant clinic  -last saw them Virtually 12/21, was due for follow up in 8 months (8/22)   - COVID-19,PF,PFIZER (12+ Yrs GRAY LABEL)    Thyroid disorder  -will check labs on 6/6/22, taking medication daily now as last TSH in the ED was elevated, free T4 has been normal.   - levothyroxine (SYNTHROID/LEVOTHROID) 125 MCG tablet  Dispense: 90 tablet; Refill: 0  - T4 free  - TSH    Kidney replaced by transplant  -per Kidney team     History of bronchitis  -has had symptoms since 4/22  -slight improvement  -Chest X ray normal  -clear lungs on exam  -Oxygen sats look good  -will reach out via My Chart in a week and if symptoms are still present then we discussed using 5 day course of Prednisone  -air fluid level on right ear, use of Flonase nasal spray over the counter reviewed     High priority for 2019-nCoV vaccine  - COVID-19,PF,PFIZER (12+ Yrs GRAY LABEL)      Ordering of each unique test  Prescription drug management  44 minutes spent on the date of the encounter doing chart review, history and exam, documentation and further activities per the note        Return in about 6 months (around 12/1/2022) for Follow up, with me, using a video visit.    Gina Dc MD MPH    [facility]    Valencia Doherty is a 62 year old who presents for the following health issues   HPI       Emergency room Follow-up  "Visit:    Hospital/Nursing Home/IP Rehab Facility: [facility]  Date of Admission: 5/1/22  Date of Discharge: 5/1/22  Reason(s) for Admission: Blood pressure problem      Was your hospitalization related to COVID-19? No   Problems taking medications regularly:  None  Medication changes since discharge: None  Problems adhering to non-medication therapy:  None         Has had cough and bronchitis since April  Feels a little better  No fever  Food can make her cough too  Was told she had a double ear infection, this is slowly getting better, some ringing.  Has taken 10 days of Augmentin     Was told by Sleep that iron may be low.  Lab appointment 6/6/22  Still with fatigue, sleeping more  Low energy    Basal cell carcinoma+ left thigh, scheduled with DERM     The following is a summary from the ED visit 5/1/22:  \"MDM:   Bessy Sapngler is a 62 y.o. female with a history of atrial fibrillation, DVT/PE, on Xarelto, and renal transplant, on chronic immunosuppression, who presents to the emergency department for evaluation of chest pain, elevated heart rate, and elevated blood pressure. Patient feels she has had a bronchitis for the past week or so. She has not had any fevers. Her cough has been nonproductive. She is otherwise felt well. She did take a home COVID test which was negative. She denies any hemoptysis or sputum production. She denies any sick contacts. Tonight she was sitting at rest and had an episode of what she felt was atrial fibrillation with some associated chest heaviness and palpitations similar to prior episodes of atrial fibrillation. It got better. Then she developed a more intense episode where she had kind of worse chest heaviness and this concerned her and she checked her heart rate and blood pressure, both of which were significantly elevated. On arrival the patient was in atrial flutter with rapid ventricular response. He is anticoagulated on Xarelto and is consistent with her " medications and so I felt she would be a good candidate for cardioversion however on my initial assessment she had already spontaneously converted to a sinus rhythm. We repeated an EKG and the rhythm change was confirmed. The patient was given her evening metoprolol and sotalol and with this her blood pressures did improve to the 150s-160s over 80s. This is still mildly elevated and I have recommended that she follow-up with her primary clinic regarding this. It sounds like they had recently added the nifedipine to her blood pressure regimen. Chest x-ray was obtained to ensure there is no evidence of pneumonia, pulmonary edema, pleural effusion, or other acute pulmonary pathology. The chest x-ray is clear. Laboratory evaluation is reassuring with no leukocytosis or anemia. Electrolytes and renal function are normal. Her kidney function looks great at 0.78. Her TSH is elevated at 12.82 but her free T4 is normal. This actually looks similar to her TSH and free T4 from about 3 weeks ago. We discussed this abnormality. I like her to discuss things with her primary. They may consider increasing her levothyroxine dose or may just monitor things further. Patient did have low magnesium at 1.5. She was given 2 g of IV magnesium. It is possible that the low magnesium contributes to the episode of atrial flutter. The patient remained in normal sinus rhythm during her ED course. Initial troponin is normal. We will plan to check a delta troponin as the patient does endorse that there was some reasonable chest pain with the episode that led her to check her heart rate and blood pressure. This may have just been related to that she got into atrial flutter but I wanted to ensure there is no evidence of occult ACS. I have discussed the patient in signout with my partner Dr. Bhagat who will follow up on the delta troponin. Anticipate this will be normal and she will be able to be safely discharged with primary care follow-up within 1  "week. We had discussed the bronchitis. She wants to hold off on considering a dose of Decadron or prednisone burst. She was amenable to a prescription for Tessalon Perles to try for the cough. I have recommended that she follow-up with her primary doctor this week regarding the cough. If her symptoms lasted longer than 2 weeks, an empiric course of azithromycin could be considered. She understands importance of primary care follow-up to discuss thyroid testing, have her blood pressure rechecked, and follow-up regarding her bronchitis. It is anticipated she will be able to be safely discharged after her delta troponin.    DIAGNOSIS:  ICD-10-CM   1. Atrial flutter with rapid ventricular response (HCC) I48.92   converted to sinus after arrival in the ER   2. Hypomagnesemia E83.42   3. Elevated TSH R79.89   but normal free T4, discuss results with your doctor to see if she wants you to adjust your thyroid medication   4. Bronchitis J40   5. Elevated blood pressure reading R03.0 \"     Thyroid:  -some constipation   -fatigue+  -no energy compared to how things were     Afib:  -no repeat episodes since in the ED  -more with cough attacks  -no dizziness     Scheduled for sleep test (overnight at home)     Hypertension Follow-up      Do you check your blood pressure regularly outside of the clinic? Yes     Are you following a low salt diet? Yes    Are your blood pressures ever more than 140 on the top number (systolic) OR more   than 90 on the bottom number (diastolic), for example 140/90? No    130-140 systolic, 70s diastolic   120/63 at Urgent care    Lower in the office, has to take AM meds     Has been taking care of her 14 year old grandson, had a transplant, some stress around that. His health is better, patient takes care of his pills.  Daughter had passed away  Lives with son and daughter in law who does cooking, and helps with grand son     Review of Systems   Constitutional, HEENT, cardiovascular, pulmonary, gi and " "gu systems are negative, except as otherwise noted.      Objective    /63   Pulse 60   Temp 98.5  F (36.9  C) (Tympanic)   Resp 18   Ht 1.651 m (5' 5\")   Wt 104.9 kg (231 lb 3.2 oz)   LMP 10/25/2004 (Exact Date)   SpO2 97%   Breastfeeding No   BMI 38.47 kg/m    Body mass index is 38.47 kg/m .  Physical Exam   GENERAL APPEARANCE: healthy, alert and no distress  EYES: Eyes grossly normal to inspection and conjunctivae and sclerae normal  HENT: Intact TMs with no erythema, fluid++ on the right side   NECK:  trachea midline and normal to palpation  RESP: lungs clear to auscultation - no rales, rhonchi or wheezes  CV: regular rates and rhythm, normal S1 S2  ABDOMEN: soft, non-tender and no rebound or guarding   MS: extremities normal- no gross deformities noted and peripheral pulses normal  SKIN: no suspicious lesions or rashes  NEURO: Normal strength and tone, mentation intact and speech normal  PSYCH: mentation appears normal      No results found for this or any previous visit (from the past 24 hour(s)).          "

## 2022-06-06 ENCOUNTER — LAB (OUTPATIENT)
Dept: LAB | Facility: CLINIC | Age: 62
End: 2022-06-06
Payer: COMMERCIAL

## 2022-06-06 DIAGNOSIS — I15.1 HTN, KIDNEY TRANSPLANT RELATED: ICD-10-CM

## 2022-06-06 DIAGNOSIS — Z48.298 AFTERCARE FOLLOWING ORGAN TRANSPLANT: ICD-10-CM

## 2022-06-06 DIAGNOSIS — Z94.0 HTN, KIDNEY TRANSPLANT RELATED: ICD-10-CM

## 2022-06-06 DIAGNOSIS — G25.81 RESTLESS LEGS SYNDROME (RLS): ICD-10-CM

## 2022-06-06 DIAGNOSIS — Z94.0 KIDNEY TRANSPLANTED: ICD-10-CM

## 2022-06-06 DIAGNOSIS — I48.0 PAROXYSMAL ATRIAL FIBRILLATION (H): ICD-10-CM

## 2022-06-06 DIAGNOSIS — Z79.899 IMMUNOSUPPRESSIVE MANAGEMENT ENCOUNTER FOLLOWING KIDNEY TRANSPLANT: ICD-10-CM

## 2022-06-06 DIAGNOSIS — Z94.0 IMMUNOSUPPRESSIVE MANAGEMENT ENCOUNTER FOLLOWING KIDNEY TRANSPLANT: ICD-10-CM

## 2022-06-06 DIAGNOSIS — E07.9 THYROID DISORDER: ICD-10-CM

## 2022-06-06 LAB
ALBUMIN SERPL-MCNC: 4 G/DL (ref 3.4–5)
ALP SERPL-CCNC: 51 U/L (ref 40–150)
ALT SERPL W P-5'-P-CCNC: 20 U/L (ref 0–50)
ANION GAP SERPL CALCULATED.3IONS-SCNC: 7 MMOL/L (ref 3–14)
AST SERPL W P-5'-P-CCNC: 9 U/L (ref 0–45)
BASOPHILS # BLD AUTO: 0 10E3/UL (ref 0–0.2)
BASOPHILS NFR BLD AUTO: 0 %
BILIRUB SERPL-MCNC: 0.5 MG/DL (ref 0.2–1.3)
BUN SERPL-MCNC: 15 MG/DL (ref 7–30)
CALCIUM SERPL-MCNC: 9.2 MG/DL (ref 8.5–10.1)
CHLORIDE BLD-SCNC: 107 MMOL/L (ref 94–109)
CO2 SERPL-SCNC: 25 MMOL/L (ref 20–32)
CREAT SERPL-MCNC: 0.73 MG/DL (ref 0.52–1.04)
CREAT UR-MCNC: 186 MG/DL
DEPRECATED CALCIDIOL+CALCIFEROL SERPL-MC: 29 UG/L (ref 20–75)
EOSINOPHIL # BLD AUTO: 0.1 10E3/UL (ref 0–0.7)
EOSINOPHIL NFR BLD AUTO: 1 %
ERYTHROCYTE [DISTWIDTH] IN BLOOD BY AUTOMATED COUNT: 13.1 % (ref 10–15)
FERRITIN SERPL-MCNC: 331 NG/ML (ref 8–252)
GFR SERPL CREATININE-BSD FRML MDRD: >90 ML/MIN/1.73M2
GLUCOSE BLD-MCNC: 107 MG/DL (ref 70–99)
HCT VFR BLD AUTO: 36.3 % (ref 35–47)
HGB BLD-MCNC: 11.8 G/DL (ref 11.7–15.7)
IMM GRANULOCYTES # BLD: 0 10E3/UL
IMM GRANULOCYTES NFR BLD: 0 %
IRON SATN MFR SERPL: 33 % (ref 15–46)
IRON SERPL-MCNC: 109 UG/DL (ref 35–180)
LYMPHOCYTES # BLD AUTO: 1.5 10E3/UL (ref 0.8–5.3)
LYMPHOCYTES NFR BLD AUTO: 30 %
MCH RBC QN AUTO: 30.1 PG (ref 26.5–33)
MCHC RBC AUTO-ENTMCNC: 32.5 G/DL (ref 31.5–36.5)
MCV RBC AUTO: 93 FL (ref 78–100)
MONOCYTES # BLD AUTO: 0.4 10E3/UL (ref 0–1.3)
MONOCYTES NFR BLD AUTO: 8 %
NEUTROPHILS # BLD AUTO: 2.9 10E3/UL (ref 1.6–8.3)
NEUTROPHILS NFR BLD AUTO: 60 %
PLATELET # BLD AUTO: 155 10E3/UL (ref 150–450)
POTASSIUM BLD-SCNC: 4.2 MMOL/L (ref 3.4–5.3)
PROT SERPL-MCNC: 7.3 G/DL (ref 6.8–8.8)
PROT UR-MCNC: 0.1 G/L
PROT/CREAT 24H UR: 0.05 G/G CR (ref 0–0.2)
RBC # BLD AUTO: 3.92 10E6/UL (ref 3.8–5.2)
SODIUM SERPL-SCNC: 139 MMOL/L (ref 133–144)
T4 FREE SERPL-MCNC: 1.15 NG/DL (ref 0.76–1.46)
TACROLIMUS BLD-MCNC: 6.6 UG/L (ref 5–15)
TIBC SERPL-MCNC: 329 UG/DL (ref 240–430)
TME LAST DOSE: NORMAL H
TME LAST DOSE: NORMAL H
TSH SERPL DL<=0.005 MIU/L-ACNC: 3.21 MU/L (ref 0.4–4)
WBC # BLD AUTO: 4.9 10E3/UL (ref 4–11)

## 2022-06-06 PROCEDURE — 84439 ASSAY OF FREE THYROXINE: CPT

## 2022-06-06 PROCEDURE — 80197 ASSAY OF TACROLIMUS: CPT

## 2022-06-06 PROCEDURE — 84156 ASSAY OF PROTEIN URINE: CPT

## 2022-06-06 PROCEDURE — 36415 COLL VENOUS BLD VENIPUNCTURE: CPT

## 2022-06-06 PROCEDURE — 82306 VITAMIN D 25 HYDROXY: CPT

## 2022-06-06 PROCEDURE — 82728 ASSAY OF FERRITIN: CPT

## 2022-06-06 PROCEDURE — 80050 GENERAL HEALTH PANEL: CPT

## 2022-06-06 PROCEDURE — 83550 IRON BINDING TEST: CPT

## 2022-06-07 NOTE — RESULT ENCOUNTER NOTE
Bessy, your test results were within normal limits.    Electrolytes, non fasting glucose, kidney function and liver function tests are normal.  Thyroid function is improved, please continue current dose of medication.  Basic blood count is not showing anemia or infection.     Please do not hesitate to call us at [phone] if you have any questions or concerns.    Thank you,    Gina Dc MD MPH

## 2022-06-20 ENCOUNTER — OFFICE VISIT (OUTPATIENT)
Dept: SLEEP MEDICINE | Facility: CLINIC | Age: 62
End: 2022-06-20
Attending: INTERNAL MEDICINE
Payer: COMMERCIAL

## 2022-06-20 ENCOUNTER — OFFICE VISIT (OUTPATIENT)
Dept: DERMATOLOGY | Facility: CLINIC | Age: 62
End: 2022-06-20
Payer: COMMERCIAL

## 2022-06-20 VITALS — HEART RATE: 64 BPM | DIASTOLIC BLOOD PRESSURE: 75 MMHG | SYSTOLIC BLOOD PRESSURE: 144 MMHG

## 2022-06-20 DIAGNOSIS — E66.01 MORBID OBESITY (H): Chronic | ICD-10-CM

## 2022-06-20 DIAGNOSIS — R06.83 SNORING: ICD-10-CM

## 2022-06-20 DIAGNOSIS — C44.719 BASAL CELL CARCINOMA (BCC) OF LEFT THIGH: Primary | ICD-10-CM

## 2022-06-20 DIAGNOSIS — F51.04 CHRONIC INSOMNIA: ICD-10-CM

## 2022-06-20 DIAGNOSIS — Z92.25 HISTORY OF IMMUNOSUPPRESSION THERAPY: ICD-10-CM

## 2022-06-20 DIAGNOSIS — R53.81 MALAISE AND FATIGUE: ICD-10-CM

## 2022-06-20 DIAGNOSIS — I48.0 PAROXYSMAL ATRIAL FIBRILLATION (H): ICD-10-CM

## 2022-06-20 DIAGNOSIS — R53.83 MALAISE AND FATIGUE: ICD-10-CM

## 2022-06-20 PROCEDURE — 12032 INTMD RPR S/A/T/EXT 2.6-7.5: CPT | Performed by: DERMATOLOGY

## 2022-06-20 PROCEDURE — G0399 HOME SLEEP TEST/TYPE 3 PORTA: HCPCS | Performed by: INTERNAL MEDICINE

## 2022-06-20 PROCEDURE — 17313 MOHS 1 STAGE T/A/L: CPT | Performed by: DERMATOLOGY

## 2022-06-20 ASSESSMENT — PAIN SCALES - GENERAL: PAINLEVEL: NO PAIN (1)

## 2022-06-20 NOTE — PATIENT INSTRUCTIONS
Mohs Wound Care Instructions with Steri Strips    Possible complications of any surgical procedure are bleeding, infection, scarring, alteration in skin color and sensation, muscle weakness in the area, wound dehiscence or seperation, or recurrence of the lesion or disease. On occasion, after healing, a secondary procedure or revision may be recommended in order to obtain the best cosmetic or functional result.     After your surgery, a pressure bandage will be placed over the area that has sutures. This will help prevent bleeding. Please, follow these instructions as they will help you to prevent complications as your wound heals.    For the First 48 hours After Surgery:    Leave the pressure bandage on and keep it dry. If it should come loose, you may retape it, but do not take it off.    Relax and take it easy. Do not do any vigorous exercise, heavy lifting, or bending forward. This could cause the wound to bleed.    Post-operative pain is usually mild. You may alternate between 1000 mg of Tylenol (acetaminophen) and 400 mg of Ibuprofen every 4 hours.  Do not take more than 4,000 mg of acetaminophen and more than 3200 mg of Ibuprofen in a 24 hr period.  Avoid alcohol and vitamin E as these may increase your tendency to bleed.    You may put an ice pack around the bandaged area for 20 minutes every 2-3 hours. This may help reduce swelling, bruising, and pain. Make sure the ice pack is waterproof so that the pressure bandage does not get wet.     You may see a small amount of drainage or blood on your pressure bandage.  This is normal.  However, if drainage or bleeding continues or saturates the bandage, you will need to apply firm pressure over the bandage with a washcloth for 15 minutes. If bleeding continues after applying pressure for 15 minutes then go to the nearest emergency room.      48 Hours After Surgery:    Remove outer white bandage down to clear plastic film (Tegaderm).    Leave the clear plastic film  (Tegaderm) on for up to 2 weeks, as long as it is intact.  If it falls off prior to 2 weeks follow daily wound care below.  If it stays intact for the full 2 weeks, then remove and treat as normal, healthy skin.      Daily Wound Care (if Tegaderm and Steri-Strips fall off prior to 2 weeks):    Wash wound with a mild soap and water.  Use caution when washing the wound, be gentle and do not let the forceful shower stream hit the wound directly. DO NOT WASH WITH HYDROGEN PEROXIDE AS THIS MIGHT CAUSE THE STITCHES TO DISSOLVE FASTER THAN WHAT WE WANT.    Pat dry.    Apply Vaseline (from a new container or tube) over the suture line with a Q-tip until it is completely healed. It is very important to keep the wound continuously moist, as wounds heal best in a moist environment.    Keep the site covered until it's healed.  You can cover it with a Telfa (non-stick) dressing and tape or a band-aid until healed with normal, healthy skin.      Call Us If:    You have pain that is not controlled with Tylenol/Ibuprofen.    You have signs or symptoms of an infection, such as: fever over 100 degrees F, redness, warmth, or foul-smelling or yellow/creamy drainage from the wound.      Who should I call with questions?  [facility]: [phone]  [facility]: [phone]  For urgent needs outside of business hours call the [facility] at [phone] and ask for the dermatology resident on call

## 2022-06-20 NOTE — NURSING NOTE
Bessy M Spangler's goals for this visit include:   Chief Complaint   Patient presents with     Derm Problem     Bessy is here today for excision on her left thigh due to BCC, pt wants mohs.        She requests these members of her care team be copied on today's visit information:     PCP: Gina Dc    Referring Provider:  No referring provider defined for this encounter.    BP (!) 144/75   Pulse 64   LMP 10/25/2004 (Exact Date)     Do you need any medication refills at today's visit? No    Mary Forrest LPN

## 2022-06-20 NOTE — PROGRESS NOTES
[facility] [place] Procedure Note    Dermatology Problem List:  0. NUB - left thigh - bx 5/9/22  1. 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 History of kidney transplant, currently on mycophenolic acid acid and tacrolimus  3. ISK s/p cryo  ____________________________________________    Date of Service:  Jun 20, 2022  Surgery: Mohs micrographic surgery    Case 1  Repair Type: intermediate  Repair Size: 5.0 cm  Suture Material: monocryl 4-0  Tumor Type: BCC - Basal cell carcinoma  Location: left thigh  Derm-Path Accession #: [id number]  PreOp Size: 1.0X1.1 cm  PostOp Size: 2.4X1.9 cm  Mohs Accession #: [id number]  Level of Defect: fat      Procedure:  We discussed the principles of treatment and most likely complications including scarring, bleeding, infection, swelling, pain, crusting, nerve damage, large wound,  incomplete excision, wound dehiscence,  nerve damage, recurrence, and a second procedure may be recommended to obtain the best cosmetic or functional result.    Informed consent was obtained and the patient underwent the procedure as follows:  The patient was placed supine on the operating table.  The cancer was identified, outlined with a marker, and verified by the patient.  The entire surgical field was prepped with Hibiclens.  The surgical site was anesthetized using Lidocaine 1% with epi 1:100,000.      The area of clinically apparent tumor was debulked. The layer of tissue was then surgically excised using a #15 blade and was then transferred onto a specimen sheet maintaining the orientation of the specimen. Hemostasis was obtained using bipolar electrocoagulation. The wound site was then covered with a dressing while the tissue samples were processed for examination.    The excised tissue was transported to the Mohs histology laboratory maintaining the tissue  orientation.  The tissue specimen was relaxed so that the entire surgical margin was in a a single horizontal plane for sectioning and inked for precise mapping.  A precise reference map was drawn to reflect the sectioning of the specimen, colored inking of the margins, and orientation on the patient. The tissue was processed using horizontal sectioning of the base and continuous peripheral margins.  The histopathologic sections were reviewed in conjunction with the reference map.    Total blocks: 1    Total slides:  2    There were no cancer cells visualized on examination, therefore Mohs surgery was complete.    REPAIR: An intermediate layered linear closure was selected as the procedure which would maximally preserve both function and cosmesis.    After the excision of the tumor, the area was undermined. Hemostasis was obtained with electrocoagulation.  Closure was oriented so that the wound was in the patient's natural skin tension lines. The subcutaneous and dermal layers were then closed with 4-0 monocryl buried vertical mattress sutures.    Estimated blood loss was less than 10 ml for all surgical sites. A sterile pressure dressing was applied and wound care instructions, with a written handout, were given. The patient was discharged from the [facility]atologic Surgery Center alert and ambulatory.    Follow-up in PRN    Scribe Disclosure:   Demarcus GOMEZ, am serving as a scribe to document services personally performed by this physician, Dr. Alex Nice, based on data collection and the provider's statements to me.     Provider Disclosure:   The documentation recorded by the scribe accurately reflects the services I personally performed and the decisions made by me.  I personally performed the procedures today.  Alex Nice DO    Department of Dermatology  [facility]: Phone: [phone], Fax:[phone]  [facility]  [facility]: Phone: [phone], Fax: [phone]    Care and Laboratory Testing Performed at:  [facility]   Dermatology Clinic  [address] [place] [address]

## 2022-06-20 NOTE — NURSING NOTE
The following medication was given:     MEDICATION:  Lidocaine with epinephrine 1% 1:[id number]  ROUTE: SQ  SITE: see procedure note  DOSE: 7.5cc  LOT #: [id number]  : Hospira  EXPIRATION DATE: 12/2022  NDC#: 2257-5715-80  Was there drug waste? 1.5cc  Multi-dose vial: Yes    Racquel Yarbrough LPN  June 20, 2022    Steri strips, tegaderm and pressure dressing applied to Mohs site on left thigh.  Wound care instructions reviewed with patient and AVS provided.  Patient verbalized understanding.  No further questions or concerns at this time.      Racquel Yarbrough LPN

## 2022-06-20 NOTE — LETTER
6/20/2022         RE: Bessy Spangler  [address] [place] [address]        Dear Colleague,    Thank you for referring your patient, Bessy Spangler, to the [facility]. Please see a copy of my visit note below.    [facility] D[facility]at[facility] Surgery [facility] [place] Procedure Note    Dermatology Problem List:  0. NUB - left thigh - bx 5/9/22  1. 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 History of kidney transplant, currently on mycophenolic acid acid and tacrolimus  3. ISK s/p cryo  ____________________________________________    Date of Service:  Jun 20, 2022  Surgery: Mohs micrographic surgery    Case 1  Repair Type: intermediate  Repair Size: 5.0 cm  Suture Material: monocryl 4-0  Tumor Type: BCC - Basal cell carcinoma  Location: left thigh  Derm-Path Accession #: [id number]  PreOp Size: 1.0X1.1 cm  PostOp Size: 2.4X1.9 cm  Mohs Accession #: [id number]  Level of Defect: fat      Procedure:  We discussed the principles of treatment and most likely complications including scarring, bleeding, infection, swelling, pain, crusting, nerve damage, large wound,  incomplete excision, wound dehiscence,  nerve damage, recurrence, and a second procedure may be recommended to obtain the best cosmetic or functional result.    Informed consent was obtained and the patient underwent the procedure as follows:  The patient was placed supine on the operating table.  The cancer was identified, outlined with a marker, and verified by the patient.  The entire surgical field was prepped with Hibiclens.  The surgical site was anesthetized using Lidocaine 1% with epi 1:100,000.      The area of clinically apparent tumor was debulked. The layer of tissue was then surgically excised using a #15 blade and was then transferred onto a specimen sheet maintaining the orientation of the specimen.  Hemostasis was obtained using bipolar electrocoagulation. The wound site was then covered with a dressing while the tissue samples were processed for examination.    The excised tissue was transported to the Mohs histology laboratory maintaining the tissue orientation.  The tissue specimen was relaxed so that the entire surgical margin was in a a single horizontal plane for sectioning and inked for precise mapping.  A precise reference map was drawn to reflect the sectioning of the specimen, colored inking of the margins, and orientation on the patient. The tissue was processed using horizontal sectioning of the base and continuous peripheral margins.  The histopathologic sections were reviewed in conjunction with the reference map.    Total blocks: 1    Total slides:  2    There were no cancer cells visualized on examination, therefore Mohs surgery was complete.    REPAIR: An intermediate layered linear closure was selected as the procedure which would maximally preserve both function and cosmesis.    After the excision of the tumor, the area was undermined. Hemostasis was obtained with electrocoagulation.  Closure was oriented so that the wound was in the patient's natural skin tension lines. The subcutaneous and dermal layers were then closed with 4-0 monocryl buried vertical mattress sutures.    Estimated blood loss was less than 10 ml for all surgical sites. A sterile pressure dressing was applied and wound care instructions, with a written handout, were given. The patient was discharged from the Dermatologic Surgery Center alert and ambulatory.    Follow-up in PRN    Scribe Disclosure:   Demarcus GOMEZ, am serving as a scribe to document services personally performed by this physician, Dr. Alex Nice, based on data collection and the provider's statements to me.     Provider Disclosure:   The documentation recorded by the scribe accurately reflects the services I personally performed and the decisions made  by me.  I personally performed the procedures today.  Alex Nice DO    Department of Dermatology  [facility]: Phone: [phone], Fax:[phone]  [facility]: Phone: [phone], Fax: [phone]    Care and Laboratory Testing Performed at:  [facility]   Dermatology Clinic  [address] [place] [address]        Again, thank you for allowing me to participate in the care of your patient.        Sincerely,        Alex Nice MD

## 2022-06-21 ENCOUNTER — DOCUMENTATION ONLY (OUTPATIENT)
Dept: SLEEP MEDICINE | Facility: CLINIC | Age: 62
End: 2022-06-21
Payer: COMMERCIAL

## 2022-06-21 NOTE — PROGRESS NOTES
This HSAT was performed using a Noxturnal T3 device which recorded snore, sound, movement activity, body position, nasal pressure, oronasal thermal airflow, pulse, oximetry and both chest and abdominal respiratory effort. HSAT data was restricted to the time patient states they were in bed.     HSAT was scored using 1B 4% hypopnea rule.     AHI: 13.6. Snoring was reported as moderate.  Time with SpO2 below 89% was 24.6 minutes.   Overall signal quality was good     Pt will follow up with sleep provider to determine appropriate therapy.     Ordering Provider, Fransisco Burgos MD Charles O., BA, RPS, RST  System Clinical Specialist 6/21/2022

## 2022-06-21 NOTE — PROGRESS NOTES
HST POST-STUDY QUESTIONNAIRE    1. What time did you go to bed?  8:30  2. How long do you think it took to fall asleep?  2 HRS  3. What time did you wake up to start the day?  5:55  4. Did you get up during the night at all?  YES  5. If you woke up, do you remember approximately what time(s)? 3:00 am  6. Did you have any difficulty with the equipment?  No  7. Did you us any type of treatment with this study?  None  8. Was the head of the bed elevated? No  9. Did you sleep in a recliner?  No  10. Did you stop using CPAP at least 3 days before this test?  NA  11. Any other information you'd like us to know? NA

## 2022-06-22 ENCOUNTER — TELEPHONE (OUTPATIENT)
Dept: DERMATOLOGY | Facility: CLINIC | Age: 62
End: 2022-06-22

## 2022-06-22 NOTE — TELEPHONE ENCOUNTER
SUBJECTIVE/OBJECTIVE:                                                    Bessy is 1 day s/p Mohs for BCC on left thigh.     ASSESSMENT:                                                       NURSING ASSESSMENT:     Wound location: left thigh       What are you doing to manage your pain?  No pain currently.  She reports only taking 1 Tylenol    Is it helping?  yes    Are you applying ice? n/a    Have you had any noticeable bleeding through the bandage?  No, dressing is dry and intact    Do you have any concerns?  No     PLAN:                                                       Wound care directions reviewed.  Pt declined a post op appointment. Next skin check has been scheduled.     Esthela Ndiaye RN

## 2022-06-22 NOTE — PROCEDURES
"HOME SLEEP STUDY INTERPRETATION        Patient: Bessy Spangler  MRN: [id number]  YOB: 1960  Study Date: 6/20/2022  PCP/Referring Provider: Gina Dc;  Ordering Provider: Fransisco Burgos MD         Indications for Home Study: Bessy Spangler is a 62 year old female with symptoms suggestive of obstructive sleep apnea.    Estimated body mass index is 38.47 kg/m  as calculated from the following:    Height as of 6/1/22: 1.651 m (5' 5\").    Weight as of 6/1/22: 104.9 kg (231 lb 3.2 oz).  Total score - [place]: 4 (4/26/2022  7:10 AM)  StopBang Total Score: 4 (4/26/2022  7:10 AM)        Data: A full night home sleep study was performed recording the standard physiologic parameters including body position, movement, sound, nasal pressure, thermal oral airflow, chest and abdominal movements with respiratory inductance plethysmography, and oxygen saturation by pulse oximetry. Pulse rate was estimated by oximetry recording. This study was considered adequate based on > 4 hours of quality oximetry and respiratory recording. As specified by the AASM Manual for the Scoring of Sleep and Associated events, version 2.3, Rule VIII.D 1B, 4% oxygen desaturation scoring for hypopneas is used as a standard of care on all home sleep apnea testing.        Analysis Time:  537 minutes        Respiration:   Sleep Associated Hypoxemia: sustained hypoxemia was not present. Baseline oxygen saturation was 97%.  Time with saturation less than or equal to 88% was 15 minutes. The lowest oxygen saturation was 80%.   Snoring: Snoring was present.  Respiratory events: The home study revealed a presence of 9 obstructive apneas and 8 mixed and central apneas. There were 105 hypopneas resulting in a combined apnea/hypopnea index [AHI] of 13 events per hour.  AHI was 17 per hour supine, 11 per hour prone, 10 per hour on left side, and 14 per hour on right side.   Pattern: Excluding events noted above, respiratory rate and pattern was " Normal.      Position: Percent of time spent: supine - 29%, prone - 12%, on left - 25%, on right - 33%.      Heart Rate: By pulse oximetry normal rate was noted.       Assessment:     Mild obstructive sleep apnea.    Sleep associated hypoxemia was present.    Recommendations:    Consider auto-CPAP at 5-15 cmH2O, oral appliance therapy, polysomnography with full night PAP titration or surgical options.    Suggest optimizing sleep hygiene and avoiding sleep deprivation.    Weight management.        Diagnosis Code(s): Obstructive Sleep Apnea G47.33, Hypoxemia G47.36    Fransisco Burgos MD, June 22, 2022   Diplomate, American Board of Internal Medicine, Sleep Medicine

## 2022-07-25 ENCOUNTER — VIRTUAL VISIT (OUTPATIENT)
Dept: SLEEP MEDICINE | Facility: CLINIC | Age: 62
End: 2022-07-25
Payer: COMMERCIAL

## 2022-07-25 VITALS — HEIGHT: 66 IN | WEIGHT: 230 LBS | BODY MASS INDEX: 36.96 KG/M2

## 2022-07-25 DIAGNOSIS — G47.33 OSA (OBSTRUCTIVE SLEEP APNEA): Chronic | ICD-10-CM

## 2022-07-25 DIAGNOSIS — G47.33 OSA (OBSTRUCTIVE SLEEP APNEA): Primary | ICD-10-CM

## 2022-07-25 DIAGNOSIS — F51.04 CHRONIC INSOMNIA: ICD-10-CM

## 2022-07-25 DIAGNOSIS — G25.81 RESTLESS LEGS SYNDROME (RLS): ICD-10-CM

## 2022-07-25 DIAGNOSIS — E66.01 MORBID OBESITY (H): ICD-10-CM

## 2022-07-25 PROCEDURE — 99213 OFFICE O/P EST LOW 20 MIN: CPT | Mod: 95 | Performed by: INTERNAL MEDICINE

## 2022-07-25 ASSESSMENT — SLEEP AND FATIGUE QUESTIONNAIRES
HOW LIKELY ARE YOU TO NOD OFF OR FALL ASLEEP WHILE LYING DOWN TO REST IN THE AFTERNOON WHEN CIRCUMSTANCES PERMIT: SLIGHT CHANCE OF DOZING
HOW LIKELY ARE YOU TO NOD OFF OR FALL ASLEEP WHILE SITTING INACTIVE IN A PUBLIC PLACE: WOULD NEVER DOZE
HOW LIKELY ARE YOU TO NOD OFF OR FALL ASLEEP WHILE SITTING AND TALKING TO SOMEONE: WOULD NEVER DOZE
HOW LIKELY ARE YOU TO NOD OFF OR FALL ASLEEP WHILE WATCHING TV: WOULD NEVER DOZE
HOW LIKELY ARE YOU TO NOD OFF OR FALL ASLEEP WHEN YOU ARE A PASSENGER IN A CAR FOR AN HOUR WITHOUT A BREAK: WOULD NEVER DOZE
HOW LIKELY ARE YOU TO NOD OFF OR FALL ASLEEP IN A CAR, WHILE STOPPED FOR A FEW MINUTES IN TRAFFIC: WOULD NEVER DOZE
HOW LIKELY ARE YOU TO NOD OFF OR FALL ASLEEP WHILE SITTING QUIETLY AFTER LUNCH WITHOUT ALCOHOL: WOULD NEVER DOZE
HOW LIKELY ARE YOU TO NOD OFF OR FALL ASLEEP WHILE SITTING AND READING: SLIGHT CHANCE OF DOZING

## 2022-07-25 NOTE — PROGRESS NOTES
"Bessy is a 62 year old who is being evaluated via a billable video visit.      How would you like to obtain your AVS? MyChart  If the video visit is dropped, the invitation should be resent by: Send to e-mail at: [email or web address]  Will anyone else be joining your video visit? Suyapa  Val Cota        Video-Visit Details    Video Start Time: 8:31 AM    Type of service:  Video Visit    Video End Time:8:50 AM    Originating Location (pt. Location): Home    Distant Location (provider location):  [facility]     Platform used for Video Visit: Rounds         Home Sleep Apnea Testing Results Visit:    Chief Complaint   Patient presents with     sleep study follow up       Bessy Spangler is a 62 year old female who had Home Sleep Apnea Testing.  She presented with snoring, gasp arousals, no current bed partner, sleep maintenance difficulties, ?fatigue (ESS 4), crowded oropharynx, obesity. Comorbid paroxysmal atrial fibrillation  - Sleep maintenance difficulties, suspect psychophysiologic insomnia though sleep disordered breathing may play a role  - Mild-moderate restless leg syndrome. Ferritin 331    Estimated body mass index is 37.12 kg/m  as calculated from the following:    Height as of this encounter: 1.676 m (5' 6\").    Weight as of this encounter: 104.3 kg (230 lb).  Total score - [place]: 2 (7/25/2022  8:23 AM)   StopBang Total Score: 4 (4/26/2022  7:10 AM)  BILLY Total Score: 7    Home Sleep Apnea Testing - 6/20/22: 230 lbs: AHI 13/hr.   Oxygen [place] of 80%.  Sp02 =< 88% for 15 minutes   AHI was 17 per hour supine, 11 per hour prone, 10 per hour on left side, and 14 per hour on right side.   She slept on her back (29%), prone (12%), left (25%) and right (33%) sides.   Analysis time: 537 minutes.     Signal quality of Oxymeter 99% Good  Nasal Cannula 100% Good  RIP belts 100% Good.     Bessy Spangler reports that she slept Poor.       Past medical/surgical history, family " "history, social history, medications and allergies were reviewed.      Ht 1.676 m (5' 6\")   Wt 104.3 kg (230 lb)   LMP 10/25/2004 (Exact Date)   BMI 37.12 kg/m      Impression/Plan:  Mild Obstructive Sleep Apnea.   Sleep associated hypoxemia was present.  Treatment options discussed today including  auto-CPAP at 5-15 cmH2O, oral appliance therapy, positional therapy, polysomnography with full night PAP titration or surgical options.- If elects no treatment would recommend Polysomnogram with ambien if needed  - She wants to consider options    I spent 15 minutes with patient including counseling, and 5 minutes with chart review, and documentation         "

## 2022-07-27 DIAGNOSIS — I10 ESSENTIAL HYPERTENSION: ICD-10-CM

## 2022-07-27 DIAGNOSIS — Z94.0 KIDNEY REPLACED BY TRANSPLANT: ICD-10-CM

## 2022-07-27 RX ORDER — NIFEDIPINE 30 MG/1
TABLET, EXTENDED RELEASE ORAL
Qty: 90 TABLET | Refills: 1 | Status: SHIPPED | OUTPATIENT
Start: 2022-07-27 | End: 2023-01-30

## 2022-07-29 DIAGNOSIS — I10 ESSENTIAL HYPERTENSION: ICD-10-CM

## 2022-08-01 RX ORDER — METOPROLOL TARTRATE 50 MG
TABLET ORAL
Qty: 180 TABLET | Refills: 0 | Status: SHIPPED | OUTPATIENT
Start: 2022-08-01 | End: 2022-10-31

## 2022-09-06 ENCOUNTER — TELEPHONE (OUTPATIENT)
Dept: SLEEP MEDICINE | Facility: CLINIC | Age: 62
End: 2022-09-06

## 2022-09-06 ENCOUNTER — LAB (OUTPATIENT)
Dept: LAB | Facility: CLINIC | Age: 62
End: 2022-09-06
Payer: COMMERCIAL

## 2022-09-06 DIAGNOSIS — Z94.0 IMMUNOSUPPRESSIVE MANAGEMENT ENCOUNTER FOLLOWING KIDNEY TRANSPLANT: ICD-10-CM

## 2022-09-06 DIAGNOSIS — Z94.0 KIDNEY TRANSPLANTED: ICD-10-CM

## 2022-09-06 DIAGNOSIS — Z79.899 IMMUNOSUPPRESSIVE MANAGEMENT ENCOUNTER FOLLOWING KIDNEY TRANSPLANT: ICD-10-CM

## 2022-09-06 DIAGNOSIS — Z48.298 AFTERCARE FOLLOWING ORGAN TRANSPLANT: ICD-10-CM

## 2022-09-06 DIAGNOSIS — G47.33 OSA (OBSTRUCTIVE SLEEP APNEA): Primary | ICD-10-CM

## 2022-09-06 LAB
ANION GAP SERPL CALCULATED.3IONS-SCNC: 5 MMOL/L (ref 3–14)
BUN SERPL-MCNC: 11 MG/DL (ref 7–30)
CALCIUM SERPL-MCNC: 9.3 MG/DL (ref 8.5–10.1)
CHLORIDE BLD-SCNC: 110 MMOL/L (ref 94–109)
CO2 SERPL-SCNC: 27 MMOL/L (ref 20–32)
CREAT SERPL-MCNC: 0.7 MG/DL (ref 0.52–1.04)
ERYTHROCYTE [DISTWIDTH] IN BLOOD BY AUTOMATED COUNT: 12.7 % (ref 10–15)
GFR SERPL CREATININE-BSD FRML MDRD: >90 ML/MIN/1.73M2
GLUCOSE BLD-MCNC: 114 MG/DL (ref 70–99)
HCT VFR BLD AUTO: 36.1 % (ref 35–47)
HGB BLD-MCNC: 11.9 G/DL (ref 11.7–15.7)
MCH RBC QN AUTO: 30 PG (ref 26.5–33)
MCHC RBC AUTO-ENTMCNC: 33 G/DL (ref 31.5–36.5)
MCV RBC AUTO: 91 FL (ref 78–100)
PLATELET # BLD AUTO: 154 10E3/UL (ref 150–450)
POTASSIUM BLD-SCNC: 4.6 MMOL/L (ref 3.4–5.3)
RBC # BLD AUTO: 3.97 10E6/UL (ref 3.8–5.2)
SODIUM SERPL-SCNC: 142 MMOL/L (ref 133–144)
TACROLIMUS BLD-MCNC: 6.5 UG/L (ref 5–15)
TME LAST DOSE: NORMAL H
TME LAST DOSE: NORMAL H
WBC # BLD AUTO: 5.3 10E3/UL (ref 4–11)

## 2022-09-06 PROCEDURE — 85027 COMPLETE CBC AUTOMATED: CPT

## 2022-09-06 PROCEDURE — 36415 COLL VENOUS BLD VENIPUNCTURE: CPT

## 2022-09-06 PROCEDURE — 80197 ASSAY OF TACROLIMUS: CPT

## 2022-09-06 PROCEDURE — 80048 BASIC METABOLIC PNL TOTAL CA: CPT

## 2022-09-06 RX ORDER — ZOLPIDEM TARTRATE 5 MG/1
TABLET ORAL
Qty: 1 TABLET | Refills: 0 | Status: SHIPPED | OUTPATIENT
Start: 2022-09-06 | End: 2023-03-10

## 2022-09-06 NOTE — TELEPHONE ENCOUNTER
LOV: 07/25/2022.    Patient requesting sleep study be ordered.     Will route to provider to advise per last visit note.   Impression/Plan:  Mild Obstructive Sleep Apnea.   Sleep associated hypoxemia was present.  Treatment options discussed today including  auto-CPAP at 5-15 cmH2O, oral appliance therapy, positional therapy, polysomnography with full night PAP titration or surgical options.- If elects no treatment would recommend Polysomnogram with ambien if needed  - She wants to consider options

## 2022-09-06 NOTE — TELEPHONE ENCOUNTER
Ordered diagnostic Polysomnogram with ambien if needed  AMbien sent to [facility]

## 2022-09-06 NOTE — TELEPHONE ENCOUNTER
Reason for Call:  Other appointment    Detailed comments: patient called and would like to schedule a in office sleep study ordered by Aubrey Saunders at  S[facility]EP [facility].    Patient needs order placed, and a person to contact her to schedule.    Thank you.    Phone Number Patient can be reached at: Home number on file [phone] (home)    Best Time: any    Can we leave a detailed message on this number? YES    Call taken on 9/6/2022 at 9:28 AM by Jeri Alba

## 2022-09-07 ENCOUNTER — TELEPHONE (OUTPATIENT)
Dept: TRANSPLANT | Facility: CLINIC | Age: 62
End: 2022-09-07

## 2022-09-07 NOTE — TELEPHONE ENCOUNTER
Patient called and notified that sleep study has been ordered. No answer. Detailed message with instructions to schedule was left for patient.     Additionally patient was informed that one tab of Ambien was prescribed and sent to pharmacy listed. Patient was advised the medication is to be brought with to the sleep study and taken once advised by the sleep tech.     Gianna Coffman RN on 9/7/2022 at 8:38 AM

## 2022-09-07 NOTE — TELEPHONE ENCOUNTER
Bessy Spangler Richard Steven, MD        9/7/22 9:42 AM  Hello,  Should my dose of Tacrolimus be adjusted?  Thank you,  Lily Spangler      Tac 6.5 (9/6/22)  Goal 4-6  Current tac dose 3.5 mg BID      MyChart message sent. No dose change at this time.

## 2022-09-16 ENCOUNTER — CARE COORDINATION (OUTPATIENT)
Dept: SLEEP MEDICINE | Facility: CLINIC | Age: 62
End: 2022-09-16

## 2022-09-16 DIAGNOSIS — F51.04 CHRONIC INSOMNIA: ICD-10-CM

## 2022-09-16 DIAGNOSIS — G47.33 OSA (OBSTRUCTIVE SLEEP APNEA): ICD-10-CM

## 2022-09-16 DIAGNOSIS — G25.81 RESTLESS LEGS SYNDROME (RLS): ICD-10-CM

## 2022-09-16 DIAGNOSIS — E66.01 MORBID OBESITY (H): Primary | ICD-10-CM

## 2022-09-16 NOTE — PROGRESS NOTES
P2P was completed, DIAMANTE Stephens, CNP on 9/16/2022 at 12:15PM.  Case [id number].  PSG requested to be done 9/18/2022, was denied during the P2P, as Dr. Anisa Gonzalez agreed with Dr. Burgos's HST assessment  starting Autopap.  If, patient has issues with use of autopap, Dr. Burgos may ask for titration study, 08280, not a 71703.  Patient was called to cancel the PSG scheduled on 9/18/2022, and said she really needed to have that done because she was told she was borderline DEVORA, and was concerned about her heart.  She also stated that she did not want to start the cpap and go through the process if she was not over 15/ahi per hour.  She was told that if 15ahi/per hour and above that it would not be good for her heart.  She wanted me to send her a [employer] message with this information in it.  I said I would see if I could get the denial information from financial securing and send in [employer] to her.  I suggested she send a [employer] message to Dr. Burgos asking for next steps.   Routed to Dr. Burgos for review.

## 2022-09-16 NOTE — LETTER
[facility] [address] [address]  [phone]  Dept: [phone]    2022      RE:  Bessy Spangler  MRN:  [id number]  :  1960        To Whom It May Concern:    I am righting this letter in appeal of a recent decision made to deny coverage for Ms. Spangler for an attended diagnostic Polysomnogram.    I initially saw Ms. Spangler     Sincerely,    ***                          No notes on file

## 2022-09-16 NOTE — LETTER
[facility] [address] [address]  [phone]  Dept: [phone]    2022      RE:  Bessy Spangler  MRN:  [id number]  :  1960    I am writing this letter in appeal of a denial for coverage of an attended diagnostic Polysomnogram for Ms. Spangler. I initially saw her in 2022 for snoring, gasp arousals, sleep maintenance difficulties, and fatigue (ESS 4), with crowded oropharynx and obesity. She also has a history notable for paroxysmal atrial fibrillation.    She completed a Home Sleep Apnea Test in 2022 which suggested that she had at least mild obstructive sleep apnea.     At her follow-up visit in 2022 the patient expressed a desire to NOT treat the sleep apnea if indeed her obstructive sleep apnea was mild. Given that the HSAT may under-estimate severity in approximately 20% of patients (particularly patients found to have no or mild obstructive sleep apnea) we decided to proceed with a diagnostic Polysomnogram to confirm her severity as mild in which case we would elect to not treat at this time. If the patient were to have moderate obstructive sleep apnea on Polysomnogram then we would then elect to treat.     The insurance coverage for this second test was denied and a [id number] consultation was done on 22. Unfortunately, I was not available on this date and the consultation was managed by my colleague David Flores who did not completely understand the purpose of the second test after reviewing my notes.     Please reconsider the coverage for a diagnostic Polysomnogram as our treatment decision requires more sensitive and reliable identification of the severity of her problem. The patient wants to avoid the hassle and cost of CPAP treatment, but is also concerned about her heart health. If her AHI were to be confirmed to be <15 and in the mild category we would not treat at this time.  Whereas, if we were to find she had more severe obstructive sleep apnea we would proceed with treatment        Sincerely,  Fransisco Burgos MD                            No notes on file

## 2022-09-16 NOTE — LETTER
[facility] [address] [address]  [phone]  Dept: [phone]    2022      RE:  Bessy Spangler  MRN:  [id number]  :  1960    I am writing this letter in appeal of a denial for coverage of an attended diagnostic Polysomnogram for Ms. Spangler. I initially saw her in 2022 for snoring, gasp arousals, sleep maintenance difficulties, and fatigue (ESS 4), with crowded oropharynx and obesity. She also has a history notable for paroxysmal atrial fibrillation.    She completed a Home Sleep Apnea Test in 2022 which suggested that she had at least mild obstructive sleep apnea.     At her follow-up visit in 2022 the patient expressed a desire to NOT treat the sleep apnea if indeed her obstructive sleep apnea was mild. Given that the HSAT may under-estimate severity in approximately 20% of patients (particularly patients found to have no or mild obstructive sleep apnea) we decided to proceed with a diagnostic Polysomnogram to confirm her severity as mild in which case we would elect to not treat at this time. If the patient were to have moderate obstructive sleep apnea on Polysomnogram then we would then elect to treat.     The insurance coverage for this second test was denied and a [id number] consultation was done on 22. Unfortunately, I was not available on this date and the consultation was managed by my colleague David Flores who did not completely understand the purpose of the second test after reviewing my notes.     Please reconsider the coverage for a diagnostic Polysomnogram as our treatment decision requires more sensitive and reliable identification of the severity of her problem. The patient wants to avoid the hassle and cost of CPAP treatment, but is also concerned about her heart health. If her AHI were to be confirmed to be <15 and in the mild category we would not treat at this time.  Whereas, if we were to find she had more severe obstructive sleep apnea we would proceed with treatment        Sincerely,  Fransisco Burgos MD          No notes on file

## 2022-09-21 NOTE — PROGRESS NOTES
I suspect that I will need to do another P2P  Is that possible, or do we need to set that up?  I am trying to contact David regarding substance of the conversation he had.

## 2022-09-22 NOTE — PROGRESS NOTES
Letter came to my in basket and I could not figure out how to get it out  Apparently there was something I still needed to d, to finish it, but could not figure out how/what  I ended up deleting it by accident  Luckily I made a hard copy which Josefa Bennett is going to scan in  Should not be so hard to do a letter. Took better part of 30 minutes

## 2022-09-26 NOTE — PROGRESS NOTES
Provider letter re-generated and printed. Faxed provider letter, consult, return note and HST interp to     Appeals Dept  [employer]  [phone]    Rose Crandall CMA on 9/26/2022 at 12:03 PM

## 2022-09-27 ENCOUNTER — TELEPHONE (OUTPATIENT)
Dept: FAMILY MEDICINE | Facility: CLINIC | Age: 62
End: 2022-09-27
Payer: COMMERCIAL

## 2022-09-27 PROCEDURE — 99207 PR NO CHARGE NURSE ONLY: CPT | Performed by: PREVENTIVE MEDICINE

## 2022-09-27 NOTE — TELEPHONE ENCOUNTER
Patient Quality Outreach    Patient is due for the following:   Hypertension -  BP check    Next Steps:   Schedule a nurse only visit for blood pressure    Type of outreach:    Sent Kiko message.      Questions for provider review:    None     Diane L. Schoenherr, RN

## 2022-09-28 PROBLEM — C44.91 BASAL CELL CARCINOMA: Status: ACTIVE | Noted: 2022-05-09

## 2022-10-30 DIAGNOSIS — I10 ESSENTIAL HYPERTENSION: ICD-10-CM

## 2022-10-31 RX ORDER — METOPROLOL TARTRATE 50 MG
TABLET ORAL
Qty: 180 TABLET | Refills: 0 | Status: SHIPPED | OUTPATIENT
Start: 2022-10-31 | End: 2023-02-01

## 2022-11-07 DIAGNOSIS — Z94.0 KIDNEY REPLACED BY TRANSPLANT: Primary | ICD-10-CM

## 2022-11-08 RX ORDER — TACROLIMUS 0.5 MG/1
0.5 CAPSULE ORAL 2 TIMES DAILY
Qty: 180 CAPSULE | Refills: 3 | Status: SHIPPED | OUTPATIENT
Start: 2022-11-08 | End: 2023-10-03

## 2022-11-29 ENCOUNTER — TELEPHONE (OUTPATIENT)
Dept: TRANSPLANT | Facility: CLINIC | Age: 62
End: 2022-11-29

## 2022-11-29 DIAGNOSIS — Z79.899 IMMUNOSUPPRESSIVE MANAGEMENT ENCOUNTER FOLLOWING KIDNEY TRANSPLANT: ICD-10-CM

## 2022-11-29 DIAGNOSIS — Z94.0 IMMUNOSUPPRESSIVE MANAGEMENT ENCOUNTER FOLLOWING KIDNEY TRANSPLANT: ICD-10-CM

## 2022-11-29 DIAGNOSIS — Z94.0 KIDNEY TRANSPLANTED: Primary | ICD-10-CM

## 2022-11-29 DIAGNOSIS — Z48.298 AFTERCARE FOLLOWING ORGAN TRANSPLANT: ICD-10-CM

## 2022-11-29 NOTE — PROGRESS NOTES
Patient has an upcoming lab appointment on 12/5/22. Please review and place future orders that are needed.    Eli Sarmiento on 11/29/2022 at 12:16 PM

## 2022-11-29 NOTE — TELEPHONE ENCOUNTER
ELI SMITH 3 hours ago (12:16 PM)     SM  Patient has an upcoming lab appointment on 12/5/22. Please review and place future orders that are needed.     Eli Smith on 11/29/2022 at 12:16 PM          Standing lab orders placed.

## 2022-12-05 ENCOUNTER — LAB (OUTPATIENT)
Dept: LAB | Facility: CLINIC | Age: 62
End: 2022-12-05
Payer: COMMERCIAL

## 2022-12-05 DIAGNOSIS — Z48.298 AFTERCARE FOLLOWING ORGAN TRANSPLANT: ICD-10-CM

## 2022-12-05 DIAGNOSIS — Z79.899 IMMUNOSUPPRESSIVE MANAGEMENT ENCOUNTER FOLLOWING KIDNEY TRANSPLANT: ICD-10-CM

## 2022-12-05 DIAGNOSIS — Z94.0 IMMUNOSUPPRESSIVE MANAGEMENT ENCOUNTER FOLLOWING KIDNEY TRANSPLANT: ICD-10-CM

## 2022-12-05 DIAGNOSIS — Z94.0 KIDNEY TRANSPLANTED: ICD-10-CM

## 2022-12-05 LAB
ALBUMIN MFR UR ELPH: 15.1 MG/DL
ANION GAP SERPL CALCULATED.3IONS-SCNC: 6 MMOL/L (ref 3–14)
BUN SERPL-MCNC: 9 MG/DL (ref 7–30)
CALCIUM SERPL-MCNC: 9.2 MG/DL (ref 8.5–10.1)
CHLORIDE BLD-SCNC: 110 MMOL/L (ref 94–109)
CO2 SERPL-SCNC: 25 MMOL/L (ref 20–32)
CREAT SERPL-MCNC: 0.69 MG/DL (ref 0.52–1.04)
CREAT UR-MCNC: 218 MG/DL
ERYTHROCYTE [DISTWIDTH] IN BLOOD BY AUTOMATED COUNT: 12.7 % (ref 10–15)
GFR SERPL CREATININE-BSD FRML MDRD: >90 ML/MIN/1.73M2
GLUCOSE BLD-MCNC: 111 MG/DL (ref 70–99)
HCT VFR BLD AUTO: 38.4 % (ref 35–47)
HGB BLD-MCNC: 12.4 G/DL (ref 11.7–15.7)
MCH RBC QN AUTO: 29.7 PG (ref 26.5–33)
MCHC RBC AUTO-ENTMCNC: 32.3 G/DL (ref 31.5–36.5)
MCV RBC AUTO: 92 FL (ref 78–100)
PLATELET # BLD AUTO: 190 10E3/UL (ref 150–450)
POTASSIUM BLD-SCNC: 4.3 MMOL/L (ref 3.4–5.3)
PROT/CREAT 24H UR: 0.07 MG/MG CR (ref 0–0.2)
RBC # BLD AUTO: 4.17 10E6/UL (ref 3.8–5.2)
SODIUM SERPL-SCNC: 141 MMOL/L (ref 133–144)
TACROLIMUS BLD-MCNC: 5.8 UG/L (ref 5–15)
TME LAST DOSE: NORMAL H
TME LAST DOSE: NORMAL H
WBC # BLD AUTO: 7.6 10E3/UL (ref 4–11)

## 2022-12-05 PROCEDURE — 85027 COMPLETE CBC AUTOMATED: CPT

## 2022-12-05 PROCEDURE — 80197 ASSAY OF TACROLIMUS: CPT

## 2022-12-05 PROCEDURE — 80048 BASIC METABOLIC PNL TOTAL CA: CPT

## 2022-12-05 PROCEDURE — 84156 ASSAY OF PROTEIN URINE: CPT

## 2022-12-05 PROCEDURE — 36415 COLL VENOUS BLD VENIPUNCTURE: CPT

## 2022-12-15 ENCOUNTER — IMMUNIZATION (OUTPATIENT)
Dept: NURSING | Facility: CLINIC | Age: 62
End: 2022-12-15
Payer: COMMERCIAL

## 2022-12-15 PROCEDURE — 90471 IMMUNIZATION ADMIN: CPT

## 2022-12-15 PROCEDURE — 0124A COVID-19 VACCINE BIVALENT BOOSTER 12+ (PFIZER): CPT

## 2022-12-15 PROCEDURE — 91312 COVID-19 VACCINE BIVALENT BOOSTER 12+ (PFIZER): CPT

## 2022-12-15 PROCEDURE — 90682 RIV4 VACC RECOMBINANT DNA IM: CPT

## 2022-12-27 ENCOUNTER — TELEPHONE (OUTPATIENT)
Dept: FAMILY MEDICINE | Facility: CLINIC | Age: 62
End: 2022-12-27
Payer: COMMERCIAL

## 2022-12-27 NOTE — TELEPHONE ENCOUNTER
.Patient Quality Outreach    Patient is due for the following:   Hypertension -  BP check    Next Steps:   Schedule a nurse only visit for blood pressure check    Type of outreach:    Sent [employer] message.      Questions for provider review:    None     Diane L. Schoenherr, RN

## 2023-01-04 ENCOUNTER — TELEPHONE (OUTPATIENT)
Dept: SLEEP MEDICINE | Facility: CLINIC | Age: 63
End: 2023-01-04

## 2023-01-04 PROBLEM — Z85.828 HISTORY OF NONMELANOMA SKIN CANCER: Chronic | Status: ACTIVE | Noted: 2023-01-04

## 2023-01-04 PROBLEM — C44.91 BASAL CELL CARCINOMA: Status: RESOLVED | Noted: 2022-05-09 | Resolved: 2023-01-04

## 2023-01-04 NOTE — TELEPHONE ENCOUNTER
Called Stephan to see what the outcome of our appeal was for PSG denied 09/16/2022.     Our fax with additional information was not received. I re-faxed our notes and set up a peer to peer review for today at 4pm. Dr. Natalia Abraham with call BK  at [phone] or provider phone at [phone].    Rose Crandall CMA on 1/4/2023 at 1:50 PM

## 2023-01-30 DIAGNOSIS — Z94.0 KIDNEY REPLACED BY TRANSPLANT: ICD-10-CM

## 2023-01-30 DIAGNOSIS — I10 ESSENTIAL HYPERTENSION: ICD-10-CM

## 2023-01-30 RX ORDER — NIFEDIPINE 30 MG/1
TABLET, EXTENDED RELEASE ORAL
Qty: 90 TABLET | Refills: 1 | Status: SHIPPED | OUTPATIENT
Start: 2023-01-30 | End: 2023-04-18 | Stop reason: ALTCHOICE

## 2023-01-31 DIAGNOSIS — I82.409 RECURRENT DEEP VEIN THROMBOSIS (DVT) (H): ICD-10-CM

## 2023-01-31 DIAGNOSIS — Z86.718 HISTORY OF DVT (DEEP VEIN THROMBOSIS): Primary | Chronic | ICD-10-CM

## 2023-01-31 DIAGNOSIS — I48.0 PAROXYSMAL ATRIAL FIBRILLATION (H): ICD-10-CM

## 2023-01-31 RX ORDER — RIVAROXABAN 20 MG/1
TABLET, FILM COATED ORAL
Qty: 90 TABLET | Refills: 3 | Status: SHIPPED | OUTPATIENT
Start: 2023-01-31 | End: 2023-11-27

## 2023-01-31 NOTE — TELEPHONE ENCOUNTER
Thromboembolic Disorder, s/p PE (2009), s/p RLE DVT (2018)  - Continue NOAC  Indefinitely     Dr Parker defers to PCP or hematology for anticoagulation for above. Patient not on for any cardiac reason (afib)    Amanda Beckford RN  Cardiology Care Coordinator  [facility]  Phone: [phone]

## 2023-02-01 DIAGNOSIS — Z79.899 IMMUNOSUPPRESSIVE MANAGEMENT ENCOUNTER FOLLOWING KIDNEY TRANSPLANT: ICD-10-CM

## 2023-02-01 DIAGNOSIS — I10 ESSENTIAL HYPERTENSION: ICD-10-CM

## 2023-02-01 DIAGNOSIS — Z94.0 IMMUNOSUPPRESSIVE MANAGEMENT ENCOUNTER FOLLOWING KIDNEY TRANSPLANT: ICD-10-CM

## 2023-02-01 DIAGNOSIS — Z94.0 KIDNEY TRANSPLANTED: Primary | ICD-10-CM

## 2023-02-01 DIAGNOSIS — Z48.298 AFTERCARE FOLLOWING ORGAN TRANSPLANT: ICD-10-CM

## 2023-02-01 DIAGNOSIS — Z94.0 KIDNEY REPLACED BY TRANSPLANT: ICD-10-CM

## 2023-02-01 RX ORDER — METOPROLOL TARTRATE 50 MG
TABLET ORAL
Qty: 180 TABLET | Refills: 0 | Status: SHIPPED | OUTPATIENT
Start: 2023-02-01 | End: 2023-04-14

## 2023-02-02 DIAGNOSIS — Z94.0 KIDNEY TRANSPLANTED: Primary | ICD-10-CM

## 2023-02-02 DIAGNOSIS — Z48.298 AFTERCARE FOLLOWING ORGAN TRANSPLANT: ICD-10-CM

## 2023-02-02 RX ORDER — TACROLIMUS 1 MG/1
3 CAPSULE ORAL 2 TIMES DAILY
Qty: 540 CAPSULE | Refills: 0 | Status: SHIPPED | OUTPATIENT
Start: 2023-02-02 | End: 2023-10-03

## 2023-02-02 RX ORDER — MYCOPHENOLIC ACID 180 MG/1
540 TABLET, DELAYED RELEASE ORAL 2 TIMES DAILY
Qty: 540 TABLET | Refills: 0 | Status: SHIPPED | OUTPATIENT
Start: 2023-02-02 | End: 2023-12-04

## 2023-02-28 ENCOUNTER — THERAPY VISIT (OUTPATIENT)
Dept: SLEEP MEDICINE | Facility: CLINIC | Age: 63
End: 2023-02-28
Payer: COMMERCIAL

## 2023-02-28 ENCOUNTER — TELEPHONE (OUTPATIENT)
Dept: FAMILY MEDICINE | Facility: CLINIC | Age: 63
End: 2023-02-28

## 2023-02-28 DIAGNOSIS — G47.33 OSA (OBSTRUCTIVE SLEEP APNEA): ICD-10-CM

## 2023-02-28 PROCEDURE — 95810 POLYSOM 6/> YRS 4/> PARAM: CPT | Performed by: INTERNAL MEDICINE

## 2023-02-28 NOTE — Clinical Note
Patient went into afib at 1 AM, can clearly seen the change if you look at pulse rate on the hypnogram

## 2023-03-02 ENCOUNTER — LAB (OUTPATIENT)
Dept: LAB | Facility: CLINIC | Age: 63
End: 2023-03-02
Payer: COMMERCIAL

## 2023-03-02 DIAGNOSIS — Z94.0 IMMUNOSUPPRESSIVE MANAGEMENT ENCOUNTER FOLLOWING KIDNEY TRANSPLANT: ICD-10-CM

## 2023-03-02 DIAGNOSIS — Z94.0 KIDNEY TRANSPLANTED: ICD-10-CM

## 2023-03-02 DIAGNOSIS — Z48.298 AFTERCARE FOLLOWING ORGAN TRANSPLANT: ICD-10-CM

## 2023-03-02 DIAGNOSIS — Z79.899 IMMUNOSUPPRESSIVE MANAGEMENT ENCOUNTER FOLLOWING KIDNEY TRANSPLANT: ICD-10-CM

## 2023-03-02 PROBLEM — G47.33 OSA (OBSTRUCTIVE SLEEP APNEA): Chronic | Status: ACTIVE | Noted: 2022-07-25

## 2023-03-02 PROBLEM — I48.0 PAROXYSMAL ATRIAL FIBRILLATION (H): Chronic | Status: ACTIVE | Noted: 2020-08-29

## 2023-03-02 LAB
ANION GAP SERPL CALCULATED.3IONS-SCNC: 6 MMOL/L (ref 3–14)
BUN SERPL-MCNC: 19 MG/DL (ref 7–30)
CALCIUM SERPL-MCNC: 9.6 MG/DL (ref 8.5–10.1)
CHLORIDE BLD-SCNC: 108 MMOL/L (ref 94–109)
CO2 SERPL-SCNC: 26 MMOL/L (ref 20–32)
CREAT SERPL-MCNC: 0.7 MG/DL (ref 0.52–1.04)
ERYTHROCYTE [DISTWIDTH] IN BLOOD BY AUTOMATED COUNT: 12.7 % (ref 10–15)
GFR SERPL CREATININE-BSD FRML MDRD: >90 ML/MIN/1.73M2
GLUCOSE BLD-MCNC: 108 MG/DL (ref 70–99)
HCT VFR BLD AUTO: 38.1 % (ref 35–47)
HGB BLD-MCNC: 12.4 G/DL (ref 11.7–15.7)
MCH RBC QN AUTO: 29.8 PG (ref 26.5–33)
MCHC RBC AUTO-ENTMCNC: 32.5 G/DL (ref 31.5–36.5)
MCV RBC AUTO: 92 FL (ref 78–100)
PLATELET # BLD AUTO: 170 10E3/UL (ref 150–450)
POTASSIUM BLD-SCNC: 4.6 MMOL/L (ref 3.4–5.3)
RBC # BLD AUTO: 4.16 10E6/UL (ref 3.8–5.2)
SODIUM SERPL-SCNC: 140 MMOL/L (ref 133–144)
TACROLIMUS BLD-MCNC: 7 UG/L (ref 5–15)
TME LAST DOSE: NORMAL H
TME LAST DOSE: NORMAL H
WBC # BLD AUTO: 6.2 10E3/UL (ref 4–11)

## 2023-03-02 PROCEDURE — 36415 COLL VENOUS BLD VENIPUNCTURE: CPT

## 2023-03-02 PROCEDURE — 85027 COMPLETE CBC AUTOMATED: CPT

## 2023-03-02 PROCEDURE — 80197 ASSAY OF TACROLIMUS: CPT

## 2023-03-02 PROCEDURE — 80048 BASIC METABOLIC PNL TOTAL CA: CPT

## 2023-03-02 NOTE — PROCEDURES
" SLEEP STUDY INTERPRETATION  DIAGNOSTIC POLYSOMNOGRAPHY REPORT      Patient: KASSIE KITCHEN  YOB: 1960  Study Date: 2/28/2023  MRN: [id number]  Referring Provider: -  Ordering Provider: Fransisco Burgos MD    Indications for Polysomnography: The patient is a 62 year old Female who is 5' 6\" and weighs 230.0 lbs. Her BMI is 37.4, [place] sleepiness scale 4 and neck circumference is 46 cm. A diagnostic polysomnogram was performed to evaluate for mild Obstructive Sleep Apnea with suggested sleep associated hypoxemia on [facility] 6/2022. Patient prefers no treatment, polysomnogram done to rule out more severe DEVORA and re-evaluate hypoxemia    Polysomnogram Data: A full night polysomnogram recorded the standard physiologic parameters including EEG, EOG, EMG, ECG, nasal and oral airflow. Respiratory parameters of chest and abdominal movements were recorded with respiratory inductance plethysmography. Oxygen saturation was recorded by pulse oximetry. Hypopnea scoring rule used: 1B 4%.    Sleep Architecture: sleep was fragmented and poorly consolidated  The total recording time of the polysomnogram was 527.3 minutes. The total sleep time was 215.0 minutes. Sleep latency was increased at 88.4 minutes without the use of a sleep aid. REM latency was 323.0 minutes. Arousal index was increased at 62.0 arousals per hour. Sleep efficiency was decreased at 40.8%. Wake after sleep onset was 223.0 minutes. The patient spent 24.4% of total sleep time in Stage N1, 50.7% in Stage N2, 17.0% in Stage N3, and 7.9% in REM. Time in supine was 0 minutes.    Respiration: It was somewhat difficult to discern RAEAs from PLMS with arousals    Events ? The polysomnogram revealed a presence of 3 obstructive, 0 central, and 0 mixed apneas resulting in an apnea index of 0.8 events per hour. There were 25 obstructive hypopneas and 0 central hypopneas resulting in an obstructive hypopnea index of 7.0 and central hypopnea index of 0 events per " hour. The combined apnea/hypopnea index was 7.8 events per hour (central apnea/hypopnea index was 0 events per hour). The REM AHI was 35.3 events per hour. The supine AHI was n/a. The RERA index was 6.4 events per hour.  The RDI was 14.2 events per hour.    Snoring - was reported as moderate to loud and intermittent.    Respiratory rate and pattern - was notable for normal respiratory rate and pattern.    Sustained Sleep Associated Hypoventilation - Transcutaneous carbon dioxide monitoring was not used     Sleep Associated Hypoxemia - (Greater than 5 minutes O2 sat at or below 88%) was present. Baseline oxygen saturation was 91.4%. Lowest oxygen saturation was 84.0%. Time spent less than or equal to 88% was 10.6 minutes. Time spent less than or equal to 89% was 53.1 minutes.    Movement Activity:     Periodic Limb Activity - There were 293 PLMs during the entire study. The PLM index was 81.8 movements per hour. The PLM Arousal Index was 42.7 per hour.    REM EMG Activity - Excessive transient/sustained muscle activity was not present.    Nocturnal Behavior - Abnormal sleep related behaviors were not noted     Bruxism - None apparent.    Cardiac Summary:   The average pulse rate was 84.4 bpm. The minimum pulse rate was 57.0 bpm while the maximum pulse rate was 141.0 bpm.  At 0050 the patient went into atrial fibrillation and remained in afib until the end of the study             Assessment:     Mild obstructive sleep apnea, predominantly REM-related when it is associated with desaturations    Mild sustained hypoxemia    Severity may be underestimated due to the paucity of REM sleep and lack of supine sleep     Periodic Limb Movements of sleep     Atrial fibrillation, history of paroxysmal atrial fibrillation     Recommendations:    Treatment could be empirically initiated with Auto?titrating PAP therapy with a range of 5 to 15 cmH2O. Recommend clinical follow up with sleep management team including oximetry or HSAT  on treatment.    Patient may be a candidate for dental appliance through referral to Sleep Dentistry for the treatment of obstructive sleep apnea and/or socially disruptive snoring.    Advice regarding the risks of drowsy driving.    Suggest optimizing sleep schedule and avoiding sleep deprivation.    Weight management (if BMI > 30).    Pharmacologic therapy should be used for management of restless legs syndrome only if present and clinically indicated and not based on the presence of periodic limb movements alone.    Diagnostic Codes:   Obstructive Sleep Apnea G47.33  Sleep Hypoxemia/Hypoventilation G47.36     _____________________________________   Electronically Signed By: Fransisco Burgos MD 3/2/23           Range(%) Time in range (min)   0.0 - 89.0 53.1   0.0 - 88.0 10.6         Stage Min(mm Hg) Max(mm Hg)   Wake - -   NREM(1+2+3) - -   REM - -       Range(mmHg) Time in range (min)   55.0 - 100.0 -   Excluded data <20.0 & >65.0 527.5

## 2023-03-03 ENCOUNTER — TELEPHONE (OUTPATIENT)
Dept: TRANSPLANT | Facility: CLINIC | Age: 63
End: 2023-03-03
Payer: COMMERCIAL

## 2023-03-03 NOTE — TELEPHONE ENCOUNTER
Tacrolimus = 7.0  (3/2/23)  Goal 4-6  Current tac dose 3 mg BID    Previous levels at goal on same dose.    PLAN:   Call and confirm this was a good 12-hour trough.   Verify current dose.   Confirm no new medications or illness (macey. Diarrhea).  Recommendations: stay on the same dose.   Recheck level in 1-2 weeks and make sure it is a good trough to avoid additional lab draws.  If continues to be above goal, will plan to decrease dose.      OUTCOME:  Verbalized understanding and agreement to plan.  Made aware SOT referral placed.  will call to set up.

## 2023-03-10 ENCOUNTER — OFFICE VISIT (OUTPATIENT)
Dept: OPTOMETRY | Facility: CLINIC | Age: 63
End: 2023-03-10
Payer: COMMERCIAL

## 2023-03-10 DIAGNOSIS — H52.4 PRESBYOPIA: ICD-10-CM

## 2023-03-10 DIAGNOSIS — H43.811 VITREOUS DEGENERATION AND DETACHMENT OF RIGHT EYE: Primary | ICD-10-CM

## 2023-03-10 DIAGNOSIS — H25.13 SENILE NUCLEAR SCLEROSIS, BILATERAL: ICD-10-CM

## 2023-03-10 PROCEDURE — 92004 COMPRE OPH EXAM NEW PT 1/>: CPT | Performed by: OPTOMETRIST

## 2023-03-10 PROCEDURE — 92015 DETERMINE REFRACTIVE STATE: CPT | Performed by: OPTOMETRIST

## 2023-03-10 ASSESSMENT — CONF VISUAL FIELD
OD_NORMAL: 1
OS_SUPERIOR_NASAL_RESTRICTION: 0
OD_SUPERIOR_NASAL_RESTRICTION: 0
OD_INFERIOR_NASAL_RESTRICTION: 0
OD_SUPERIOR_TEMPORAL_RESTRICTION: 0
OS_INFERIOR_TEMPORAL_RESTRICTION: 0
OD_INFERIOR_TEMPORAL_RESTRICTION: 0
OS_SUPERIOR_TEMPORAL_RESTRICTION: 0
OS_INFERIOR_NASAL_RESTRICTION: 0
OS_NORMAL: 1

## 2023-03-10 ASSESSMENT — REFRACTION_WEARINGRX
OD_CYLINDER: SPHERE
OD_SPHERE: +1.50
OS_CYLINDER: SPHERE
OS_SPHERE: +1.50

## 2023-03-10 ASSESSMENT — VISUAL ACUITY
OS_SC+: +2
OD_CC: J1 BE
METHOD: SNELLEN - LINEAR
OS_SC: 20/30
OD_SC: 20/20

## 2023-03-10 ASSESSMENT — SLIT LAMP EXAM - LIDS
COMMENTS: NORMAL
COMMENTS: NORMAL

## 2023-03-10 ASSESSMENT — CUP TO DISC RATIO
OS_RATIO: 0.4
OD_RATIO: 0.4

## 2023-03-10 ASSESSMENT — REFRACTION_MANIFEST
OS_SPHERE: +0.75
OS_CYLINDER: SPHERE
OD_CYLINDER: SPHERE
OD_ADD: +2.50
OS_ADD: +2.50
OD_SPHERE: PLANO

## 2023-03-10 ASSESSMENT — EXTERNAL EXAM - LEFT EYE: OS_EXAM: NORMAL

## 2023-03-10 ASSESSMENT — EXTERNAL EXAM - RIGHT EYE: OD_EXAM: NORMAL

## 2023-03-10 ASSESSMENT — TONOMETRY
IOP_METHOD: ICARE
OD_IOP_MMHG: 12
OS_IOP_MMHG: 10

## 2023-03-10 NOTE — PROGRESS NOTES
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 No evidence of tears, holes, or detachment at this time. Return to clinic with new flashes, floaters, or curtain over vision. Return to clinic if floater becomes more bothersome.     (H25.13) Senile nuclear sclerosis, bilateral  Comment: Not visually significant at this time.   Plan: Monitor.     (H52.4) Presbyopia  Plan: Monitor. Ok to continue with reading glasses as needed.         Complete documentation of historical and exam elements from today's encounter can  be found in the full encounter summary report (not reduplicated in this progress  note). I personally obtained the chief complaint(s) and history of present illness. I  confirmed and edited as necessary the review of systems, past medical/surgical  history, family history, social history, and examination findings as documented by  others; and I examined the patient myself. I personally reviewed the relevant tests,  images, and reports as documented above. I formulated and edited as necessary the  assessment and plan and discussed the findings and management plan with the  patient and family.    Robert Mcgregor, OD

## 2023-03-10 NOTE — NURSING NOTE
Chief Complaints and History of Present Illnesses   Patient presents with     COMPREHENSIVE EYE EXAM       Chief Complaint(s) and History of Present Illness(es)     COMPREHENSIVE EYE EXAM            Laterality: left eye    Associated symptoms: tearing and floaters          Comments    She is here for complete eye exam.  She complains of left eye tearing all the time. She has tried Ats sporadically.   She notes her distance vision seems fine. Wears OTC readers.  She complains of a floater on the right eye, that started about 2 weeks ago. No flashes of lights.                    JUAN MANUEL Lewis  8:13 AM 03/10/2023

## 2023-04-10 ENCOUNTER — VIRTUAL VISIT (OUTPATIENT)
Dept: SLEEP MEDICINE | Facility: CLINIC | Age: 63
End: 2023-04-10
Payer: COMMERCIAL

## 2023-04-10 ENCOUNTER — MYC MEDICAL ADVICE (OUTPATIENT)
Dept: SLEEP MEDICINE | Facility: CLINIC | Age: 63
End: 2023-04-10

## 2023-04-10 VITALS — WEIGHT: 220 LBS | HEIGHT: 66 IN | BODY MASS INDEX: 35.36 KG/M2

## 2023-04-10 DIAGNOSIS — I48.0 PAROXYSMAL ATRIAL FIBRILLATION (H): Chronic | ICD-10-CM

## 2023-04-10 DIAGNOSIS — G47.33 OSA (OBSTRUCTIVE SLEEP APNEA): Chronic | ICD-10-CM

## 2023-04-10 DIAGNOSIS — G47.33 OSA (OBSTRUCTIVE SLEEP APNEA): Primary | Chronic | ICD-10-CM

## 2023-04-10 DIAGNOSIS — F51.04 CHRONIC INSOMNIA: ICD-10-CM

## 2023-04-10 DIAGNOSIS — G25.81 RESTLESS LEGS SYNDROME (RLS): ICD-10-CM

## 2023-04-10 DIAGNOSIS — E66.01 MORBID OBESITY (H): Primary | Chronic | ICD-10-CM

## 2023-04-10 PROCEDURE — 99214 OFFICE O/P EST MOD 30 MIN: CPT | Mod: VID | Performed by: INTERNAL MEDICINE

## 2023-04-10 ASSESSMENT — SLEEP AND FATIGUE QUESTIONNAIRES
HOW LIKELY ARE YOU TO NOD OFF OR FALL ASLEEP WHILE SITTING QUIETLY AFTER LUNCH WITHOUT ALCOHOL: WOULD NEVER DOZE
HOW LIKELY ARE YOU TO NOD OFF OR FALL ASLEEP WHILE SITTING INACTIVE IN A PUBLIC PLACE: WOULD NEVER DOZE
HOW LIKELY ARE YOU TO NOD OFF OR FALL ASLEEP WHEN YOU ARE A PASSENGER IN A CAR FOR AN HOUR WITHOUT A BREAK: SLIGHT CHANCE OF DOZING
HOW LIKELY ARE YOU TO NOD OFF OR FALL ASLEEP WHILE WATCHING TV: SLIGHT CHANCE OF DOZING
HOW LIKELY ARE YOU TO NOD OFF OR FALL ASLEEP WHILE SITTING AND TALKING TO SOMEONE: SLIGHT CHANCE OF DOZING
HOW LIKELY ARE YOU TO NOD OFF OR FALL ASLEEP WHILE LYING DOWN TO REST IN THE AFTERNOON WHEN CIRCUMSTANCES PERMIT: SLIGHT CHANCE OF DOZING
HOW LIKELY ARE YOU TO NOD OFF OR FALL ASLEEP WHILE SITTING AND READING: SLIGHT CHANCE OF DOZING
HOW LIKELY ARE YOU TO NOD OFF OR FALL ASLEEP IN A CAR, WHILE STOPPED FOR A FEW MINUTES IN TRAFFIC: WOULD NEVER DOZE

## 2023-04-10 NOTE — PROGRESS NOTES
Virtual Visit Details    Type of service:  Video Visit     Originating Location (pt. Location): Home    Distant Location (provider location):  Off-site  Platform used for Video Visit: Eliz

## 2023-04-10 NOTE — NURSING NOTE
Is the patient currently in the state of [place]? YES    Visit mode:VIDEO    If the visit is dropped, the patient can be reconnected by: VIDEO VISIT: Send to e-mail at: [email or web address]    Will anyone else be joining the visit? NO      How would you like to obtain your AVS? MyChart    Are changes needed to the allergy or medication list? NO    Reason for visit: sleep study follow up

## 2023-04-10 NOTE — PROGRESS NOTES
Sleep Study Follow-Up Visit:    Date on this visit: 4/10/2023    Bessy Spangler comes in today for follow-up of her sleep study done at the [facility].      She presented with snoring, gasp arousals, no current bed partner, sleep maintenance difficulties, ?fatigue (ESS 4), crowded oropharynx, obesity. Comorbid paroxysmal atrial fibrillation  - Sleep maintenance difficulties, suspect psychophysiologic insomnia though sleep disordered breathing may play a role  - Mild-moderate restless leg syndrome. Ferritin 331     Home Sleep Apnea Testing - 6/20/22: 230 lbs: AHI 13/hr.   Oxygen [place] of 80%.  Sp02 =< 88% for 15 minutes   AHI was 17 per hour supine, 11 per hour prone, 10 per hour on left side, and 14 per hour on right side.   She slept on her back (29%), prone (12%), left (25%) and right (33%) sides.     She elected no treatment. A sleep study was done to confirm presence of mild obstructive sleep apnea       Study Date: 2/28/2023 (230 lbs)     Sleep Architecture: sleep was fragmented and poorly consolidated  The total recording time of the polysomnogram was 527.3 minutes. The total sleep time was 215.0 minutes. Sleep latency was increased at 88.4 minutes without the use of a sleep aid. REM latency was 323.0 minutes. Arousal index was increased at 62.0 arousals per hour. Sleep efficiency was decreased at 40.8%. Wake after sleep onset was 223.0 minutes. The patient spent 24.4% of total sleep time in Stage N1, 50.7% in Stage N2, 17.0% in Stage N3, and 7.9% in REM. Time in supine was 0 minutes.     Respiration: It was somewhat difficult to discern RERAs from PLMS with arousals    Events ? The polysomnogram revealed a presence of 3 obstructive, 0 central, and 0 mixed apneas resulting in an apnea index of 0.8 events per hour. There were 25 obstructive hypopneas and 0 central hypopneas resulting in an obstructive hypopnea index of 7.0 and central hypopnea index of 0 events per hour. The combined  apnea/hypopnea index was 7.8 events per hour (central apnea/hypopnea index was 0 events per hour). The REM AHI was 35.3 events per hour. The supine AHI was n/a. The RERA index was 6.4 events per hour.  The RDI was 14.2 events per hour.    Snoring - was reported as moderate to loud and intermittent.    Respiratory rate and pattern - was notable for normal respiratory rate and pattern.    Sustained Sleep Associated Hypoventilation - Transcutaneous carbon dioxide monitoring was not used     Sleep Associated Hypoxemia - (Greater than 5 minutes O2 sat at or below 88%) was present. Baseline oxygen saturation was 91.4%. Lowest oxygen saturation was 84.0%. Time spent less than or equal to 88% was 10.6 minutes. Time spent less than or equal to 89% was 53.1 minutes.     Movement Activity:     Periodic Limb Activity - There were 293 PLMs during the entire study. The PLM index was 81.8 movements per hour. The PLM Arousal Index was 42.7 per hour.    REM EMG Activity - Excessive transient/sustained muscle activity was not present.    Nocturnal Behavior - Abnormal sleep related behaviors were not noted     Bruxism - None apparent.     Cardiac Summary:   The average pulse rate was 84.4 bpm. The minimum pulse rate was 57.0 bpm while the maximum pulse rate was 141.0 bpm.  At 0050 the patient went into atrial fibrillation and remained in afib until the end of the study           4/10/2023     8:22 AM   [place] Total Score   Total score - [place] 5           Insomnia Severity Index    Difficulty falling asleep 0    Difficulty staying asleep 1    Problems waking up too early 1    How SATISFIED/DISSATISFIED are you with your CURRENT sleep pattern? 2    How NOTICEABLE to others do you think your sleep problem is in terms of impairing the quality of your life? 0    How WORRIED/DISTRESSED are you about your current sleep problem? 2    To what extent do you consider your sleep problem to INTERFERE with your daily functioning (e.g. daytime  fatigue, mood, ability to function at work/daily chores, concentration, memory, mood, etc.) CURRENTLY? 2    Total Score 8           Past medical/surgical history, family history, social history, medications and allergies were reviewed.      Current Outpatient Medications   Medication Sig Dispense Refill     levothyroxine (SYNTHROID/LEVOTHROID) 125 MCG tablet TAKE 1 TABLET BY MOUTH EVERY DAY 90 tablet 1     metoprolol tartrate (LOPRESSOR) 50 MG tablet TAKE 1 TABLET BY MOUTH TWICE A  tablet 0     mycophenolic acid (GENERIC EQUIVALENT) 180 MG EC tablet Take 3 tablets (540 mg) by mouth 2 times daily 540 tablet 0     NIFEdipine ER OSMOTIC (PROCARDIA XL) 30 MG 24 hr tablet TAKE 1 TABLET (30 MG) BY MOUTH DAILY FOR BLOOD PRESSURE 90 tablet 1     sotalol (BETAPACE) 80 MG tablet Take 0.5 tablets (40 mg) by mouth in the morning and 0.5 tablets (40 mg) in the evening. 90 tablet 3     tacrolimus (GENERIC EQUIVALENT) 0.5 MG capsule Take 1 capsule (0.5 mg) by mouth 2 times daily 180 capsule 3     tacrolimus (GENERIC EQUIVALENT) 1 MG capsule Take 3 capsules (3 mg) by mouth 2 times daily 540 capsule 0     VITAMIN D, CHOLECALCIFEROL, PO Take 1,000 Units by mouth daily       XARELTO ANTICOAGULANT 20 MG TABS tablet TAKE 1 TABLET BY MOUTH DAILY WITH DINNER 90 tablet 3         Problem List:  Patient Active Problem List    Diagnosis Date Noted     Kidney replaced by transplant 07/30/2009     Priority: High     Kidney failure as a child   Etiology of Kidney Failure: Alport's syndrome  Tx: LDKT  Transplant: 7/30/2009 (Kidney)       History of breast cancer 06/03/2002     Priority: High     Infiltrating ductal carcinoma of the left breast diagnosed March of 2002, 1.1 cm, tumor stage T1c, N0, stage 1, ER/RI positive, and HER-2 3+ positive, grade 2.   TREATMENT: Partial mastectomy and then 4 cycles of Adriamycin and cyclophosphamide in following that radiation. The patient then took tamoxifen from July 2002 until June 2005. At that  point we changed to exemestane. In May 2008 patient elected to stop therapy.        EDVORA (obstructive sleep apnea)- mild (AHI 13) 07/25/2022     Priority: Medium     Home Sleep Apnea Testing - 6/20/22: 230 lbs: AHI 13/hr. Oxygen [place] of 80%.  Sp02 =< 88% for 15 minutes.  AHI was 17 per hour supine, 11 per hour prone, 10 per hour on left side, and 14 per hour on right side. She slept on her back (29%), prone (12%), left (25%) and right (33%) sides.   2/28/2023 [place] [facility] Study (230.0 lbs) - AHI 7.8, RDI 14.2, Supine AHI n/a, REM AHI 35.3, Low O2 84.0%, Time Spent ?88% 10.6 minutes / Time Spent ?89% 53.1 minutes. PLM index was 81.8 movements per hour. PLM Arousal Index was 42.7 per hour.       History of DVT (deep vein thrombosis) and PE (pulmonary embolism) 04/25/2022     Priority: Medium     Pulmonary embolism post kidney transplant 9/4/2009  Right lower extremity DVT 6/2018       Morbid obesity (H) 02/08/2022     Priority: Medium     Paroxysmal atrial fibrillation (H) 08/29/2020     Priority: Medium     Palpitations. PAFib documented on 30 day monitor May 2020. 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/2020     Priority: Medium     Immunosuppression (H)      Priority: Medium     Will not use shingles vaccine including Shingrix       HTN, kidney transplant related      Priority: Medium     History of nonmelanoma skin cancer 01/04/2023     Priority: Low     Restless legs syndrome (RLS) 07/25/2022     Priority: Low     Chronic insomnia 04/26/2022     Priority: Low     Vitamin D deficiency 08/29/2020     Priority: Low     Aftercare following organ transplant 08/14/2017     Priority: Low     Tear film insufficiency 04/09/2014     Priority: Low     Presbyopia - Both Eyes 04/09/2014     Priority: Low     Acquired hypothyroidism 10/25/2012     Priority: Low     She had thyroid ablation, now hypothyoid       Post-menopausal 10/25/2012     Priority:  Low     CARDIOVASCULAR SCREENING; LDL GOAL LESS THAN 100 10/31/2010     Priority: Low        Impression/Plan:      Mild obstructive sleep apnea   Options discussed. Elect mandibular advancement device   - Get HSAT after dental appliance adjusted     Restless leg syndrome  Now occurring more often   - No treatment at this time      Sleep maintenance difficulties  She has started to Read the book Say Good Night To Insomnia    We reviewed stimulus control     Atrial fibrillation   She has a cardiology follow-up       I spent 20 minutes with patient including counseling, and 10 minutes with chart review, and documentation     CC: Gina Dc: Senthil Cisse

## 2023-04-11 NOTE — TELEPHONE ENCOUNTER
Patient originally selected dental device for treatment for her Mild DEVORA.  She has changed her mind and would like to proceed with CPAP instead.  Order pended for provider consideration.

## 2023-04-14 ENCOUNTER — OFFICE VISIT (OUTPATIENT)
Dept: FAMILY MEDICINE | Facility: CLINIC | Age: 63
End: 2023-04-14
Payer: COMMERCIAL

## 2023-04-14 VITALS
BODY MASS INDEX: 36.77 KG/M2 | HEART RATE: 70 BPM | DIASTOLIC BLOOD PRESSURE: 80 MMHG | RESPIRATION RATE: 18 BRPM | SYSTOLIC BLOOD PRESSURE: 128 MMHG | TEMPERATURE: 97.6 F | OXYGEN SATURATION: 95 % | WEIGHT: 228.8 LBS | HEIGHT: 66 IN

## 2023-04-14 DIAGNOSIS — Z12.4 CERVICAL CANCER SCREENING: ICD-10-CM

## 2023-04-14 DIAGNOSIS — G47.30 MILD SLEEP APNEA: ICD-10-CM

## 2023-04-14 DIAGNOSIS — E07.9 THYROID DISORDER: ICD-10-CM

## 2023-04-14 DIAGNOSIS — Z12.31 VISIT FOR SCREENING MAMMOGRAM: ICD-10-CM

## 2023-04-14 DIAGNOSIS — Z00.00 ROUTINE GENERAL MEDICAL EXAMINATION AT A HEALTH CARE FACILITY: Primary | ICD-10-CM

## 2023-04-14 DIAGNOSIS — Z12.11 SCREEN FOR COLON CANCER: ICD-10-CM

## 2023-04-14 DIAGNOSIS — I74.19: ICD-10-CM

## 2023-04-14 DIAGNOSIS — D84.9 IMMUNOSUPPRESSION (H): ICD-10-CM

## 2023-04-14 DIAGNOSIS — Z12.31 ENCOUNTER FOR SCREENING MAMMOGRAM FOR BREAST CANCER: ICD-10-CM

## 2023-04-14 DIAGNOSIS — I48.0 PAROXYSMAL ATRIAL FIBRILLATION (H): ICD-10-CM

## 2023-04-14 DIAGNOSIS — Z94.0 KIDNEY REPLACED BY TRANSPLANT: ICD-10-CM

## 2023-04-14 DIAGNOSIS — E66.01 MORBID OBESITY (H): ICD-10-CM

## 2023-04-14 DIAGNOSIS — I10 ESSENTIAL HYPERTENSION: ICD-10-CM

## 2023-04-14 LAB
ALBUMIN SERPL-MCNC: 3.9 G/DL (ref 3.4–5)
ALP SERPL-CCNC: 58 U/L (ref 40–150)
ALT SERPL W P-5'-P-CCNC: 21 U/L (ref 0–50)
ANION GAP SERPL CALCULATED.3IONS-SCNC: 3 MMOL/L (ref 3–14)
AST SERPL W P-5'-P-CCNC: 12 U/L (ref 0–45)
BASOPHILS # BLD AUTO: 0 10E3/UL (ref 0–0.2)
BASOPHILS NFR BLD AUTO: 0 %
BILIRUB SERPL-MCNC: 0.4 MG/DL (ref 0.2–1.3)
BUN SERPL-MCNC: 14 MG/DL (ref 7–30)
CALCIUM SERPL-MCNC: 8.7 MG/DL (ref 8.5–10.1)
CHLORIDE BLD-SCNC: 111 MMOL/L (ref 94–109)
CHOLEST SERPL-MCNC: 165 MG/DL
CO2 SERPL-SCNC: 26 MMOL/L (ref 20–32)
CREAT SERPL-MCNC: 0.87 MG/DL (ref 0.52–1.04)
CREAT UR-MCNC: 171 MG/DL
EOSINOPHIL # BLD AUTO: 0.1 10E3/UL (ref 0–0.7)
EOSINOPHIL NFR BLD AUTO: 1 %
ERYTHROCYTE [DISTWIDTH] IN BLOOD BY AUTOMATED COUNT: 12.8 % (ref 10–15)
FASTING STATUS PATIENT QL REPORTED: NO
GFR SERPL CREATININE-BSD FRML MDRD: 74 ML/MIN/1.73M2
GLUCOSE BLD-MCNC: 95 MG/DL (ref 70–99)
HCT VFR BLD AUTO: 35.4 % (ref 35–47)
HDLC SERPL-MCNC: 59 MG/DL
HGB BLD-MCNC: 11.7 G/DL (ref 11.7–15.7)
IMM GRANULOCYTES # BLD: 0 10E3/UL
IMM GRANULOCYTES NFR BLD: 0 %
LDLC SERPL CALC-MCNC: 71 MG/DL
LYMPHOCYTES # BLD AUTO: 1.8 10E3/UL (ref 0.8–5.3)
LYMPHOCYTES NFR BLD AUTO: 35 %
MCH RBC QN AUTO: 29.9 PG (ref 26.5–33)
MCHC RBC AUTO-ENTMCNC: 33.1 G/DL (ref 31.5–36.5)
MCV RBC AUTO: 91 FL (ref 78–100)
MICROALBUMIN UR-MCNC: 14 MG/L
MICROALBUMIN/CREAT UR: 8.19 MG/G CR (ref 0–25)
MONOCYTES # BLD AUTO: 0.4 10E3/UL (ref 0–1.3)
MONOCYTES NFR BLD AUTO: 8 %
NEUTROPHILS # BLD AUTO: 3 10E3/UL (ref 1.6–8.3)
NEUTROPHILS NFR BLD AUTO: 56 %
NONHDLC SERPL-MCNC: 106 MG/DL
PLATELET # BLD AUTO: 158 10E3/UL (ref 150–450)
POTASSIUM BLD-SCNC: 3.8 MMOL/L (ref 3.4–5.3)
PROT SERPL-MCNC: 7.1 G/DL (ref 6.8–8.8)
RBC # BLD AUTO: 3.91 10E6/UL (ref 3.8–5.2)
SODIUM SERPL-SCNC: 140 MMOL/L (ref 133–144)
T4 FREE SERPL-MCNC: 1.3 NG/DL (ref 0.76–1.46)
TRIGL SERPL-MCNC: 175 MG/DL
TSH SERPL DL<=0.005 MIU/L-ACNC: 0.96 MU/L (ref 0.4–4)
WBC # BLD AUTO: 5.3 10E3/UL (ref 4–11)

## 2023-04-14 PROCEDURE — 80050 GENERAL HEALTH PANEL: CPT | Performed by: PREVENTIVE MEDICINE

## 2023-04-14 PROCEDURE — 84439 ASSAY OF FREE THYROXINE: CPT | Performed by: PREVENTIVE MEDICINE

## 2023-04-14 PROCEDURE — 80061 LIPID PANEL: CPT | Performed by: PREVENTIVE MEDICINE

## 2023-04-14 PROCEDURE — 99214 OFFICE O/P EST MOD 30 MIN: CPT | Mod: 25 | Performed by: PREVENTIVE MEDICINE

## 2023-04-14 PROCEDURE — 82570 ASSAY OF URINE CREATININE: CPT | Performed by: PREVENTIVE MEDICINE

## 2023-04-14 PROCEDURE — 36415 COLL VENOUS BLD VENIPUNCTURE: CPT | Performed by: PREVENTIVE MEDICINE

## 2023-04-14 PROCEDURE — 99397 PER PM REEVAL EST PAT 65+ YR: CPT | Performed by: PREVENTIVE MEDICINE

## 2023-04-14 PROCEDURE — 82043 UR ALBUMIN QUANTITATIVE: CPT | Performed by: PREVENTIVE MEDICINE

## 2023-04-14 RX ORDER — LEVOTHYROXINE SODIUM 125 UG/1
125 TABLET ORAL DAILY
Qty: 90 TABLET | Refills: 3 | Status: SHIPPED | OUTPATIENT
Start: 2023-04-14 | End: 2024-04-19

## 2023-04-14 RX ORDER — SOTALOL HYDROCHLORIDE 80 MG/1
40 TABLET ORAL 2 TIMES DAILY
Qty: 90 TABLET | Refills: 3 | Status: SHIPPED | OUTPATIENT
Start: 2023-04-14 | End: 2024-04-19

## 2023-04-14 RX ORDER — METOPROLOL TARTRATE 50 MG
50 TABLET ORAL 2 TIMES DAILY
Qty: 180 TABLET | Refills: 3 | Status: SHIPPED | OUTPATIENT
Start: 2023-04-14 | End: 2023-04-18 | Stop reason: ALTCHOICE

## 2023-04-14 ASSESSMENT — ENCOUNTER SYMPTOMS
CONSTIPATION: 0
MYALGIAS: 0
PALPITATIONS: 0
FEVER: 0
WEAKNESS: 0
HEMATURIA: 0
FREQUENCY: 0
EYE PAIN: 0
JOINT SWELLING: 0
CHILLS: 0
ABDOMINAL PAIN: 0
DYSURIA: 0
COUGH: 0
DIARRHEA: 0
SHORTNESS OF BREATH: 0
SORE THROAT: 0
HEARTBURN: 0
PARESTHESIAS: 0
HEMATOCHEZIA: 0
NAUSEA: 0
BREAST MASS: 0
DIZZINESS: 0
HEADACHES: 0
ARTHRALGIAS: 0
NERVOUS/ANXIOUS: 0

## 2023-04-14 ASSESSMENT — PAIN SCALES - GENERAL: PAINLEVEL: NO PAIN (0)

## 2023-04-14 ASSESSMENT — ACTIVITIES OF DAILY LIVING (ADL): CURRENT_FUNCTION: NO ASSISTANCE NEEDED

## 2023-04-14 NOTE — RESULT ENCOUNTER NOTE
Bessy,     Basic blood count is not showing anemia or infection.  Other labs are pending.     Please do not hesitate to call us at [phone] if you have any questions or concerns.    Thank you,    Gina Dc MD MPH

## 2023-04-14 NOTE — PATIENT INSTRUCTIONS
Preventive Health Recommendations    See your health care provider every year to    Review health changes.     Discuss preventive care.      Review your medicines if your doctor has prescribed any.      You no longer need a yearly Pap test unless you've had an abnormal Pap test in the past 10 years. If you have vaginal symptoms, such as bleeding or discharge, be sure to talk with your provider about a Pap test.      Every 1 to 2 years, have a mammogram.  If you are over 69, talk with your health care provider about whether or not you want to continue having screening mammograms.      Every 10 years, have a colonoscopy. Or, have a yearly FIT test (stool test). These exams will check for colon cancer.       Have a cholesterol test every 5 years, or more often if your doctor advises it.       Have a diabetes test (fasting glucose) every three years. If you are at risk for diabetes, you should have this test more often.       At age 65, have a bone density scan (DEXA) to check for osteoporosis (brittle bone disease).    Shots:    Get a flu shot each year.    Get a tetanus shot every 10 years.    Talk to your doctor about your pneumonia vaccines. There are now two you should receive - Pneumovax (PPSV 23) and Prevnar (PCV 13).    Talk to your pharmacist about the shingles vaccine.    Talk to your doctor about the hepatitis B vaccine.    Nutrition:     Eat at least 5 servings of fruits and vegetables each day.      Eat whole-grain bread, whole-wheat pasta and brown rice instead of white grains and rice.      Get adequate about Calcium and Vitamin D.     Lifestyle    Exercise at least 150 minutes a week (30 minutes a day, 5 days a week). This will help you control your weight and prevent disease.      Limit alcohol to one drink per day.      No smoking.       Wear sunscreen to prevent skin cancer.       See your dentist twice a year for an exam and cleaning.      See your eye doctor every 1 to 2 years to screen for  conditions such as glaucoma, macular degeneration, cataracts, etc.    Personalized Prevention Plan  You are due for the preventive services outlined below.  Your care team is available to assist you in scheduling these services.  If you have already completed any of these items, please share that information with your care team to update in your medical record.    Health Maintenance Due   Topic Date Due     ANNUAL REVIEW OF HM ORDERS  Never done     Zoster (Shingles) Vaccine (1 of 2) Never done     Pneumococcal Vaccine (2 - PCV) 02/18/2006     PAP  10/11/2022     HPV Follow Up  10/11/2022     Colorectal Cancer Screening  02/09/2023     Annual Wellness Visit  03/03/2023     FALL RISK ASSESSMENT  Never done     Mammogram  02/24/2023     Patient Education   Personalized Prevention Plan  You are due for the preventive services outlined below.  Your care team is available to assist you in scheduling these services.  If you have already completed any of these items, please share that information with your care team to update in your medical record.  Health Maintenance Due   Topic Date Due     ANNUAL REVIEW OF HM ORDERS  Never done     Zoster (Shingles) Vaccine (1 of 2) Never done     Pneumococcal Vaccine (2 - PCV) 02/18/2006     PAP  10/11/2022     HPV Follow Up  10/11/2022     Colorectal Cancer Screening  02/09/2023     Annual Wellness Visit  03/03/2023     FALL RISK ASSESSMENT  Never done     Mammogram  02/24/2023

## 2023-04-14 NOTE — PROGRESS NOTES
"SUBJECTIVE:   Bessy is a 63 year old who presents for Preventive Visit.       View : No data to display.            Patient has been advised of split billing requirements and indicates understanding: Yes  Are you in the first 12 months of your Medicare coverage?  No    Healthy Habits:     In general, how would you rate your overall health?  Good    Frequency of exercise:  1 day/week    Duration of exercise:  Other    Do you usually eat at least 4 servings of fruit and vegetables a day, include whole grains    & fiber and avoid regularly eating high fat or \"junk\" foods?  No    Taking medications regularly:  Yes    Medication side effects:  Other    Ability to successfully perform activities of daily living:  No assistance needed    Home Safety:  No safety concerns identified    Hearing Impairment:  No hearing concerns    In the past 6 months, have you been bothered by leaking of urine? Yes    In general, how would you rate your overall mental or emotional health?  Good      PHQ-2 Total Score: 0    Additional concerns today:  No      Have you ever done Advance Care Planning? (For example, a Health Directive, POLST, or a discussion with a medical provider or your loved ones about your wishes): No, advance care planning information given to patient to review.  Patient declined advance care planning discussion at this time.      Fall risk  Fallen 2 or more times in the past year?: No  Any fall with injury in the past year?: No    Cognitive Screening   1) Repeat 3 items yes   2) Clock draw: NORMAL  3) 3 item recall: Recalls 3 objects  Results: 3 items recalled: COGNITIVE IMPAIRMENT LESS LIKELY    Mini-CogTM Copyright ELISE Parks. Licensed by the author for use in [facility]; reprinted with permission ([email or web address]). All rights reserved.      Do you have sleep apnea, excessive snoring or daytime drowsiness?: yes    Reviewed and updated as needed this visit by clinical staff   Tobacco  Allergies  Meds  " Problems  Med Hx  Surg Hx  Fam Hx          Reviewed and updated as needed this visit by Provider   Tobacco  Allergies  Meds  Problems  Med Hx  Surg Hx  Fam Hx         Social History     Tobacco Use     Smoking status: Former     Packs/day: 1.00     Years: 20.00     Pack years: 20.00     Types: Cigarettes     Quit date: 1998     Years since quittin.2     Smokeless tobacco: Never   Vaping Use     Vaping status: Not on file   Substance Use Topics     Alcohol use: No     Alcohol/week: 0.0 standard drinks of alcohol             2023     1:48 PM   Alcohol Use   Prescreen: >3 drinks/day or >7 drinks/week? Not Applicable     Do you have a current opioid prescription? No  Do you use any other controlled substances or medications that are not prescribed by a provider? None      Sleep:  -saw Sleep clinic on 4/10/23  -mild DEVORA, will be starting CPAP  -restless legs syndrome+    Atrial fibrillation:  -followed by Cardiology, has appointment 23  -on beta blocker (metoprolol and Sotalol) and Xarelto   -no chest pain  -no syncope  -no palpitations in the last week    Followed by transplant nephrology Dr. Marquez, due for follow up     Thyroid:  -some constipation   -fatigue+  -no energy compared to how things were     Hypertension Follow-up       Do you check your blood pressure regularly outside of the clinic? Yes     Are you following a low salt diet? Yes    Are your blood pressures ever more than 140 on the top number (systolic) OR more       than 90 on the bottom number (diastolic), for example 140/90? No    Has checked blood pressure at home 120s systolic usually     Current providers sharing in care for this patient include:   Patient Care Team:  Gina Dc MD as PCP - General (Family Medicine)  Chris Marquez MD as MD (Nephrology)  Gina Dc MD as Assigned PCP  Chris Marquez MD as Assigned Nephrology Provider  Anthony Garcia MD as MD (Dermatology)  Tennille Parker MD  as Assigned Heart and Vascular Provider  Fransisco Burgos MD as Assigned Sleep Provider  Robert Mcgregor OD as Assigned Surgical Provider    The following health maintenance items are reviewed in Epic and correct as of today:  Health Maintenance   Topic Date Due     ZOSTER IMMUNIZATION (1 of 2) Never done     Pneumococcal Vaccine: 65+ Years (2 - PCV) 02/18/2006     PAP FOLLOW-UP  10/11/2022     HPV FOLLOW-UP  10/11/2022     COLORECTAL CANCER SCREENING  02/09/2023     MAMMO SCREENING  02/24/2023     TSH W/FREE T4 REFLEX  06/06/2023     MEDICARE ANNUAL WELLNESS VISIT  04/14/2024     ANNUAL REVIEW OF HM ORDERS  04/14/2024     FALL RISK ASSESSMENT  04/14/2024     LIPID  02/08/2027     ADVANCE CARE PLANNING  04/14/2028     DEXA  11/04/2029     DTAP/TDAP/TD IMMUNIZATION (3 - Td or Tdap) 02/08/2032     HEPATITIS C SCREENING  Completed     HIV SCREENING  Completed     PHQ-2 (once per calendar year)  Completed     INFLUENZA VACCINE  Completed     COVID-19 Vaccine  Completed     IPV IMMUNIZATION  Aged Out     MENINGITIS IMMUNIZATION  Aged Out     LUNG CANCER SCREENING  Discontinued     Lab work is in process  Labs reviewed in EPIC  BP Readings from Last 3 Encounters:   04/14/23 128/80   06/20/22 (!) 144/75   06/01/22 120/63    Wt Readings from Last 3 Encounters:   04/14/23 103.8 kg (228 lb 12.8 oz)   04/10/23 99.8 kg (220 lb)   07/25/22 104.3 kg (230 lb)                  Patient Active Problem List   Diagnosis     History of breast cancer     Kidney replaced by transplant     CARDIOVASCULAR SCREENING; LDL GOAL LESS THAN 100     Immunosuppression (H)     HTN, kidney transplant related     Acquired hypothyroidism     Post-menopausal     Tear film insufficiency     Presbyopia - Both Eyes     Aftercare following organ transplant     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     Vitamin D deficiency     Paroxysmal atrial fibrillation (H)     Morbid obesity (H)     History of DVT (deep vein thrombosis) and PE (pulmonary  embolism)     Chronic insomnia     Restless legs syndrome (RLS)     DEVORA (obstructive sleep apnea)- mild (AHI 13)     History of nonmelanoma skin cancer     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)     Past Surgical History:   Procedure Laterality Date     APPENDECTOMY OPEN  1999     AV FISTULA OR GRAFT ARTERIAL      left     LUMPECTOMY BREAST  2002    left breast partial mastectomy     ZZC TRANSPLANT,PREP LIVING  RENAL GRAFT  2009    Hx of nephritis       Social History     Tobacco Use     Smoking status: Former     Packs/day: 1.00     Years: 20.00     Pack years: 20.00     Types: Cigarettes     Quit date: 1998     Years since quittin.2     Smokeless tobacco: Never   Vaping Use     Vaping status: Not on file   Substance Use Topics     Alcohol use: No     Alcohol/week: 0.0 standard drinks of alcohol     Family History   Problem Relation Age of Onset     Diabetes Father         type 2       Arthritis Father      Glaucoma Father      Cancer Mother 58        smoker,metastatic adnoid cystic cancer,  early 60s     Arthritis Mother      Endocrine Disease Mother         thyroid     Hypertension Maternal Grandmother          at a young age     Kidney Disease Maternal Grandmother      Hypertension Maternal Grandfather      Cancer Paternal Grandmother         stomach cancer     Endocrine Disease Sister         thyroid     Genitourinary Problems Daughter 32        renal failure      Endocrine Disease Sister         thyroid     Chronic Obstructive Pulmonary Disease Paternal Grandfather         smoking  young     Heart Disease Maternal Half-Brother          Current Outpatient Medications   Medication Sig Dispense Refill     levothyroxine (SYNTHROID/LEVOTHROID) 125 MCG tablet Take 1 tablet (125 mcg) by mouth daily 90 tablet 3     metoprolol tartrate (LOPRESSOR) 50 MG tablet Take 1 tablet (50 mg) by mouth 2 times daily 180 tablet 3     mycophenolic acid (GENERIC EQUIVALENT) 180 MG EC  "tablet Take 3 tablets (540 mg) by mouth 2 times daily 540 tablet 0     NIFEdipine ER OSMOTIC (PROCARDIA XL) 30 MG 24 hr tablet TAKE 1 TABLET (30 MG) BY MOUTH DAILY FOR BLOOD PRESSURE 90 tablet 1     sotalol (BETAPACE) 80 MG tablet Take 0.5 tablets (40 mg) by mouth 2 times daily 90 tablet 3     tacrolimus (GENERIC EQUIVALENT) 0.5 MG capsule Take 1 capsule (0.5 mg) by mouth 2 times daily 180 capsule 3     tacrolimus (GENERIC EQUIVALENT) 1 MG capsule Take 3 capsules (3 mg) by mouth 2 times daily 540 capsule 0     VITAMIN D, CHOLECALCIFEROL, PO Take 1,000 Units by mouth daily       XARELTO ANTICOAGULANT 20 MG TABS tablet TAKE 1 TABLET BY MOUTH DAILY WITH DINNER 90 tablet 3     Allergies   Allergen Reactions     Dilaudid [Hydromorphone Hcl]      Patient unable to recall     Hydromorphone      Patient unable to recall     Sulfa Drugs Swelling     \"swelling\" - age 3         FHS-7:       2/24/2022     9:03 AM   Breast CA Risk Assessment (FHS-7)   Did any of your first-degree relatives have breast or ovarian cancer? No   Did any of your relatives have bilateral breast cancer? No   Did any man in your family have breast cancer? No   Did any woman in your family have breast and ovarian cancer? No   Did any woman in your family have breast cancer before age 50 y? No   Do you have 2 or more relatives with breast and/or ovarian cancer? No   Do you have 2 or more relatives with breast and/or bowel cancer? No       Mammogram Screening: Recommended mammography every 1-2 years with patient discussion and risk factor consideration  Pertinent mammograms are reviewed under the imaging tab.    Review of Systems   Constitutional: Negative for chills and fever.   HENT: Negative for congestion, ear pain, hearing loss and sore throat.    Eyes: Negative for pain and visual disturbance.   Respiratory: Negative for cough and shortness of breath.    Cardiovascular: Positive for peripheral edema. Negative for chest pain and palpitations. " "  Gastrointestinal: Negative for abdominal pain, constipation, diarrhea, heartburn, hematochezia and nausea.   Breasts:  Negative for tenderness, breast mass and discharge.   Genitourinary: Negative for dysuria, frequency, genital sores, hematuria, pelvic pain, urgency, vaginal bleeding and vaginal discharge.   Musculoskeletal: Negative for arthralgias, joint swelling and myalgias.   Skin: Negative for rash.   Neurological: Negative for dizziness, weakness, headaches and paresthesias.   Psychiatric/Behavioral: Negative for mood changes. The patient is not nervous/anxious.        OBJECTIVE:   /80 (BP Location: Right arm, Patient Position: Sitting, Cuff Size: Adult Large)   Pulse 70   Temp 97.6  F (36.4  C) (Oral)   Resp 18   Ht 1.67 m (5' 5.75\")   Wt 103.8 kg (228 lb 12.8 oz)   LMP 10/25/2004 (Exact Date)   SpO2 95%   BMI 37.21 kg/m   Estimated body mass index is 37.21 kg/m  as calculated from the following:    Height as of this encounter: 1.67 m (5' 5.75\").    Weight as of this encounter: 103.8 kg (228 lb 12.8 oz).  Physical Exam  GENERAL APPEARANCE: healthy, alert and no distress  EYES: Eyes grossly normal to inspection and conjunctivae and sclerae normal  NECK: no adenopathy and trachea midline and normal to palpation  RESP: lungs clear to auscultation - no rales, rhonchi or wheezes  CV: regular rates and rhythm, normal S1 S2, no S3 or S4 and no murmur, click or rub  ABDOMEN: soft, non-tender and no rebound or guarding   MS: extremities normal- no gross deformities noted and peripheral pulses normal  SKIN: no suspicious lesions or rashes  NEURO: Normal strength and tone, mentation intact and speech normal  PSYCH: mentation appears normal      Diagnostic Test Results:  Labs reviewed in Epic  Results for orders placed or performed in visit on 04/14/23 (from the past 24 hour(s))   CBC with Platelets & Differential    Narrative    The following orders were created for panel order CBC with Platelets & " Differential.  Procedure                               Abnormality         Status                     ---------                               -----------         ------                     CBC with platelets and d...[[id number]]                      Final result                 Please view results for these tests on the individual orders.   CBC with platelets and differential   Result Value Ref Range    WBC Count 5.3 4.0 - 11.0 10e3/uL    RBC Count 3.91 3.80 - 5.20 10e6/uL    Hemoglobin 11.7 11.7 - 15.7 g/dL    Hematocrit 35.4 35.0 - 47.0 %    MCV 91 78 - 100 fL    MCH 29.9 26.5 - 33.0 pg    MCHC 33.1 31.5 - 36.5 g/dL    RDW 12.8 10.0 - 15.0 %    Platelet Count 158 150 - 450 10e3/uL    % Neutrophils 56 %    % Lymphocytes 35 %    % Monocytes 8 %    % Eosinophils 1 %    % Basophils 0 %    % Immature Granulocytes 0 %    Absolute Neutrophils 3.0 1.6 - 8.3 10e3/uL    Absolute Lymphocytes 1.8 0.8 - 5.3 10e3/uL    Absolute Monocytes 0.4 0.0 - 1.3 10e3/uL    Absolute Eosinophils 0.1 0.0 - 0.7 10e3/uL    Absolute Basophils 0.0 0.0 - 0.2 10e3/uL    Absolute Immature Granulocytes 0.0 <=0.4 10e3/uL       ASSESSMENT / PLAN:   Bessy was seen today for physical and medication request.    Diagnoses and all orders for this visit:    Routine general medical examination at a health care facility  -    Preventive guidelines reviewed  -will review vaccines with Transplant team   -     Lipid panel reflex to direct LDL Non-fasting    Morbid obesity (H)  Wt Readings from Last 2 Encounters:   04/14/23 103.8 kg (228 lb 12.8 oz)   04/10/23 99.8 kg (220 lb)     Paroxysmal atrial fibrillation (H)  -     On beta blockers and Xarelto   -has upcoming follow up with Cardiology   -     sotalol (BETAPACE) 80 MG tablet; Take 0.5 tablets (40 mg) by mouth 2 times daily  -     CBC with Platelets & Differential  -     Comprehensive metabolic panel    Essential hypertension  -     Albumin Random Urine Quantitative with Creat Ratio; Future  -      "metoprolol tartrate (LOPRESSOR) 50 MG tablet; Take 1 tablet (50 mg) by mouth 2 times daily  -     Albumin Random Urine Quantitative with Creat Ratio  -at goal  -continue current medications     Thyroid disorder  -     Refills on medication provided   -     levothyroxine (SYNTHROID/LEVOTHROID) 125 MCG tablet; Take 1 tablet (125 mcg) by mouth daily  -     TSH  -     T4 free    Mild sleep apnea  -will be starting CPAP    Kidney replaced by transplant  -due for follow up    Cervical cancer screening  -screening guidelines stop at age 65 years    Screen for colon cancer  -     Fecal colorectal cancer screen FIT - Future (S+30)    Visit for screening mammogram  -     MA SCREENING DIGITAL BILAT - Future  (s+30); Future    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    Weight as of this encounter: 103.8 kg (228 lb 12.8 oz).   Weight management plan: Discussed healthy diet and exercise guidelines      She reports that she quit smoking about 25 years ago. Her smoking use included cigarettes. She has a 20.00 pack-year smoking history. She has never used smokeless tobacco.      Appropriate preventive services were discussed with this patient, including applicable screening as appropriate for cardiovascular disease, diabetes, osteopenia/osteoporosis, and glaucoma.  As appropriate for age/gender, discussed screening for colorectal cancer, prostate cancer, breast " cancer, and cervical cancer. Checklist reviewing preventive services available has been given to the patient.    Reviewed patients plan of care and provided an AVS. The Basic Care Plan (routine screening as documented in Health Maintenance) for Bessy meets the Care Plan requirement. This Care Plan has been established and reviewed with the Patient.          Gina Dc MD MPH    [facility]    Identified Health Risks:    I have reviewed Opioid Use Disorder and Substance Use Disorder risk factors and made any needed referrals.

## 2023-04-18 ENCOUNTER — OFFICE VISIT (OUTPATIENT)
Dept: CARDIOLOGY | Facility: CLINIC | Age: 63
End: 2023-04-18
Payer: COMMERCIAL

## 2023-04-18 VITALS
WEIGHT: 227.4 LBS | OXYGEN SATURATION: 96 % | HEART RATE: 65 BPM | DIASTOLIC BLOOD PRESSURE: 69 MMHG | SYSTOLIC BLOOD PRESSURE: 135 MMHG | BODY MASS INDEX: 36.99 KG/M2

## 2023-04-18 DIAGNOSIS — I48.0 PAROXYSMAL ATRIAL FIBRILLATION (H): ICD-10-CM

## 2023-04-18 DIAGNOSIS — G47.33 OSA (OBSTRUCTIVE SLEEP APNEA): Chronic | ICD-10-CM

## 2023-04-18 DIAGNOSIS — R09.89 BRUIT OF RIGHT CAROTID ARTERY: ICD-10-CM

## 2023-04-18 DIAGNOSIS — I10 BENIGN ESSENTIAL HYPERTENSION: Primary | ICD-10-CM

## 2023-04-18 PROCEDURE — 93000 ELECTROCARDIOGRAM COMPLETE: CPT | Performed by: INTERNAL MEDICINE

## 2023-04-18 PROCEDURE — 99215 OFFICE O/P EST HI 40 MIN: CPT | Performed by: INTERNAL MEDICINE

## 2023-04-18 RX ORDER — CARVEDILOL 12.5 MG/1
12.5 TABLET ORAL 2 TIMES DAILY WITH MEALS
Qty: 60 TABLET | Refills: 3 | Status: SHIPPED | OUTPATIENT
Start: 2023-04-18 | End: 2023-05-05

## 2023-04-18 NOTE — PATIENT INSTRUCTIONS
Take your medicines every day, as directed     Changes made today:  Continue sotalol   Stop metoprolol and nifedipine   Start carvedilol 12.5 twice a day  Check blood pressure once a day  Carotid ultrasound  CPAP  Return 1 year    Cardiology Care Coordinators:      Amanda CASTELLANOS RN     Cardiology Rooming staff:  Salo VERONICA    Phone  [phone]      Fax [phone]    To Contact us     During Business Hours:  [phone]     If you are needing refills please contact your pharmacy.     For urgent after hour care please call the [facility] at [phone] or the [facility] at [phone] and ask to speak to the cardiologist on call.            HOW TO CHECK YOUR BLOOD PRESSURE AT HOME:     Avoid eating, smoking, and exercising for at least 30 minutes before taking a reading.     Be sure you have taken your BP medication at least 2-3 hours before you check it.      Sit quietly for 10 minutes before a reading.      Sit in a chair with your feet flat on the floor. Rest your  arm on a table so that the arm cuff is at the same level as your heart.     Remain still during the reading.  Record your blood pressure and pulse in a log and bring to your next appointment.       Use [email or web address] allows you to communicate directly with your heart team through secure messaging.  [employer] can be accessed any time on your phone, computer, or tablet.  If you need assistance, we'd be happy to help!             Keep your Heart Appointments:

## 2023-04-18 NOTE — RESULT ENCOUNTER NOTE
Bessy,     Electrolytes, glucose, kidney function and liver function tests are normal.  LDL cholesterol is at goal for you.  Urine sample is not showing any abnormal protein.  Thyroid function is in a normal range.  Plan of care and follow up as discussed in clinic.     Please do not hesitate to call us at [phone] if you have any questions or concerns.    Thank you,    Gina Dc MD MPH

## 2023-04-18 NOTE — PROGRESS NOTES
Bessy Spangler's goals for this visit include: Has sleep apnea. No specific cardiac goals    She requests these members of her care team be copied on today's visit information: PCP    PCP: Gina Dc    Referring Provider:  No referring provider defined for this encounter.    /69 (BP Location: Left arm, Patient Position: Sitting, Cuff Size: Adult Regular)   Pulse 65   Wt 103.1 kg (227 lb 6.4 oz)   LMP 10/25/2004 (Exact Date)   SpO2 96%   BMI 36.99 kg/m      Do you need any medication refills at today's visit? No.    Salo Amato, EMT  Clinic Support  [facility]    [phone]    Employed by [facility] [employer]    I am delighted to see Bessy Spangler at the [place] cardiology clinic for follow up of atrial fibrillation.     The patient is a 63 year old  female with palpitations starting November 2019, HRs 130s. Paroxysmal AFib documented on 30 day monitor May 2020, started on sotalol 40 bid. She had episodes 1-2 times per week before starting sotalol, which decreased to 1-2 times per month on sotalol. She saw Dr. Russell Cisse 4/19/2021 who ordered a patch monitor. The monitor showed AF burden 5%, with longest duration 6 hours starting at 3 am, average HR in AF was 110 bpm.  I met her 4/12/2022. We discussed options and she decided to continue sotalol at 40 bid. She did a sleep study on 6/20/2022 and was found to have mild DEVORA, CPAP recommended. She declined CPAP. Repeat sleep study 3/2/2023 showing several hypoxic episodes TO 84%. She went into AF around midnight and remained in AF until end of study. She was again recommended to have CPAP machine which she is now considering.    She works from home. She is raising her 15 yo grandson (whose mother was her daughter who passed away a few years ago). She also lives with her son and his family. She admits that home can be chaotic, she had not been exercising over the winter, and she did have palpitations like  "her prior AF episodes about 1-2 times a week and would often wake up her at night. 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 Since she's been out of the house, moving around, she has not had any palpitations.     Past Medical History:  1. PAF, no cardioversions, on sotalol 40 bid since May 2020  2. Breast CA s/p left lumpectomy  3. CKD s/p kidney transplant 2009  4. Hypertension  5. Hypothyroidism  5. DVT/Pulmonary embolism post kidney transplant 2009  6. RLE DVT June 2018, started Xarelto  7. DEVORA, has not received CPAP yet  8. COVID+, fevers    Allergies:    Allergies   Allergen Reactions     Dilaudid [Hydromorphone Hcl]      Patient unable to recall     Hydromorphone      Patient unable to recall     Sulfa Drugs Swelling     \"swelling\" - age 3         Medications:   Sotalol 40 bid  Rivaroxaban 20 every day  Metoprolol tartrate 50 bid  Nifedipine ER 30 every day    Tacrolimus  Mycophenolate  Levothyroxine 125 mg every day    Physical examination  Vitals: /69 (BP Location: Left arm, Patient Position: Sitting, Cuff Size: Adult Regular)   Pulse 65   Wt 103.1 kg (227 lb 6.4 oz)   LMP 10/25/2004 (Exact Date)   SpO2 96%   BMI 36.99 kg/m    BMI= Body mass index is 36.99 kg/m .    Constitutional: In general, the patient is a pleasant female in no acute distress.    Targeted cardiovascular exam:  Regular rate and rhythm. Normal S1, S2. No murmur, rub, click, or gallop. Right carotid bruit.   Extremities:Pulses are normal bilaterally throughout. No peripheral edema. Left forearm AV fistula.  Respiratory: Clear to asculation.      I have personally and independently reviewed the following:  Labs:  4/14/2023: K 3.8, cr 0.87, chol 165, HDL 59, LDL 71, , TSH 0.96, hgb 11.7, plt 158K    EKG:   TODAY 4/18/2023: sinus 59 bpm, QTc 437 ms  4/12/2022: sinus 64 bpm, normal intervals, QTc 430 ms     Patch monitor 4/19-4/29/2021: PAF, burden 5%, average 110 " bpm, longest episode 6 hrs started at 3am    Assessment :  1. Atrial fibrillation, paroxysmal. She is minimally symptomatic, did not want to increase sotalol any more. Symptoms appear to have improved with a change in routine of going outside her house, going to office, dietary discretion. Discussed lifestyle modification, encouraged her to get CPAP. 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  2. Hypertension. I would be more aggressive with BP management as it can help in AF reduction. She has jake on metoprolol tartrate and nifedipine low dose for a few years, discussed with her options of changing to one medication such as carvedilol which is a better BP medication and can simply her regimen. She is agreeable.  3. Right carotid bruit. I had not heard this before. Will get carotid dopplers.  4. DEVORA, new diagnosis, encouraged to follow up with getting CPAP  5. S/p kidney transplant, she has follow up appointment with Dr. Marquez in September        Plan:  Continue sotalol 40 bid, EKG annually to follow QTc  Stop metoprolol and nifedipine  Begin carvedilol 12.5 bid  She will send BP readings to me in 2 weeks  Carotid ultrasound  Return 1 year          I spent a total of 20 minutes face to face with  Bessy Spangler during today's office visit. I have spent an additional 20 minutes today on chart review and documentation.      The patient is to return  as above. The patient understood the treatment plan as outlined above.  There were no barriers to learning.      Tennille Parker MD

## 2023-04-24 ENCOUNTER — ANCILLARY PROCEDURE (OUTPATIENT)
Dept: ULTRASOUND IMAGING | Facility: CLINIC | Age: 63
End: 2023-04-24
Attending: INTERNAL MEDICINE
Payer: COMMERCIAL

## 2023-04-24 DIAGNOSIS — R09.89 BRUIT OF RIGHT CAROTID ARTERY: ICD-10-CM

## 2023-04-24 PROCEDURE — 93880 EXTRACRANIAL BILAT STUDY: CPT | Performed by: RADIOLOGY

## 2023-05-03 ENCOUNTER — APPOINTMENT (OUTPATIENT)
Dept: SLEEP MEDICINE | Facility: CLINIC | Age: 63
End: 2023-05-03
Payer: COMMERCIAL

## 2023-05-03 ENCOUNTER — DOCUMENTATION ONLY (OUTPATIENT)
Dept: SLEEP MEDICINE | Facility: CLINIC | Age: 63
End: 2023-05-03

## 2023-05-03 DIAGNOSIS — G47.33 OSA (OBSTRUCTIVE SLEEP APNEA): Chronic | ICD-10-CM

## 2023-05-03 DIAGNOSIS — F51.04 CHRONIC INSOMNIA: ICD-10-CM

## 2023-05-03 DIAGNOSIS — G25.81 RESTLESS LEGS SYNDROME (RLS): ICD-10-CM

## 2023-05-03 DIAGNOSIS — E66.01 MORBID OBESITY (H): Primary | Chronic | ICD-10-CM

## 2023-05-03 NOTE — PROGRESS NOTES
Patient was offered choice of vendor and chose [facility].  Patient Bessy Spangler was set up at [facility]  on May 3, 2023. Patient received a Resmed Airsense 10. Pressures were set at 5-15 cm H2O.   Patient s ramp is 5 cm H2O for auto and FLEX/EPR is EPR, 2.  Patient received a [employer] mask name: Dreamwear nasal cushion fitpack (I also had a gel Dreamwear pillow sized small demo I gave patient to try in comparison to decide which she likes better), heated tubing and heated humidifier.  Patient needs both sleep follow up appointment and usage for insurance compliance. She has sleep follow up appointment on 8/31/2023 with Dr. Burgos.   Vivien Manzano

## 2023-05-08 ENCOUNTER — DOCUMENTATION ONLY (OUTPATIENT)
Dept: SLEEP MEDICINE | Facility: CLINIC | Age: 63
End: 2023-05-08
Payer: COMMERCIAL

## 2023-05-08 DIAGNOSIS — G47.33 OSA (OBSTRUCTIVE SLEEP APNEA): Chronic | ICD-10-CM

## 2023-05-08 DIAGNOSIS — G25.81 RESTLESS LEGS SYNDROME (RLS): ICD-10-CM

## 2023-05-08 DIAGNOSIS — F51.04 CHRONIC INSOMNIA: ICD-10-CM

## 2023-05-08 DIAGNOSIS — E66.01 MORBID OBESITY (H): Primary | Chronic | ICD-10-CM

## 2023-05-08 NOTE — PROGRESS NOTES
3 day Sleep therapy management telephone visit    Diagnostic AHI:  13.6 HST    Confirmed with patient at time of call- N/A Patient is still interested in STM service       Left message to call back.          Objective data     Order Settings for PAP  CPAP min     CPAP max              Device settings from machine CPAP min 5.0     CPAP max 15.0           EPR Setting TWO    RESMED soft response  OFF     Assessment: Nightly usage over four hours      Action plan: Patient to have 14 day STM visit. Patient has a follow up visit scheduled:   yes within 31-90 days of set up    Replacement device: No  STM ordered by provider: Yes     Total time spent on accessing and  interpreting remote patient PAP therapy data  10 minutes    Total time spent counseling, coaching  and reviewing PAP therapy data with patient  1 minutes    [id number] no

## 2023-05-15 DIAGNOSIS — I10 BENIGN ESSENTIAL HYPERTENSION: ICD-10-CM

## 2023-05-15 RX ORDER — CARVEDILOL 12.5 MG/1
TABLET ORAL
Qty: 60 TABLET | Refills: 3 | OUTPATIENT
Start: 2023-05-15

## 2023-05-19 ENCOUNTER — DOCUMENTATION ONLY (OUTPATIENT)
Dept: SLEEP MEDICINE | Facility: CLINIC | Age: 63
End: 2023-05-19
Payer: COMMERCIAL

## 2023-05-19 DIAGNOSIS — E66.01 MORBID OBESITY (H): Primary | Chronic | ICD-10-CM

## 2023-05-19 DIAGNOSIS — G25.81 RESTLESS LEGS SYNDROME (RLS): ICD-10-CM

## 2023-05-19 DIAGNOSIS — G47.33 OSA (OBSTRUCTIVE SLEEP APNEA): Chronic | ICD-10-CM

## 2023-05-19 DIAGNOSIS — F51.04 CHRONIC INSOMNIA: ICD-10-CM

## 2023-05-19 NOTE — PROGRESS NOTES
14  DAY STM VISIT    Diagnostic AHI:  13.6 PSG    Message left for patient to return call     Assessment: Pt meeting objective benchmarks.      Action plan: waiting for patient to return call.  and pt to have 30 day STM visit.      Device type: Auto-CPAP    PAP settings:  CPAP MIN CPAP MAX 95TH % PRESSURE EPR RESMED SOFT RESPONSE SETTING   5.0 cm  H20 15.0 cm  H20 13.1 cm  H20  TWO OFF     Mask type:  Per patient choice    Objective measures: 14 day rolling measures   COMPLIANCE LEAK AHI AVERAGE USE IN MINUTES   100 % 10.8 0.59 483   GOAL >70% GOAL < 24 LPM GOAL <5 GOAL >240      Patient has the following upcoming sleep appts:  Future Sleep Appointments       Provider Department    8/31/2023 8:30 AM (Arrive by 8:15 AM) Fransisco Burgos MD [facility] [place]          Total time spent on accessing and interpreting remote patient PAP therapy data  10 minutes    Total time spent counseling, coaching  and reviewing PAP therapy data with patient  1 minute    [id number]  [id number]  no (3 day STM)

## 2023-06-02 ENCOUNTER — HEALTH MAINTENANCE LETTER (OUTPATIENT)
Age: 63
End: 2023-06-02

## 2023-06-16 ENCOUNTER — TELEPHONE (OUTPATIENT)
Dept: TRANSPLANT | Facility: CLINIC | Age: 63
End: 2023-06-16

## 2023-06-16 ENCOUNTER — LAB (OUTPATIENT)
Dept: LAB | Facility: CLINIC | Age: 63
End: 2023-06-16
Payer: COMMERCIAL

## 2023-06-16 DIAGNOSIS — Z94.0 KIDNEY TRANSPLANTED: ICD-10-CM

## 2023-06-16 DIAGNOSIS — Z48.298 AFTERCARE FOLLOWING ORGAN TRANSPLANT: ICD-10-CM

## 2023-06-16 DIAGNOSIS — Z79.899 IMMUNOSUPPRESSIVE MANAGEMENT ENCOUNTER FOLLOWING KIDNEY TRANSPLANT: ICD-10-CM

## 2023-06-16 DIAGNOSIS — Z94.0 IMMUNOSUPPRESSIVE MANAGEMENT ENCOUNTER FOLLOWING KIDNEY TRANSPLANT: ICD-10-CM

## 2023-06-16 LAB
ALBUMIN MFR UR ELPH: 6.1 MG/DL
ANION GAP SERPL CALCULATED.3IONS-SCNC: 11 MMOL/L (ref 7–15)
BUN SERPL-MCNC: 17.4 MG/DL (ref 8–23)
CALCIUM SERPL-MCNC: 9.6 MG/DL (ref 8.8–10.2)
CHLORIDE SERPL-SCNC: 106 MMOL/L (ref 98–107)
CREAT SERPL-MCNC: 0.87 MG/DL (ref 0.51–0.95)
CREAT UR-MCNC: 102 MG/DL
DEPRECATED HCO3 PLAS-SCNC: 22 MMOL/L (ref 22–29)
ERYTHROCYTE [DISTWIDTH] IN BLOOD BY AUTOMATED COUNT: 12.8 % (ref 10–15)
GFR SERPL CREATININE-BSD FRML MDRD: 74 ML/MIN/1.73M2
GLUCOSE SERPL-MCNC: 110 MG/DL (ref 70–99)
HCT VFR BLD AUTO: 33.9 % (ref 35–47)
HGB BLD-MCNC: 11 G/DL (ref 11.7–15.7)
MCH RBC QN AUTO: 29.8 PG (ref 26.5–33)
MCHC RBC AUTO-ENTMCNC: 32.4 G/DL (ref 31.5–36.5)
MCV RBC AUTO: 92 FL (ref 78–100)
PLATELET # BLD AUTO: 131 10E3/UL (ref 150–450)
POTASSIUM SERPL-SCNC: 4.9 MMOL/L (ref 3.4–5.3)
PROT/CREAT 24H UR: 0.06 MG/MG CR (ref 0–0.2)
RBC # BLD AUTO: 3.69 10E6/UL (ref 3.8–5.2)
SODIUM SERPL-SCNC: 139 MMOL/L (ref 136–145)
TACROLIMUS BLD-MCNC: 6.8 UG/L (ref 5–15)
TME LAST DOSE: NORMAL H
TME LAST DOSE: NORMAL H
WBC # BLD AUTO: 4.9 10E3/UL (ref 4–11)

## 2023-06-16 PROCEDURE — 80197 ASSAY OF TACROLIMUS: CPT

## 2023-06-16 PROCEDURE — 85027 COMPLETE CBC AUTOMATED: CPT

## 2023-06-16 PROCEDURE — 84156 ASSAY OF PROTEIN URINE: CPT

## 2023-06-16 PROCEDURE — 36415 COLL VENOUS BLD VENIPUNCTURE: CPT

## 2023-06-16 PROCEDURE — 80048 BASIC METABOLIC PNL TOTAL CA: CPT

## 2023-06-16 NOTE — TELEPHONE ENCOUNTER
"Called back Bessy QUEEN Aníbal  Reports swelling on BLE  Noticed after antihypertensive meds were changed by Cards (Dr. Parker).  Metoprolol was discontinued and was started on Carvedilol.  Was told, Carvedilol does not cause swelling and should contact Transplant Provider.  Reports SOB with activity. No longer walks around the block like she used to.  States she's had some residual symptoms from COVID but reports \"this is 1000 times worse\"  RNCC recommended going to ED for difficulty breathing, worsened SOB.  Lab results are pending. RNCC will send message to Dr. Marquez.      Message sent to Dr. Marquez.          "

## 2023-06-16 NOTE — TELEPHONE ENCOUNTER
Bessy has some concerns regarding swelling in her leg and feet. Patient cardiologist had changed her carvedilol to 12.5mg so patient reached out to the Cardiologist and was told it's not caused by the med change. But could be her Kidney patient had labs drawn today

## 2023-06-19 ENCOUNTER — TELEPHONE (OUTPATIENT)
Dept: TRANSPLANT | Facility: CLINIC | Age: 63
End: 2023-06-19
Payer: COMMERCIAL

## 2023-06-19 NOTE — TELEPHONE ENCOUNTER
"Chris Marquez MD [place], Teresa, RN  ED is fine, but she should also follow up with PCP or primary Nephrologist for this.     Johnathan       ----- Message -----   From: Sofia Rose RN   Sent: 6/19/2023   8:45 AM CDT   To: Chris Marquez MD   Subject: BLE,SOB                                           Dr. Marquez,     Kidney Txp #1: 7/30/2009 (13y 10m)     Reports swelling on BLE   Noticed after antihypertensive meds were changed by Cards (Dr. Parker).   Metoprolol was discontinued and was started on Carvedilol.   Was told, Carvedilol does not cause swelling and should contact Transplant Provider.   Reports SOB with activity. No longer walks around the block like she used to.   States she's had some residual symptoms from COVID but reports \"this is 1000 times worse\"   RNCC recommended going to ED for difficulty breathing, worsened SOB.     What do you recommend?     Thanks,   Wilian      OUTCOME:  Discussed Dr. Marquez's recommendations.  States he will reach out to her PCP.  She is [place] the cabin.  She feels fine when she is not doing anything.  "

## 2023-09-11 ENCOUNTER — OFFICE VISIT (OUTPATIENT)
Dept: TRANSPLANT | Facility: CLINIC | Age: 63
End: 2023-09-11
Attending: INTERNAL MEDICINE
Payer: COMMERCIAL

## 2023-09-11 ENCOUNTER — PATIENT OUTREACH (OUTPATIENT)
Dept: NEPHROLOGY | Facility: CLINIC | Age: 63
End: 2023-09-11
Payer: COMMERCIAL

## 2023-09-11 VITALS
OXYGEN SATURATION: 96 % | SYSTOLIC BLOOD PRESSURE: 176 MMHG | TEMPERATURE: 98.5 F | HEART RATE: 69 BPM | BODY MASS INDEX: 36.71 KG/M2 | WEIGHT: 225.7 LBS | DIASTOLIC BLOOD PRESSURE: 80 MMHG

## 2023-09-11 DIAGNOSIS — I15.1 HTN, KIDNEY TRANSPLANT RELATED: ICD-10-CM

## 2023-09-11 DIAGNOSIS — R60.9 EDEMA, UNSPECIFIED TYPE: ICD-10-CM

## 2023-09-11 DIAGNOSIS — Z94.0 KIDNEY REPLACED BY TRANSPLANT: Chronic | ICD-10-CM

## 2023-09-11 DIAGNOSIS — D84.9 IMMUNOSUPPRESSED STATUS (H): ICD-10-CM

## 2023-09-11 DIAGNOSIS — N18.1 CKD (CHRONIC KIDNEY DISEASE) STAGE 1, GFR 90 ML/MIN OR GREATER: ICD-10-CM

## 2023-09-11 DIAGNOSIS — I48.0 PAROXYSMAL ATRIAL FIBRILLATION (H): Primary | ICD-10-CM

## 2023-09-11 DIAGNOSIS — E55.9 VITAMIN D DEFICIENCY: ICD-10-CM

## 2023-09-11 DIAGNOSIS — Z94.0 HTN, KIDNEY TRANSPLANT RELATED: ICD-10-CM

## 2023-09-11 DIAGNOSIS — N18.2 CKD (CHRONIC KIDNEY DISEASE) STAGE 2, GFR 60-89 ML/MIN: ICD-10-CM

## 2023-09-11 DIAGNOSIS — Z94.0 KIDNEY TRANSPLANTED: ICD-10-CM

## 2023-09-11 DIAGNOSIS — Z48.298 AFTERCARE FOLLOWING ORGAN TRANSPLANT: ICD-10-CM

## 2023-09-11 PROCEDURE — 99214 OFFICE O/P EST MOD 30 MIN: CPT | Performed by: INTERNAL MEDICINE

## 2023-09-11 PROCEDURE — 99213 OFFICE O/P EST LOW 20 MIN: CPT | Performed by: INTERNAL MEDICINE

## 2023-09-11 RX ORDER — FUROSEMIDE 20 MG
20 TABLET ORAL DAILY
Qty: 90 TABLET | Refills: 3 | Status: SHIPPED | OUTPATIENT
Start: 2023-09-11 | End: 2023-10-04 | Stop reason: ALTCHOICE

## 2023-09-11 ASSESSMENT — PAIN SCALES - GENERAL: PAINLEVEL: NO PAIN (0)

## 2023-09-11 NOTE — PATIENT INSTRUCTIONS
Patient Recommendations:  - Start furosemide 20 mg daily.  - Check with PCP about need for colon screening testing.  - Will order cardiac echo.  - Will refer you to General Nephrology at [facility] for a visit in 4-6 months or so to establish care with a goal of ongoing co-management between your primary Nephrologist and the Transplant Team.     Transplant Patient Information  Your Post Transplant Coordinator is: Wilian Rose  For non urgent items, we encourage you to contact your coordinator/care team online via [employer]  You and your care team can also contact your transplant coordinator Monday - Friday, 8am - 5pm at [phone] (Option 2 to reach the coordinator or Option 4 to schedule an appointment).  After hours for urgent matters, please call [facility] at [phone].

## 2023-09-11 NOTE — LETTER
9/11/2023         RE: Bessy Spangler  [address] [place] [address]        Dear Colleague,    Thank you for referring your patient, Bessy Spangler, to the [facility]. Please see a copy of my visit note below.    TRANSPLANT NEPHROLOGY CHRONIC POST TRANSPLANT VISIT    Assessment & Plan  # LDKT: Stable   - Baseline Creatinine:  ~ 0.7-0.9   - Proteinuria: Normal (<0.2 grams)   - Date DSA Last Checked: Oct/2009      Latest DSA: Not checked recently due to time from transplant   - BK Viremia: Not checked recently due to time from transplant   - Kidney Tx Biopsy: No    # Immunosuppression: Tacrolimus immediate release (goal 4-6) and Mycophenolic acid (dose 540 mg every 12 hours)   - Continue with intensive monitoring of immunosuppression for efficacy and toxicity.   - Changes: No    # Infection Prophylaxis:   Last CD4 Level: 290 (Aug/2011)  - PJP: None    # Hypertension: Borderline control;  Goal BP: < 130/80   - Changes: Yes - With some edema and shortness of breath, will start bumetanide 1 mg daily   - Recommend low salt diet.    # Anemia in Chronic Renal Disease: Hgb: Trend down      BLANCA: No   - Iron studies: Not checked recently   - Patient is overdue for colonoscopy and with trend down in hemoglobin, would recommend updating that.   - If patient is iron deficient, would also consider EGD.    # Mineral Bone Disorder:   - Vitamin D; level: Not checked recently        On supplement: Yes  - Calcium; level: Normal        On supplement: No    # Electrolytes:   - Potassium; level: Normal        On supplement: No  - Bicarbonate; level: Normal        On supplement: No    # Paroxysmal Atrial Fibrillation: No recent episodes on sotalol and beta blocker.  Patient remains on chronic anticoagulation with rivaroxaban.     # H/o DVT and h/o PE: Remains on chronic anticoagulation with rivaroxiban.    # Obesity, Class II (BMI = 36.3): Stable weight.   - Recommend weight loss for overall  "health by increasing exercise and watching caloric intake.    # DEVORA on CPAP: Patient is compliant.    # Fatigue: Patient reports increased symptoms, as well as some shortness of breath and increased edema.  Cannot rule out cardiac issues.  Would also rule out some viral infections with immunosuppressed status.   - Recommend obtaining cardiac echo.   - Would also check CMV, EBV and parvovirus PCR.    # Skin Cancer: New lesions: none   - Discussed sun protection and recommend regular follow up with Dermatology.    # Medical Compliance: Yes    # Health Maintenance and Vaccination Review: Not Reviewed    # Transplant History:  Etiology of Kidney Failure: Alport's syndrome  Tx: LDKT  Transplant: 7/30/2009 (Kidney)  Significant changes in immunosuppression: None  Significant transplant-related complications: None    Transplant Office Phone Number: [phone]    Assessment and plan was discussed with the patient and she voiced her understanding and agreement.    Return visit: Return in about 1 year (around 9/11/2024).    Chris Marquez MD    Chief Complaint  Ms. Spangler is a 63 year old here for kidney transplant and immunosuppression management.    History of Present Illness   Ms. Spangler reports feeling okay overall with some medical complaints.  Since last clinic visit, patient reports no hospitalizations or new medical complaints and has been doing well overall.  Her energy level has decreased some over time, especially after getting COVID, and is not normal.  She is active, although really gets minimal exercise.  Patient reports some general arthritic pain, as well as some soreness at her left wrist where her AV fistula is located.  Denies any chest pain, but some increase in shortness of breath with exertion.  No palpitations.  Increased leg swelling, which she associates with carvedilol, but mostly worse at the end of the day.    Appetite is \"too good\" although weight is stable.  No nausea, vomiting or " "diarrhea.  Rare heartburn symptoms, mostly with eating spicy foods.  No fever, sweats or chills.  No night sweats.    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    Vitamin D deficiency    Paroxysmal atrial fibrillation (H)    Morbid obesity (H)    History of DVT (deep vein thrombosis) and PE (pulmonary embolism)    Chronic insomnia    Restless legs syndrome (RLS)    DEVORA (obstructive sleep apnea)- mild (AHI 13)    History of nonmelanoma skin cancer    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)    CKD (chronic kidney disease) stage 2, GFR 60-89 ml/min       Allergies  Allergies   Allergen Reactions    Dilaudid [Hydromorphone Hcl]      Patient unable to recall    Hydromorphone      Patient unable to recall    Sulfa Antibiotics Swelling     \"swelling\" - age 3         Medications  Current Outpatient Medications   Medication Sig    carvedilol (COREG) 12.5 MG tablet Take 1.5 tablets (18.75 mg) by mouth 2 times daily (with meals)    levothyroxine (SYNTHROID/LEVOTHROID) 125 MCG tablet Take 1 tablet (125 mcg) by mouth daily    mycophenolic acid (GENERIC EQUIVALENT) 180 MG EC tablet Take 3 tablets (540 mg) by mouth 2 times daily    sotalol (BETAPACE) 80 MG tablet Take 0.5 tablets (40 mg) by mouth 2 times daily    VITAMIN D, CHOLECALCIFEROL, PO Take 1,000 Units by mouth daily    XARELTO ANTICOAGULANT 20 MG TABS tablet TAKE 1 TABLET BY MOUTH DAILY WITH DINNER    bumetanide (BUMEX) 0.5 MG tablet Take 2 tablets (1 mg) by mouth daily    tacrolimus (GENERIC) 0.5 MG capsule Take 1 capsule (0.5 mg) by mouth 2 times daily hold    " tacrolimus (GENERIC) 1 MG capsule Take 3 capsules (3 mg) by mouth 2 times daily     No current facility-administered medications for this visit.     There are no discontinued medications.    Physical Exam  Vital Signs: BP (!) 176/80   Pulse 69   Temp 98.5  F (36.9  C) (Oral)   Wt 102.4 kg (225 lb 11.2 oz)   LMP 10/25/2004 (Exact Date)   SpO2 96%   BMI 36.71 kg/m      GENERAL APPEARANCE: alert and no distress  HENT: mouth without ulcers or lesions  RESP: lungs clear to auscultation - no rales, rhonchi or wheezes  CV: regular rhythm, normal rate, no rub, no murmur  EDEMA: trace to 1+ LE edema bilaterally  ABDOMEN: soft, nondistended, nontender, bowel sounds normal, obese  MS: extremities normal - no gross deformities noted, no evidence of inflammation in joints, no muscle tenderness  SKIN: no rash  TX KIDNEY: normal  DIALYSIS ACCESS:  LUE AV fistula with good thrill    Data        Latest Ref Rng & Units 10/2/2023     8:07 AM 6/16/2023     7:53 AM 4/14/2023     2:46 PM   Renal   Sodium 135 - 145 mmol/L 140  139  140    K 3.4 - 5.3 mmol/L 4.2  4.9  3.8    Cl 98 - 107 mmol/L 105  106  111    Cl (external) 98 - 107 mmol/L 105  106  111    CO2 22 - 29 mmol/L 23  22  26    Urea Nitrogen 7 - 30 mg/dL   14    Urea Nitrogen 8.0 - 23.0 mg/dL 21.4  17.4     Creatinine 0.51 - 0.95 mg/dL 0.98  0.87  0.87    Glucose 70 - 99 mg/dL 109  110  95    Calcium 8.8 - 10.2 mg/dL 9.4  9.6  8.7          Latest Ref Rng & Units 6/6/2022     8:03 AM 2/8/2022     8:14 AM 9/12/2019     7:53 AM   Bone Health   Vit D Def 20 - 75 ug/L 29  31  29          Latest Ref Rng & Units 10/2/2023     8:07 AM 6/16/2023     7:53 AM 4/14/2023     2:46 PM   Heme   WBC 4.0 - 11.0 10e3/uL 5.5  4.9  5.3    Hgb 11.7 - 15.7 g/dL 10.8  11.0  11.7    Plt 150 - 450 10e3/uL 147  131  158          Latest Ref Rng & Units 4/14/2023     2:46 PM 6/6/2022     7:59 AM 8/25/2011     7:30 AM   Liver   AP 40 - 150 U/L 58  51  66    TBili 0.2 - 1.3 mg/dL 0.4  0.5  0.2    ALT 0  - 50 U/L 21  20  10    AST 0 - 45 U/L 12  9  21    Tot Protein 6.8 - 8.8 g/dL 7.1  7.3  7.2    Albumin 3.4 - 5.0 g/dL 3.9  4.0  4.3          Latest Ref Rng & Units 2/8/2022     8:14 AM 11/27/2020     7:56 AM 7/29/2009     7:59 AM   Pancreas   A1C 0.0 - 5.6 % 5.5  5.0  5.0          Latest Ref Rng & Units 6/6/2022     7:59 AM 8/26/2014     7:37 AM 6/20/2012     7:00 AM   Iron studies   Iron 35 - 180 ug/dL 109  76  71    Iron Saturation Index 15 - 46 % 33  27  23    Ferritin 8 - 252 ng/mL 331  365  344          Latest Ref Rng & Units 2/8/2022     8:14 AM 3/4/2013     2:04 PM 1/23/2012     7:35 AM   [facility] Txp Virology   CVM DNA Quant   Whole blood, EDTA anticoagulant     CMV Quant <100 Copies/mL  <100  No CMV DNA detected.     CMV QT Log <2.0 Log copies/mL  <2.0  The Cytomegalovirus DNA Quantitation assay is a real-time polymerase chain   reaction (PCR) utilizing analyte specific reagents manufactured by [employer]   [employer]. Analyte Specific Reagents (ASRs) are used in many laboratory   tests necessary for standard medical care and generally do not require FDA   approval.   This test was developed and its performance characteristics determined by   [facility].  It has not been   cleared or approved by the US Food and Drug Administration.     CMV QUANT IU/ML Not Detected IU/mL Not Detected      BK Spec    Plasma    BK Res <1000 copies/mL   <1000    BK Log <3.0 Log copies/mL   <3.0  The lower limit of detection for this assay is 1000 copies/mL.  Real-time TaqMan   PCR was performed using BK primers and probe for the detection of a 90 bp   portion of the  1 gene.  The performance characteristics were validated by   the [facility].   It has not been cleared or approved by the U.S. Food and Drug Administration.        Recent Labs   Lab Test 03/02/23  0759 06/16/23  0753 10/02/23  0807   DOSTAC 3/1/2023 6/15/2023 10/1/2023   TACROL 7.0 6.8  7.5     Recent Labs   Lab Test 09/12/19  0754   DOSMPA 9/11/19 2000   MPACID 2.59   MPAG 33.6         Again, thank you for allowing me to participate in the care of your patient.        Sincerely,        Chris Marquez MD

## 2023-09-11 NOTE — NURSING NOTE
Chief Complaint   Patient presents with    RECHECK       BP (!) 176/80   Pulse 69   Temp 98.5  F (36.9  C) (Oral)   Wt 102.4 kg (225 lb 11.2 oz)   LMP 10/25/2004 (Exact Date)   SpO2 96%   BMI 36.71 kg/m      Robbin Timmons on 9/11/2023 at 4:28 PM

## 2023-09-11 NOTE — LETTER
9/11/2023      RE: Bessy Spangler  [id number] [place] [address]       TRANSPLANT NEPHROLOGY CHRONIC POST TRANSPLANT VISIT    Assessment & Plan  # LDKT: Stable   - Baseline Creatinine:  ~ 0.7-0.9   - Proteinuria: Normal (<0.2 grams)   - Date DSA Last Checked: Oct/2009      Latest DSA: Not checked recently due to time from transplant   - BK Viremia: Not checked recently due to time from transplant   - Kidney Tx Biopsy: No    # Immunosuppression: Tacrolimus immediate release (goal 4-6) and Mycophenolic acid (dose 540 mg every 12 hours)   - Continue with intensive monitoring of immunosuppression for efficacy and toxicity.   - Changes: No    # Infection Prophylaxis:   Last CD4 Level: 290 (Aug/2011)  - PJP: None    # Hypertension: Borderline control;  Goal BP: < 130/80   - Changes: Yes - With some edema and shortness of breath, will start bumetanide 1 mg daily   - Recommend low salt diet.    # Anemia in Chronic Renal Disease: Hgb: Trend down      BLANCA: No   - Iron studies: Not checked recently   - Patient is overdue for colonoscopy and with trend down in hemoglobin, would recommend updating that.   - If patient is iron deficient, would also consider EGD.    # Mineral Bone Disorder:   - Vitamin D; level: Not checked recently        On supplement: Yes  - Calcium; level: Normal        On supplement: No    # Electrolytes:   - Potassium; level: Normal        On supplement: No  - Bicarbonate; level: Normal        On supplement: No    # Paroxysmal Atrial Fibrillation: No recent episodes on sotalol and beta blocker.  Patient remains on chronic anticoagulation with rivaroxaban.     # H/o DVT and h/o PE: Remains on chronic anticoagulation with rivaroxiban.    # Obesity, Class II (BMI = 36.3): Stable weight.   - Recommend weight loss for overall health by increasing exercise and watching caloric intake.    # DEVORA on CPAP: Patient is compliant.    # Fatigue: Patient reports increased symptoms, as well as some  "shortness of breath and increased edema.  Cannot rule out cardiac issues.  Would also rule out some viral infections with immunosuppressed status.   - Recommend obtaining cardiac echo.   - Would also check CMV, EBV and parvovirus PCR.    # Skin Cancer: New lesions: none   - Discussed sun protection and recommend regular follow up with Dermatology.    # Medical Compliance: Yes    # Health Maintenance and Vaccination Review: Not Reviewed    # Transplant History:  Etiology of Kidney Failure: Alport's syndrome  Tx: LDKT  Transplant: 7/30/2009 (Kidney)  Significant changes in immunosuppression: None  Significant transplant-related complications: None    Transplant Office Phone Number: [phone]    Assessment and plan was discussed with the patient and she voiced her understanding and agreement.    Return visit: Return in about 1 year (around 9/11/2024).    Chris Marquez MD    Chief Complaint  Ms. Spangler is a 63 year old here for kidney transplant and immunosuppression management.    History of Present Illness   Ms. Spangler reports feeling okay overall with some medical complaints.  Since last clinic visit, patient reports no hospitalizations or new medical complaints and has been doing well overall.  Her energy level has decreased some over time, especially after getting COVID, and is not normal.  She is active, although really gets minimal exercise.  Patient reports some general arthritic pain, as well as some soreness at her left wrist where her AV fistula is located.  Denies any chest pain, but some increase in shortness of breath with exertion.  No palpitations.  Increased leg swelling, which she associates with carvedilol, but mostly worse at the end of the day.    Appetite is \"too good\" although weight is stable.  No nausea, vomiting or diarrhea.  Rare heartburn symptoms, mostly with eating spicy foods.  No fever, sweats or chills.  No night sweats.    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     Vitamin D deficiency     Paroxysmal atrial fibrillation (H)     Morbid obesity (H)     History of DVT (deep vein thrombosis) and PE (pulmonary embolism)     Chronic insomnia     Restless legs syndrome (RLS)     DEVORA (obstructive sleep apnea)- mild (AHI 13)     History of nonmelanoma skin cancer     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)     CKD (chronic kidney disease) stage 2, GFR 60-89 ml/min       Allergies  Allergies   Allergen Reactions     Dilaudid [Hydromorphone Hcl]      Patient unable to recall     Hydromorphone      Patient unable to recall     Sulfa Antibiotics Swelling     \"swelling\" - age 3         Medications  Current Outpatient Medications   Medication Sig     carvedilol (COREG) 12.5 MG tablet Take 1.5 tablets (18.75 mg) by mouth 2 times daily (with meals)     levothyroxine (SYNTHROID/LEVOTHROID) 125 MCG tablet Take 1 tablet (125 mcg) by mouth daily     mycophenolic acid (GENERIC EQUIVALENT) 180 MG EC tablet Take 3 tablets (540 mg) by mouth 2 times daily     sotalol (BETAPACE) 80 MG tablet Take 0.5 tablets (40 mg) by mouth 2 times daily     VITAMIN D, CHOLECALCIFEROL, PO Take 1,000 Units by mouth daily     XARELTO ANTICOAGULANT 20 MG TABS tablet TAKE 1 TABLET BY MOUTH DAILY WITH DINNER     bumetanide (BUMEX) 0.5 MG tablet Take 2 tablets (1 mg) by mouth daily     tacrolimus (GENERIC) 0.5 MG capsule Take 1 capsule (0.5 mg) by mouth 2 times daily hold     tacrolimus (GENERIC) 1 MG capsule Take 3 capsules (3 mg) by mouth 2 times daily     No current facility-administered medications for this " visit.     There are no discontinued medications.    Physical Exam  Vital Signs: BP (!) 176/80   Pulse 69   Temp 98.5  F (36.9  C) (Oral)   Wt 102.4 kg (225 lb 11.2 oz)   LMP 10/25/2004 (Exact Date)   SpO2 96%   BMI 36.71 kg/m      GENERAL APPEARANCE: alert and no distress  HENT: mouth without ulcers or lesions  RESP: lungs clear to auscultation - no rales, rhonchi or wheezes  CV: regular rhythm, normal rate, no rub, no murmur  EDEMA: trace to 1+ LE edema bilaterally  ABDOMEN: soft, nondistended, nontender, bowel sounds normal, obese  MS: extremities normal - no gross deformities noted, no evidence of inflammation in joints, no muscle tenderness  SKIN: no rash  TX KIDNEY: normal  DIALYSIS ACCESS:  LUE AV fistula with good thrill    Data        Latest Ref Rng & Units 10/2/2023     8:07 AM 6/16/2023     7:53 AM 4/14/2023     2:46 PM   Renal   Sodium 135 - 145 mmol/L 140  139  140    K 3.4 - 5.3 mmol/L 4.2  4.9  3.8    Cl 98 - 107 mmol/L 105  106  111    Cl (external) 98 - 107 mmol/L 105  106  111    CO2 22 - 29 mmol/L 23  22  26    Urea Nitrogen 7 - 30 mg/dL   14    Urea Nitrogen 8.0 - 23.0 mg/dL 21.4  17.4     Creatinine 0.51 - 0.95 mg/dL 0.98  0.87  0.87    Glucose 70 - 99 mg/dL 109  110  95    Calcium 8.8 - 10.2 mg/dL 9.4  9.6  8.7          Latest Ref Rng & Units 6/6/2022     8:03 AM 2/8/2022     8:14 AM 9/12/2019     7:53 AM   Bone Health   Vit D Def 20 - 75 ug/L 29  31  29          Latest Ref Rng & Units 10/2/2023     8:07 AM 6/16/2023     7:53 AM 4/14/2023     2:46 PM   Heme   WBC 4.0 - 11.0 10e3/uL 5.5  4.9  5.3    Hgb 11.7 - 15.7 g/dL 10.8  11.0  11.7    Plt 150 - 450 10e3/uL 147  131  158          Latest Ref Rng & Units 4/14/2023     2:46 PM 6/6/2022     7:59 AM 8/25/2011     7:30 AM   Liver   AP 40 - 150 U/L 58  51  66    TBili 0.2 - 1.3 mg/dL 0.4  0.5  0.2    ALT 0 - 50 U/L 21  20  10    AST 0 - 45 U/L 12  9  21    Tot Protein 6.8 - 8.8 g/dL 7.1  7.3  7.2    Albumin 3.4 - 5.0 g/dL 3.9  4.0  4.3           Latest Ref Rng & Units 2/8/2022     8:14 AM 11/27/2020     7:56 AM 7/29/2009     7:59 AM   Pancreas   A1C 0.0 - 5.6 % 5.5  5.0  5.0          Latest Ref Rng & Units 6/6/2022     7:59 AM 8/26/2014     7:37 AM 6/20/2012     7:00 AM   Iron studies   Iron 35 - 180 ug/dL 109  76  71    Iron Saturation Index 15 - 46 % 33  27  23    Ferritin 8 - 252 ng/mL 331  365  344          Latest Ref Rng & Units 2/8/2022     8:14 AM 3/4/2013     2:04 PM 1/23/2012     7:35 AM   [facility]   CVM DNA Quant   Whole blood, EDTA anticoagulant     CMV Quant <100 Copies/mL  <100  No CMV DNA detected.     CMV QT Log <2.0 Log copies/mL  <2.0  The Cytomegalovirus DNA Quantitation assay is a real-time polymerase chain   reaction (PCR) utilizing analyte specific reagents manufactured by [employer]   [employer]. Analyte Specific Reagents (ASRs) are used in many laboratory   tests necessary for standard medical care and generally do not require FDA   approval.   This test was developed and its performance characteristics determined by   [facility].  It has not been   cleared or approved by the US Food and Drug Administration.     CMV QUANT IU/ML Not Detected IU/mL Not Detected      BK Spec    Plasma    BK Res <1000 copies/mL   <1000    BK Log <3.0 Log copies/mL   <3.0  The lower limit of detection for this assay is 1000 copies/mL.  Real-time TaqMan   PCR was performed using BK primers and probe for the detection of a 90 bp   portion of the  1 gene.  The performance characteristics were validated by   the [facility].   It has not been cleared or approved by the U.S. Food and Drug Administration.        Recent Labs   Lab Test 03/02/23  0759 06/16/23  0753 10/02/23  0807   DOSTAC 3/1/2023 6/15/2023 10/1/2023   TACROL 7.0 6.8 7.5     Recent Labs   Lab Test 09/12/19  0754   DOSMPA 9/11/19 2000   MPACID 2.59   MPAG 33.6       Chris Marquez MD

## 2023-09-11 NOTE — PROGRESS NOTES
Pt referred by Dr. Marquez for assessment of LUE radial cephalic AVF that is reported as painful.  Dr. Marquez would like pt to be seen for assessment for possible ligation/ excision consult. Pt reported to Dr. Marquez that she experiences more pain with activities.  Per Dr. Marquez, AVF is ok to ligate s/p txp.    Neph Tracking Flowsheet Last Filled Values       Final Modality Transplant   kidney txp 7/30/09    Access Surgeon Referral Status  Referred  ligation consult    Dialysis Access Referral 09/13/23  Dr. Villaseñor    Diaylsis Access Type AV Fistula    Dialysis Access Site LLA  radiocephalic    Dialysis Access Surgery --  [facility], pre txp in 2009    Dialysis Access Surgeon OS          Kidney Transplant - 7/30/2009  (#1)  Coordinator:  Sofia Rose    Request sent to schedule pt for fistula consult to discuss AVF ligation surgery with Dr. Villaseñor.    muzu tv message sent to pt introducing writer and updating with plan.  Pt to expect a call to schedule appointment with Dr. Villaseñor.    TASHI RED RN on 9/13/2023 at 3:01 PM  Dialysis Access Care Coordinator  Phone: [phone]  Pool: P_Dialysis_Access_Nurse

## 2023-09-12 DIAGNOSIS — R60.9 EDEMA, UNSPECIFIED TYPE: ICD-10-CM

## 2023-09-12 DIAGNOSIS — I15.1 HTN, KIDNEY TRANSPLANT RELATED: ICD-10-CM

## 2023-09-12 DIAGNOSIS — Z94.0 HTN, KIDNEY TRANSPLANT RELATED: ICD-10-CM

## 2023-09-15 ENCOUNTER — TELEPHONE (OUTPATIENT)
Dept: TRANSPLANT | Facility: CLINIC | Age: 63
End: 2023-09-15
Payer: COMMERCIAL

## 2023-09-18 ENCOUNTER — VIRTUAL VISIT (OUTPATIENT)
Dept: SLEEP MEDICINE | Facility: CLINIC | Age: 63
End: 2023-09-18
Payer: COMMERCIAL

## 2023-09-18 VITALS — BODY MASS INDEX: 36.16 KG/M2 | HEIGHT: 66 IN | WEIGHT: 225 LBS

## 2023-09-18 DIAGNOSIS — G47.33 OSA (OBSTRUCTIVE SLEEP APNEA): Primary | Chronic | ICD-10-CM

## 2023-09-18 PROCEDURE — 99212 OFFICE O/P EST SF 10 MIN: CPT | Mod: VID

## 2023-09-18 ASSESSMENT — SLEEP AND FATIGUE QUESTIONNAIRES
HOW LIKELY ARE YOU TO NOD OFF OR FALL ASLEEP WHILE SITTING INACTIVE IN A PUBLIC PLACE: WOULD NEVER DOZE
HOW LIKELY ARE YOU TO NOD OFF OR FALL ASLEEP WHILE SITTING AND TALKING TO SOMEONE: WOULD NEVER DOZE
HOW LIKELY ARE YOU TO NOD OFF OR FALL ASLEEP WHILE SITTING AND READING: SLIGHT CHANCE OF DOZING
HOW LIKELY ARE YOU TO NOD OFF OR FALL ASLEEP WHEN YOU ARE A PASSENGER IN A CAR FOR AN HOUR WITHOUT A BREAK: SLIGHT CHANCE OF DOZING
HOW LIKELY ARE YOU TO NOD OFF OR FALL ASLEEP WHILE SITTING QUIETLY AFTER LUNCH WITHOUT ALCOHOL: WOULD NEVER DOZE
HOW LIKELY ARE YOU TO NOD OFF OR FALL ASLEEP WHILE WATCHING TV: SLIGHT CHANCE OF DOZING
HOW LIKELY ARE YOU TO NOD OFF OR FALL ASLEEP IN A CAR, WHILE STOPPED FOR A FEW MINUTES IN TRAFFIC: WOULD NEVER DOZE
HOW LIKELY ARE YOU TO NOD OFF OR FALL ASLEEP WHILE LYING DOWN TO REST IN THE AFTERNOON WHEN CIRCUMSTANCES PERMIT: MODERATE CHANCE OF DOZING

## 2023-09-18 ASSESSMENT — PAIN SCALES - GENERAL: PAINLEVEL: NO PAIN (0)

## 2023-09-18 NOTE — NURSING NOTE
Is the patient currently in the state of [place]? YES    Visit mode:VIDEO    If the visit is dropped, the patient can be reconnected by: VIDEO VISIT: Send to e-mail at: [email or web address]    Will anyone else be joining the visit? NO  (If patient encounters technical issues they should call [phone])    How would you like to obtain your AVS? MyChart    Are changes needed to the allergy or medication list? Pt stated no changes to allergies and Pt stated no med changes    Reason for visit: RECHECK  Has patient had flu shot for current/most recent flu season? If so, when? No    Val HEATON

## 2023-09-18 NOTE — PROGRESS NOTES
Virtual Visit Details    Type of service: Video Visit   Start time: 12:00 PM  End time: 12:14 PM  Originating Location (pt. Location): Home    Distant Location (provider location): On-site  Platform used for Video Visit: [facility]    Sleep Apnea - Follow-up Visit:    Impression/Plan:  (G47.33) DEVORA (obstructive sleep apnea)- mild (AHI 13) (primary encounter diagnosis)  Comment: Bessy presents to the sleep clinic for follow-up of her mild sleep apnea managed with CPAP. She is here to document compliance with her machine received on 05/03/2023. CPAP is going well. Her only complaint is waking with a very dry mouth. Her mouth is so dry it feels glued shut in the morning. She is feeling more rested with CPAP and noticing less episodes of atrial fibrillation. She is tolerating the CPAP pressures. Her leak is acceptable, and her residual AHI is normal at 0.7 events/hr. She used CPAP 30/30 days and 100% of the days >4 hours.   Plan: Continue auto-PAP 5-15 cm H2O. A prescription was written for new supplies. We reviewed recommendations for cleaning and replacing supplies. I increased her humidity level today. Encouraged her to increase the humidity more if needed. Recommended over the counter Biotene or XyliMelts to help relieve dryness. If her mouth were open in the night, she could try a chinstrap or full face mask. She does not think her mouth is open.     Bessy Spangler will follow up in about 1 year(s).     Total time spent reviewing medical records, history and physical examination, review of previous testing and interpretation as well as documentation on this date: 19 minutes    CC: Gina Dc MD    History of Present Illness:  Chief Complaint   Patient presents with    RECHECK     Bessy Spangler presents for follow-up of her mild sleep apnea, managed with CPAP. She was last seen by Dr. Burgos on 04/10/2023. She is here to document compliance with her machine received on 05/03/2023. Things are going well with  CPAP. Her only complaint is waking with a very dry mouth. She says her mouth is so dry she cannot open it in the morning. She feels more rested with CPAP and is noticing less episodes of atrial fibrillation.     Ferritin 331 6/6/2022    Home Sleep Apnea Testing - 06/20/2022: 230 lbs: AHI 13/hr. Oxygen [place] of 80%.  Sp02 =< 88% for 15 minutes.  AHI was 17 per hour supine, 11 per hour prone, 10 per hour on left side, and 14 per hour on right side. She slept on her back (29%), prone (12%), left (25%) and right (33%) sides.   2/28/2023 [facility] Sleep Study (230.0 lbs) - AHI 7.8, RDI 14.2, Supine AHI n/a, REM AHI 35.3, Low O2 84.0%, Time Spent ?88% 10.6 minutes / Time Spent ?89% 53.1 minutes. PLM index was 81.8 movements per hour. PLM Arousal Index was 42.7 per hour.     Do you use a CPAP Machine at home: Yes  Overall, on a scale of 0-10 how would you rate your CPAP (0 poor, 10 great): 9    What type of mask do you use: Nasal Pillow Dreamwear nasal cushion fitpack   Is your mask comfortable: Yes  If not, why:      Is your mask leaking: No  If yes, where do you feel it:    How many night per week does the mask leak (0-7):      Do you notice snoring with mask on: No  Do you notice gasping arousals with mask on: No  Are you having significant oral or nasal dryness: Yes, she wakes with a very dry mouth in the morning.   Is the pressure setting comfortable: Yes  If not, why:      She uses the water chamber.    What is your typical bedtime: 8pm or later  How long does it take you to go to sleep on PAP therapy: 10 - 15 minutes  What time do you typically get out of bed for the day: 5:30 AM  How many hours on average per night are you using PAP therapy: 8-9 hours  How many hours are you sleeping per night: 8-9  Do you feel well rested in the morning: Yes  Wakes 1-2 times per night to use the bathroom.     She occasionally notices restless legs.     ResMed AirSense 10  Auto-PAP 5.0 - 15.0 cmH2O 30 day usage data:  08/12/2023-09/10/2023  100% of days with > 4 hours of use. 0/30 days with no use.   Average use 8 hours 0 minutes per day.   95%ile Leak 7.3 L/min.   CPAP 95% pressure 13 cm.   AHI 0.63 events per hour.     EPWORTH SLEEPINESS SCALE         9/18/2023    11:56 AM    Breeden Sleepiness Scale ( CHAZ Farfan  8790-0421<br>ESS - [place]/English - Final version - 21 Nov 07 - [facility] Research [place].)   Sitting and reading Slight chance of dozing   Watching TV Slight chance of dozing   Sitting, inactive in a public place (e.g. a theatre or a meeting) Would never doze   As a passenger in a car for an hour without a break Slight chance of dozing   Lying down to rest in the afternoon when circumstances permit Moderate chance of dozing   Sitting and talking to someone Would never doze   Sitting quietly after a lunch without alcohol Would never doze   In a car, while stopped for a few minutes in traffic Would never doze   Breeden Score (MC) 5   Breeden Score (Sleep) 5       INSOMNIA SEVERITY INDEX (BILLY)          9/18/2023    11:49 AM   Insomnia Severity Index (BILLY)   Difficulty falling asleep 0   Difficulty staying asleep 0   Problems waking up too early 1   How SATISFIED/DISSATISFIED are you with your CURRENT sleep pattern? 1   How NOTICEABLE to others do you think your sleep problem is in terms of impairing the quality of your life? 0   How WORRIED/DISTRESSED are you about your current sleep problem? 0   To what extent do you consider your sleep problem to INTERFERE with your daily functioning (e.g. daytime fatigue, mood, ability to function at work/daily chores, concentration, memory, mood, etc.) CURRENTLY? 0   BILLY Total Score 2       Guidelines for Scoring/Interpretation:  Total score categories:  0-7 = No clinically significant insomnia   8-14 = Subthreshold insomnia   15-21 = Clinical insomnia (moderate severity)  22-28 = Clinical insomnia (severe)  Used via courtesy of www.[place].va.gov with permission from Seb Clinton  PhD., [facility]      Past medical/surgical history, family history, social history, medications and allergies were reviewed.        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 Oxygen [place] of 80%.  Sp02 =< 88% for 15 minutes.  AHI was 17 per hour supine, 11 per hour prone, 10 per hour on left side, and 14 per hour on right side. She slept on her back (29%), prone (12%), left (25%) and right (33%) sides.   2/28/2023 [facility] Sleep Study (230.0 lbs) - AHI 7.8, RDI 14.2, Supine AHI n/a, REM AHI 35.3, Low O2 84.0%, Time Spent ?88% 10.6 minutes / Time Spent ?89% 53.1 minutes. PLM index was 81.8 movements per hour. PLM Arousal Index was 42.7 per hour.      Chronic insomnia 04/26/2022     Priority: Medium    History of DVT (deep vein thrombosis) and PE (pulmonary embolism) 04/25/2022     Priority: Medium     Pulmonary embolism post kidney transplant 9/4/2009  Right lower extremity DVT 6/2018      Morbid obesity (H) 02/08/2022     Priority: Medium    Vitamin D deficiency 08/29/2020     Priority: Medium    Paroxysmal atrial fibrillation (H) 08/29/2020     Priority: Medium     Palpitations. PAFib documented on 30 day monitor May 2020. 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 08/27/2020     Priority: Medium    Aftercare following organ transplant 08/14/2017     Priority: Medium    Tear film insufficiency 04/09/2014     Priority: Medium    Presbyopia - Both Eyes 04/09/2014     Priority: Medium    Acquired hypothyroidism 10/25/2012     Priority: Medium     She had thyroid ablation, now hypothyoid      Post-menopausal 10/25/2012     Priority: Medium    Immunosuppression (H)      Priority: Medium     Will not use  "shingles vaccine including Shingrix      HTN, kidney transplant related      Priority: Medium    CARDIOVASCULAR SCREENING; LDL GOAL LESS THAN 100 10/31/2010     Priority: Medium    Kidney replaced by transplant 07/30/2009     Priority: Medium     Kidney failure as a child   Etiology of Kidney Failure: Alport's syndrome  Tx: LDKT  Transplant: 7/30/2009 (Kidney)      History of breast cancer 06/03/2002     Priority: Medium     Infiltrating ductal carcinoma of the left breast diagnosed March of 2002, 1.1 cm, tumor stage T1c, N0, stage 1, ER/OR positive, and HER-2 3+ positive, grade 2.   TREATMENT: Partial mastectomy and then 4 cycles of Adriamycin and cyclophosphamide in following that radiation. The patient then took tamoxifen from July 2002 until June 2005. At that point we changed to exemestane. In May 2008 patient elected to stop therapy.       History of nonmelanoma skin cancer 01/04/2023     Priority: Low    Restless legs syndrome (RLS) 07/25/2022     Priority: Low        Ht 1.676 m (5' 6\")   Wt 102.1 kg (225 lb)   LMP 10/25/2004 (Exact Date)   BMI 36.32 kg/m      Jerome Wilson, APRN CNP 9/18/2023  "

## 2023-09-18 NOTE — PATIENT INSTRUCTIONS
Tips to reduce dry mouth:     Increase your humidity level. Your humidity level can range from 0-8. Today I increased your humidity from level 4 to 5. If this does not seem to make a difference, you can increase it more.   You can try over the counter Biotene to help with dry mouth. Biotene is available in rinses, gels, sprays, and lozenges. XyliMelts are another option to relieve dryness.    If your mouth were open while you were sleeping you could try a chinstrap which is available through [facility] with your CPAP supply prescription or a full face mask.

## 2023-09-19 ENCOUNTER — TELEPHONE (OUTPATIENT)
Dept: TRANSPLANT | Facility: CLINIC | Age: 63
End: 2023-09-19
Payer: COMMERCIAL

## 2023-09-19 NOTE — TELEPHONE ENCOUNTER
Patient Contacted for the patient to call back and schedule the following:    Appointment type: FISTULA CONSULT  Provider: CHRISTINE  Return date: WITHIN 1 MONTH  Specialty phone number: [phone]  Additional appointment(s) needed: NA  Additonal Notes: Pt declined to schedule at this time and was given scheduling phone number if she would like to schedule in the future. Disregard note below.

## 2023-09-21 ENCOUNTER — PATIENT OUTREACH (OUTPATIENT)
Dept: NEPHROLOGY | Facility: CLINIC | Age: 63
End: 2023-09-21
Payer: COMMERCIAL

## 2023-09-21 ENCOUNTER — TELEPHONE (OUTPATIENT)
Dept: TRANSPLANT | Facility: CLINIC | Age: 63
End: 2023-09-21
Payer: COMMERCIAL

## 2023-09-21 NOTE — TELEPHONE ENCOUNTER
Received message that patient has declined to schedule a fistula ligation consult.    Removed Dialysis Access Care Coordinators from care team. Referral ended.    Val Roberts RN  Dialysis Access Care Coordinator  Phone: [phone]  Pool: Dialysis Access Nurse ([phone])

## 2023-10-02 ENCOUNTER — LAB (OUTPATIENT)
Dept: LAB | Facility: CLINIC | Age: 63
End: 2023-10-02
Payer: COMMERCIAL

## 2023-10-02 DIAGNOSIS — Z94.0 IMMUNOSUPPRESSIVE MANAGEMENT ENCOUNTER FOLLOWING KIDNEY TRANSPLANT: ICD-10-CM

## 2023-10-02 DIAGNOSIS — Z94.0 KIDNEY TRANSPLANTED: ICD-10-CM

## 2023-10-02 DIAGNOSIS — Z48.298 AFTERCARE FOLLOWING ORGAN TRANSPLANT: ICD-10-CM

## 2023-10-02 DIAGNOSIS — Z79.899 IMMUNOSUPPRESSIVE MANAGEMENT ENCOUNTER FOLLOWING KIDNEY TRANSPLANT: ICD-10-CM

## 2023-10-02 LAB
ANION GAP SERPL CALCULATED.3IONS-SCNC: 12 MMOL/L (ref 7–15)
BUN SERPL-MCNC: 21.4 MG/DL (ref 8–23)
CALCIUM SERPL-MCNC: 9.4 MG/DL (ref 8.8–10.2)
CHLORIDE SERPL-SCNC: 105 MMOL/L (ref 98–107)
CREAT SERPL-MCNC: 0.98 MG/DL (ref 0.51–0.95)
DEPRECATED HCO3 PLAS-SCNC: 23 MMOL/L (ref 22–29)
EGFRCR SERPLBLD CKD-EPI 2021: 64 ML/MIN/1.73M2
ERYTHROCYTE [DISTWIDTH] IN BLOOD BY AUTOMATED COUNT: 13 % (ref 10–15)
GLUCOSE SERPL-MCNC: 109 MG/DL (ref 70–99)
HCT VFR BLD AUTO: 32.6 % (ref 35–47)
HGB BLD-MCNC: 10.8 G/DL (ref 11.7–15.7)
MCH RBC QN AUTO: 29.4 PG (ref 26.5–33)
MCHC RBC AUTO-ENTMCNC: 33.1 G/DL (ref 31.5–36.5)
MCV RBC AUTO: 89 FL (ref 78–100)
PLATELET # BLD AUTO: 147 10E3/UL (ref 150–450)
POTASSIUM SERPL-SCNC: 4.2 MMOL/L (ref 3.4–5.3)
RBC # BLD AUTO: 3.67 10E6/UL (ref 3.8–5.2)
SODIUM SERPL-SCNC: 140 MMOL/L (ref 135–145)
TACROLIMUS BLD-MCNC: 7.5 UG/L (ref 5–15)
TME LAST DOSE: NORMAL H
TME LAST DOSE: NORMAL H
WBC # BLD AUTO: 5.5 10E3/UL (ref 4–11)

## 2023-10-02 PROCEDURE — 80197 ASSAY OF TACROLIMUS: CPT

## 2023-10-02 PROCEDURE — 80048 BASIC METABOLIC PNL TOTAL CA: CPT

## 2023-10-02 PROCEDURE — 85027 COMPLETE CBC AUTOMATED: CPT

## 2023-10-02 PROCEDURE — 36415 COLL VENOUS BLD VENIPUNCTURE: CPT

## 2023-10-03 DIAGNOSIS — Z48.298 AFTERCARE FOLLOWING ORGAN TRANSPLANT: ICD-10-CM

## 2023-10-03 DIAGNOSIS — Z94.0 KIDNEY REPLACED BY TRANSPLANT: ICD-10-CM

## 2023-10-03 DIAGNOSIS — Z79.899 IMMUNOSUPPRESSIVE MANAGEMENT ENCOUNTER FOLLOWING KIDNEY TRANSPLANT: ICD-10-CM

## 2023-10-03 DIAGNOSIS — Z94.0 KIDNEY TRANSPLANTED: ICD-10-CM

## 2023-10-03 DIAGNOSIS — Z94.0 IMMUNOSUPPRESSIVE MANAGEMENT ENCOUNTER FOLLOWING KIDNEY TRANSPLANT: ICD-10-CM

## 2023-10-03 RX ORDER — TACROLIMUS 0.5 MG/1
0.5 CAPSULE ORAL 2 TIMES DAILY
Qty: 180 CAPSULE | Refills: 3
Start: 2023-10-03 | End: 2023-11-16

## 2023-10-03 RX ORDER — TACROLIMUS 1 MG/1
3 CAPSULE ORAL 2 TIMES DAILY
Qty: 540 CAPSULE | Refills: 3 | Status: SHIPPED | OUTPATIENT
Start: 2023-10-03 | End: 2023-10-20

## 2023-10-03 NOTE — TELEPHONE ENCOUNTER
Tacrolimus = 7.5  (10/2/23)  Goal 4-6  Current tac dose 3 mg BID    Previous level also above goal on same dose    PLAN:   Call and confirm this was a good 12-hour trough.   Verify current dose.   Confirm no new medications or illness (macey. Diarrhea).  If good trough and correct dose above, recommend:  decrease dose to 2.5 mg BID.   Recheck level in 2 weeks and make sure it is a good trough to avoid additional lab draws.

## 2023-10-03 NOTE — TELEPHONE ENCOUNTER
Call placed to patient. Patient confirms current tacrolimus dose at 3.5 mg bid and accurate trough level. Denies any recent illness, diarrhea or medication changes. Patient v\u to decrease dose to 3 mg bid and repeat level in 2 weeks. Order/rx sent

## 2023-10-03 NOTE — TELEPHONE ENCOUNTER
Called back Bessy Spangler   States she was on Tac 3.5 mg BID not on 3 mg BID.    Reports she has swelling on feet and was prescribed Lasix but she has a sulfa allergy.  Pharmacy has recommended Bumex.  She refuses to take the Lasix.  Dr. Marquez made aware. RNCC will ask again if he wants to switch to Bumex.  Reports she's always had swelling but not as much as the swelling she has lately.    States that she has discussed being tired and having shortness at her visit with Dr. Marquez.  She was asked to repeat CBC.   Hemoglobin and Hematocrit slight trend down.    Message sent to Dr Marquez.

## 2023-10-04 ENCOUNTER — TELEPHONE (OUTPATIENT)
Dept: TRANSPLANT | Facility: CLINIC | Age: 63
End: 2023-10-04
Payer: COMMERCIAL

## 2023-10-04 DIAGNOSIS — Z48.298 AFTERCARE FOLLOWING ORGAN TRANSPLANT: ICD-10-CM

## 2023-10-04 DIAGNOSIS — T86.10 COMPLICATIONS, KIDNEY TRANSPLANT: ICD-10-CM

## 2023-10-04 DIAGNOSIS — M79.89 LEG SWELLING: ICD-10-CM

## 2023-10-04 DIAGNOSIS — Z94.0 KIDNEY TRANSPLANTED: Primary | ICD-10-CM

## 2023-10-04 RX ORDER — BUMETANIDE 0.5 MG/1
TABLET ORAL
Refills: 0 | OUTPATIENT
Start: 2023-10-04

## 2023-10-04 RX ORDER — BUMETANIDE 0.5 MG/1
1 TABLET ORAL DAILY
Qty: 60 TABLET | Refills: 11 | Status: SHIPPED | OUTPATIENT
Start: 2023-10-04

## 2023-10-04 NOTE — TELEPHONE ENCOUNTER
Chris Marquez MD Ututalum, Teresa, RN  You can try bumetanide 1 mg daily and see how she does with that.    Johnathan     ----- Message -----  From: Sofia Rose RN  Sent: 10/3/2023   2:28 PM CDT  To: MD Dr Alma Castellanos,    Reports she has swelling on feet and was prescribed Lasix but she has a sulfa allergy.  Pharmacy has recommended Bumex.  She refuses to take the Lasix.  Reports she's always had swelling but not as much as the swelling she has lately.  Sorry this might be the 3rd message I sent about this.  Do you want to switch to Bumex? How much?    Also states that she has discussed being tired and having shortness of breath at her visit with you.  She was asked to repeat CBC.  Hemoglobin and Hematocrit slight trend down.    Let me know what you recommend.    Thanks,  Wilian      OUTCOME:  Discontinue lasix.   Start Bumex 1 mg daily.  Rx sent to:  [facility] [place] [facility] [address] [facility] task:  Please call and let Bessy know Dr Marquez switched Lasix to Bumex.   Patient reports that her right eye is mostly red. No other symptoms--denies drainage, gritty feeling, slight itch.   Pt denies any injury.     Pt to see Dr Martinez today at 330pm

## 2023-10-08 PROBLEM — N18.2 CKD (CHRONIC KIDNEY DISEASE) STAGE 2, GFR 60-89 ML/MIN: Status: ACTIVE | Noted: 2023-10-08

## 2023-10-09 ENCOUNTER — TELEPHONE (OUTPATIENT)
Dept: TRANSPLANT | Facility: CLINIC | Age: 63
End: 2023-10-09
Payer: COMMERCIAL

## 2023-10-09 DIAGNOSIS — T86.10 COMPLICATIONS, KIDNEY TRANSPLANT: ICD-10-CM

## 2023-10-09 DIAGNOSIS — Z94.0 KIDNEY TRANSPLANTED: Primary | ICD-10-CM

## 2023-10-09 DIAGNOSIS — D64.9 ANEMIA: ICD-10-CM

## 2023-10-09 DIAGNOSIS — E55.9 VITAMIN D DEFICIENCY: ICD-10-CM

## 2023-10-09 DIAGNOSIS — R53.83 FATIGUE: ICD-10-CM

## 2023-10-09 DIAGNOSIS — R06.00 DYSPNEA: ICD-10-CM

## 2023-10-09 DIAGNOSIS — Z48.298 AFTERCARE FOLLOWING ORGAN TRANSPLANT: ICD-10-CM

## 2023-10-09 NOTE — TELEPHONE ENCOUNTER
Chris Marquez MD Ututalum, Teresa, RN  Wilian,    Patti patient is doing well on bumetanide.  With new edema and shortness of breath, would also recommend a cardiac echo and follow up with Cardiology.    Please check the following labs: Vitamin D, Iron panel, CMV PCR, EBV PCR, and Parvovirus PCR.    Trend down in hemoglobin.  Will check an iron panel and if she is iron deficient, she should follow up with PCP for EGD and colonoscopy.  Either way, she is due for her colonoscopy.    Johnathan      Recommendations at 9/11/23 Visit:  Patient Recommendations:  - Start furosemide 20 mg daily.  - Check with PCP about need for colon screening testing.  - Will order cardiac echo.  - Will refer you to General Nephrology at [facility] for a visit in 4-6 months or so to establish care with a goal of ongoing co-management between your primary Nephrologist and the Transplant Team.        Standing lab orders updated.  Lab orders added:  Vit D  Iron panel  CMV PCR   EBV PCR  Parvovirus PCR    Has she started taking Bumex?   SOB and swelling improved?    OUTCOME:  Left VM.  Requested call back with Update.

## 2023-10-09 NOTE — PROGRESS NOTES
TRANSPLANT NEPHROLOGY CHRONIC POST TRANSPLANT VISIT    Assessment & Plan   # LDKT: Stable   - Baseline Creatinine:  ~ 0.7-0.9   - Proteinuria: Normal (<0.2 grams)   - Date DSA Last Checked: Oct/2009      Latest DSA: Not checked recently due to time from transplant   - BK Viremia: Not checked recently due to time from transplant   - Kidney Tx Biopsy: No    # Immunosuppression: Tacrolimus immediate release (goal 4-6) and Mycophenolic acid (dose 540 mg every 12 hours)   - Continue with intensive monitoring of immunosuppression for efficacy and toxicity.   - Changes: No    # Infection Prophylaxis:   Last CD4 Level: 290 (Aug/2011)  - PJP: None    # Hypertension: Borderline control;  Goal BP: < 130/80   - Changes: Yes - With some edema and shortness of breath, will start bumetanide 1 mg daily   - Recommend low salt diet.    # Anemia in Chronic Renal Disease: Hgb: Trend down      BLANCA: No   - Iron studies: Not checked recently   - Patient is overdue for colonoscopy and with trend down in hemoglobin, would recommend updating that.   - If patient is iron deficient, would also consider EGD.    # Mineral Bone Disorder:   - Vitamin D; level: Not checked recently        On supplement: Yes  - Calcium; level: Normal        On supplement: No    # Electrolytes:   - Potassium; level: Normal        On supplement: No  - Bicarbonate; level: Normal        On supplement: No    # Paroxysmal Atrial Fibrillation: No recent episodes on sotalol and beta blocker.  Patient remains on chronic anticoagulation with rivaroxaban.     # H/o DVT and h/o PE: Remains on chronic anticoagulation with rivaroxiban.    # Obesity, Class II (BMI = 36.3): Stable weight.   - Recommend weight loss for overall health by increasing exercise and watching caloric intake.    # DEVORA on CPAP: Patient is compliant.    # Fatigue: Patient reports increased symptoms, as well as some shortness of breath and increased edema.  Cannot rule out cardiac issues.  Would also rule out  "some viral infections with immunosuppressed status.   - Recommend obtaining cardiac echo.   - Would also check CMV, EBV and parvovirus PCR.    # Skin Cancer: New lesions: none   - Discussed sun protection and recommend regular follow up with Dermatology.    # Medical Compliance: Yes    # Health Maintenance and Vaccination Review: Not Reviewed    # Transplant History:  Etiology of Kidney Failure: Alport's syndrome  Tx: LDKT  Transplant: 7/30/2009 (Kidney)  Significant changes in immunosuppression: None  Significant transplant-related complications: None    Transplant Office Phone Number: [phone]    Assessment and plan was discussed with the patient and she voiced her understanding and agreement.    Return visit: Return in about 1 year (around 9/11/2024).    Chris Marquez MD    Chief Complaint   Ms. Spangler is a 63 year old here for kidney transplant and immunosuppression management.    History of Present Illness    Ms. Spangler reports feeling okay overall with some medical complaints.  Since last clinic visit, patient reports no hospitalizations or new medical complaints and has been doing well overall.  Her energy level has decreased some over time, especially after getting COVID, and is not normal.  She is active, although really gets minimal exercise.  Patient reports some general arthritic pain, as well as some soreness at her left wrist where her AV fistula is located.  Denies any chest pain, but some increase in shortness of breath with exertion.  No palpitations.  Increased leg swelling, which she associates with carvedilol, but mostly worse at the end of the day.    Appetite is \"too good\" although weight is stable.  No nausea, vomiting or diarrhea.  Rare heartburn symptoms, mostly with eating spicy foods.  No fever, sweats or chills.  No night sweats.    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    Vitamin D deficiency    Paroxysmal atrial fibrillation (H)    Morbid obesity (H)    History of DVT (deep vein thrombosis) and PE (pulmonary embolism)    Chronic insomnia    Restless legs syndrome (RLS)    DEVORA (obstructive sleep apnea)- mild (AHI 13)    History of nonmelanoma skin cancer    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)    CKD (chronic kidney disease) stage 2, GFR 60-89 ml/min       Allergies   Allergies   Allergen Reactions    Dilaudid [Hydromorphone Hcl]      Patient unable to recall    Hydromorphone      Patient unable to recall    Sulfa Antibiotics Swelling     \"swelling\" - age 3         Medications   Current Outpatient Medications   Medication Sig    carvedilol (COREG) 12.5 MG tablet Take 1.5 tablets (18.75 mg) by mouth 2 times daily (with meals)    levothyroxine (SYNTHROID/LEVOTHROID) 125 MCG tablet Take 1 tablet (125 mcg) by mouth daily    mycophenolic acid (GENERIC EQUIVALENT) 180 MG EC tablet Take 3 tablets (540 mg) by mouth 2 times daily    sotalol (BETAPACE) 80 MG tablet Take 0.5 tablets (40 mg) by mouth 2 times daily    VITAMIN D, CHOLECALCIFEROL, PO Take 1,000 Units by mouth daily    XARELTO ANTICOAGULANT 20 MG TABS tablet TAKE 1 TABLET BY MOUTH DAILY WITH DINNER    bumetanide (BUMEX) 0.5 MG tablet Take 2 tablets (1 mg) by mouth daily    tacrolimus (GENERIC) 0.5 MG capsule Take 1 capsule (0.5 mg) by mouth 2 times daily hold    tacrolimus (GENERIC) 1 MG capsule Take 3 capsules (3 mg) by mouth 2 times daily     No current facility-administered medications for this visit.     There are no discontinued medications.    Physical Exam   Vital Signs: BP (!) 176/80   Pulse 69   Temp " 98.5  F (36.9  C) (Oral)   Wt 102.4 kg (225 lb 11.2 oz)   LMP 10/25/2004 (Exact Date)   SpO2 96%   BMI 36.71 kg/m      GENERAL APPEARANCE: alert and no distress  HENT: mouth without ulcers or lesions  RESP: lungs clear to auscultation - no rales, rhonchi or wheezes  CV: regular rhythm, normal rate, no rub, no murmur  EDEMA: trace to 1+ LE edema bilaterally  ABDOMEN: soft, nondistended, nontender, bowel sounds normal, obese  MS: extremities normal - no gross deformities noted, no evidence of inflammation in joints, no muscle tenderness  SKIN: no rash  TX KIDNEY: normal  DIALYSIS ACCESS:  LUE AV fistula with good thrill    Data         Latest Ref Rng & Units 10/2/2023     8:07 AM 6/16/2023     7:53 AM 4/14/2023     2:46 PM   Renal   Sodium 135 - 145 mmol/L 140  139  140    K 3.4 - 5.3 mmol/L 4.2  4.9  3.8    Cl 98 - 107 mmol/L 105  106  111    Cl (external) 98 - 107 mmol/L 105  106  111    CO2 22 - 29 mmol/L 23  22  26    Urea Nitrogen 7 - 30 mg/dL   14    Urea Nitrogen 8.0 - 23.0 mg/dL 21.4  17.4     Creatinine 0.51 - 0.95 mg/dL 0.98  0.87  0.87    Glucose 70 - 99 mg/dL 109  110  95    Calcium 8.8 - 10.2 mg/dL 9.4  9.6  8.7          Latest Ref Rng & Units 6/6/2022     8:03 AM 2/8/2022     8:14 AM 9/12/2019     7:53 AM   Bone Health   Vit D Def 20 - 75 ug/L 29  31  29          Latest Ref Rng & Units 10/2/2023     8:07 AM 6/16/2023     7:53 AM 4/14/2023     2:46 PM   Heme   WBC 4.0 - 11.0 10e3/uL 5.5  4.9  5.3    Hgb 11.7 - 15.7 g/dL 10.8  11.0  11.7    Plt 150 - 450 10e3/uL 147  131  158          Latest Ref Rng & Units 4/14/2023     2:46 PM 6/6/2022     7:59 AM 8/25/2011     7:30 AM   Liver   AP 40 - 150 U/L 58  51  66    TBili 0.2 - 1.3 mg/dL 0.4  0.5  0.2    ALT 0 - 50 U/L 21  20  10    AST 0 - 45 U/L 12  9  21    Tot Protein 6.8 - 8.8 g/dL 7.1  7.3  7.2    Albumin 3.4 - 5.0 g/dL 3.9  4.0  4.3          Latest Ref Rng & Units 2/8/2022     8:14 AM 11/27/2020     7:56 AM 7/29/2009     7:59 AM   Pancreas   A1C 0.0 -  5.6 % 5.5  5.0  5.0          Latest Ref Rng & Units 6/6/2022     7:59 AM 8/26/2014     7:37 AM 6/20/2012     7:00 AM   Iron studies   Iron 35 - 180 ug/dL 109  76  71    Iron Saturation Index 15 - 46 % 33  27  23    Ferritin 8 - 252 ng/mL 331  365  344          Latest Ref Rng & Units 2/8/2022     8:14 AM 3/4/2013     2:04 PM 1/23/2012     7:35 AM   [facility]   CVM DNA Quant   Whole blood, EDTA anticoagulant     CMV Quant <100 Copies/mL  <100  No CMV DNA detected.     CMV QT Log <2.0 Log copies/mL  <2.0  The Cytomegalovirus DNA Quantitation assay is a real-time polymerase chain   reaction (PCR) utilizing analyte specific reagents manufactured by [employer]   [employer]. Analyte Specific Reagents (ASRs) are used in many laboratory   tests necessary for standard medical care and generally do not require FDA   approval.   This test was developed and its performance characteristics determined by   [facility].  It has not been   cleared or approved by the US Food and Drug Administration.     CMV QUANT IU/ML Not Detected IU/mL Not Detected      BK Spec    Plasma    BK Res <1000 copies/mL   <1000    BK Log <3.0 Log copies/mL   <3.0  The lower limit of detection for this assay is 1000 copies/mL.  Real-time TaqMan   PCR was performed using BK primers and probe for the detection of a 90 bp   portion of the  1 gene.  The performance characteristics were validated by   the [facility].   It has not been cleared or approved by the U.S. Food and Drug Administration.        Recent Labs   Lab Test 03/02/23  0759 06/16/23  0753 10/02/23  0807   DOSTAC 3/1/2023 6/15/2023 10/1/2023   TACROL 7.0 6.8 7.5     Recent Labs   Lab Test 09/12/19  0754   DOSMPA 9/11/19 2000   MPACID 2.59   MPAG 33.6

## 2023-10-11 ENCOUNTER — TELEPHONE (OUTPATIENT)
Dept: TRANSPLANT | Facility: CLINIC | Age: 63
End: 2023-10-11
Payer: COMMERCIAL

## 2023-10-11 NOTE — TELEPHONE ENCOUNTER
Bessy Spangler  [facility] Lpn[facility]Ma (supporting Chris Marquez MD)3 hours ago (1:32 PM)     Yes, I am able to walk around the block.  It seems to get better with consistent walking.    You  Bessy M Aníbal7 hours ago (9:28 AM)   TU  How about the shortness of breath and feeling tired all the time?  Are you able to do your usual walk around the block now?      Bessy Spangler   to [facility] Lpn[facility]Ma (supporting Chris Marquez MD)    10/10/23  7:02 AM  Wilian,  After my appointment I revised my diet to reduce salt intake. My swelling has gone down signicantly after sitting at my desk all day.  I have not picked up or taken bumetanide.  I will have labs redrawn soon.  Lily      Message sent to Dr. Marquez.      ADDENDUM:  Chris Marquez MD Ututalum, Teresa, RN  We can follow for now.      ----- Message -----  From: Sofia Rose RN  Sent: 10/11/2023   4:50 PM CDT  To: Chris Marquez MD  Subject: RE: Clinic visit                                Dr Marquez,    Patient did not /start bumex but she did dietary changes (low salt).  Swelling went down.  See [email or web address] message below.  Per Epic she already follows with Cards.  She is now able to walk around her block again.    Do you want to proceed with echo still?    Thanks,  Wilian

## 2023-10-19 ENCOUNTER — LAB (OUTPATIENT)
Dept: LAB | Facility: CLINIC | Age: 63
End: 2023-10-19
Payer: COMMERCIAL

## 2023-10-19 DIAGNOSIS — R06.00 DYSPNEA: ICD-10-CM

## 2023-10-19 DIAGNOSIS — Z94.0 IMMUNOSUPPRESSIVE MANAGEMENT ENCOUNTER FOLLOWING KIDNEY TRANSPLANT: ICD-10-CM

## 2023-10-19 DIAGNOSIS — E55.9 VITAMIN D DEFICIENCY: ICD-10-CM

## 2023-10-19 DIAGNOSIS — Z48.298 AFTERCARE FOLLOWING ORGAN TRANSPLANT: ICD-10-CM

## 2023-10-19 DIAGNOSIS — Z94.0 KIDNEY REPLACED BY TRANSPLANT: ICD-10-CM

## 2023-10-19 DIAGNOSIS — R53.83 FATIGUE: ICD-10-CM

## 2023-10-19 DIAGNOSIS — Z79.899 IMMUNOSUPPRESSIVE MANAGEMENT ENCOUNTER FOLLOWING KIDNEY TRANSPLANT: ICD-10-CM

## 2023-10-19 DIAGNOSIS — T86.10 COMPLICATIONS, KIDNEY TRANSPLANT: ICD-10-CM

## 2023-10-19 DIAGNOSIS — D64.9 ANEMIA: ICD-10-CM

## 2023-10-19 DIAGNOSIS — Z94.0 KIDNEY TRANSPLANTED: ICD-10-CM

## 2023-10-19 LAB
ANION GAP SERPL CALCULATED.3IONS-SCNC: 14 MMOL/L (ref 7–15)
BUN SERPL-MCNC: 26 MG/DL (ref 8–23)
CALCIUM SERPL-MCNC: 9.5 MG/DL (ref 8.8–10.2)
CHLORIDE SERPL-SCNC: 104 MMOL/L (ref 98–107)
CMV DNA SPEC NAA+PROBE-ACNC: NOT DETECTED IU/ML
CREAT SERPL-MCNC: 1.1 MG/DL (ref 0.51–0.95)
DEPRECATED HCO3 PLAS-SCNC: 21 MMOL/L (ref 22–29)
EGFRCR SERPLBLD CKD-EPI 2021: 55 ML/MIN/1.73M2
ERYTHROCYTE [DISTWIDTH] IN BLOOD BY AUTOMATED COUNT: 12.9 % (ref 10–15)
FERRITIN SERPL-MCNC: 485 NG/ML (ref 11–328)
GLUCOSE SERPL-MCNC: 105 MG/DL (ref 70–99)
HCT VFR BLD AUTO: 33.1 % (ref 35–47)
HGB BLD-MCNC: 10.7 G/DL (ref 11.7–15.7)
IRON BINDING CAPACITY (ROCHE): 289 UG/DL (ref 240–430)
IRON SATN MFR SERPL: 21 % (ref 15–46)
IRON SERPL-MCNC: 62 UG/DL (ref 37–145)
MCH RBC QN AUTO: 29.2 PG (ref 26.5–33)
MCHC RBC AUTO-ENTMCNC: 32.3 G/DL (ref 31.5–36.5)
MCV RBC AUTO: 90 FL (ref 78–100)
PLATELET # BLD AUTO: 133 10E3/UL (ref 150–450)
POTASSIUM SERPL-SCNC: 4.8 MMOL/L (ref 3.4–5.3)
RBC # BLD AUTO: 3.66 10E6/UL (ref 3.8–5.2)
SODIUM SERPL-SCNC: 139 MMOL/L (ref 135–145)
TACROLIMUS BLD-MCNC: 8.5 UG/L (ref 5–15)
TME LAST DOSE: NORMAL H
TME LAST DOSE: NORMAL H
VIT D+METAB SERPL-MCNC: 32 NG/ML (ref 20–50)
WBC # BLD AUTO: 4.8 10E3/UL (ref 4–11)

## 2023-10-19 PROCEDURE — 36415 COLL VENOUS BLD VENIPUNCTURE: CPT

## 2023-10-19 PROCEDURE — 87798 DETECT AGENT NOS DNA AMP: CPT | Mod: 90

## 2023-10-19 PROCEDURE — 82728 ASSAY OF FERRITIN: CPT

## 2023-10-19 PROCEDURE — 80197 ASSAY OF TACROLIMUS: CPT

## 2023-10-19 PROCEDURE — 83550 IRON BINDING TEST: CPT

## 2023-10-19 PROCEDURE — 80048 BASIC METABOLIC PNL TOTAL CA: CPT

## 2023-10-19 PROCEDURE — 82306 VITAMIN D 25 HYDROXY: CPT

## 2023-10-19 PROCEDURE — 99000 SPECIMEN HANDLING OFFICE-LAB: CPT

## 2023-10-19 PROCEDURE — 87799 DETECT AGENT NOS DNA QUANT: CPT

## 2023-10-19 PROCEDURE — 85027 COMPLETE CBC AUTOMATED: CPT

## 2023-10-19 PROCEDURE — 83540 ASSAY OF IRON: CPT

## 2023-10-20 DIAGNOSIS — Z79.899 IMMUNOSUPPRESSIVE MANAGEMENT ENCOUNTER FOLLOWING KIDNEY TRANSPLANT: ICD-10-CM

## 2023-10-20 DIAGNOSIS — Z94.0 IMMUNOSUPPRESSIVE MANAGEMENT ENCOUNTER FOLLOWING KIDNEY TRANSPLANT: ICD-10-CM

## 2023-10-20 DIAGNOSIS — Z94.0 KIDNEY REPLACED BY TRANSPLANT: ICD-10-CM

## 2023-10-20 DIAGNOSIS — Z48.298 AFTERCARE FOLLOWING ORGAN TRANSPLANT: ICD-10-CM

## 2023-10-20 DIAGNOSIS — Z94.0 KIDNEY TRANSPLANTED: Primary | ICD-10-CM

## 2023-10-20 LAB — EBV DNA # SPEC NAA+PROBE: NOT DETECTED COPIES/ML

## 2023-10-20 RX ORDER — TACROLIMUS 1 MG/1
2 CAPSULE ORAL 2 TIMES DAILY
Qty: 360 CAPSULE | Refills: 3 | Status: SHIPPED | OUTPATIENT
Start: 2023-10-20 | End: 2023-11-16

## 2023-10-20 NOTE — TELEPHONE ENCOUNTER
ISSUE:   Tacrolimus IR level 8.5 on 10.19.2023, goal 4-6, dose 3 mg BID.    PLAN/LPN TASK:   Please call patient and confirm this was an accurate 12-hour trough. Verify Tacrolimus IR dose 3 mg BID. Confirm no new medications or illness. Confirm no missed doses. If accurate trough and accurate dose, decrease Tacrolimus IR dose to 2 mg BID and repeat labs in 1 weeks.  --> creatinine slightly above baseline - suspect this is related to elevated tacrolimus level, please also ensure pt is adequately hydrated.  Creatinine will be re-checked with labs in 1 week with tacrolimus level.

## 2023-10-20 NOTE — TELEPHONE ENCOUNTER
Spoke to patient who confirms this was an accurate 12-hour trough. Verified Tacrolimus IR dose 3 mg BID. Confirmed no new medications or illness. Confirmed no missed doses. Patient confirms decrease Tacrolimus IR dose to 2 mg BID and repeat labs in 1 weeks.

## 2023-10-23 LAB — B19V DNA SER QL NAA+PROBE: NOT DETECTED

## 2023-11-15 ENCOUNTER — IMMUNIZATION (OUTPATIENT)
Dept: NURSING | Facility: CLINIC | Age: 63
End: 2023-11-15
Payer: COMMERCIAL

## 2023-11-15 ENCOUNTER — LAB (OUTPATIENT)
Dept: LAB | Facility: CLINIC | Age: 63
End: 2023-11-15
Payer: COMMERCIAL

## 2023-11-15 DIAGNOSIS — Z94.0 KIDNEY TRANSPLANTED: ICD-10-CM

## 2023-11-15 LAB
ANION GAP SERPL CALCULATED.3IONS-SCNC: 9 MMOL/L (ref 7–15)
BUN SERPL-MCNC: 14.6 MG/DL (ref 8–23)
CALCIUM SERPL-MCNC: 9.4 MG/DL (ref 8.8–10.2)
CHLORIDE SERPL-SCNC: 107 MMOL/L (ref 98–107)
CREAT SERPL-MCNC: 0.88 MG/DL (ref 0.51–0.95)
DEPRECATED HCO3 PLAS-SCNC: 24 MMOL/L (ref 22–29)
EGFRCR SERPLBLD CKD-EPI 2021: 73 ML/MIN/1.73M2
ERYTHROCYTE [DISTWIDTH] IN BLOOD BY AUTOMATED COUNT: 12.8 % (ref 10–15)
GLUCOSE SERPL-MCNC: 99 MG/DL (ref 70–99)
HCT VFR BLD AUTO: 33.6 % (ref 35–47)
HGB BLD-MCNC: 10.7 G/DL (ref 11.7–15.7)
MCH RBC QN AUTO: 29.2 PG (ref 26.5–33)
MCHC RBC AUTO-ENTMCNC: 31.8 G/DL (ref 31.5–36.5)
MCV RBC AUTO: 92 FL (ref 78–100)
PLATELET # BLD AUTO: 150 10E3/UL (ref 150–450)
POTASSIUM SERPL-SCNC: 4.6 MMOL/L (ref 3.4–5.3)
RBC # BLD AUTO: 3.66 10E6/UL (ref 3.8–5.2)
SODIUM SERPL-SCNC: 140 MMOL/L (ref 135–145)
TACROLIMUS BLD-MCNC: 7.3 UG/L (ref 5–15)
TME LAST DOSE: NORMAL H
TME LAST DOSE: NORMAL H
WBC # BLD AUTO: 4.8 10E3/UL (ref 4–11)

## 2023-11-15 PROCEDURE — 90662 IIV NO PRSV INCREASED AG IM: CPT

## 2023-11-15 PROCEDURE — 36415 COLL VENOUS BLD VENIPUNCTURE: CPT

## 2023-11-15 PROCEDURE — 80048 BASIC METABOLIC PNL TOTAL CA: CPT

## 2023-11-15 PROCEDURE — 90471 IMMUNIZATION ADMIN: CPT

## 2023-11-15 PROCEDURE — 91320 SARSCV2 VAC 30MCG TRS-SUC IM: CPT

## 2023-11-15 PROCEDURE — 80197 ASSAY OF TACROLIMUS: CPT

## 2023-11-15 PROCEDURE — 85027 COMPLETE CBC AUTOMATED: CPT

## 2023-11-15 PROCEDURE — 90480 ADMN SARSCOV2 VAC 1/ONLY CMP: CPT

## 2023-11-16 ENCOUNTER — TELEPHONE (OUTPATIENT)
Dept: TRANSPLANT | Facility: CLINIC | Age: 63
End: 2023-11-16
Payer: COMMERCIAL

## 2023-11-16 DIAGNOSIS — Z94.0 KIDNEY REPLACED BY TRANSPLANT: ICD-10-CM

## 2023-11-16 DIAGNOSIS — Z94.0 KIDNEY TRANSPLANTED: Primary | ICD-10-CM

## 2023-11-16 DIAGNOSIS — Z48.298 AFTERCARE FOLLOWING ORGAN TRANSPLANT: ICD-10-CM

## 2023-11-16 DIAGNOSIS — Z79.899 IMMUNOSUPPRESSIVE MANAGEMENT ENCOUNTER FOLLOWING KIDNEY TRANSPLANT: ICD-10-CM

## 2023-11-16 DIAGNOSIS — Z94.0 IMMUNOSUPPRESSIVE MANAGEMENT ENCOUNTER FOLLOWING KIDNEY TRANSPLANT: ICD-10-CM

## 2023-11-16 RX ORDER — TACROLIMUS 0.5 MG/1
0.5 CAPSULE ORAL 2 TIMES DAILY
Qty: 180 CAPSULE | Refills: 3 | Status: SHIPPED | OUTPATIENT
Start: 2023-11-16 | End: 2024-06-07

## 2023-11-16 RX ORDER — TACROLIMUS 1 MG/1
1 CAPSULE ORAL 2 TIMES DAILY
Qty: 180 CAPSULE | Refills: 3 | Status: SHIPPED | OUTPATIENT
Start: 2023-11-16 | End: 2024-06-07

## 2023-11-16 NOTE — TELEPHONE ENCOUNTER
Spoke w/ pt, confirmed tac dose and trough. No med changes or illness.     Patient verbalized understanding to reduce tacrolimus to 1.5 mg BID and repeat level in 1-2 weeks. Rx and labs ordered.

## 2023-11-16 NOTE — TELEPHONE ENCOUNTER
Tacrolimus = 7.3  (11/15/23)  Goal 4-6  Current tac dose 2 mg BID    10/20-level was 8.5 while on Tac 3 mg bid      PLAN:   Call and confirm this was a good 12-hour trough.   Verify current dose.   Confirm no new medications.  If good trough and correct dose above, recommend:  decrease dose to 1.5 mg BID.   Recheck level in 1-2 weeks and make sure it is a good trough to avoid additional lab draws.

## 2023-11-21 DIAGNOSIS — I82.409 RECURRENT DEEP VEIN THROMBOSIS (DVT) (H): ICD-10-CM

## 2023-11-22 ENCOUNTER — MYC REFILL (OUTPATIENT)
Dept: CARDIOLOGY | Facility: CLINIC | Age: 63
End: 2023-11-22
Payer: COMMERCIAL

## 2023-11-22 DIAGNOSIS — I82.409 RECURRENT DEEP VEIN THROMBOSIS (DVT) (H): ICD-10-CM

## 2023-11-22 RX ORDER — RIVAROXABAN 20 MG/1
20 TABLET, FILM COATED ORAL
Qty: 90 TABLET | Refills: 3 | OUTPATIENT
Start: 2023-11-22

## 2023-11-27 DIAGNOSIS — I82.409 RECURRENT DEEP VEIN THROMBOSIS (DVT) (H): ICD-10-CM

## 2023-11-27 NOTE — TELEPHONE ENCOUNTER
Pt is out of XARELTO ANTICOAGULANT 20 MG TABS tablet.    Pharmacy does not have refills on file and is needing new prescription for Xarelto to be sent.     Routing to provider for review.     Tash Hightower RN

## 2023-11-28 NOTE — TELEPHONE ENCOUNTER
Informed pt that Rx was sent to pharmacy.  Pt will cb to schedule Medication recheck appt per PCP.

## 2023-12-04 DIAGNOSIS — Z94.0 KIDNEY TRANSPLANTED: ICD-10-CM

## 2023-12-04 DIAGNOSIS — Z79.899 IMMUNOSUPPRESSIVE MANAGEMENT ENCOUNTER FOLLOWING KIDNEY TRANSPLANT: ICD-10-CM

## 2023-12-04 DIAGNOSIS — Z94.0 IMMUNOSUPPRESSIVE MANAGEMENT ENCOUNTER FOLLOWING KIDNEY TRANSPLANT: ICD-10-CM

## 2023-12-04 DIAGNOSIS — Z48.298 AFTERCARE FOLLOWING ORGAN TRANSPLANT: ICD-10-CM

## 2023-12-04 DIAGNOSIS — Z94.0 KIDNEY REPLACED BY TRANSPLANT: ICD-10-CM

## 2023-12-04 RX ORDER — MYCOPHENOLIC ACID 180 MG/1
540 TABLET, DELAYED RELEASE ORAL 2 TIMES DAILY
Qty: 540 TABLET | Refills: 3 | Status: SHIPPED | OUTPATIENT
Start: 2023-12-04 | End: 2024-09-10

## 2023-12-05 ENCOUNTER — LAB (OUTPATIENT)
Dept: LAB | Facility: CLINIC | Age: 63
End: 2023-12-05
Payer: COMMERCIAL

## 2023-12-05 DIAGNOSIS — Z94.0 KIDNEY TRANSPLANTED: ICD-10-CM

## 2023-12-05 LAB
ANION GAP SERPL CALCULATED.3IONS-SCNC: 12 MMOL/L (ref 7–15)
BUN SERPL-MCNC: 25.8 MG/DL (ref 8–23)
CALCIUM SERPL-MCNC: 9.9 MG/DL (ref 8.8–10.2)
CHLORIDE SERPL-SCNC: 105 MMOL/L (ref 98–107)
CREAT SERPL-MCNC: 0.93 MG/DL (ref 0.51–0.95)
DEPRECATED HCO3 PLAS-SCNC: 24 MMOL/L (ref 22–29)
EGFRCR SERPLBLD CKD-EPI 2021: 68 ML/MIN/1.73M2
GLUCOSE SERPL-MCNC: 113 MG/DL (ref 70–99)
POTASSIUM SERPL-SCNC: 4.4 MMOL/L (ref 3.4–5.3)
SODIUM SERPL-SCNC: 141 MMOL/L (ref 135–145)
TACROLIMUS BLD-MCNC: 4.4 UG/L (ref 5–15)
TME LAST DOSE: ABNORMAL H
TME LAST DOSE: ABNORMAL H

## 2023-12-05 PROCEDURE — 36415 COLL VENOUS BLD VENIPUNCTURE: CPT

## 2023-12-05 PROCEDURE — 80048 BASIC METABOLIC PNL TOTAL CA: CPT

## 2023-12-05 PROCEDURE — 80197 ASSAY OF TACROLIMUS: CPT

## 2024-01-08 ENCOUNTER — OFFICE VISIT (OUTPATIENT)
Dept: URGENT CARE | Facility: URGENT CARE | Age: 64
End: 2024-01-08
Payer: COMMERCIAL

## 2024-01-08 ENCOUNTER — ANCILLARY PROCEDURE (OUTPATIENT)
Dept: GENERAL RADIOLOGY | Facility: CLINIC | Age: 64
End: 2024-01-08
Payer: COMMERCIAL

## 2024-01-08 VITALS
WEIGHT: 221.5 LBS | BODY MASS INDEX: 35.75 KG/M2 | SYSTOLIC BLOOD PRESSURE: 164 MMHG | OXYGEN SATURATION: 93 % | HEART RATE: 86 BPM | RESPIRATION RATE: 20 BRPM | DIASTOLIC BLOOD PRESSURE: 80 MMHG | TEMPERATURE: 99.5 F

## 2024-01-08 DIAGNOSIS — J40 BRONCHITIS: Primary | ICD-10-CM

## 2024-01-08 DIAGNOSIS — R05.1 ACUTE COUGH: ICD-10-CM

## 2024-01-08 PROCEDURE — 99213 OFFICE O/P EST LOW 20 MIN: CPT

## 2024-01-08 PROCEDURE — 71046 X-RAY EXAM CHEST 2 VIEWS: CPT | Mod: TC | Performed by: RADIOLOGY

## 2024-01-08 RX ORDER — BENZONATATE 200 MG/1
200 CAPSULE ORAL 3 TIMES DAILY PRN
Qty: 21 CAPSULE | Refills: 0 | Status: SHIPPED | OUTPATIENT
Start: 2024-01-08 | End: 2024-01-18

## 2024-01-09 NOTE — PATIENT INSTRUCTIONS
Chest x-ray does not show any pneumonia per the radiologist report.  Take benzonatate as prescribed for the cough.  Get plenty of rest and drink fluids.  Follow up with your primary care provider should symptoms persist.

## 2024-01-09 NOTE — PROGRESS NOTES
ASSESSMENT:  (J40) Bronchitis  (primary encounter diagnosis)  Plan: benzonatate (TESSALON) 200 MG capsule    (R05.1) Acute cough  Plan: XR Chest 2 Views    PLAN:  Informed the patient the chest x-ray does not show any pneumonia per the radiologist report.  Bronchitis patient instructions discussed and provided.  We discussed taking benzonatate as prescribed for the cough, getting plenty of rest and drinking fluids.  We also discussed following up with her primary care provider should symptoms persist.  Patient acknowledged her understanding of the above plan.    The use of Dragon/PowerMic dictation services may have been used to construct the content in this note; any grammatical or spelling errors are non-intentional. Please contact the author of this note directly if you are in need of any clarification.      DIAMANTE Mcgowan CNP      SUBJECTIVE:  Bessy Spangler is a 63 year old female who presents to the clinic today with a chief complaint of cough  for over 2 weeks.   Her cough is described as slightly productive.  The patient's symptoms are severe and worsening.  Associated symptoms include rhinorrhea, shortness of breath, and watery eyes. The patient's symptoms are exacerbated by lying on the left side.  Patient has been using OTC cough suppressants  to improve symptoms.    ROS  Negative except noted above.     OBJECTIVE:  BP (!) 164/80   Pulse 86   Temp 99.5  F (37.5  C)   Resp 20   Wt 100.5 kg (221 lb 8 oz)   LMP 10/25/2004 (Exact Date)   SpO2 93%   BMI 35.75 kg/m    GENERAL APPEARANCE: healthy, alert and no distress  EYES: EOMI,  PERRL, conjunctiva clear  HENT: nose and mouth without erythema, ulcers or lesions and oral mucous membranes moist, no erythema noted  NECK: supple, nontender, no lymphadenopathy  RESP: lungs clear to auscultation - no rales, rhonchi or wheezes  CV: regular rates and rhythm, normal S1 S2, no murmur noted  SKIN: no suspicious lesions or rashes    X-RAY: Chest  x-ray does not show any pneumonia per the radiologist report.

## 2024-01-17 ENCOUNTER — ANCILLARY PROCEDURE (OUTPATIENT)
Dept: CARDIOLOGY | Facility: CLINIC | Age: 64
End: 2024-01-17
Attending: INTERNAL MEDICINE
Payer: COMMERCIAL

## 2024-01-17 ENCOUNTER — TELEPHONE (OUTPATIENT)
Dept: CARDIOLOGY | Facility: CLINIC | Age: 64
End: 2024-01-17

## 2024-01-17 ENCOUNTER — TELEPHONE (OUTPATIENT)
Dept: TRANSPLANT | Facility: CLINIC | Age: 64
End: 2024-01-17

## 2024-01-17 DIAGNOSIS — I48.0 PAROXYSMAL ATRIAL FIBRILLATION (H): ICD-10-CM

## 2024-01-17 DIAGNOSIS — R60.9 EDEMA, UNSPECIFIED TYPE: ICD-10-CM

## 2024-01-17 LAB — LVEF ECHO: NORMAL

## 2024-01-17 PROCEDURE — 93306 TTE W/DOPPLER COMPLETE: CPT | Performed by: INTERNAL MEDICINE

## 2024-01-17 NOTE — TELEPHONE ENCOUNTER
Situation   Breakthrough AFIB    Background  H/O PAF       Assessment  Spoke with patient, she was found in AFIB today during her echocardiogram. According to pt report she has been in and out AFIB more frequently for the past 2 weeks.   Pt has been experiencing heart palpitations, home blood pressure today was 119/66 HR 69 . Pt reported compliance with her cardiac medications including xarelto 20 mg daily, Sotalol 40 mg BID and carvedilol 18.75 mg BID. Pt stated she is also also fighting bronchitis for a few days, pt is seeing PCP tomorrow to follow-up on that.     Routed to Dr Parker for further review:

## 2024-01-17 NOTE — TELEPHONE ENCOUNTER
ISSUE:   Echocardiogram completed 1/17/24.    PLAN:   Message  Received: Today  Chris Marquez MD Ututalum, Teresa, RN    Normal cardiac function on echo, but patient is in atrial fibrillation.  Recommend follow up with Cardiology.    Chart reviewed. Last cardiology visit with Dr. Tennille Parker, [facility] on 4/18/23    OUTCOME:   Call Bessy, recommend having cardiology team review her Echo results and advise on follow up plan.     [phone]     Bessy verbalized understanding and took phone number to call.    Zakia Sepulveda, RN, BSN (  Solid Organ Transplant, Post Kidney and Pancreas  Transplant Care Coordinator  [phone]

## 2024-01-17 NOTE — TELEPHONE ENCOUNTER
[facility] Call Center    Phone Message    May a detailed message be left on voicemail: yes     Reason for Call: Other: patient is calling to let her care team know that she had an echo completed and would like john munoz to view it and call her regarding what next steps need to be done, please advise, thank you.     Action Taken: Other: cardiology     Travel Screening: Not Applicable  Thank you!  [facility]

## 2024-01-18 ENCOUNTER — VIRTUAL VISIT (OUTPATIENT)
Dept: FAMILY MEDICINE | Facility: CLINIC | Age: 64
End: 2024-01-18
Payer: COMMERCIAL

## 2024-01-18 DIAGNOSIS — J20.9 ACUTE BRONCHITIS WITH SYMPTOMS > 10 DAYS: Primary | ICD-10-CM

## 2024-01-18 DIAGNOSIS — I48.0 PAROXYSMAL ATRIAL FIBRILLATION (H): ICD-10-CM

## 2024-01-18 DIAGNOSIS — D84.9 IMMUNOSUPPRESSION (H): ICD-10-CM

## 2024-01-18 DIAGNOSIS — I10 ESSENTIAL HYPERTENSION: ICD-10-CM

## 2024-01-18 DIAGNOSIS — Z94.0 KIDNEY REPLACED BY TRANSPLANT: ICD-10-CM

## 2024-01-18 PROCEDURE — 99214 OFFICE O/P EST MOD 30 MIN: CPT | Mod: 95 | Performed by: PREVENTIVE MEDICINE

## 2024-01-18 RX ORDER — PREDNISONE 20 MG/1
20 TABLET ORAL 2 TIMES DAILY
Qty: 10 TABLET | Refills: 0 | Status: SHIPPED | OUTPATIENT
Start: 2024-01-18 | End: 2024-01-23

## 2024-01-18 ASSESSMENT — ENCOUNTER SYMPTOMS: COUGH: 1

## 2024-01-18 NOTE — TELEPHONE ENCOUNTER
<< Tennille Parker MD Clerge, Ingrid, RN  Caller: Unspecified (Yesterday,  2:11 PM)  Happy to see her back sooner once she gets over bronchitis, but she has known PAF and did not want to increase her sotalol dose last time I saw her.>>    The above reviewed with patient, appointment made to follow-up with Dr Parker on February 8/2024. Appointment/Date/time/Location reviewed.

## 2024-01-18 NOTE — PROGRESS NOTES
"    Instructions Relayed to Patient by Virtual Roomer:     Patient is active on [employer]:   Relayed following to patient: \"It looks like you are active on [employer], are you able to join the visit this way? If not, do you need us to send you a link now or would you like your provider to send a link via text or email when they are ready to initiate the visit?\"    Reminded patient to ensure they were logged on to virtual visit by arrival time listed. Documented in appointment notes if patient had flexibility to initiate visit sooner than arrival time. If pediatric virtual visit, ensured pediatric patient along with parent/guardian will be present for video visit.     Patient offered the website [email or web address] and/or phone number to [employer] Help line: [phone]      Bessy is a 63 year old who is being evaluated via a billable video visit.      How would you like to obtain your AVS? [employer]  If the video visit is dropped, the invitation should be resent by: Send to e-mail at: [email or web address]  Will anyone else be joining your video visit? No          Assessment & Plan     Acute bronchitis with symptoms > 10 days  -symptoms since 12/23  -reviewed notes from Urgent care evaluation from 1/8/24  -chest X ray negative for pneumonias  -ECHO normal and did not show congestive heart failure  -due to duration of symptoms, immunosuppressed status, will use antibiotics as risk of bacterial infections is higher   -monitor temperature   -we discussed that can wait to start Prednisone, however, if not better by Day 5 of antibiotics then start Prednisone.   - amoxicillin-clavulanate (AUGMENTIN) 875-125 MG tablet  Dispense: 14 tablet; Refill: 0  - predniSONE (DELTASONE) 20 MG tablet  Dispense: 10 tablet; Refill: 0    Paroxysmal atrial fibrillation (H)  -followed by Cardiology  -ECHO done yesterday  -was noted to be in Afib during ECHO     Kidney replaced by transplant  -followed by Dr. Alma Doan " "hypertension  -reading was elevated in Urgent care  -has been checking blood pressure at home and readings are normal.     Immunosuppression (H24)  -On Tacrolimus       Prescription drug management  30 minutes spent by me on the date of the encounter doing chart review, history and exam, documentation and further activities per the note      BMI  Estimated body mass index is 35.75 kg/m  as calculated from the following:    Height as of 9/18/23: 1.676 m (5' 6\").    Weight as of 1/8/24: 100.5 kg (221 lb 8 oz).       Valencia Doherty is a 63 year old, presenting for the following health issues:  Cough      1/18/2024    10:04 AM   Additional Questions   Roomed by Vy     Cough    History of Present Illness       Reason for visit:  Cough    She eats 0-1 servings of fruits and vegetables daily.She consumes 0 sweetened beverage(s) daily.She exercises with enough effort to increase her heart rate 9 or less minutes per day.  She exercises with enough effort to increase her heart rate 3 or less days per week.   She is taking medications regularly.       1/8/24 seen in urgent care for Bronchitis.   EXAM: XR CHEST 2 VIEWS  LOCATION: [facility]  DATE: 1/8/2024     INDICATION:  Acute cough  COMPARISON: Chest radiograph 08/09/2014                                                                      IMPRESSION: Negative chest.    Followed by Cardiology for Afib, had recent ECHO and noted to be in Afib.  Cardiology recommendation reviewed:    \"Happy to see her back sooner once she gets over bronchitis, but she has known PAF and did not want to increase her sotalol dose last time I saw her \"    ECHO done 1/17/24:    Interpretation Summary     Global and regional left ventricular function is normal with an EF of 60-65%.  Global right ventricular function is normal.  No significant valvular abnormalities present.  Pulmonary artery systolic pressure is normal.  Estimated mean right atrial pressure " is normal.  No pericardial effusion is present.      Grand daughter had a cough  Symptoms started with a cough  Cough for 2 weeks, not able to cough up any mucus  Developed hemorrhoids and was having urine incontinence   Has been on Tessalon and this helped some  Has had tightness+      Follows with Dr. Marquez.  Blood pressure at home has been checked:  121/77 pulse 108  119/66 pulse 69      Acute Illness  Acute illness concerns: Cough  Onset/Duration: Since 12/23/2023  Symptoms:  Fever: No  Chills/Sweats: No  Headache (location?): No  Sinus Pressure: No  Conjunctivitis:  No  Ear Pain: no  Rhinorrhea: YES  Congestion: YES  Sore Throat: No  Cough: YES-productive of yellow sputum, productive of green sputum  Wheeze: YES - crackling noise  Decreased Appetite: YES  Nausea: No  Vomiting: No  Diarrhea: No  Dysuria/Freq.: No  Dysuria or Hematuria: No  Fatigue/Achiness: No  Sick/Strep Exposure: No  Therapies tried and outcome: Was given prescription 200mg benzonatate-pt states no longer taking it.          Objective           Vitals:  No vitals were obtained today due to virtual visit.    Physical Exam   GENERAL: alert and no distress  EYES: Eyes grossly normal to inspection.  No discharge or erythema, or obvious scleral/conjunctival abnormalities.  RESP: No audible wheeze, or cyanosis. Cough+  SKIN: Visible skin clear. No significant rash, abnormal pigmentation or lesions.  NEURO: Cranial nerves grossly intact.  Mentation and speech appropriate for age.  PSYCH: Appropriate affect, tone, and pace of words    No results found. However, due to the size of the patient record, not all encounters were searched. Please check Results Review for a complete set of results.      Video-Visit Details    Type of service:  Video Visit     Originating Location (pt. Location): Home    Distant Location (provider location):  Off-site  Platform used for Video Visit: Eliz  Signed Electronically by: Gina Dc MD MPH

## 2024-02-15 ENCOUNTER — LAB (OUTPATIENT)
Dept: LAB | Facility: CLINIC | Age: 64
End: 2024-02-15
Payer: COMMERCIAL

## 2024-02-15 DIAGNOSIS — R53.83 FATIGUE: ICD-10-CM

## 2024-02-15 DIAGNOSIS — E55.9 VITAMIN D DEFICIENCY: ICD-10-CM

## 2024-02-15 DIAGNOSIS — D64.9 ANEMIA: ICD-10-CM

## 2024-02-15 DIAGNOSIS — Z94.0 KIDNEY TRANSPLANTED: ICD-10-CM

## 2024-02-15 DIAGNOSIS — T86.10 COMPLICATIONS, KIDNEY TRANSPLANT: ICD-10-CM

## 2024-02-15 DIAGNOSIS — R06.00 DYSPNEA: ICD-10-CM

## 2024-02-15 DIAGNOSIS — Z48.298 AFTERCARE FOLLOWING ORGAN TRANSPLANT: ICD-10-CM

## 2024-02-15 LAB
ALBUMIN MFR UR ELPH: 7.9 MG/DL
ANION GAP SERPL CALCULATED.3IONS-SCNC: 11 MMOL/L (ref 7–15)
BUN SERPL-MCNC: 20.9 MG/DL (ref 8–23)
CALCIUM SERPL-MCNC: 9.5 MG/DL (ref 8.8–10.2)
CHLORIDE SERPL-SCNC: 104 MMOL/L (ref 98–107)
CREAT SERPL-MCNC: 0.86 MG/DL (ref 0.51–0.95)
CREAT UR-MCNC: 160 MG/DL
DEPRECATED HCO3 PLAS-SCNC: 24 MMOL/L (ref 22–29)
EGFRCR SERPLBLD CKD-EPI 2021: 75 ML/MIN/1.73M2
ERYTHROCYTE [DISTWIDTH] IN BLOOD BY AUTOMATED COUNT: 13.3 % (ref 10–15)
GLUCOSE SERPL-MCNC: 102 MG/DL (ref 70–99)
HCT VFR BLD AUTO: 35.4 % (ref 35–47)
HGB BLD-MCNC: 11.3 G/DL (ref 11.7–15.7)
MCH RBC QN AUTO: 29 PG (ref 26.5–33)
MCHC RBC AUTO-ENTMCNC: 31.9 G/DL (ref 31.5–36.5)
MCV RBC AUTO: 91 FL (ref 78–100)
PLATELET # BLD AUTO: 147 10E3/UL (ref 150–450)
POTASSIUM SERPL-SCNC: 4.4 MMOL/L (ref 3.4–5.3)
PROT/CREAT 24H UR: 0.05 MG/MG CR (ref 0–0.2)
RBC # BLD AUTO: 3.9 10E6/UL (ref 3.8–5.2)
SODIUM SERPL-SCNC: 139 MMOL/L (ref 135–145)
TACROLIMUS BLD-MCNC: 4.9 UG/L (ref 5–15)
TME LAST DOSE: ABNORMAL H
TME LAST DOSE: ABNORMAL H
WBC # BLD AUTO: 4.8 10E3/UL (ref 4–11)

## 2024-02-15 PROCEDURE — 84156 ASSAY OF PROTEIN URINE: CPT

## 2024-02-15 PROCEDURE — 36415 COLL VENOUS BLD VENIPUNCTURE: CPT

## 2024-02-15 PROCEDURE — 85027 COMPLETE CBC AUTOMATED: CPT

## 2024-02-15 PROCEDURE — 80048 BASIC METABOLIC PNL TOTAL CA: CPT

## 2024-02-15 PROCEDURE — 80197 ASSAY OF TACROLIMUS: CPT

## 2024-04-19 ENCOUNTER — OFFICE VISIT (OUTPATIENT)
Dept: FAMILY MEDICINE | Facility: CLINIC | Age: 64
End: 2024-04-19
Payer: COMMERCIAL

## 2024-04-19 ENCOUNTER — ORDERS ONLY (AUTO-RELEASED) (OUTPATIENT)
Dept: FAMILY MEDICINE | Facility: CLINIC | Age: 64
End: 2024-04-19

## 2024-04-19 VITALS
HEIGHT: 64 IN | OXYGEN SATURATION: 100 % | WEIGHT: 214.2 LBS | DIASTOLIC BLOOD PRESSURE: 83 MMHG | HEART RATE: 118 BPM | SYSTOLIC BLOOD PRESSURE: 126 MMHG | BODY MASS INDEX: 36.57 KG/M2

## 2024-04-19 DIAGNOSIS — Z13.220 LIPID SCREENING: ICD-10-CM

## 2024-04-19 DIAGNOSIS — Z12.31 VISIT FOR SCREENING MAMMOGRAM: ICD-10-CM

## 2024-04-19 DIAGNOSIS — D84.9 IMMUNOSUPPRESSION (H): ICD-10-CM

## 2024-04-19 DIAGNOSIS — E07.9 THYROID DISORDER: ICD-10-CM

## 2024-04-19 DIAGNOSIS — I48.0 PAROXYSMAL ATRIAL FIBRILLATION (H): ICD-10-CM

## 2024-04-19 DIAGNOSIS — E66.01 CLASS 2 SEVERE OBESITY DUE TO EXCESS CALORIES WITH SERIOUS COMORBIDITY AND BODY MASS INDEX (BMI) OF 36.0 TO 36.9 IN ADULT (H): ICD-10-CM

## 2024-04-19 DIAGNOSIS — I10 BENIGN ESSENTIAL HYPERTENSION: ICD-10-CM

## 2024-04-19 DIAGNOSIS — E66.812 CLASS 2 SEVERE OBESITY DUE TO EXCESS CALORIES WITH SERIOUS COMORBIDITY AND BODY MASS INDEX (BMI) OF 36.0 TO 36.9 IN ADULT (H): ICD-10-CM

## 2024-04-19 DIAGNOSIS — Z12.4 CERVICAL CANCER SCREENING: ICD-10-CM

## 2024-04-19 DIAGNOSIS — N18.2 CKD (CHRONIC KIDNEY DISEASE) STAGE 2, GFR 60-89 ML/MIN: ICD-10-CM

## 2024-04-19 DIAGNOSIS — Z94.0 KIDNEY REPLACED BY TRANSPLANT: ICD-10-CM

## 2024-04-19 DIAGNOSIS — I82.409 RECURRENT DEEP VEIN THROMBOSIS (DVT) (H): ICD-10-CM

## 2024-04-19 DIAGNOSIS — E28.39 ESTROGEN DEFICIENCY: ICD-10-CM

## 2024-04-19 DIAGNOSIS — Z12.11 SCREEN FOR COLON CANCER: ICD-10-CM

## 2024-04-19 DIAGNOSIS — Z00.00 ROUTINE GENERAL MEDICAL EXAMINATION AT A HEALTH CARE FACILITY: Primary | ICD-10-CM

## 2024-04-19 PROBLEM — I74.19: Status: RESOLVED | Noted: 2023-04-14 | Resolved: 2024-04-19

## 2024-04-19 LAB
ERYTHROCYTE [DISTWIDTH] IN BLOOD BY AUTOMATED COUNT: 13.4 % (ref 10–15)
HCT VFR BLD AUTO: 38.5 % (ref 35–47)
HGB BLD-MCNC: 12.2 G/DL (ref 11.7–15.7)
MCH RBC QN AUTO: 29 PG (ref 26.5–33)
MCHC RBC AUTO-ENTMCNC: 31.7 G/DL (ref 31.5–36.5)
MCV RBC AUTO: 91 FL (ref 78–100)
PLATELET # BLD AUTO: 153 10E3/UL (ref 150–450)
RBC # BLD AUTO: 4.21 10E6/UL (ref 3.8–5.2)
WBC # BLD AUTO: 5.1 10E3/UL (ref 4–11)

## 2024-04-19 PROCEDURE — 82043 UR ALBUMIN QUANTITATIVE: CPT | Performed by: PREVENTIVE MEDICINE

## 2024-04-19 PROCEDURE — 87624 HPV HI-RISK TYP POOLED RSLT: CPT | Performed by: PREVENTIVE MEDICINE

## 2024-04-19 PROCEDURE — G0145 SCR C/V CYTO,THINLAYER,RESCR: HCPCS | Performed by: PREVENTIVE MEDICINE

## 2024-04-19 PROCEDURE — 80053 COMPREHEN METABOLIC PANEL: CPT | Performed by: PREVENTIVE MEDICINE

## 2024-04-19 PROCEDURE — 84439 ASSAY OF FREE THYROXINE: CPT | Performed by: PREVENTIVE MEDICINE

## 2024-04-19 PROCEDURE — 99397 PER PM REEVAL EST PAT 65+ YR: CPT | Performed by: PREVENTIVE MEDICINE

## 2024-04-19 PROCEDURE — 36415 COLL VENOUS BLD VENIPUNCTURE: CPT | Performed by: PREVENTIVE MEDICINE

## 2024-04-19 PROCEDURE — 82570 ASSAY OF URINE CREATININE: CPT | Performed by: PREVENTIVE MEDICINE

## 2024-04-19 PROCEDURE — 80061 LIPID PANEL: CPT | Performed by: PREVENTIVE MEDICINE

## 2024-04-19 PROCEDURE — 85027 COMPLETE CBC AUTOMATED: CPT | Performed by: PREVENTIVE MEDICINE

## 2024-04-19 PROCEDURE — 99214 OFFICE O/P EST MOD 30 MIN: CPT | Mod: 25 | Performed by: PREVENTIVE MEDICINE

## 2024-04-19 PROCEDURE — 84443 ASSAY THYROID STIM HORMONE: CPT | Performed by: PREVENTIVE MEDICINE

## 2024-04-19 RX ORDER — SOTALOL HYDROCHLORIDE 80 MG/1
40 TABLET ORAL 2 TIMES DAILY
Qty: 90 TABLET | Refills: 3 | Status: SHIPPED | OUTPATIENT
Start: 2024-04-19

## 2024-04-19 RX ORDER — LEVOTHYROXINE SODIUM 125 UG/1
125 TABLET ORAL DAILY
Qty: 90 TABLET | Refills: 3 | Status: SHIPPED | OUTPATIENT
Start: 2024-04-19

## 2024-04-19 RX ORDER — CARVEDILOL 12.5 MG/1
18.75 TABLET ORAL 2 TIMES DAILY WITH MEALS
Qty: 270 TABLET | Refills: 3 | Status: SHIPPED | OUTPATIENT
Start: 2024-04-19

## 2024-04-19 SDOH — HEALTH STABILITY: PHYSICAL HEALTH: ON AVERAGE, HOW MANY DAYS PER WEEK DO YOU ENGAGE IN MODERATE TO STRENUOUS EXERCISE (LIKE A BRISK WALK)?: 1 DAY

## 2024-04-19 ASSESSMENT — SOCIAL DETERMINANTS OF HEALTH (SDOH): HOW OFTEN DO YOU GET TOGETHER WITH FRIENDS OR RELATIVES?: ONCE A WEEK

## 2024-04-19 ASSESSMENT — PAIN SCALES - GENERAL: PAINLEVEL: NO PAIN (0)

## 2024-04-19 NOTE — PROGRESS NOTES
cPreventive Care Visit  [facility]  Gina Dc MD, Family Medicine  Apr 19, 2024      Assessment & Plan     Routine general medical examination at a health care facility  -Preventive guidelines reviewed  -Patient is due for RSV vaccination, shingles vaccination and pneumococcal vaccine.  There is a category D interaction associated with tacrolimus that patient takes for history of renal transplant.  I did request the patient to reach out to her transplant team to see if it would be okay for her to proceed with these immunizations.    Paroxysmal atrial fibrillation (H)  -Followed by cardiology  -On sotalol, carvedilol and Xarelto  -No new symptoms of chest pain or new palpitations  - sotalol (BETAPACE) 80 MG tablet  Dispense: 90 tablet; Refill: 3    Recurrent deep vein thrombosis (DVT) (H)  -On anticoagulation    CKD (chronic kidney disease) stage 2, GFR 60-89 ml/min  -Await labs from today  -GFR was 75 on 2/15/2024  - Albumin Random Urine Quantitative with Creat Ratio    Thyroid disorder  -Await labs, adjust medication if needed  -Refills on medication provided  - levothyroxine (SYNTHROID/LEVOTHROID) 125 MCG tablet  Dispense: 90 tablet; Refill: 3  - TSH  - T4, free    Class 2 severe obesity due to excess calories with serious comorbidity and body mass index (BMI) of 36.0 to 36.9 in adult (H)  -Patient has been working on weight loss by making dietary changes, reducing salt intake  Wt Readings from Last 2 Encounters:   04/19/24 97.2 kg (214 lb 3.2 oz)   01/08/24 100.5 kg (221 lb 8 oz)        Immunosuppression (H24)  - CBC with platelets  - Comprehensive metabolic panel (BMP + Alb, Alk Phos, ALT, AST, Total. Bili, TP)    Kidney replaced by transplant  -Followed by transplant team  Etiology of Kidney Failure: Alport's syndrome  Tx: LDKT  Transplant: 7/30/2009 (Kidney)  Annual follow up     Benign essential hypertension  -Blood pressure readings are at goal  - carvedilol (COREG) 12.5 MG  "tablet  Dispense: 270 tablet; Refill: 3    Cervical cancer screening  -Cervical cancer screening guidelines reviewed for immunosuppressed patients  -Patient is over age 65, however we will proceed with cervical cancer screening  - Pap Screen with HPV - recommended age 30 - 65 years    Screen for colon cancer  -Declined colonoscopy  -No family history of colon cancer  - COLOGUARD([employer])    Visit for screening mammogram  - *MA Screening Digital Bilateral    Lipid screening  - Lipid panel reflex to direct LDL Non-fasting    Estrogen deficiency  -Last DEXA was in 2014  - DX Bone Density      Ordering of each unique test  Prescription drug management  43 minutes spent by me on the date of the encounter doing chart review, history and exam, documentation and further activities per the note      BMI  Estimated body mass index is 36.77 kg/m  as calculated from the following:    Height as of this encounter: 1.626 m (5' 4\").    Weight as of this encounter: 97.2 kg (214 lb 3.2 oz).   Weight management plan: Discussed healthy diet and exercise guidelines    Counseling  Appropriate preventive services were discussed with this patient, including applicable screening as appropriate for fall prevention, nutrition, physical activity, Tobacco-use cessation, weight loss and cognition.  Checklist reviewing preventive services available has been given to the patient.  Reviewed patient's diet, addressing concerns and/or questions.   She is at risk for lack of exercise and has been provided with information to increase physical activity for the benefit of her well-being.   Information on urinary incontinence and treatment options given to patient.       Valencia Doherty is a 64 year old, presenting for the following:  Annual Visit        4/19/2024     7:05 AM   Additional Questions   Roomed by cornelius        Health Care Directive  Patient does not have a Health Care Directive or Living Will: Patient states has Advance Directive " and will bring in a copy to clinic.    HPI        Wt Readings from Last 2 Encounters:   04/19/24 97.2 kg (214 lb 3.2 oz)   01/08/24 100.5 kg (221 lb 8 oz)      REFILLS NEEDED    Sleep:  -saw Sleep clinic on 4/10/23  -mild DEVORA, using CPAP consistently   -restless legs syndrome+     Atrial fibrillation:  -followed by Cardiology  -on beta blocker (metoprolol and Sotalol) and Xarelto   -no chest pain  -no syncope  -no palpitations in the last week     Followed by transplant nephrology Dr. Marquez    Etiology of Kidney Failure: Alport's syndrome  Tx: LDKT  Transplant: 7/30/2009 (Kidney)  Annual follow up       Thyroid:  -regular bowels   -no significant fatigue      Hypertension Follow-up     Do you check your blood pressure regularly outside of the clinic? Yes   Are you following a low salt diet? Yes  Are your blood pressures ever more than 140 on the top number (systolic) OR more       than 90 on the bottom number (diastolic), for example 140/90? No     Has checked blood pressure at home 125-135/80 systolic usually         4/19/2024   General Health   How would you rate your overall physical health? Good   Feel stress (tense, anxious, or unable to sleep) Not at all         4/19/2024   Nutrition   Diet: Regular (no restrictions)         4/19/2024   Exercise   Days per week of moderate/strenous exercise 1 day   (!) EXERCISE CONCERN      4/19/2024   Social Factors   Frequency of gathering with friends or relatives Once a week   Worry food won't last until get money to buy more No   Food not last or not have enough money for food? No   Do you have housing?  Yes   Are you worried about losing your housing? No   Lack of transportation? No   Unable to get utilities (heat,electricity)? No         4/19/2024   Fall Risk   Fallen 2 or more times in the past year? No    No   Trouble with walking or balance? No    No          4/19/2024   Activities of Daily Living- Home Safety   Needs help with the following daily activites None of  the above   Safety concerns in the home None of the above         2024   Dental   Dentist two times every year? Yes         2024   Hearing Screening   Hearing concerns? None of the above         2024   Driving Risk Screening   Patient/family members have concerns about driving No         2024   General Alertness/Fatigue Screening   Have you been more tired than usual lately? No         2024   Urinary Incontinence Screening   Bothered by leaking urine in past 6 months Yes         2024   TB Screening   Were you born outside of the [place]? No           Today's PHQ-2 Score:       2024    10:05 AM   PHQ-2 (  [employer])   Q1: Little interest or pleasure in doing things 0   Q2: Feeling down, depressed or hopeless 0   PHQ-2 Score 0   Q1: Little interest or pleasure in doing things Not at all   Q2: Feeling down, depressed or hopeless Not at all   PHQ-2 Score 0         2024   Substance Use   Alcohol more than 3/day or more than 7/wk Not Applicable   Do you have a current opioid prescription? No   How severe/bad is pain from 1 to 10? 0/10 (No Pain)   Do you use any other substances recreationally? No     Social History     Tobacco Use    Smoking status: Former     Current packs/day: 0.00     Average packs/day: 1 pack/day for 20.0 years (20.0 ttl pk-yrs)     Types: Cigarettes     Start date: 1978     Quit date: 1998     Years since quittin.3    Smokeless tobacco: Never   Vaping Use    Vaping status: Never Used   Substance Use Topics    Alcohol use: No     Alcohol/week: 0.0 standard drinks of alcohol    Drug use: No           2022   LAST FHS-7 RESULTS   1st degree relative breast or ovarian cancer No   Any relative bilateral breast cancer No   Any male have breast cancer No   Any ONE woman have BOTH breast AND ovarian cancer No   Any woman with breast cancer before 50yrs No   2 or more relatives with breast AND/OR ovarian cancer No   2 or more relatives with breast AND/OR  "bowel cancer No        Mammogram Screening - Mammogram every 1-2 years updated in Health Maintenance based on mutual decision making    ASCVD Risk   The 10-year ASCVD risk score (Adelaida MONTEIRO, et al., 2019) is: 7.1%    Values used to calculate the score:      Age: 64 years      Sex: Female      Is Non- : No      Diabetic: No      Tobacco smoker: No      Systolic Blood Pressure: 126 mmHg      Is BP treated: Yes      HDL Cholesterol: 59 mg/dL      Total Cholesterol: 165 mg/dL            Reviewed and updated as needed this visit by Provider   Tobacco  Allergies  Meds  Problems  Med Hx  Surg Hx  Fam Hx            Past Medical History:   Diagnosis Date    Acute deep vein thrombosis (DVT) of distal vein of right lower extremity (H) 07/24/2018    Basal cell carcinoma 5/9/2022    Left thigh    Basal cell carcinoma 5/9/2022    Left thigh    Breast cancer (H)     Chronic kidney disease (CKD), stage V (H)     Due to \"nephritis\". Biopsy done years ago. 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/2018     Past Surgical History:   Procedure Laterality Date    APPENDECTOMY OPEN  01/01/1999    AV FISTULA OR GRAFT ARTERIAL      left    LUMPECTOMY BREAST  2002    left breast partial mastectomy    ZZC TRANSPLANT,PREP LIVING  RENAL GRAFT  07/30/2009    Hx of nephritis     Lab work is in process  Labs reviewed in EPIC  BP Readings from Last 3 Encounters:   04/19/24 126/83   01/08/24 (!) 164/80   09/11/23 (!) 176/80    Wt Readings from Last 3 Encounters:   04/19/24 97.2 kg (214 lb 3.2 oz)   01/08/24 100.5 kg (221 lb 8 oz)   09/18/23 102.1 " kg (225 lb)                  Patient Active Problem List   Diagnosis    History of breast cancer    Kidney replaced by transplant    CARDIOVASCULAR SCREENING; LDL GOAL LESS THAN 100    Immunosuppressed status (H24)    HTN, kidney transplant related    Acquired hypothyroidism    Post-menopausal    Tear film insufficiency    Presbyopia - Both Eyes    Aftercare following organ transplant    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    Vitamin D deficiency    Paroxysmal atrial fibrillation (H)    History of DVT (deep vein thrombosis) and PE (pulmonary embolism)    Chronic insomnia    Restless legs syndrome (RLS)    DEVORA (obstructive sleep apnea)- mild (AHI 13)    History of nonmelanoma skin cancer    CKD (chronic kidney disease) stage 2, GFR 60-89 ml/min    Class 2 severe obesity due to excess calories with serious comorbidity in adult (H)     Past Surgical History:   Procedure Laterality Date    APPENDECTOMY OPEN  1999    AV FISTULA OR GRAFT ARTERIAL      left    LUMPECTOMY BREAST      left breast partial mastectomy    ZZC TRANSPLANT,PREP LIVING  RENAL GRAFT  2009    Hx of nephritis       Social History     Tobacco Use    Smoking status: Former     Current packs/day: 0.00     Average packs/day: 1 pack/day for 20.0 years (20.0 ttl pk-yrs)     Types: Cigarettes     Start date: 1978     Quit date: 1998     Years since quittin.3    Smokeless tobacco: Never   Substance Use Topics    Alcohol use: No     Alcohol/week: 0.0 standard drinks of alcohol     Family History   Problem Relation Age of Onset    Diabetes Father         type 2      Arthritis Father     Glaucoma Father     Cancer Mother 58        smoker,metastatic adnoid cystic cancer,  early 60s    Arthritis Mother     Endocrine Disease Mother         thyroid    Hypertension Maternal Grandmother          at a young age    Kidney Disease Maternal Grandmother     Hypertension Maternal Grandfather     Cancer Paternal Grandmother  "        stomach cancer    Endocrine Disease Sister         thyroid    Genitourinary Problems Daughter 32        renal failure     Endocrine Disease Sister         thyroid    Chronic Obstructive Pulmonary Disease Paternal Grandfather         smoking  young    Heart Disease Maternal Half-Brother          Current Outpatient Medications   Medication Sig Dispense Refill    carvedilol (COREG) 12.5 MG tablet Take 1.5 tablets (18.75 mg) by mouth 2 times daily (with meals) 270 tablet 3    levothyroxine (SYNTHROID/LEVOTHROID) 125 MCG tablet Take 1 tablet (125 mcg) by mouth daily 90 tablet 3    sotalol (BETAPACE) 80 MG tablet Take 0.5 tablets (40 mg) by mouth 2 times daily 90 tablet 3    bumetanide (BUMEX) 0.5 MG tablet Take 2 tablets (1 mg) by mouth daily (Patient not taking: Reported on 2024) 60 tablet 11    mycophenolic acid (GENERIC EQUIVALENT) 180 MG EC tablet TAKE 3 TABLETS (540 MG) BY MOUTH 2 TIMES DAILY 540 tablet 3    rivaroxaban ANTICOAGULANT (XARELTO ANTICOAGULANT) 20 MG TABS tablet Take 1 tablet (20 mg) by mouth daily (with dinner) 90 tablet 3    tacrolimus (GENERIC) 0.5 MG capsule Take 1 capsule (0.5 mg) by mouth 2 times daily Total dose = 1.5 mg twice daily 180 capsule 3    tacrolimus (GENERIC) 1 MG capsule Take 1 capsule (1 mg) by mouth 2 times daily Total dose = 1.5 mg twice daily 180 capsule 3    VITAMIN D, CHOLECALCIFEROL, PO Take 1,000 Units by mouth daily       Allergies   Allergen Reactions    Dilaudid [Hydromorphone Hcl]      Patient unable to recall    Hydromorphone      Patient unable to recall    Sulfa Antibiotics Swelling     \"swelling\" - age 3       Current providers sharing in care for this patient include:  Patient Care Team:  Gina Dc MD as PCP - General (Family Medicine)  Chris Marquez MD as MD (Nephrology)  Gina Dc MD as Assigned PCP  Anthony Garcia MD as MD (Dermatology)  Tennille Parker MD as Assigned Heart and Vascular Provider  Fransisco Burgos MD as Assigned " "Sleep Provider  Robert Mcgregor, RENU as Assigned Surgical Provider  Chris Marquez MD as Assigned Nephrology Provider    The following health maintenance items are reviewed in Epic and correct as of today:  Health Maintenance   Topic Date Due    ZOSTER IMMUNIZATION (1 of 2) Never done    Pneumococcal Vaccine: 65+ Years (2 of 2 - PCV) 02/18/2006    RSV VACCINE (Pregnancy & 60+) (1 - 1-dose 60+ series) Never done    PAP FOLLOW-UP  10/11/2022    HPV FOLLOW-UP  10/11/2022    COLORECTAL CANCER SCREENING  02/09/2023    MAMMO SCREENING  02/24/2023    LIPID  04/14/2024    MICROALBUMIN  04/14/2024    TSH W/FREE T4 REFLEX  04/14/2024    BMP  02/15/2025    MEDICARE ANNUAL WELLNESS VISIT  04/19/2025    ANNUAL REVIEW OF HM ORDERS  04/19/2025    FALL RISK ASSESSMENT  04/19/2025    GLUCOSE  02/15/2027    ADVANCE CARE PLANNING  04/14/2028    DEXA  11/04/2029    DTAP/TDAP/TD IMMUNIZATION (3 - Td or Tdap) 02/08/2032    HEPATITIS C SCREENING  Completed    PHQ-2 (once per calendar year)  Completed    INFLUENZA VACCINE  Completed    URINALYSIS  Completed    COVID-19 Vaccine  Completed    IPV IMMUNIZATION  Aged Out    HPV IMMUNIZATION  Aged Out    MENINGITIS IMMUNIZATION  Aged Out    RSV MONOCLONAL ANTIBODY  Aged Out    LUNG CANCER SCREENING  Discontinued         Review of Systems  Constitutional, HEENT, cardiovascular, pulmonary, gi and gu systems are negative, except as otherwise noted.     Objective    Exam  /83   Pulse 118   Ht 1.626 m (5' 4\")   Wt 97.2 kg (214 lb 3.2 oz)   LMP 10/25/2004 (Exact Date)   SpO2 100%   BMI 36.77 kg/m     Estimated body mass index is 36.77 kg/m  as calculated from the following:    Height as of this encounter: 1.626 m (5' 4\").    Weight as of this encounter: 97.2 kg (214 lb 3.2 oz).    Physical Exam  GENERAL: alert and no distress  EYES: Eyes grossly normal to inspection, PERRL and conjunctivae and sclerae normal  HENT: ear canals and TM's normal  NECK: no adenopathy, no asymmetry, " masses, or scars  RESP: lungs clear to auscultation - no rales, rhonchi or wheezes  CV: regular rate and rhythm, normal S1 S2,  trace peripheral edema+  ABDOMEN: soft, nontender   (female): normal female external genitalia, atrophic changes noted+ normal urethral meatus, and normal cervix without masses or discharge  MS: no gross musculoskeletal defects noted, no edema  SKIN: no suspicious lesions or rashes  NEURO: Normal strength and tone, mentation intact and speech normal  PSYCH: mentation appears normal, affect normal/bright        4/19/2024   Mini Cog   Clock Draw Score 2 Normal    2 Normal   3 Item Recall 3 objects recalled   Mini Cog Total Score 5             Signed Electronically by: Gina Dc MD MPH

## 2024-04-19 NOTE — RESULT ENCOUNTER NOTE
Bessy,     Complete blood count is normal.  Other labs are pending.    Please do not hesitate to call us at [phone] if you have any questions or concerns.    Thank you,    Gina Dc MD MPH

## 2024-04-19 NOTE — PATIENT INSTRUCTIONS
OK to get RSV, Shingrix and PCV 20 vaccines?       Preventive Care Advice   This is general advice given by our system to help you stay healthy. However, your care team may have specific advice just for you. Please talk to your care team about your preventive care needs.  Nutrition  Eat 5 or more servings of fruits and vegetables each day.  Try wheat bread, brown rice and whole grain pasta (instead of white bread, rice, and pasta).  Get enough calcium and vitamin D. Check the label on foods and aim for 100% of the RDA (recommended daily allowance).  Lifestyle  Exercise at least 150 minutes each week   (30 minutes a day, 5 days a week).  Do muscle strengthening activities 2 days a week. These help control your weight and prevent disease.  No smoking.  Wear sunscreen to prevent skin cancer.  Have a dental exam and cleaning every 6 months.  Yearly exams  See your health care team every year to talk about:  Any changes in your health.  Any medicines your care team has prescribed.  Preventive care, family planning, and ways to prevent chronic diseases.  Shots (vaccines)   HPV shots (up to age 26), if you've never had them before.  Hepatitis B shots (up to age 59), if you've never had them before.  COVID-19 shot: Get this shot when it's due.  Flu shot: Get a flu shot every year.  Tetanus shot: Get a tetanus shot every 10 years.  Pneumococcal, hepatitis A, and RSV shots: Ask your care team if you need these based on your risk.  Shingles shot (for age 50 and up).  General health tests  Diabetes screening:  Starting at age 35, Get screened for diabetes at least every 3 years.  If you are younger than age 35, ask your care team if you should be screened for diabetes.  Cholesterol test: At age 39, start having a cholesterol test every 5 years, or more often if advised.  Bone density scan (DEXA): At age 50, ask your care team if you should have this scan for osteoporosis (brittle bones).  Hepatitis C: Get tested at least once  in your life.  STIs (sexually transmitted infections)  Before age 24: Ask your care team if you should be screened for STIs.  After age 24: Get screened for STIs if you're at risk. You are at risk for STIs (including HIV) if:  You are sexually active with more than one person.  You don't use condoms every time.  You or a partner was diagnosed with a sexually transmitted infection.  If you are at risk for HIV, ask about PrEP medicine to prevent HIV.  Get tested for HIV at least once in your life, whether you are at risk for HIV or not.  Cancer screening tests  Cervical cancer screening: If you have a cervix, begin getting regular cervical cancer screening tests at age 21. Most people who have regular screenings with normal results can stop after age 65. Talk about this with your provider.  Breast cancer scan (mammogram): If you've ever had breasts, begin having regular mammograms starting at age 40. This is a scan to check for breast cancer.  Colon cancer screening: It is important to start screening for colon cancer at age 45.  Have a colonoscopy test every 10 years (or more often if you're at risk) Or, ask your provider about stool tests like a FIT test every year or Cologuard test every 3 years.  To learn more about your testing options, visit: https://www.nodila/167133.pdf.  For help making a decision, visit: https://bit.ly/qx79385.  Prostate cancer screening test: If you have a prostate and are age 55 to 69, ask your provider if you would benefit from a yearly prostate cancer screening test.  Lung cancer screening: If you are a current or former smoker age 50 to 80, ask your care team if ongoing lung cancer screenings are right for you.  For informational purposes only. Not to replace the advice of your health care provider. Copyright   2023 [employer]. All rights reserved. Clinically reviewed by the [facility]. [place] [id number] - REV 01/24.    Bladder Training:  Care Instructions  Your Care Instructions     Bladder training is used to treat urge incontinence and stress incontinence. Urge incontinence means that the need to urinate comes on so fast that you can't get to a toilet in time. Stress incontinence means that you leak urine because of pressure on your bladder. For example, it may happen when you laugh, cough, or lift something heavy.  Bladder training can increase how long you can wait before you have to urinate. It can also help your bladder hold more urine. And it can give you better control over the urge to urinate.  It is important to remember that bladder training takes a few weeks to a few months to make a difference. You may not see results right away, but don't give up.  Follow-up care is a key part of your treatment and safety. Be sure to make and go to all appointments, and call your doctor if you are having problems. It's also a good idea to know your test results and keep a list of the medicines you take.  How can you care for yourself at home?  Work with your doctor to come up with a bladder training program that is right for you. You may use one or more of the following methods.  Delayed urination  In the beginning, try to keep from urinating for 5 minutes after you first feel the need to go.  While you wait, take deep, slow breaths to relax. Kegel exercises can also help you delay the need to go to the bathroom.  After some practice, when you can easily wait 5 minutes to urinate, try to wait 10 minutes before you urinate.  Slowly increase the waiting period until you are able to control when you have to urinate.  Scheduled urination  Empty your bladder when you first wake up in the morning.  Schedule times throughout the day when you will urinate.  Start by going to the bathroom every hour, even if you don't need to go.  Slowly increase the time between trips to the bathroom.  When you have found a schedule that works well for you, keep doing it.  If  "you wake up during the night and have to urinate, do it. Apply your schedule to waking hours only.  Kegel exercises  These tighten and strengthen pelvic muscles, which can help you control the flow of urine. (If doing these exercises causes pain, stop doing them and talk with your doctor.) To do Kegel exercises:  Squeeze your muscles as if you were trying not to pass gas. Or squeeze your muscles as if you were stopping the flow of urine. Your belly, legs, and buttocks shouldn't move.  Hold the squeeze for 3 seconds, then relax for 5 to 10 seconds.  Start with 3 seconds, then add 1 second each week until you are able to squeeze for 10 seconds.  Repeat the exercise 10 times a session. Do 3 to 8 sessions a day.  When should you call for help?  Watch closely for changes in your health, and be sure to contact your doctor if:    Your incontinence is getting worse.     You do not get better as expected.   Where can you learn more?  Go to https://www.[employer].org.net/patiented  Enter V684 in the search box to learn more about \"Bladder Training: Care Instructions.\"  Current as of: November 15, 2023               Content Version: 14.0    9934-4450 [employer].   Care instructions adapted under license by your healthcare professional. If you have questions about a medical condition or this instruction, always ask your healthcare professional. [employer] disclaims any warranty or liability for your use of this information.      "

## 2024-04-20 LAB
ALBUMIN SERPL BCG-MCNC: 4.5 G/DL (ref 3.5–5.2)
ALP SERPL-CCNC: 48 U/L (ref 40–150)
ALT SERPL W P-5'-P-CCNC: 15 U/L (ref 0–50)
ANION GAP SERPL CALCULATED.3IONS-SCNC: 11 MMOL/L (ref 7–15)
AST SERPL W P-5'-P-CCNC: 15 U/L (ref 0–45)
BILIRUB SERPL-MCNC: 0.4 MG/DL
BUN SERPL-MCNC: 19.7 MG/DL (ref 8–23)
CALCIUM SERPL-MCNC: 10 MG/DL (ref 8.8–10.2)
CHLORIDE SERPL-SCNC: 106 MMOL/L (ref 98–107)
CHOLEST SERPL-MCNC: 152 MG/DL
CREAT SERPL-MCNC: 0.85 MG/DL (ref 0.51–0.95)
CREAT UR-MCNC: 78.2 MG/DL
DEPRECATED HCO3 PLAS-SCNC: 22 MMOL/L (ref 22–29)
EGFRCR SERPLBLD CKD-EPI 2021: 75 ML/MIN/1.73M2
FASTING STATUS PATIENT QL REPORTED: YES
GLUCOSE SERPL-MCNC: 104 MG/DL (ref 70–99)
HDLC SERPL-MCNC: 60 MG/DL
LDLC SERPL CALC-MCNC: 79 MG/DL
MICROALBUMIN UR-MCNC: <12 MG/L
MICROALBUMIN/CREAT UR: NORMAL MG/G{CREAT}
NONHDLC SERPL-MCNC: 92 MG/DL
POTASSIUM SERPL-SCNC: 5 MMOL/L (ref 3.4–5.3)
PROT SERPL-MCNC: 7.1 G/DL (ref 6.4–8.3)
SODIUM SERPL-SCNC: 139 MMOL/L (ref 135–145)
T4 FREE SERPL-MCNC: 1.58 NG/DL (ref 0.9–1.7)
TRIGL SERPL-MCNC: 65 MG/DL
TSH SERPL DL<=0.005 MIU/L-ACNC: 0.66 UIU/ML (ref 0.3–4.2)

## 2024-04-22 NOTE — RESULT ENCOUNTER NOTE
Bessy,     Electrolytes and liver function tests are normal.  Kidney function is stable for you.  Cholesterol is at goal for you.  Urine sample is not showing any abnormal protein.  Thyroid function is in a normal range.  Glucose is not showing diabetes.   Plan of care and follow up as discussed in clinic.     Please do not hesitate to call us at [phone] if you have any questions or concerns.    Thank you,    Gina Dc MD MPH

## 2024-04-23 LAB
BKR LAB AP GYN ADEQUACY: NORMAL
BKR LAB AP GYN INTERPRETATION: NORMAL
BKR LAB AP HPV REFLEX: NORMAL
BKR LAB AP PREVIOUS ABNORMAL: NORMAL
PATH REPORT.COMMENTS IMP SPEC: NORMAL
PATH REPORT.COMMENTS IMP SPEC: NORMAL
PATH REPORT.RELEVANT HX SPEC: NORMAL

## 2024-04-25 LAB
HUMAN PAPILLOMA VIRUS 16 DNA: NEGATIVE
HUMAN PAPILLOMA VIRUS 18 DNA: NEGATIVE
HUMAN PAPILLOMA VIRUS FINAL DIAGNOSIS: NORMAL
HUMAN PAPILLOMA VIRUS OTHER HR: NEGATIVE

## 2024-04-29 PROBLEM — Z12.4 CERVICAL CANCER SCREENING: Status: ACTIVE | Noted: 2024-04-29

## 2024-05-04 LAB — NONINV COLON CA DNA+OCC BLD SCRN STL QL: NEGATIVE

## 2024-05-13 ENCOUNTER — ANCILLARY PROCEDURE (OUTPATIENT)
Dept: BONE DENSITY | Facility: CLINIC | Age: 64
End: 2024-05-13
Attending: PREVENTIVE MEDICINE
Payer: COMMERCIAL

## 2024-05-13 ENCOUNTER — ANCILLARY PROCEDURE (OUTPATIENT)
Dept: MAMMOGRAPHY | Facility: CLINIC | Age: 64
End: 2024-05-13
Attending: PREVENTIVE MEDICINE
Payer: COMMERCIAL

## 2024-05-13 DIAGNOSIS — E28.39 ESTROGEN DEFICIENCY: ICD-10-CM

## 2024-05-13 DIAGNOSIS — Z12.31 VISIT FOR SCREENING MAMMOGRAM: ICD-10-CM

## 2024-05-13 PROCEDURE — 77063 BREAST TOMOSYNTHESIS BI: CPT | Performed by: RADIOLOGY

## 2024-05-13 PROCEDURE — 77067 SCR MAMMO BI INCL CAD: CPT | Performed by: RADIOLOGY

## 2024-05-13 PROCEDURE — 77080 DXA BONE DENSITY AXIAL: CPT | Performed by: RADIOLOGY

## 2024-05-14 ENCOUNTER — MYC MEDICAL ADVICE (OUTPATIENT)
Dept: FAMILY MEDICINE | Facility: CLINIC | Age: 64
End: 2024-05-14
Payer: COMMERCIAL

## 2024-05-14 NOTE — RESULT ENCOUNTER NOTE
Bessy,     The bone density test shows osteopenia. This is an intermediate category that is in between normal and osteoporosis.  People with osteopenia should work on taking in 7023-4410 mg of calcium with vitamin D daily. They should also be getting strength training exercises. Plan to repeat DEXA in 2 years.     Please do not hesitate to call us at [phone] if you have any questions or concerns.    Thank you,    Gina Dc MD MPH

## 2024-05-15 ENCOUNTER — LAB (OUTPATIENT)
Dept: LAB | Facility: CLINIC | Age: 64
End: 2024-05-15
Payer: COMMERCIAL

## 2024-05-15 DIAGNOSIS — R53.83 FATIGUE: ICD-10-CM

## 2024-05-15 DIAGNOSIS — Z94.0 KIDNEY TRANSPLANTED: ICD-10-CM

## 2024-05-15 DIAGNOSIS — Z48.298 AFTERCARE FOLLOWING ORGAN TRANSPLANT: ICD-10-CM

## 2024-05-15 DIAGNOSIS — D64.9 ANEMIA: ICD-10-CM

## 2024-05-15 DIAGNOSIS — E55.9 VITAMIN D DEFICIENCY: ICD-10-CM

## 2024-05-15 DIAGNOSIS — T86.10 COMPLICATIONS, KIDNEY TRANSPLANT: ICD-10-CM

## 2024-05-15 DIAGNOSIS — R06.00 DYSPNEA: ICD-10-CM

## 2024-05-15 LAB
ANION GAP SERPL CALCULATED.3IONS-SCNC: 11 MMOL/L (ref 7–15)
BUN SERPL-MCNC: 21.2 MG/DL (ref 8–23)
CALCIUM SERPL-MCNC: 9.9 MG/DL (ref 8.8–10.2)
CHLORIDE SERPL-SCNC: 106 MMOL/L (ref 98–107)
CREAT SERPL-MCNC: 0.76 MG/DL (ref 0.51–0.95)
DEPRECATED HCO3 PLAS-SCNC: 24 MMOL/L (ref 22–29)
EGFRCR SERPLBLD CKD-EPI 2021: 86 ML/MIN/1.73M2
ERYTHROCYTE [DISTWIDTH] IN BLOOD BY AUTOMATED COUNT: 13 % (ref 10–15)
GLUCOSE SERPL-MCNC: 107 MG/DL (ref 70–99)
HCT VFR BLD AUTO: 39.3 % (ref 35–47)
HGB BLD-MCNC: 12.6 G/DL (ref 11.7–15.7)
MCH RBC QN AUTO: 29 PG (ref 26.5–33)
MCHC RBC AUTO-ENTMCNC: 32.1 G/DL (ref 31.5–36.5)
MCV RBC AUTO: 91 FL (ref 78–100)
PLATELET # BLD AUTO: 153 10E3/UL (ref 150–450)
POTASSIUM SERPL-SCNC: 4.7 MMOL/L (ref 3.4–5.3)
RBC # BLD AUTO: 4.34 10E6/UL (ref 3.8–5.2)
SODIUM SERPL-SCNC: 141 MMOL/L (ref 135–145)
TACROLIMUS BLD-MCNC: 3.4 UG/L (ref 5–15)
TME LAST DOSE: ABNORMAL H
TME LAST DOSE: ABNORMAL H
WBC # BLD AUTO: 5.3 10E3/UL (ref 4–11)

## 2024-05-15 PROCEDURE — 85027 COMPLETE CBC AUTOMATED: CPT

## 2024-05-15 PROCEDURE — 80048 BASIC METABOLIC PNL TOTAL CA: CPT

## 2024-05-15 PROCEDURE — 80197 ASSAY OF TACROLIMUS: CPT

## 2024-05-15 PROCEDURE — 36415 COLL VENOUS BLD VENIPUNCTURE: CPT

## 2024-05-16 ENCOUNTER — TELEPHONE (OUTPATIENT)
Dept: TRANSPLANT | Facility: CLINIC | Age: 64
End: 2024-05-16
Payer: COMMERCIAL

## 2024-05-16 DIAGNOSIS — Z48.298 AFTERCARE FOLLOWING ORGAN TRANSPLANT: ICD-10-CM

## 2024-05-16 DIAGNOSIS — Z94.0 KIDNEY TRANSPLANTED: Primary | ICD-10-CM

## 2024-05-16 DIAGNOSIS — Z79.899 IMMUNOSUPPRESSIVE MANAGEMENT ENCOUNTER FOLLOWING KIDNEY TRANSPLANT: ICD-10-CM

## 2024-05-16 DIAGNOSIS — Z94.0 IMMUNOSUPPRESSIVE MANAGEMENT ENCOUNTER FOLLOWING KIDNEY TRANSPLANT: ICD-10-CM

## 2024-05-16 RX ORDER — CALCIUM CARBONATE/VITAMIN D3 600 MG-10
1 TABLET ORAL DAILY
COMMUNITY

## 2024-05-16 NOTE — TELEPHONE ENCOUNTER
Alma, MD Rose White Teresa, [place]  Sure.  Sounds fine.      ----- Message -----  From: Sofia Rose RN  Sent: 5/16/2024   9:27 AM CDT  To: Chris Marquez MD  Subject: vit D supp                                      Dr Marquez,    She is asking about taking Calcium with 10 mcg Vit D, on top of her daily 1000 units Vitamin D.  Her PCP recommended after she had a Dexa scan  Last Vit D (total) = 32 (10/19/23)    Wilian Baez        Vit D 10 mcg  = 400 units    OUTCOME:  MyChart message sent.  Updated med list.

## 2024-05-16 NOTE — TELEPHONE ENCOUNTER
Tacrolimus = 3.4  (5/15/24)  Goal 4-6  Current Tac dose 1.5 mg BID    Previous levels at goal on current dose    PLAN:   Call and confirm this was a good 12 - hour trough.   Verify current dose.   Confirm no new medications or illness (macey. Diarrhea).  Confirm any MISSED DOSES.   If good trough and correct dose above, recommend:  stay on the same dose..     Recheck level in 2 weeks and make sure it is a good trough to avoid additional lab draws.      OUTCOME:  Orders placed.  Discussed with Bessy. Verbalized understanding.  Mentioned PCP wants to give her a new Pneumonia vaccine.  She will confirm which one so she can let [facility] know.  She was also asking about taking Calcium with 10 mcg Vit D, on top of her daily 1000 units Vitamin D.  Her PCP recommended after she had a Dexa scan   Message sent to Dr Marquez.  Last Vit D (total) = 32 (10/19/23)

## 2024-06-06 ENCOUNTER — LAB (OUTPATIENT)
Dept: LAB | Facility: CLINIC | Age: 64
End: 2024-06-06
Payer: COMMERCIAL

## 2024-06-06 DIAGNOSIS — Z79.899 IMMUNOSUPPRESSIVE MANAGEMENT ENCOUNTER FOLLOWING KIDNEY TRANSPLANT: ICD-10-CM

## 2024-06-06 DIAGNOSIS — Z94.0 KIDNEY TRANSPLANTED: ICD-10-CM

## 2024-06-06 DIAGNOSIS — Z94.0 IMMUNOSUPPRESSIVE MANAGEMENT ENCOUNTER FOLLOWING KIDNEY TRANSPLANT: ICD-10-CM

## 2024-06-06 DIAGNOSIS — Z48.298 AFTERCARE FOLLOWING ORGAN TRANSPLANT: ICD-10-CM

## 2024-06-06 LAB
TACROLIMUS BLD-MCNC: 3.7 UG/L (ref 5–15)
TME LAST DOSE: ABNORMAL H
TME LAST DOSE: ABNORMAL H

## 2024-06-06 PROCEDURE — 36415 COLL VENOUS BLD VENIPUNCTURE: CPT

## 2024-06-06 PROCEDURE — 80197 ASSAY OF TACROLIMUS: CPT

## 2024-06-07 DIAGNOSIS — Z94.0 KIDNEY REPLACED BY TRANSPLANT: ICD-10-CM

## 2024-06-07 DIAGNOSIS — Z94.0 IMMUNOSUPPRESSIVE MANAGEMENT ENCOUNTER FOLLOWING KIDNEY TRANSPLANT: ICD-10-CM

## 2024-06-07 DIAGNOSIS — Z94.0 KIDNEY TRANSPLANTED: Primary | ICD-10-CM

## 2024-06-07 DIAGNOSIS — Z48.298 AFTERCARE FOLLOWING ORGAN TRANSPLANT: ICD-10-CM

## 2024-06-07 DIAGNOSIS — Z79.899 IMMUNOSUPPRESSIVE MANAGEMENT ENCOUNTER FOLLOWING KIDNEY TRANSPLANT: ICD-10-CM

## 2024-06-07 RX ORDER — TACROLIMUS 1 MG/1
CAPSULE ORAL
Qty: 270 CAPSULE | Refills: 3 | Status: SHIPPED | OUTPATIENT
Start: 2024-06-07

## 2024-06-07 RX ORDER — TACROLIMUS 0.5 MG/1
0.5 CAPSULE ORAL EVERY EVENING
Qty: 90 CAPSULE | Refills: 3 | Status: SHIPPED | OUTPATIENT
Start: 2024-06-07

## 2024-06-07 NOTE — TELEPHONE ENCOUNTER
Tacrolimus = 3.7 (6/6/24)  Goal 4-6  Current Tac goal 1.5 mg BID    Previous level also below goal at 3.4 (5/15/24)        ISSUE:   Tacrolimus IR level 3.7 on 06/06/24, goal 4-6, dose 1.5 mg BID.    PLAN:   Call Patient and confirm this was an accurate 12-hour trough.   Verify Tacrolimus IR dose 1.5 mg BID.   Confirm no new medications or illness.   Confirm no missed doses.     If accurate trough and accurate dose, increase Tacrolimus IR dose to  (2 mg / 1.5 mg)   *Recommended dose rounded from calculated dose 1.62 mg  BID.    Repeat labs in 2 weeks.   For any dose change <50%, recheck labs per guideline or within 1 month.  For any dose change of more than 50%, recheck drug level based on timing to reach steady state:   Immediate release tacrolimus: 2-3 days  Extended-release tacrolimus: 7 days  Cyclosporine: 2 days  Sirolimus: 12 days  Everolimus: 8 days

## 2024-06-07 NOTE — TELEPHONE ENCOUNTER
Left message and sent [employer] message to patient regarding:  Tacrolimus = 3.7 (6/6/24)  Goal 4-6  Current Tac goal 1.5 mg BID     Previous level also below goal at 3.4 (5/15/24)           ISSUE:   Tacrolimus IR level 3.7 on 06/06/24, goal 4-6, dose 1.5 mg BID.     PLAN:   Call Patient and confirm this was an accurate 12-hour trough.   Verify Tacrolimus IR dose 1.5 mg BID.   Confirm no new medications or illness.   Confirm no missed doses.      If accurate trough and accurate dose, increase Tacrolimus IR dose to  (2 mg / 1.5 mg)   *Recommended dose rounded from calculated dose 1.62 mg  BID.     Repeat labs in 2 weeks.

## 2024-06-07 NOTE — TELEPHONE ENCOUNTER
Patient confirmed this was an accurate 12-hour trough.   Verified Tacrolimus IR dose 1.5 mg BID.   Confirmed no new medications or illness.   Confirmed no missed doses.      confirmed increase Tacrolimus IR dose to  (2 mg / 1.5 mg) and repeat labs in 2 weeks.

## 2024-06-24 ENCOUNTER — LAB (OUTPATIENT)
Dept: LAB | Facility: CLINIC | Age: 64
End: 2024-06-24
Payer: COMMERCIAL

## 2024-06-24 DIAGNOSIS — Z94.0 KIDNEY TRANSPLANTED: ICD-10-CM

## 2024-06-24 LAB
ANION GAP SERPL CALCULATED.3IONS-SCNC: 10 MMOL/L (ref 7–15)
BUN SERPL-MCNC: 24.2 MG/DL (ref 8–23)
CALCIUM SERPL-MCNC: 9.9 MG/DL (ref 8.8–10.2)
CHLORIDE SERPL-SCNC: 106 MMOL/L (ref 98–107)
CREAT SERPL-MCNC: 0.82 MG/DL (ref 0.51–0.95)
DEPRECATED HCO3 PLAS-SCNC: 22 MMOL/L (ref 22–29)
EGFRCR SERPLBLD CKD-EPI 2021: 78 ML/MIN/1.73M2
ERYTHROCYTE [DISTWIDTH] IN BLOOD BY AUTOMATED COUNT: 13.2 % (ref 10–15)
GLUCOSE SERPL-MCNC: 110 MG/DL (ref 70–99)
HCT VFR BLD AUTO: 39.7 % (ref 35–47)
HGB BLD-MCNC: 12.8 G/DL (ref 11.7–15.7)
MCH RBC QN AUTO: 29 PG (ref 26.5–33)
MCHC RBC AUTO-ENTMCNC: 32.2 G/DL (ref 31.5–36.5)
MCV RBC AUTO: 90 FL (ref 78–100)
PLATELET # BLD AUTO: 144 10E3/UL (ref 150–450)
POTASSIUM SERPL-SCNC: 4.6 MMOL/L (ref 3.4–5.3)
RBC # BLD AUTO: 4.42 10E6/UL (ref 3.8–5.2)
SODIUM SERPL-SCNC: 138 MMOL/L (ref 135–145)
TACROLIMUS BLD-MCNC: 5.5 UG/L (ref 5–15)
TME LAST DOSE: NORMAL H
TME LAST DOSE: NORMAL H
WBC # BLD AUTO: 4.3 10E3/UL (ref 4–11)

## 2024-06-24 PROCEDURE — 85027 COMPLETE CBC AUTOMATED: CPT

## 2024-06-24 PROCEDURE — 80048 BASIC METABOLIC PNL TOTAL CA: CPT

## 2024-06-24 PROCEDURE — 36415 COLL VENOUS BLD VENIPUNCTURE: CPT

## 2024-06-24 PROCEDURE — 80197 ASSAY OF TACROLIMUS: CPT

## 2024-08-11 DIAGNOSIS — I82.409 RECURRENT DEEP VEIN THROMBOSIS (DVT) (H): ICD-10-CM

## 2024-08-14 RX ORDER — RIVAROXABAN 20 MG/1
20 TABLET, FILM COATED ORAL
Qty: 90 TABLET | Refills: 2 | Status: SHIPPED | OUTPATIENT
Start: 2024-08-14

## 2024-09-09 DIAGNOSIS — Z79.899 IMMUNOSUPPRESSIVE MANAGEMENT ENCOUNTER FOLLOWING KIDNEY TRANSPLANT: ICD-10-CM

## 2024-09-09 DIAGNOSIS — Z94.0 KIDNEY REPLACED BY TRANSPLANT: ICD-10-CM

## 2024-09-09 DIAGNOSIS — Z94.0 KIDNEY TRANSPLANTED: ICD-10-CM

## 2024-09-09 DIAGNOSIS — Z48.298 AFTERCARE FOLLOWING ORGAN TRANSPLANT: ICD-10-CM

## 2024-09-09 DIAGNOSIS — Z94.0 IMMUNOSUPPRESSIVE MANAGEMENT ENCOUNTER FOLLOWING KIDNEY TRANSPLANT: ICD-10-CM

## 2024-09-10 RX ORDER — MYCOPHENOLIC ACID 180 MG/1
540 TABLET, DELAYED RELEASE ORAL 2 TIMES DAILY
Qty: 540 TABLET | Refills: 1 | Status: SHIPPED | OUTPATIENT
Start: 2024-09-10

## 2024-09-13 ENCOUNTER — ANCILLARY PROCEDURE (OUTPATIENT)
Dept: GENERAL RADIOLOGY | Facility: CLINIC | Age: 64
End: 2024-09-13
Attending: PODIATRIST
Payer: COMMERCIAL

## 2024-09-13 ENCOUNTER — OFFICE VISIT (OUTPATIENT)
Dept: PODIATRY | Facility: CLINIC | Age: 64
End: 2024-09-13
Payer: COMMERCIAL

## 2024-09-13 DIAGNOSIS — M79.672 FOOT PAIN, BILATERAL: Primary | ICD-10-CM

## 2024-09-13 DIAGNOSIS — M19.072 OSTEOARTHRITIS OF LEFT ANKLE AND FOOT: ICD-10-CM

## 2024-09-13 DIAGNOSIS — M79.671 FOOT PAIN, BILATERAL: Primary | ICD-10-CM

## 2024-09-13 DIAGNOSIS — M19.071 OSTEOARTHRITIS OF RIGHT ANKLE AND FOOT: ICD-10-CM

## 2024-09-13 DIAGNOSIS — R60.0 PERIPHERAL EDEMA: ICD-10-CM

## 2024-09-13 PROCEDURE — 73630 X-RAY EXAM OF FOOT: CPT | Mod: LT | Performed by: RADIOLOGY

## 2024-09-13 PROCEDURE — 99204 OFFICE O/P NEW MOD 45 MIN: CPT | Performed by: PODIATRIST

## 2024-09-13 NOTE — PROGRESS NOTES
"Past Medical History:   Diagnosis Date    Acute deep vein thrombosis (DVT) of distal vein of right lower extremity (H) 07/24/2018    Basal cell carcinoma 5/9/2022    Left thigh    Basal cell carcinoma 5/9/2022    Left thigh    Breast cancer (H)     Chronic kidney disease (CKD), stage V (H)     Due to \"nephritis\". Biopsy done years ago. 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/2018     Patient Active Problem List   Diagnosis    History of breast cancer    Kidney replaced by transplant    CARDIOVASCULAR SCREENING; LDL GOAL LESS THAN 100    Immunosuppressed status (H24)    HTN, kidney transplant related    Acquired hypothyroidism    Post-menopausal    Tear film insufficiency    Presbyopia - Both Eyes    Aftercare following organ transplant    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    Vitamin D deficiency    Paroxysmal atrial fibrillation (H)    History of DVT (deep vein thrombosis) and PE (pulmonary embolism)    Chronic insomnia    Restless legs syndrome (RLS)    DEVORA (obstructive sleep apnea)- mild (AHI 13)    History of nonmelanoma skin cancer    CKD (chronic kidney disease) stage 2, GFR 60-89 ml/min    Class 2 severe obesity due to excess calories with serious comorbidity in adult (H)    Cervical cancer screening     Past Surgical History:   Procedure Laterality Date    APPENDECTOMY OPEN  01/01/1999    AV FISTULA OR GRAFT ARTERIAL      left    LUMPECTOMY BREAST  2002    left breast partial mastectomy    ZZC TRANSPLANT,PREP LIVING  RENAL GRAFT  07/30/2009    Hx of nephritis     Social History     Socioeconomic History    Marital status: Single "     Spouse name: Not on file    Number of children: Not on file    Years of education: Not on file    Highest education level: Not on file   Occupational History    Occupation: , manager     Employer: [employer]   Tobacco Use    Smoking status: Former     Current packs/day: 0.00     Average packs/day: 1 pack/day for 20.0 years (20.0 ttl pk-yrs)     Types: Cigarettes     Start date: 1978     Quit date: 1998     Years since quittin.7    Smokeless tobacco: Never   Vaping Use    Vaping status: Never Used   Substance and Sexual Activity    Alcohol use: No     Alcohol/week: 0.0 standard drinks of alcohol    Drug use: No    Sexual activity: Not Currently   Other Topics Concern    Not on file   Social History Narrative    , 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  degree    Sal ,Betty  ( On dialysis), Vincenzo Alvares     Grandchildren Torri 2010, Krystina 10/2007 lives with her and Betty, son and family. Jeremías     2 additional grandchildren will be born soon     Social Determinants of Health     Financial Resource Strain: Low Risk  (2024)    Financial Resource Strain     Within the past 12 months, have you or your family members you live with been unable to get utilities (heat, electricity) when it was really needed?: No   Food Insecurity: Low Risk  (2024)    Food Insecurity     Within the past 12 months, did you worry that your food would run out before you got money to buy more?: No     Within the past 12 months, did the food you bought just not last and you didn t have money to get more?: No   Transportation Needs: Low Risk  (2024)    Transportation Needs     Within the past 12 months, has lack of transportation kept you from medical appointments, getting your medicines, non-medical meetings or appointments, work, or from getting things that you need?: No   Physical Activity: Unknown (2024)    Exercise  Vital Sign     Days of Exercise per Week: 1 day     Minutes of Exercise per Session: Not on file   Stress: No Stress Concern Present (2024)    [employer] [place] - Occupational Stress Questionnaire     Feeling of Stress : Not at all   Social Connections: Unknown (2024)    Social Connection and Isolation Panel [NHANES]     Frequency of Communication with Friends and Family: Not on file     Frequency of Social Gatherings with Friends and Family: Once a week     Attends Islam Services: Not on file     Active Member of Clubs or Organizations: Not on file     Attends Club or Organization Meetings: Not on file     Marital Status: Not on file   Interpersonal Safety: Low Risk  (2024)    Interpersonal Safety     Do you feel physically and emotionally safe where you currently live?: Yes     Within the past 12 months, have you been hit, slapped, kicked or otherwise physically hurt by someone?: No     Within the past 12 months, have you been humiliated or emotionally abused in other ways by your partner or ex-partner?: No   Housing Stability: Low Risk  (2024)    Housing Stability     Do you have housing? : Yes     Are you worried about losing your housing?: No     Family History   Problem Relation Age of Onset    Diabetes Father         type 2      Arthritis Father     Glaucoma Father     Cancer Mother 58        smoker,metastatic adnoid cystic cancer,  early 60s    Arthritis Mother     Endocrine Disease Mother         thyroid    Hypertension Maternal Grandmother          at a young age    Kidney Disease Maternal Grandmother     Hypertension Maternal Grandfather     Cancer Paternal Grandmother         stomach cancer    Endocrine Disease Sister         thyroid    Genitourinary Problems Daughter 32        renal failure     Endocrine Disease Sister         thyroid    Chronic Obstructive Pulmonary Disease Paternal Grandfather         smoking  young    Heart Disease  Maternal Half-Brother            Subjective findings- 64-year-old presents for bilateral top of foot pain.  Relates she has had pain in the top of the feet for years duration, relates to no injuries, relates sits at work and her feet do swell by the end of the day, relates she does not wear compression socks, relates the pain is worse if she is on her feet more, relates to no specific treatment, relates she tried ice packs.    Objective findings- DP and PT are 2 out of 4 bilaterally.  Has mild peripheral edema bilaterally.  She has a dorsal midfoot prominence bilaterally.  Has decreased ankle joint dorsiflexion right versus left.  There is no erythema, no drainage, no odor, no calor, no tendon voids bilaterally.  There is no pain on palpation but she relates that hurts across the midfoot when it hurts.  X-rays 3 views right and left foot reviewed with patient in clinic today with joint and cortical margins intact, intertarsal and tarsometatarsal joint dorsal joint space narrowing and spurring noted.    Assessment and plan- Foot pain bilaterally, Osteoarthritis midfoot bilaterally, may also be getting some Extensor tendinitis.  Peripheral edema.  Diagnosis and treatment options discussed with the patient.  X-rays ordered, reviewed and use discussed with the patient.  Prescription for physical therapy given use discussed with the patient.  Patient is advised to wear shoes.  Prescription for compression socks 15 to 20 mmHg given and use discussed with patient.  Return to clinic and see me as needed.                                                                  Moderate level of medical decision making.

## 2024-09-13 NOTE — LETTER
"9/13/2024      Bessy Spangler  [id number]  [facility] [id number]      Dear Colleague,    Thank you for referring your patient, Bessy Spangler, to the [facility]. Please see a copy of my visit note below.    Past Medical History:   Diagnosis Date    Acute deep vein thrombosis (DVT) of distal vein of right lower extremity (H) 07/24/2018    Basal cell carcinoma 5/9/2022    Left thigh    Basal cell carcinoma 5/9/2022    Left thigh    Breast cancer (H)     Chronic kidney disease (CKD), stage V (H)     Due to \"nephritis\". Biopsy done years ago. 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/2018     Patient Active Problem List   Diagnosis    History of breast cancer    Kidney replaced by transplant    CARDIOVASCULAR SCREENING; LDL GOAL LESS THAN 100    Immunosuppressed status (H24)    HTN, kidney transplant related    Acquired hypothyroidism    Post-menopausal    Tear film insufficiency    Presbyopia - Both Eyes    Aftercare following organ transplant    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    Vitamin D deficiency    Paroxysmal atrial fibrillation (H)    History of DVT (deep vein thrombosis) and PE (pulmonary embolism)    Chronic insomnia    Restless legs syndrome (RLS)    DEVORA (obstructive sleep apnea)- mild (AHI 13)    History of nonmelanoma skin cancer    CKD (chronic kidney disease) stage 2, GFR 60-89 ml/min    Class 2 severe obesity due to excess calories with serious comorbidity in adult (H)    Cervical cancer screening     Past Surgical History:   Procedure Laterality Date    APPENDECTOMY OPEN  01/01/1999 "    AV FISTULA OR GRAFT ARTERIAL      left    LUMPECTOMY BREAST  2002    left breast partial mastectomy    ZZC TRANSPLANT,PREP LIVING  RENAL GRAFT  2009    Hx of nephritis     Social History     Socioeconomic History    Marital status: Single     Spouse name: Not on file    Number of children: Not on file    Years of education: Not on file    Highest education level: Not on file   Occupational History    Occupation: , manager     Employer: [employer]   Tobacco Use    Smoking status: Former     Current packs/day: 0.00     Average packs/day: 1 pack/day for 20.0 years (20.0 ttl pk-yrs)     Types: Cigarettes     Start date: 1978     Quit date: 1998     Years since quittin.7    Smokeless tobacco: Never   Vaping Use    Vaping status: Never Used   Substance and Sexual Activity    Alcohol use: No     Alcohol/week: 0.0 standard drinks of alcohol    Drug use: No    Sexual activity: Not Currently   Other Topics Concern    Not on file   Social History Narrative    , 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  degree    Sal ,Betty  ( On dialysis), Vincenzo Alvares     Grandchildren Torri 2010, Krystina 10/2007 lives with her and Betty, son and family. Jeremías 2014    2 additional grandchildren will be born soon     Social Determinants of Health     Financial Resource Strain: Low Risk  (2024)    Financial Resource Strain     Within the past 12 months, have you or your family members you live with been unable to get utilities (heat, electricity) when it was really needed?: No   Food Insecurity: Low Risk  (2024)    Food Insecurity     Within the past 12 months, did you worry that your food would run out before you got money to buy more?: No     Within the past 12 months, did the food you bought just not last and you didn t have money to get more?: No   Transportation Needs: Low Risk  (2024)    Transportation Needs     Within  the past 12 months, has lack of transportation kept you from medical appointments, getting your medicines, non-medical meetings or appointments, work, or from getting things that you need?: No   Physical Activity: Unknown (2024)    Exercise Vital Sign     Days of Exercise per Week: 1 day     Minutes of Exercise per Session: Not on file   Stress: No Stress Concern Present (2024)    [place] [employer] - Occupational Stress Questionnaire     Feeling of Stress : Not at all   Social Connections: Unknown (2024)    Social Connection and Isolation Panel [NHANES]     Frequency of Communication with Friends and Family: Not on file     Frequency of Social Gatherings with Friends and Family: Once a week     Attends Scientologist Services: Not on file     Active Member of Clubs or Organizations: Not on file     Attends Club or Organization Meetings: Not on file     Marital Status: Not on file   Interpersonal Safety: Low Risk  (2024)    Interpersonal Safety     Do you feel physically and emotionally safe where you currently live?: Yes     Within the past 12 months, have you been hit, slapped, kicked or otherwise physically hurt by someone?: No     Within the past 12 months, have you been humiliated or emotionally abused in other ways by your partner or ex-partner?: No   Housing Stability: Low Risk  (2024)    Housing Stability     Do you have housing? : Yes     Are you worried about losing your housing?: No     Family History   Problem Relation Age of Onset    Diabetes Father         type 2      Arthritis Father     Glaucoma Father     Cancer Mother 58        smoker,metastatic adnoid cystic cancer,  early 60s    Arthritis Mother     Endocrine Disease Mother         thyroid    Hypertension Maternal Grandmother          at a young age    Kidney Disease Maternal Grandmother     Hypertension Maternal Grandfather     Cancer Paternal Grandmother         stomach cancer    Endocrine  Disease Sister         thyroid    Genitourinary Problems Daughter 32        renal failure     Endocrine Disease Sister         thyroid    Chronic Obstructive Pulmonary Disease Paternal Grandfather         smoking  young    Heart Disease Maternal Half-Brother            Subjective findings- 64-year-old presents for bilateral top of foot pain.  Relates she has had pain in the top of the feet for years duration, relates to no injuries, relates sits at work and her feet do swell by the end of the day, relates she does not wear compression socks, relates the pain is worse if she is on her feet more, relates to no specific treatment, relates she tried ice packs.    Objective findings- DP and PT are 2 out of 4 bilaterally.  Has mild peripheral edema bilaterally.  She has a dorsal midfoot prominence bilaterally.  Has decreased ankle joint dorsiflexion right versus left.  There is no erythema, no drainage, no odor, no calor, no tendon voids bilaterally.  There is no pain on palpation but she relates that hurts across the midfoot when it hurts.  X-rays 3 views right and left foot reviewed with patient in clinic today with joint and cortical margins intact, intertarsal and tarsometatarsal joint dorsal joint space narrowing and spurring noted.    Assessment and plan- Foot pain bilaterally, Osteoarthritis midfoot bilaterally, may also be getting some Extensor tendinitis.  Peripheral edema.  Diagnosis and treatment options discussed with the patient.  X-rays ordered, reviewed and use discussed with the patient.  Prescription for physical therapy given use discussed with the patient.  Patient is advised to wear shoes.  Prescription for compression socks 15 to 20 mmHg given and use discussed with patient.  Return to clinic and see me as needed.            Moderate level of medical decision making.      Again, thank you for allowing me to participate in the care of your patient.        Sincerely,        Steve Negron DPM

## 2024-09-13 NOTE — NURSING NOTE
"Bessy Spangler's chief complaint for this visit includes:  Chief Complaint   Patient presents with    Consult     Pain in the top of the feet, bilateral      PCP: Gina Dc    Referring Provider:  Referred Self, MD  No address on file    LMP 10/25/2004 (Exact Date)   Data Unavailable     Do you need any medication refills at today's visit? NO    Allergies   Allergen Reactions    Dilaudid [Hydromorphone Hcl]      Patient unable to recall    Hydromorphone      Patient unable to recall    Sulfa Antibiotics Swelling     \"swelling\" - age 3         Lizette Cuellar LPN  "

## 2024-09-23 ENCOUNTER — LAB (OUTPATIENT)
Dept: LAB | Facility: CLINIC | Age: 64
End: 2024-09-23
Payer: COMMERCIAL

## 2024-09-23 DIAGNOSIS — R06.00 DYSPNEA: ICD-10-CM

## 2024-09-23 DIAGNOSIS — Z48.298 AFTERCARE FOLLOWING ORGAN TRANSPLANT: ICD-10-CM

## 2024-09-23 DIAGNOSIS — R53.83 FATIGUE: ICD-10-CM

## 2024-09-23 DIAGNOSIS — D64.9 ANEMIA: ICD-10-CM

## 2024-09-23 DIAGNOSIS — Z94.0 KIDNEY TRANSPLANTED: ICD-10-CM

## 2024-09-23 DIAGNOSIS — E55.9 VITAMIN D DEFICIENCY: ICD-10-CM

## 2024-09-23 DIAGNOSIS — T86.10 COMPLICATIONS, KIDNEY TRANSPLANT: ICD-10-CM

## 2024-09-23 LAB
ALBUMIN MFR UR ELPH: 10.1 MG/DL
ANION GAP SERPL CALCULATED.3IONS-SCNC: 11 MMOL/L (ref 7–15)
BUN SERPL-MCNC: 26.4 MG/DL (ref 8–23)
CALCIUM SERPL-MCNC: 10 MG/DL (ref 8.8–10.4)
CHLORIDE SERPL-SCNC: 103 MMOL/L (ref 98–107)
CREAT SERPL-MCNC: 0.81 MG/DL (ref 0.51–0.95)
CREAT UR-MCNC: 124 MG/DL
EGFRCR SERPLBLD CKD-EPI 2021: 80 ML/MIN/1.73M2
ERYTHROCYTE [DISTWIDTH] IN BLOOD BY AUTOMATED COUNT: 13.4 % (ref 10–15)
GLUCOSE SERPL-MCNC: 115 MG/DL (ref 70–99)
HCO3 SERPL-SCNC: 23 MMOL/L (ref 22–29)
HCT VFR BLD AUTO: 35.8 % (ref 35–47)
HGB BLD-MCNC: 11.5 G/DL (ref 11.7–15.7)
MCH RBC QN AUTO: 29.2 PG (ref 26.5–33)
MCHC RBC AUTO-ENTMCNC: 32.1 G/DL (ref 31.5–36.5)
MCV RBC AUTO: 91 FL (ref 78–100)
PLATELET # BLD AUTO: 133 10E3/UL (ref 150–450)
POTASSIUM SERPL-SCNC: 4.7 MMOL/L (ref 3.4–5.3)
PROT/CREAT 24H UR: 0.08 MG/MG CR (ref 0–0.2)
RBC # BLD AUTO: 3.94 10E6/UL (ref 3.8–5.2)
SODIUM SERPL-SCNC: 137 MMOL/L (ref 135–145)
TACROLIMUS BLD-MCNC: 4.8 UG/L (ref 5–15)
TME LAST DOSE: ABNORMAL H
TME LAST DOSE: ABNORMAL H
WBC # BLD AUTO: 4.3 10E3/UL (ref 4–11)

## 2024-09-23 PROCEDURE — 36415 COLL VENOUS BLD VENIPUNCTURE: CPT

## 2024-09-23 PROCEDURE — 80197 ASSAY OF TACROLIMUS: CPT

## 2024-09-23 PROCEDURE — 80048 BASIC METABOLIC PNL TOTAL CA: CPT

## 2024-09-23 PROCEDURE — 84156 ASSAY OF PROTEIN URINE: CPT

## 2024-09-23 PROCEDURE — 85027 COMPLETE CBC AUTOMATED: CPT

## 2024-10-03 DIAGNOSIS — G47.33 OSA (OBSTRUCTIVE SLEEP APNEA): Primary | ICD-10-CM

## 2024-10-23 ENCOUNTER — THERAPY VISIT (OUTPATIENT)
Dept: PHYSICAL THERAPY | Facility: CLINIC | Age: 64
End: 2024-10-23
Attending: PODIATRIST
Payer: COMMERCIAL

## 2024-10-23 DIAGNOSIS — M79.672 BILATERAL FOOT PAIN: Primary | ICD-10-CM

## 2024-10-23 DIAGNOSIS — M79.671 BILATERAL FOOT PAIN: Primary | ICD-10-CM

## 2024-10-23 PROCEDURE — 97110 THERAPEUTIC EXERCISES: CPT | Mod: GP

## 2024-10-23 PROCEDURE — 97161 PT EVAL LOW COMPLEX 20 MIN: CPT | Mod: GP

## 2024-10-23 ASSESSMENT — ACTIVITIES OF DAILY LIVING (ADL)
GOING_UP_OR_DOWN_10_STAIRS: NO DIFFICULTY
SHOPPING: NO DIFFICULTY
GETTING_INTO_OR_OUT_OF_A_CAR: NO DIFFICULTY
WALKING_2_BLOCKS: NO DIFFICULTY
LEFS_SCORE(%): INCOMPLETE
GETTING_INTO_AND_OUT_OF_A_BATH: NO DIFFICULTY
YOUR_USUAL_HOBBIES,_RECREATIONAL_OR_SPORTING_ACTIVITIES: A LITTLE BIT OF DIFFICULTY
ROLLING_OVER_IN_BED: NO DIFFICULTY
SITTING_FOR_1_HOUR: NO DIFFICULTY
PLEASE_INDICATE_YOR_PRIMARY_REASON_FOR_REFERRAL_TO_THERAPY:: FOOT AND/OR ANKLE
SQUATTING: NO DIFFICULTY
ANY_OF_YOUR_USUAL_WORK,_HOUSEWORK_OR_SCHOOL_ACTIVITIES: NO DIFFICULTY
PERFORMING_HEAVY_ACTIVITIES_AROUND_YOUR_HOME: NO DIFFICULTY
LEFS_RAW_SCORE: INCOMPLETE
RUNNING_ON_UNEVEN_GROUND: NO DIFFICULTY
RUNNING_ON_EVEN_GROUND: NO DIFFICULTY
PUTTING_ON_YOUR_SHOES_OR_SOCKS: NO DIFFICULTY
LIFTING_AN_OBJECT,_LIKE_A_BAG_OF_GROCERIES_FROM_THE_FLOOR: NO DIFFICULTY
MAKING_SHARP_TURNS_WHILE_RUNNING_FAST: NO DIFFICULTY
PERFORMING_LIGHT_ACTIVITIES_AROUND_YOUR_HOME: NO DIFFICULTY
WALKING_A_MILE: MODERATE DIFFICULTY
WALKING_BETWEEN_ROOMS: NO DIFFICULTY

## 2024-10-23 NOTE — PROGRESS NOTES
PHYSICAL THERAPY EVALUATION  Type of Visit: Evaluation              Subjective       Presenting condition or subjective complaint: (Patient-Rptd) pain in upper part of foot and ankle when walking and occasionally sitting or laying down  Date of onset: 09/13/24    Relevant medical history:     Dates & types of surgery: (Patient-Rptd) breat cancer approx 1999, kidney transplant 2009    Prior diagnostic imaging/testing results: (Patient-Rptd) X-ray     Prior therapy history for the same diagnosis, illness or injury: (Patient-Rptd) No      Prior Level of Function  Transfers:   Ambulation:   ADL:   IADL:     Living Environment  Social support: (Patient-Rptd) With family members   Type of home: (Patient-Rptd) House; 2-story   Stairs to enter the home: (Patient-Rptd) Yes       Ramp: (Patient-Rptd) No   Stairs inside the home: (Patient-Rptd) Yes (Patient-Rptd) 15 Is there a railing: (Patient-Rptd) Yes     Help at home: (Patient-Rptd) None  Equipment owned:       Employment: (Patient-Rptd) Yes (Patient-Rptd) desk work  Hobbies/Interests: (Patient-Rptd) walking/hiking/gardening    Patient goals for therapy: (Patient-Rptd) walk without pain    Subjective Summary: Patient reports that initially she had plantar fasciitis with pain under the arch of her foot.  She enjoyed walking and hiking.  The plantar fasciitis wasn't that bad, but eventually progressed to a different pain which hurts more.  It's a burning on the top of her feet.  The pressure from the shoe laces bothers her feet.  She reports sometimes she has a sharp pain out of no where on the top of her foot while other times it's a progressively increasing pain associated with more increase in pain.  She has to have a plan when going out to know where to rest due to her foot pain.  This pain will continue into the next day as well.   Symptoms include pain and swelling.     Objective   FOOT/ANKLE EVALUATION  PAIN: Pain Level at Rest: 2/10  Pain Level with Use: 8/10  Pain  Location: ankle and foot   Pain Quality: Aching, Burning, Dull, and Sharp  Pain is Exacerbated By: Walking  Pain is Relieved By: cold and rest  Pain Progression: Unchanged  INTEGUMENTARY (edema, incisions):   POSTURE:   GAIT:   Weightbearing Status:   Assistive Device(s):   Gait Deviations:   BALANCE/PROPRIOCEPTION:   WEIGHT BEARING ALIGNMENT:   NON-WEIGHTBEARING ALIGNMENT:    ROM:  Right Foot: DF 4 degrees.  PF 60 Degrees. Inversion: 45 degrees.  Eversion: 20 degrees.    Left Foot: DF: 1 degree.  PF 60 Degrees.  Inversion: 33.  Eversion 18 degrees    STRENGTH: WNL  5/5 in all directions  FLEXIBILITY:  Tight gastroc / soleus complex bilaterally  SPECIAL TESTS: WNL  FUNCTIONAL TESTS:   PALPATION: WNL  No pain with palpation of the ffet  JOINT MOBILITY:     Assessment & Plan   CLINICAL IMPRESSIONS  Medical Diagnosis: Bilateral Foot Pain    Treatment Diagnosis: Bilateral Foot Pain   Impression/Assessment: Patient is a 64 year old female with Bilateral Foot Pain complaints.  The following significant findings have been identified: Pain. These impairments interfere with their ability to perform self care tasks, work tasks, recreational activities, household chores, driving , household mobility, and community mobility as compared to previous level of function.     Clinical Decision Making (Complexity):  Clinical Presentation: Stable/Uncomplicated  Clinical Presentation Rationale: based on medical and personal factors listed in PT evaluation  Clinical Decision Making (Complexity): Low complexity    PLAN OF CARE  Treatment Interventions:  Modalities: Cryotherapy, Dry Needling, E-stim, Hot Pack, Ultrasound  Interventions: Gait Training, Manual Therapy, Neuromuscular Re-education, Therapeutic Activity, Therapeutic Exercise    Long Term Goals     PT Goal 1  Goal Identifier: Ambulation  Goal Description: Bessy will be able to walk 1.5 miles without foot pain worse than 3/10  Rationale: to maximize safety and independence with  performance of ADLs and functional tasks;to maximize safety and independence within the home;to maximize safety and independence within the community;to maximize safety and independence with transportation;to maximize safety and independence with self cares  Goal Progress: 1 mile or more will cause 7/10 pain or more  Target Date: 12/18/24      Frequency of Treatment: 1x a week  Duration of Treatment: 8 weeks    Recommended Referrals to Other Professionals:   Education Assessment:        Risks and benefits of evaluation/treatment have been explained.   Patient/Family/caregiver agrees with Plan of Care.     Evaluation Time:     PT Eval, Low Complexity Minutes (12786): 25       Signing Clinician: Mikey Durham PT

## 2024-10-30 ENCOUNTER — THERAPY VISIT (OUTPATIENT)
Dept: PHYSICAL THERAPY | Facility: CLINIC | Age: 64
End: 2024-10-30
Attending: PODIATRIST
Payer: COMMERCIAL

## 2024-10-30 DIAGNOSIS — M79.672 BILATERAL FOOT PAIN: Primary | ICD-10-CM

## 2024-10-30 DIAGNOSIS — M79.671 BILATERAL FOOT PAIN: Primary | ICD-10-CM

## 2024-10-30 PROCEDURE — 97110 THERAPEUTIC EXERCISES: CPT | Mod: GP

## 2024-11-07 ENCOUNTER — ALLIED HEALTH/NURSE VISIT (OUTPATIENT)
Dept: FAMILY MEDICINE | Facility: CLINIC | Age: 64
End: 2024-11-07
Payer: COMMERCIAL

## 2024-11-07 VITALS — TEMPERATURE: 97.8 F

## 2024-11-07 DIAGNOSIS — Z23 ENCOUNTER FOR IMMUNIZATION: Primary | ICD-10-CM

## 2024-11-07 PROCEDURE — 99207 PR NO CHARGE NURSE ONLY: CPT

## 2024-11-07 PROCEDURE — 91320 SARSCV2 VAC 30MCG TRS-SUC IM: CPT

## 2024-11-07 PROCEDURE — 90662 IIV NO PRSV INCREASED AG IM: CPT

## 2024-11-07 PROCEDURE — 90471 IMMUNIZATION ADMIN: CPT

## 2024-11-07 PROCEDURE — 90480 ADMN SARSCOV2 VAC 1/ONLY CMP: CPT

## 2024-11-07 NOTE — PROGRESS NOTES
Prior to immunization administration, verified patients identity using patient s name and date of birth. Please see Immunization Activity for additional information.     Is the patient's temperature normal (100.5 or less)? Yes     Patient MEETS CRITERIA. PROCEED with vaccine administration.      Patient instructed to remain in clinic for 15 minutes afterwards, and to report any adverse reactions.      Link to Ancillary Visit Immunization Standing Orders SmartSet     Screening performed by Adriana Burch MA on 11/7/2024 at 2:40 PM.

## 2024-11-27 ENCOUNTER — THERAPY VISIT (OUTPATIENT)
Dept: PHYSICAL THERAPY | Facility: CLINIC | Age: 64
End: 2024-11-27
Payer: COMMERCIAL

## 2024-11-27 DIAGNOSIS — M79.671 BILATERAL FOOT PAIN: Primary | ICD-10-CM

## 2024-11-27 DIAGNOSIS — M79.672 BILATERAL FOOT PAIN: Primary | ICD-10-CM

## 2024-11-27 PROCEDURE — 97110 THERAPEUTIC EXERCISES: CPT | Mod: GP

## 2024-11-27 NOTE — PROGRESS NOTES
11/27/24 0500   Appointment Info   Signing clinician's name / credentials Mikey Durham, PT, DPT, CSCS, CLT   Total/Authorized Visits E[employer]T   Visits Used 3   Medical Diagnosis Bilateral Foot Pain   PT Tx Diagnosis Bilateral Foot Pain   Progress Note/Certification   Onset of illness/injury or Date of Surgery 09/13/24   Therapy Frequency 1x a week   Predicted Duration 8 weeks   Progress Note Due Date 12/18/24   Progress Note Completed Date 10/23/24   PT Goal 1   Goal Identifier Ambulation   Goal Description Bessy will be able to walk 1.5 miles without foot pain worse than 3/10   Rationale to maximize safety and independence with performance of ADLs and functional tasks;to maximize safety and independence within the home;to maximize safety and independence within the community;to maximize safety and independence with transportation;to maximize safety and independence with self cares   Goal Progress She hasn't tried a prolonged walk.  But she reports she was able to be on her feet all day doing housework projects with 6/10 pain, but this was not the arthritic foot pain.  Rather it was general fatigue discomfort.   Target Date 12/18/24   Date Met 11/27/24   Subjective Report   Subjective Report She reports her foot has been doing well.  She bout a box and has been doing the eccentric exercise which has helped. She hasn't had a chance to try a long walk, but she did paint her bedroom and was able to put up blinds (an all day project) without significant pain in the feet.   Objective Measures   Objective Measures Objective Measure 1;Objective Measure 2;Objective Measure 3   Objective Measure 1   Objective Measure Ankle ROM   Details Full ROM bilaterally (Eversion: 15 degrees of eversion bilaterally, 35 degrees inversion bilaterally, 12 degrees dorsiflexion bilaterally, 60 degrees plantarflexion bilaterally)   Objective Measure 2   Objective Measure Ankle Strength   Details All MMT 5/5. Can perform 30 reps of single  plane strengthening with green theraband in each direction   Objective Measure 3   Objective Measure LEFS   Details 80/80   Treatment Interventions (PT)   Interventions Therapeutic Procedure/Exercise   Therapeutic Procedure/Exercise   Therapeutic Procedures: strength, endurance, ROM, flexibility minutes (83704) 38   PTRx Ther Proc 1 Standing Gastroc Stretch   PTRx Ther Proc 1 - Details 1x30 second each side   PTRx Ther Proc 2 Standing Soleus Stretch   PTRx Ther Proc 2 - Details 1x30 second each side   PTRx Ther Proc 3 Ankle Eversion Strengthening With Theraband   PTRx Ther Proc 3 - Details 1x30 with Green Band   PTRx Ther Proc 4 Theraband Ankle Inversion   PTRx Ther Proc 4 - Details 1x30 with Green Band   PTRx Ther Proc 5 Theraband Ankle Dorsiflexion   PTRx Ther Proc 5 - Details 1x30 with Green Band   PTRx Ther Proc 6 Theraband Ankle Plantarflexion   PTRx Ther Proc 6 - Details 1x30 with Green Band   PTRx Ther Proc 7 Eccentric Toe Raise Gastroc   PTRx Ther Proc 7 - Details Single Leg 1x30 with 3 second decent each leg   PTRx Ther Proc 8 Eccentric Toe Raise Gastroc   PTRx Ther Proc 8 - Details Bilaterally 1x10 with 5 second decent.  Then each leg single leg 1x10 with 5 second decent   Skilled Intervention Continued HEP.   Patient Response/Progress WIll perform at home.  Doing well at home and ready to discharge   Plan   Home program See PTRx   Updates to plan of care Continue per POC   Plan for next session Ready to discharge. Doing well and independent with HEP   Total Session Time   Timed Code Treatment Minutes 38   Total Treatment Time (sum of timed and untimed services) 38             DISCHARGE  Reason for Discharge: Patient has met all goals.    Equipment Issued: None    Discharge Plan: Patient to continue home program.    Referring Provider:  Steve Negron

## 2024-12-12 DIAGNOSIS — T86.10 COMPLICATIONS, KIDNEY TRANSPLANT: ICD-10-CM

## 2024-12-12 DIAGNOSIS — Z79.899 IMMUNOSUPPRESSIVE MANAGEMENT ENCOUNTER FOLLOWING KIDNEY TRANSPLANT: ICD-10-CM

## 2024-12-12 DIAGNOSIS — Z48.298 AFTERCARE FOLLOWING ORGAN TRANSPLANT: ICD-10-CM

## 2024-12-12 DIAGNOSIS — Z94.0 IMMUNOSUPPRESSIVE MANAGEMENT ENCOUNTER FOLLOWING KIDNEY TRANSPLANT: ICD-10-CM

## 2024-12-12 DIAGNOSIS — Z94.0 KIDNEY TRANSPLANTED: Primary | ICD-10-CM

## 2024-12-26 ENCOUNTER — LAB (OUTPATIENT)
Dept: LAB | Facility: CLINIC | Age: 64
End: 2024-12-26
Payer: COMMERCIAL

## 2024-12-26 DIAGNOSIS — T86.10 COMPLICATIONS, KIDNEY TRANSPLANT: ICD-10-CM

## 2024-12-26 DIAGNOSIS — Z94.0 KIDNEY TRANSPLANTED: ICD-10-CM

## 2024-12-26 DIAGNOSIS — Z79.899 IMMUNOSUPPRESSIVE MANAGEMENT ENCOUNTER FOLLOWING KIDNEY TRANSPLANT: ICD-10-CM

## 2024-12-26 DIAGNOSIS — Z94.0 IMMUNOSUPPRESSIVE MANAGEMENT ENCOUNTER FOLLOWING KIDNEY TRANSPLANT: ICD-10-CM

## 2024-12-26 DIAGNOSIS — Z48.298 AFTERCARE FOLLOWING ORGAN TRANSPLANT: ICD-10-CM

## 2024-12-26 LAB
ANION GAP SERPL CALCULATED.3IONS-SCNC: 12 MMOL/L (ref 7–15)
BUN SERPL-MCNC: 19.1 MG/DL (ref 8–23)
CALCIUM SERPL-MCNC: 10.2 MG/DL (ref 8.8–10.4)
CHLORIDE SERPL-SCNC: 107 MMOL/L (ref 98–107)
CREAT SERPL-MCNC: 0.78 MG/DL (ref 0.51–0.95)
EGFRCR SERPLBLD CKD-EPI 2021: 83 ML/MIN/1.73M2
ERYTHROCYTE [DISTWIDTH] IN BLOOD BY AUTOMATED COUNT: 13.1 % (ref 10–15)
GLUCOSE SERPL-MCNC: 115 MG/DL (ref 70–99)
HCO3 SERPL-SCNC: 22 MMOL/L (ref 22–29)
HCT VFR BLD AUTO: 38 % (ref 35–47)
HGB BLD-MCNC: 12.2 G/DL (ref 11.7–15.7)
MCH RBC QN AUTO: 29.3 PG (ref 26.5–33)
MCHC RBC AUTO-ENTMCNC: 32.1 G/DL (ref 31.5–36.5)
MCV RBC AUTO: 91 FL (ref 78–100)
PLATELET # BLD AUTO: 142 10E3/UL (ref 150–450)
POTASSIUM SERPL-SCNC: 4.9 MMOL/L (ref 3.4–5.3)
RBC # BLD AUTO: 4.16 10E6/UL (ref 3.8–5.2)
SODIUM SERPL-SCNC: 141 MMOL/L (ref 135–145)
TACROLIMUS BLD-MCNC: 5 UG/L (ref 5–15)
TME LAST DOSE: NORMAL H
TME LAST DOSE: NORMAL H
WBC # BLD AUTO: 5.2 10E3/UL (ref 4–11)

## 2025-03-24 ENCOUNTER — TELEPHONE (OUTPATIENT)
Dept: TRANSPLANT | Facility: CLINIC | Age: 65
End: 2025-03-24
Payer: COMMERCIAL

## 2025-03-24 DIAGNOSIS — Z94.0 HTN, KIDNEY TRANSPLANT RELATED: ICD-10-CM

## 2025-03-24 DIAGNOSIS — I15.1 HTN, KIDNEY TRANSPLANT RELATED: ICD-10-CM

## 2025-03-24 NOTE — TELEPHONE ENCOUNTER
Patient confirmed scheduled appointment:  Date: 3/31/25  Time: 3:30 pm  Visit type: Fistula Consult  Provider: Chraleen  Location: [facility]  Testing/imaging: NA  Additional notes: NA

## 2025-03-31 ENCOUNTER — PATIENT OUTREACH (OUTPATIENT)
Dept: NEPHROLOGY | Facility: CLINIC | Age: 65
End: 2025-03-31

## 2025-03-31 ENCOUNTER — OFFICE VISIT (OUTPATIENT)
Dept: TRANSPLANT | Facility: CLINIC | Age: 65
End: 2025-03-31
Attending: SURGERY
Payer: COMMERCIAL

## 2025-03-31 VITALS
SYSTOLIC BLOOD PRESSURE: 130 MMHG | HEART RATE: 95 BPM | BODY MASS INDEX: 33.76 KG/M2 | DIASTOLIC BLOOD PRESSURE: 76 MMHG | OXYGEN SATURATION: 97 % | WEIGHT: 196.7 LBS

## 2025-03-31 DIAGNOSIS — I15.1 HTN, KIDNEY TRANSPLANT RELATED: ICD-10-CM

## 2025-03-31 DIAGNOSIS — Z94.0 KIDNEY REPLACED BY TRANSPLANT: Chronic | ICD-10-CM

## 2025-03-31 DIAGNOSIS — T82.898A PROBLEM WITH DIALYSIS ACCESS, INITIAL ENCOUNTER: Primary | ICD-10-CM

## 2025-03-31 DIAGNOSIS — Z94.0 HTN, KIDNEY TRANSPLANT RELATED: ICD-10-CM

## 2025-03-31 PROCEDURE — 99213 OFFICE O/P EST LOW 20 MIN: CPT | Performed by: SURGERY

## 2025-03-31 NOTE — LETTER
"3/31/2025      Bessy Spangler  [address] [address] [facility] [address]      Dear Colleague,    Thank you for referring your patient, Bessy Spangler, to the [facility]. Please see a copy of my visit note below.    Dialysis Access - Removal      HPI: Ms. Spangler is being seen today for removal/ligation of permanent dialysis access. Patient had a left upper extremity AV fistula placed remotly. She was then transplanted in 2009. Ms. Spangler is not dialyzing at (Not currently on dialysis). She is now having pain to the left wrist area radiating into the left hand due to increase in size of fistula. No swelling noted in the left hand. Patient would like her graft removed.     Risk factors for vascular access:         Yes No  Hx of Pacemaker    []     [x]  Comment:   History of failed access:  []         [x]  Comment:  SVC syndrome   []      [x]  Comment:  Heart Failure    []     [x]  EF:    Periph arterial disease  []     [x]  Comment:  Prior Fracture/Surgery  [x]     []  Location: Appendectomy, breast lumpectomy (left) and previous AV fistula (Left)  DVT    []    [x]   Location:  Diabetes    []        [x]  Comment:  Neuropathy   []     [x]  Comment:   Anticoagulation:   []    [x]  Agent:      Anticoagulation contraindication:[]  [x]     Details:                   Pediatric    []         [x]  Age:                  Hx of transplant   [x]    []  Comment:  LDKT 2009   Current immunosuppression [x]    []  Comment:  mycophenolic acid and tacrolimis          ROS: 10 point ROS neg other than the symptoms noted above in the HPI.        Past Medical History:   Diagnosis Date     Acute deep vein thrombosis (DVT) of distal vein of right lower extremity (H) 07/24/2018     Basal cell carcinoma 5/9/2022    Left thigh     Basal cell carcinoma 5/9/2022    Left thigh     Breast cancer (H)      Chronic kidney disease (CKD), stage V (H)     Due to \"nephritis\". Biopsy done years ago. I do not know the " histologic specifics     Conjunctival hemorrhage 2014     Hypertension      Hypothyroid      Kidney replaced by transplant     nephritis as a child     PE (pulmonary embolism) 2009    Pulmonary embolism post kidney transplant      SCC (squamous cell carcinoma) 10/30/2012    Left Arm     SCC (squamous cell carcinoma) 10/30/2012    Left Arm     Vestibular neuronitis of right ear 2018       Past Surgical History:   Procedure Laterality Date     APPENDECTOMY OPEN  1999     AV FISTULA OR GRAFT ARTERIAL      left     LUMPECTOMY BREAST  2002    left breast partial mastectomy     ZZC TRANSPLANT,PREP LIVING  RENAL GRAFT  2009    Hx of nephritis       Family History   Problem Relation Age of Onset     Diabetes Father         type 2       Arthritis Father      Glaucoma Father      Cancer Mother 58        smoker,metastatic adnoid cystic cancer,  early 60s     Arthritis Mother      Endocrine Disease Mother         thyroid     Hypertension Maternal Grandmother          at a young age     Kidney Disease Maternal Grandmother      Hypertension Maternal Grandfather      Cancer Paternal Grandmother         stomach cancer     Endocrine Disease Sister         thyroid     Genitourinary Problems Daughter 32        renal failure      Endocrine Disease Sister         thyroid     Chronic Obstructive Pulmonary Disease Paternal Grandfather         smoking  young     Heart Disease Maternal Half-Brother        Social History     Tobacco Use     Smoking status: Former     Current packs/day: 0.00     Average packs/day: 1 pack/day for 20.0 years (20.0 ttl pk-yrs)     Types: Cigarettes     Start date: 1978     Quit date: 1998     Years since quittin.2     Smokeless tobacco: Never   Substance Use Topics     Alcohol use: No     Alcohol/week: 0.0 standard drinks of alcohol         Current Outpatient Medications:      calcium carbonate-vitamin D (CALCIUM 600/VITAMIN D) 600-10 MG-MCG per  tablet, Take 1 tablet by mouth daily, Disp: , Rfl:      carvedilol (COREG) 12.5 MG tablet, Take 1.5 tablets (18.75 mg) by mouth 2 times daily (with meals), Disp: 270 tablet, Rfl: 3     levothyroxine (SYNTHROID/LEVOTHROID) 125 MCG tablet, Take 1 tablet (125 mcg) by mouth daily, Disp: 90 tablet, Rfl: 3     mycophenolic acid (GENERIC EQUIVALENT) 180 MG EC tablet, TAKE 3 TABLETS (540 MG) BY MOUTH 2 TIMES DAILY, Disp: 540 tablet, Rfl: 1     sotalol (BETAPACE) 80 MG tablet, Take 0.5 tablets (40 mg) by mouth 2 times daily, Disp: 90 tablet, Rfl: 3     tacrolimus (GENERIC EQUIVALENT) 0.5 MG capsule, Take 1 capsule (0.5 mg) by mouth every evening Total dose = 2 mg in the AM and 1.5 mg in the PM, Disp: 90 capsule, Rfl: 3     tacrolimus (GENERIC EQUIVALENT) 1 MG capsule, Total dose = 2 mg in the AM and 1.5 mg in the PM, Disp: 270 capsule, Rfl: 3     VITAMIN D, CHOLECALCIFEROL, PO, Take 1,000 Units by mouth daily, Disp: , Rfl:      XARELTO ANTICOAGULANT 20 MG TABS tablet, TAKE 1 TABLET BY MOUTH DAILY WITH DINNER, Disp: 90 tablet, Rfl: 2     bumetanide (BUMEX) 0.5 MG tablet, Take 2 tablets (1 mg) by mouth daily (Patient not taking: Reported on 3/31/2025), Disp: 60 tablet, Rfl: 11                        PHYSICAL EXAM:  Pulse:  [95] 95  BP: (130)/(76) 130/76  SpO2:  [97 %] 97 %  Constitutional: healthy, alert, and cooperative  HEENT: sclera anicteric, MMM, conjunctiva pink  Skin:  No rashes or jaundice  Neuro: normal gait  Psych: normal mood and affect  EXTREMITY EXAM: Left upper extremity with AV fistula. Palpable thrill. 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   Arterial Exam:   Radial   L: 2+ R: 2+   Ulnar   L: 1+;R: 1+  Patent Palmar arch  L: YES   [x]  NO   []         Capillary refill:  L: <2sec, R:<2sec    Sensory exam:   Left hand: Normal   [x]       Abnormal   []     Comment:     Motor exam normal:   Left hand: Normal   [x]       Abnormal   []     Comment:                          Assessment & Plan: Ms.  Spangler needs a ligation of Left upper extremity AV fistula. However, on exam the growing part of the fistula in the recent past seems to involve the native radial artery.  This may require reconstruction of native artery. Will consult vascular surgery for this surgical procedure.       Sheila Rolle, MS4    I have reviewed history, examined patient and discussed plan with the fellow/resident/GUDELIA.  I concur with the findings in this note.    Risks of the surgical procedure including but not limited to the rare risk of mortality discussed in detail. Patient verbalized good understanding and had several pertinent questions which were answered satisfactorily.     Total time: 30 min  Including pre-encounter, face to face and post-encounter time spent on this patient's visit.                 Again, thank you for allowing me to participate in the care of your patient.        Sincerely,        Stewart Villaseñor MD    Electronically signed

## 2025-03-31 NOTE — PATIENT INSTRUCTIONS
Thank you so much for choosing us for your care. It was a pleasure to see you at clinic today.     Plan/ follow-up recommendations: Dr. Villaseñor has referred you to see Vascular Surgery to address your aneurismal fistula since there is likely arterial involvement in the aneurism.    Additional testing/imaging ordered today: follow up with vascular surgery.    Topics discussed at today's visit:    Dialysis access ligation education provided:  -Each arm has a finite number of areas for fistula creation (typically 3 sites per arm).  -A working AV fistula or graft can be used in the future if the need for dialysis arises.  -Ligating an AV fistula or graft will stop the blood from flowing through it.  You would still have bumps where your current fistula is, but they would harden.  -Excision of the AV fistula or graft with stop the blood from flowing through it AND will remove most of the visible vein or  bumps  on the skin  -Once an AV fistula or graft is ligated, that area cannot be used again for a dialysis access in the future.  -If an AV fistula or graft is ligated now and you need dialysis again in the future, you would need a new fistula surgically created.     Dialysis Access Surgery Care Coordinators  WESTLEY Green RN  Direct: [phone]    The Vascular Surgery scheduling team will get in touch with you to set up any follow-up testing/imaging and/or appointments. Please be aware that any testing/imaging recommended today will need to completed prior to your next visit with the provider. If testing/imaging is not completed prior to your next visit, your visit may be rescheduled.        If you have any questions, please call the Dialysis Access Care Coordinator.  We also encourage the use of [employer] to communicate with your HealthCare Provider.

## 2025-03-31 NOTE — PROGRESS NOTES
"Dialysis Access - Removal      HPI: Ms. Spangler is being seen today for removal/ligation of permanent dialysis access. Patient had a left upper extremity AV fistula placed remotly. She was then transplanted in 2009. Ms. Spangler is not dialyzing at (Not currently on dialysis). She is now having pain to the left wrist area radiating into the left hand due to increase in size of fistula. No swelling noted in the left hand. Patient would like her graft removed.     Risk factors for vascular access:         Yes No  Hx of Pacemaker    []     [x]  Comment:   History of failed access:  []         [x]  Comment:  SVC syndrome   []      [x]  Comment:  Heart Failure    []     [x]  EF:    Periph arterial disease  []     [x]  Comment:  Prior Fracture/Surgery  [x]     []  Location: Appendectomy, breast lumpectomy (left) and previous AV fistula (Left)  DVT    []    [x]   Location:  Diabetes    []        [x]  Comment:  Neuropathy   []     [x]  Comment:   Anticoagulation:   []    [x]  Agent:      Anticoagulation contraindication:[]  [x]     Details:                   Pediatric    []         [x]  Age:                  Hx of transplant   [x]    []  Comment:  LDKT 2009   Current immunosuppression [x]    []  Comment:  mycophenolic acid and tacrolimis          ROS: 10 point ROS neg other than the symptoms noted above in the HPI.        Past Medical History:   Diagnosis Date    Acute deep vein thrombosis (DVT) of distal vein of right lower extremity (H) 07/24/2018    Basal cell carcinoma 5/9/2022    Left thigh    Basal cell carcinoma 5/9/2022    Left thigh    Breast cancer (H)     Chronic kidney disease (CKD), stage V (H)     Due to \"nephritis\". Biopsy done years ago. I do not know the histologic specifics    Conjunctival hemorrhage 04/09/2014    Hypertension     Hypothyroid     Kidney replaced by transplant     nephritis as a child    PE (pulmonary embolism) 09/04/2009    Pulmonary embolism post kidney transplant 2009    SCC (squamous cell " carcinoma) 10/30/2012    Left Arm    SCC (squamous cell carcinoma) 10/30/2012    Left Arm    Vestibular neuronitis of right ear 2018       Past Surgical History:   Procedure Laterality Date    APPENDECTOMY OPEN  1999    AV FISTULA OR GRAFT ARTERIAL      left    LUMPECTOMY BREAST  2002    left breast partial mastectomy    ZZC TRANSPLANT,PREP LIVING  RENAL GRAFT  2009    Hx of nephritis       Family History   Problem Relation Age of Onset    Diabetes Father         type 2  2015    Arthritis Father     Glaucoma Father     Cancer Mother 58        smoker,metastatic adnoid cystic cancer,  early 60s    Arthritis Mother     Endocrine Disease Mother         thyroid    Hypertension Maternal Grandmother          at a young age    Kidney Disease Maternal Grandmother     Hypertension Maternal Grandfather     Cancer Paternal Grandmother         stomach cancer    Endocrine Disease Sister         thyroid    Genitourinary Problems Daughter 32        renal failure     Endocrine Disease Sister         thyroid    Chronic Obstructive Pulmonary Disease Paternal Grandfather         smoking  young    Heart Disease Maternal Half-Brother        Social History     Tobacco Use    Smoking status: Former     Current packs/day: 0.00     Average packs/day: 1 pack/day for 20.0 years (20.0 ttl pk-yrs)     Types: Cigarettes     Start date: 1978     Quit date: 1998     Years since quittin.2    Smokeless tobacco: Never   Substance Use Topics    Alcohol use: No     Alcohol/week: 0.0 standard drinks of alcohol         Current Outpatient Medications:     calcium carbonate-vitamin D (CALCIUM 600/VITAMIN D) 600-10 MG-MCG per tablet, Take 1 tablet by mouth daily, Disp: , Rfl:     carvedilol (COREG) 12.5 MG tablet, Take 1.5 tablets (18.75 mg) by mouth 2 times daily (with meals), Disp: 270 tablet, Rfl: 3    levothyroxine (SYNTHROID/LEVOTHROID) 125 MCG tablet, Take 1 tablet (125 mcg) by mouth daily, Disp: 90 tablet,  Rfl: 3    mycophenolic acid (GENERIC EQUIVALENT) 180 MG EC tablet, TAKE 3 TABLETS (540 MG) BY MOUTH 2 TIMES DAILY, Disp: 540 tablet, Rfl: 1    sotalol (BETAPACE) 80 MG tablet, Take 0.5 tablets (40 mg) by mouth 2 times daily, Disp: 90 tablet, Rfl: 3    tacrolimus (GENERIC EQUIVALENT) 0.5 MG capsule, Take 1 capsule (0.5 mg) by mouth every evening Total dose = 2 mg in the AM and 1.5 mg in the PM, Disp: 90 capsule, Rfl: 3    tacrolimus (GENERIC EQUIVALENT) 1 MG capsule, Total dose = 2 mg in the AM and 1.5 mg in the PM, Disp: 270 capsule, Rfl: 3    VITAMIN D, CHOLECALCIFEROL, PO, Take 1,000 Units by mouth daily, Disp: , Rfl:     XARELTO ANTICOAGULANT 20 MG TABS tablet, TAKE 1 TABLET BY MOUTH DAILY WITH DINNER, Disp: 90 tablet, Rfl: 2    bumetanide (BUMEX) 0.5 MG tablet, Take 2 tablets (1 mg) by mouth daily (Patient not taking: Reported on 3/31/2025), Disp: 60 tablet, Rfl: 11                        PHYSICAL EXAM:  Pulse:  [95] 95  BP: (130)/(76) 130/76  SpO2:  [97 %] 97 %  Constitutional: healthy, alert, and cooperative  HEENT: sclera anicteric, MMM, conjunctiva pink  Skin:  No rashes or jaundice  Neuro: normal gait  Psych: normal mood and affect  EXTREMITY EXAM: Left upper extremity with AV fistula. Palpable thrill. 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   Arterial Exam:   Radial   L: 2+ R: 2+   Ulnar   L: 1+;R: 1+  Patent Palmar arch  L: YES   [x]  NO   []         Capillary refill:  L: <2sec, R:<2sec    Sensory exam:   Left hand: Normal   [x]       Abnormal   []     Comment:     Motor exam normal:   Left hand: Normal   [x]       Abnormal   []     Comment:                          Assessment & Plan: Ms. Spangler needs a ligation of Left upper extremity AV fistula. However, on exam the growing part of the fistula in the recent past seems to involve the native radial artery.  This may require reconstruction of native artery. Will consult vascular surgery for this surgical procedure.       Sheila Rolle  MS4    I have reviewed history, examined patient and discussed plan with the fellow/resident/GUDELIA.  I concur with the findings in this note.    Risks of the surgical procedure including but not limited to the rare risk of mortality discussed in detail. Patient verbalized good understanding and had several pertinent questions which were answered satisfactorily.     Total time: 30 min  Including pre-encounter, face to face and post-encounter time spent on this patient's visit.

## 2025-03-31 NOTE — PROGRESS NOTES
Dialysis Access Care Coordination: Consult Outreach    REASON FOR VISIT:   Patient presents to clinic for fistula evaluation consult with Dr. Villaseñor. Patient is s/p kidney transplant 7/30/2009 secondary to chronic glomerulonephritis.    Nephrology provider: Dr. Marquez.    Patient accompanied by self.    SITUATION/BACKROUND:     Neph Tracking Flowsheet Last Filled Values       Final Modality Transplant   kidney txp 7/30/09    Patient's Referral Dates Auto Populate Patient's Referral Dates    Specialty Care Coordination Referral - Dialysis 3/21/2025    Access Surgeon Referral Status  Referred  re-referred for ligation consult with Dr. Villaseñor.  Initially referred 9/13/23, pt declined appointment.    Dialysis Access Referral 03/21/25    Diaylsis Access Type AV Fistula    Dialysis Access Site LLA  radiocephalic    Dialysis Access Surgery --  OSH, pre txp in 2009    Dialysis Access Surgeon OS             Dialysis History        Start End Type Center Comments    8/25/2008 [facility] (ESRD) Dialysis Days per week -- M, W, F, 3:15PM                     Patient is followed by Solid Organ Transplant 2/2 Kidney Transplant - 7/30/2009  (#1).  Coordinator: Sofia Rose    ASSESSMENT:       Access Type Placement Date Removal Date Surgeon Outcome   IRWIN TORRE AVF Prior to 2009  OSH                    Immunosuppression:  Tacrolimus  Mycophenolic acid     Anticoagulation:  Xarelto     Antiplatelet:  none     Recent Labs:      Latest Ref Rng & Units 3/28/2025 12/26/2024 9/23/2024   Neph Labs   Sodium 135 - 145 mmol/L 140  141  137    GFR Estimate >60 mL/min/1.73m2 87  83  80    Potassium 3.4 - 5.3 mmol/L 5.2  4.9  4.7    Creatinine 0.51 - 0.95 mg/dL 0.75  0.78  0.81    Urea Nitrogen 8.0 - 23.0 mg/dL 24.8  19.1  26.4    Hemoglobin 11.7 - 15.7 g/dL 12.3  12.2  11.5          Relevant Surgical & Medical Hx:  S/p kidney txp 7/30/2009 (AVF was created prior at OSH)    Dialysis Access  Assessments:   Fistula / Graft  Fever/Chills: no  Redness/warmth: no  Swelling: no  Pain: mild  Pain site: extremity;access site  Drainage: none  Current infection: no  Steal Symptoms:  (mild discoloration, discomfort, intermittent tingling/pain)  Thrill: positive  Bruit: positive  Dysfunction:  (aneurismal, growing in size, arterial involvement likely with aneurysm at anastomosis)  Interventions: follow-up with surgeon (referred to vascular surgery)      EDUCATION:     Dialysis access surgery education provided:   - Pre-Op: pre-op H&P or PAC within 30 days prior to surgery; may need to hold certain medications (e.g. blood thinners) for a few days prior to surgery.   - Surgery: done as an outpatient procedure; patient will go home the same day.   - Post-Op: need someone to drive them home and stay with them for the first night after surgery; post-op appointment with the surgeon.     Dialysis access ligation education provided:  -Each arm has a finite number of areas for fistula creation.  -A working AV fistula or graft can be used in the future if the need for dialysis arises.  -Ligating an AV fistula or graft will stop the blood from flowing through it.  -Excision of the AV fistula or graft with stop the blood from flowing through it AND will remove most of the visible vein or  bumps  on the skin  -Once an AV fistula or graft is ligated, that area cannot be used again for a dialysis access in the future.  -If an AV fistula or graft is ligated now and patient needs dialysis again in the future, patient would need a new fistula surgically created.    PLAN:     Future Appts Next 180 days       Visit Type Date Time Department    SOT FISTULA CONSULT 3/31/2025  3:30 PM  SOT SURGERY             PLAN:  Vascular surgery referral ordered per Dr. Villaseñor.  Follow up re: ligation of IRWIN TORRE AVF.  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    Follow Up:  Vascular Surgery referral order  placed.  RN CC updated nephrology team.  Pt to follow up with vascular surgery.  Dialysis Access Care Coordination episode resolved.  Removed Dialysis Access Care Coordinators from care team and resolved Dialysis Access episode.       Patient verbalized understanding and will follow up as recommended.    TASHI RED RN  Dialysis Access Surgery Care Coordinator  Phone: [phone]  Pool: P_Dialysis_Access_Nurse

## 2025-04-01 ENCOUNTER — TELEPHONE (OUTPATIENT)
Dept: VASCULAR SURGERY | Facility: CLINIC | Age: 65
End: 2025-04-01
Payer: COMMERCIAL

## 2025-04-01 DIAGNOSIS — T82.898A PROBLEM WITH DIALYSIS ACCESS, INITIAL ENCOUNTER: Primary | ICD-10-CM

## 2025-04-01 NOTE — TELEPHONE ENCOUNTER
Vascular Clinic Appointment Request    Visit type: In-person   Imaging needed: Yes. Orders placed.    Provider: Dr. Perez   Timeframe: Next avail   Appointment notes: Suspected aneurysmal RUE AVF w/ arterial involvement; no longer in use, s/p txp      Appointment request routed to clinic coordinator pool for scheduling.      Tash Welch RN  [facility] - P Vascular  P: [phone]  F: [phone]

## 2025-04-07 ENCOUNTER — MYC MEDICAL ADVICE (OUTPATIENT)
Dept: FAMILY MEDICINE | Facility: CLINIC | Age: 65
End: 2025-04-07
Payer: COMMERCIAL

## 2025-04-24 ENCOUNTER — OFFICE VISIT (OUTPATIENT)
Dept: FAMILY MEDICINE | Facility: CLINIC | Age: 65
End: 2025-04-24
Attending: PREVENTIVE MEDICINE
Payer: COMMERCIAL

## 2025-04-24 VITALS
HEART RATE: 83 BPM | TEMPERATURE: 97.4 F | RESPIRATION RATE: 16 BRPM | HEIGHT: 64 IN | WEIGHT: 199 LBS | SYSTOLIC BLOOD PRESSURE: 112 MMHG | BODY MASS INDEX: 33.97 KG/M2 | DIASTOLIC BLOOD PRESSURE: 69 MMHG | OXYGEN SATURATION: 98 %

## 2025-04-24 DIAGNOSIS — M85.80 OSTEOPENIA, UNSPECIFIED LOCATION: ICD-10-CM

## 2025-04-24 DIAGNOSIS — I10 BENIGN ESSENTIAL HYPERTENSION: ICD-10-CM

## 2025-04-24 DIAGNOSIS — I48.0 PAROXYSMAL ATRIAL FIBRILLATION (H): ICD-10-CM

## 2025-04-24 DIAGNOSIS — Z12.31 VISIT FOR SCREENING MAMMOGRAM: ICD-10-CM

## 2025-04-24 DIAGNOSIS — Z94.0 KIDNEY REPLACED BY TRANSPLANT: ICD-10-CM

## 2025-04-24 DIAGNOSIS — Z85.3 PERSONAL HISTORY OF MALIGNANT NEOPLASM OF BREAST: ICD-10-CM

## 2025-04-24 DIAGNOSIS — E07.9 THYROID DISORDER: ICD-10-CM

## 2025-04-24 DIAGNOSIS — Z00.00 ROUTINE GENERAL MEDICAL EXAMINATION AT A HEALTH CARE FACILITY: Primary | ICD-10-CM

## 2025-04-24 DIAGNOSIS — I82.409 RECURRENT DEEP VEIN THROMBOSIS (DVT) (H): ICD-10-CM

## 2025-04-24 PROBLEM — E66.01 CLASS 2 SEVERE OBESITY DUE TO EXCESS CALORIES WITH SERIOUS COMORBIDITY IN ADULT (H): Status: RESOLVED | Noted: 2024-04-19 | Resolved: 2025-04-24

## 2025-04-24 PROBLEM — E66.812 CLASS 2 SEVERE OBESITY DUE TO EXCESS CALORIES WITH SERIOUS COMORBIDITY IN ADULT (H): Status: RESOLVED | Noted: 2024-04-19 | Resolved: 2025-04-24

## 2025-04-24 RX ORDER — SOTALOL HYDROCHLORIDE 80 MG/1
40 TABLET ORAL 2 TIMES DAILY
Qty: 90 TABLET | Refills: 3 | Status: SHIPPED | OUTPATIENT
Start: 2025-04-24

## 2025-04-24 RX ORDER — LEVOTHYROXINE SODIUM 125 UG/1
125 TABLET ORAL DAILY
Qty: 90 TABLET | Refills: 3 | Status: SHIPPED | OUTPATIENT
Start: 2025-04-24

## 2025-04-24 RX ORDER — CARVEDILOL 12.5 MG/1
18.75 TABLET ORAL 2 TIMES DAILY WITH MEALS
Qty: 270 TABLET | Refills: 3 | Status: SHIPPED | OUTPATIENT
Start: 2025-04-24

## 2025-04-24 SDOH — HEALTH STABILITY: PHYSICAL HEALTH: ON AVERAGE, HOW MANY DAYS PER WEEK DO YOU ENGAGE IN MODERATE TO STRENUOUS EXERCISE (LIKE A BRISK WALK)?: 2 DAYS

## 2025-04-24 ASSESSMENT — SOCIAL DETERMINANTS OF HEALTH (SDOH): HOW OFTEN DO YOU GET TOGETHER WITH FRIENDS OR RELATIVES?: PATIENT DECLINED

## 2025-04-24 NOTE — PATIENT INSTRUCTIONS
PLEASE CHECK WITH TRANSPLANT TEAM ABOUT GETTING RSV, AND PCV 20 VACCINES   Patient Education   Preventive Care Advice   This is general advice given by our system to help you stay healthy. However, your care team may have specific advice just for you. Please talk to your care team about your preventive care needs.  Nutrition  Eat 5 or more servings of fruits and vegetables each day.  Try wheat bread, brown rice and whole grain pasta (instead of white bread, rice, and pasta).  Get enough calcium and vitamin D. Check the label on foods and aim for 100% of the RDA (recommended daily allowance).  Lifestyle  Exercise at least 150 minutes each week  (30 minutes a day, 5 days a week).  Do muscle strengthening activities 2 days a week. These help control your weight and prevent disease.  No smoking.  Wear sunscreen to prevent skin cancer.  Have a dental exam and cleaning every 6 months.  Yearly exams  See your health care team every year to talk about:  Any changes in your health.  Any medicines your care team has prescribed.  Preventive care, family planning, and ways to prevent chronic diseases.  Shots (vaccines)   HPV shots (up to age 26), if you've never had them before.  Hepatitis B shots (up to age 59), if you've never had them before.  COVID-19 shot: Get this shot when it's due.  Flu shot: Get a flu shot every year.  Tetanus shot: Get a tetanus shot every 10 years.  Pneumococcal, hepatitis A, and RSV shots: Ask your care team if you need these based on your risk.  Shingles shot (for age 50 and up)  General health tests  Diabetes screening:  Starting at age 35, Get screened for diabetes at least every 3 years.  If you are younger than age 35, ask your care team if you should be screened for diabetes.  Cholesterol test: At age 39, start having a cholesterol test every 5 years, or more often if advised.  Bone density scan (DEXA): At age 50, ask your care team if you should have this scan for osteoporosis (brittle  bones).  Hepatitis C: Get tested at least once in your life.  STIs (sexually transmitted infections)  Before age 24: Ask your care team if you should be screened for STIs.  After age 24: Get screened for STIs if you're at risk. You are at risk for STIs (including HIV) if:  You are sexually active with more than one person.  You don't use condoms every time.  You or a partner was diagnosed with a sexually transmitted infection.  If you are at risk for HIV, ask about PrEP medicine to prevent HIV.  Get tested for HIV at least once in your life, whether you are at risk for HIV or not.  Cancer screening tests  Cervical cancer screening: If you have a cervix, begin getting regular cervical cancer screening tests starting at age 21.  Breast cancer scan (mammogram): If you've ever had breasts, begin having regular mammograms starting at age 40. This is a scan to check for breast cancer.  Colon cancer screening: It is important to start screening for colon cancer at age 45.  Have a colonoscopy test every 10 years (or more often if you're at risk) Or, ask your provider about stool tests like a FIT test every year or Cologuard test every 3 years.  To learn more about your testing options, visit:   .  For help making a decision, visit:   https://bit.ly/oa65246.  Prostate cancer screening test: If you have a prostate, ask your care team if a prostate cancer screening test (PSA) at age 55 is right for you.  Lung cancer screening: If you are a current or former smoker ages 50 to 80, ask your care team if ongoing lung cancer screenings are right for you.  For informational purposes only. Not to replace the advice of your health care provider. Copyright   2023 [employer]. All rights reserved. Clinically reviewed by the [facility]. Zoom [id number] - REV 01/24.  Bladder Training: Care Instructions  Your Care Instructions     Bladder training is used to treat urge incontinence and stress  incontinence. Urge incontinence means that the need to urinate comes on so fast that you can't get to a toilet in time. Stress incontinence means that you leak urine because of pressure on your bladder. For example, it may happen when you laugh, cough, or lift something heavy.  Bladder training can increase how long you can wait before you have to urinate. It can also help your bladder hold more urine. And it can give you better control over the urge to urinate.  It is important to remember that bladder training takes a few weeks to a few months to make a difference. You may not see results right away, but don't give up.  Follow-up care is a key part of your treatment and safety. Be sure to make and go to all appointments, and call your doctor if you are having problems. It's also a good idea to know your test results and keep a list of the medicines you take.  How can you care for yourself at home?  Work with your doctor to come up with a bladder training program that is right for you. You may use one or more of the following methods.  Delayed urination  In the beginning, try to keep from urinating for 5 minutes after you first feel the need to go.  While you wait, take deep, slow breaths to relax. Kegel exercises can also help you delay the need to go to the bathroom.  After some practice, when you can easily wait 5 minutes to urinate, try to wait 10 minutes before you urinate.  Slowly increase the waiting period until you are able to control when you have to urinate.  Scheduled urination  Empty your bladder when you first wake up in the morning.  Schedule times throughout the day when you will urinate.  Start by going to the bathroom every hour, even if you don't need to go.  Slowly increase the time between trips to the bathroom.  When you have found a schedule that works well for you, keep doing it.  If you wake up during the night and have to urinate, do it. Apply your schedule to waking hours only.  Melissa  "exercises  These tighten and strengthen pelvic muscles, which can help you control the flow of urine. (If doing these exercises causes pain, stop doing them and talk with your doctor.) To do Kegel exercises:  Squeeze your muscles as if you were trying not to pass gas. Or squeeze your muscles as if you were stopping the flow of urine. Your belly, legs, and buttocks shouldn't move.  Hold the squeeze for 3 seconds, then relax for 5 to 10 seconds.  Start with 3 seconds, then add 1 second each week until you are able to squeeze for 10 seconds.  Repeat the exercise 10 times a session. Do 3 to 8 sessions a day.  When should you call for help?  Watch closely for changes in your health, and be sure to contact your doctor if:    Your incontinence is getting worse.     You do not get better as expected.   Where can you learn more?  Go to https://www.[employer].net/patiented  Enter V684 in the search box to learn more about \"Bladder Training: Care Instructions.\"  Current as of: April 30, 2024  Content Version: 14.4    4946-4904 [employer].   Care instructions adapted under license by your healthcare professional. If you have questions about a medical condition or this instruction, always ask your healthcare professional. [employer] disclaims any warranty or liability for your use of this information.       "

## 2025-04-24 NOTE — PROGRESS NOTES
Preventive Care Visit  [facility] REGINAREGULO Dc MD, Family Medicine  Apr 24, 2025      Assessment & Plan     Routine general medical examination at a health care facility  -Preventive guidelines reviewed  -Patient is due for RSV vaccination, shingles vaccination and pneumococcal vaccine.  There is a category D interaction associated with tacrolimus that patient takes for history of renal transplant.  I did request the patient to reach out to her transplant team to see if it would be okay for her to proceed with these immunizations.  -cervical cancer screening to continue due to immunosuppressed status, next due 4/19/27    Paroxysmal atrial fibrillation (H)  -Followed by cardiology  -On sotalol, carvedilol and Xarelto  -No new symptoms of chest pain or new palpitations  - sotalol (BETAPACE) 80 MG tablet  Dispense: 90 tablet; Refill: 3    Recurrent deep vein thrombosis (DVT) (H)  -On anticoagulation   - rivaroxaban ANTICOAGULANT (XARELTO ANTICOAGULANT) 20 MG TABS tablet  Dispense: 90 tablet; Refill: 2    Thyroid disorder  -refills on medication provided   - levothyroxine (SYNTHROID/LEVOTHROID) 125 MCG tablet  Dispense: 90 tablet; Refill: 3    Benign essential hypertension  -at goal   - carvedilol (COREG) 12.5 MG tablet  Dispense: 270 tablet; Refill: 3    Kidney replaced by transplant  -GFR 87 on 3/25  - Adult Dermatology  Referral  -Followed by transplant team  Etiology of Kidney Failure: Alport's syndrome  Tx: LDKT  Transplant: 7/30/2009 (Kidney)  Annual follow up     Osteopenia, unspecified location  -DEXA 5/24  -continue calcium, vitamin d and weight bearing exercise     Visit for screening mammogram  - MA Screening Bilateral w/ Angel    Personal history of malignant neoplasm of breast  Infiltrating ductal carcinoma of the left breast diagnosed March of 2002, 1.1 cm, tumor stage T1c, N0, stage 1, ER/NC positive, and HER-2 3+ positive, grade 2.   TREATMENT: Partial mastectomy  "and then 4 cycles of Adriamycin and cyclophosphamide in following that radiation. The patient then took tamoxifen from July 2002 until June 2005. At that point we changed to exemestane. In May 2008 patient elected to stop therapy.          BMI  Estimated body mass index is 34.16 kg/m  as calculated from the following:    Height as of this encounter: 1.626 m (5' 4\").    Weight as of this encounter: 90.3 kg (199 lb).   Weight management plan: Discussed healthy diet and exercise guidelines    Counseling  Appropriate preventive services were addressed with this patient via screening, questionnaire, or discussion as appropriate for fall prevention, nutrition, physical activity, Tobacco-use cessation, social engagement, weight loss and cognition.  Checklist reviewing preventive services available has been given to the patient.  Reviewed patient's diet, addressing concerns and/or questions.   She is at risk for lack of exercise and has been provided with information to increase physical activity for the benefit of her well-being.   Information on urinary incontinence and treatment options given to patient.       Follow-up    Follow-up Visit   Expected date:  Apr 24, 2026 (Approximate)      Follow Up Appointment Details:     Follow-up with whom?: PCP    Follow-Up for what?: Adult Preventive    How?: In Person                 Subjective   Bessy is a 65 year old, presenting for the following:  Medicare Visit        4/24/2025     7:08 AM   Additional Questions   Roomed by Maryse Traylor Readings from Last 2 Encounters:   04/24/25 90.3 kg (199 lb)   03/31/25 89.2 kg (196 lb 11.2 oz)      DEXA done 5/24: Osteopenia+     Blood pressure at home+ 130 systolic  DEVORA+ using CPAP   Physical activity is the same, trying to get more walks    Mood is ok  Work is stressful      REFILLS NEEDED     Sleep:  -mild DEVORA, using CPAP consistently      Atrial fibrillation:  -followed by Cardiology  -on beta blocker (metoprolol and Sotalol) and " Xarelto   -no chest pain  -no syncope  -no palpitations      Followed by transplant nephrology Dr. Marquez     Etiology of Kidney Failure: Alport's syndrome  Tx: LDKT  Transplant: 7/30/2009 (Kidney)  Annual follow up         Thyroid:  -regular bowels   -no significant fatigue      Hypertension Follow-up     Do you check your blood pressure regularly outside of the clinic? Yes   Are you following a low salt diet? Yes  Are your blood pressures ever more than 140 on the top number (systolic) OR more       than 90 on the bottom number (diastolic), for example 140/90? No     Has checked blood pressure at home 125-135/80 systolic usually     HPI    Advance Care Planning    Patient states has Health Care Directive and will send to [employer].        4/24/2025   General Health   How would you rate your overall physical health? Good   Feel stress (tense, anxious, or unable to sleep) Not at all         4/24/2025   Nutrition   Diet: Regular (no restrictions)         4/24/2025   Exercise   Days per week of moderate/strenous exercise 2 days   (!) EXERCISE CONCERN      4/24/2025   Social Factors   Frequency of gathering with friends or relatives Patient declined   Worry food won't last until get money to buy more No   Food not last or not have enough money for food? No   Do you have housing? (Housing is defined as stable permanent housing and does not include staying outside in a car, in a tent, in an abandoned building, in an overnight shelter, or couch-surfing.) Yes   Are you worried about losing your housing? No   Lack of transportation? No   Unable to get utilities (heat,electricity)? No         4/24/2025   Fall Risk   Fallen 2 or more times in the past year? No   Trouble with walking or balance? No          4/24/2025   Activities of Daily Living- Home Safety   Needs help with the following daily activites None of the above   Safety concerns in the home None of the above         4/24/2025   Dental   Dentist two times  every year? Yes         2025   Hearing Screening   Hearing concerns? None of the above         2025   Driving Risk Screening   Patient/family members have concerns about driving No         2025   General Alertness/Fatigue Screening   Have you been more tired than usual lately? No         2025   Urinary Incontinence Screening   Bothered by leaking urine in past 6 months Yes         Today's PHQ-2 Score:       2025     7:04 AM   PHQ-2 (  [employer])   Q1: Little interest or pleasure in doing things 0   Q2: Feeling down, depressed or hopeless 0   PHQ-2 Score 0    Q1: Little interest or pleasure in doing things Not at all   Q2: Feeling down, depressed or hopeless Not at all   PHQ-2 Score 0       Patient-reported           2025   Substance Use   Alcohol more than 3/day or more than 7/wk Not Applicable   Do you have a current opioid prescription? No   How severe/bad is pain from 1 to 10? 1/10   Do you use any other substances recreationally? No     Social History     Tobacco Use    Smoking status: Former     Current packs/day: 0.00     Average packs/day: 1 pack/day for 20.0 years (20.0 ttl pk-yrs)     Types: Cigarettes     Start date: 1978     Quit date: 1998     Years since quittin.3    Smokeless tobacco: Never   Vaping Use    Vaping status: Never Used   Substance Use Topics    Alcohol use: No     Alcohol/week: 0.0 standard drinks of alcohol    Drug use: No           2024   LAST FHS-7 RESULTS   1st degree relative breast or ovarian cancer No   Any relative bilateral breast cancer No   Any male have breast cancer No   Any ONE woman have BOTH breast AND ovarian cancer No   Any woman with breast cancer before 50yrs No   2 or more relatives with breast AND/OR ovarian cancer No   2 or more relatives with breast AND/OR bowel cancer No        Mammogram Screening - Annual screen due to history of breast cancer, carcinoma in situ, or hyperplasia      Infiltrating ductal carcinoma of  "the left breast diagnosed March of 2002, 1.1 cm, tumor stage T1c, N0, stage 1, ER/ME positive, and HER-2 3+ positive, grade 2.   TREATMENT: Partial mastectomy and then 4 cycles of Adriamycin and cyclophosphamide in following that radiation. The patient then took tamoxifen from July 2002 until June 2005. At that point we changed to exemestane. In May 2008 patient elected to stop therapy.      History of abnormal Pap smear: Immunosuppression+ next pap due 4/19/2017        Latest Ref Rng & Units 4/19/2024     7:56 AM 10/11/2019     9:50 AM 10/11/2019     9:42 AM   PAP / HPV   PAP  Negative for Intraepithelial Lesion or Malignancy (NILM)      PAP (Historical)    NIL    HPV 16 DNA Negative Negative  Negative     HPV 18 DNA Negative Negative  Negative     Other HR HPV Negative Negative  Negative       ASCVD Risk   The 10-year ASCVD risk score (Adelaida MONTEIRO, et al., 2019) is: 6%    Values used to calculate the score:      Age: 65 years      Sex: Female      Is Non- : No      Diabetic: No      Tobacco smoker: No      Systolic Blood Pressure: 112 mmHg      Is BP treated: Yes      HDL Cholesterol: 67 mg/dL      Total Cholesterol: 159 mg/dL          Reviewed and updated as needed this visit by Provider   Tobacco  Allergies  Meds  Problems  Med Hx  Surg Hx  Fam Hx            Past Medical History:   Diagnosis Date    Acute deep vein thrombosis (DVT) of distal vein of right lower extremity (H) 07/24/2018    Basal cell carcinoma 5/9/2022    Left thigh    Basal cell carcinoma 5/9/2022    Left thigh    Breast cancer (H)     Chronic kidney disease (CKD), stage V (H)     Due to \"nephritis\". Biopsy done years ago. I do not know the histologic specifics    Conjunctival hemorrhage 04/09/2014    Hypertension     Hypothyroid     Kidney replaced by transplant     nephritis as a child    PE (pulmonary embolism) 09/04/2009    Pulmonary embolism post kidney transplant 2009    SCC (squamous cell carcinoma) " 10/30/2012    Left Arm    SCC (squamous cell carcinoma) 10/30/2012    Left Arm    Vestibular neuronitis of right ear 04/16/2018     Past Surgical History:   Procedure Laterality Date    APPENDECTOMY OPEN  01/01/1999    AV FISTULA OR GRAFT ARTERIAL      left    LUMPECTOMY BREAST  2002    left breast partial mastectomy    ZZC TRANSPLANT,PREP LIVING  RENAL GRAFT  07/30/2009    Hx of nephritis     Labs reviewed in EPIC  BP Readings from Last 3 Encounters:   04/24/25 112/69   03/31/25 130/76   04/19/24 126/83    Wt Readings from Last 3 Encounters:   04/24/25 90.3 kg (199 lb)   03/31/25 89.2 kg (196 lb 11.2 oz)   04/19/24 97.2 kg (214 lb 3.2 oz)                  Patient Active Problem List   Diagnosis    History of breast cancer    Kidney replaced by transplant    CARDIOVASCULAR SCREENING; LDL GOAL LESS THAN 100    Immunosuppressed status    HTN, kidney transplant related    Acquired hypothyroidism    Post-menopausal    Tear film insufficiency    Presbyopia - Both Eyes    Aftercare following organ transplant    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    Vitamin D deficiency    Paroxysmal atrial fibrillation (H)    History of DVT (deep vein thrombosis) and PE (pulmonary embolism)    Chronic insomnia    Restless legs syndrome (RLS)    DEVORA (obstructive sleep apnea)- mild (AHI 13)    History of nonmelanoma skin cancer    CKD (chronic kidney disease) stage 2, GFR 60-89 ml/min    Cervical cancer screening    Bilateral foot pain     Past Surgical History:   Procedure Laterality Date    APPENDECTOMY OPEN  01/01/1999    AV FISTULA OR GRAFT ARTERIAL      left    LUMPECTOMY BREAST  2002    left breast partial mastectomy    ZZC TRANSPLANT,PREP LIVING  RENAL GRAFT  07/30/2009    Hx of nephritis       Social History     Tobacco Use    Smoking status: Former     Current packs/day: 0.00     Average packs/day: 1 pack/day for 20.0 years (20.0 ttl pk-yrs)     Types: Cigarettes     Start date: 1/1/1978     Quit date: 1/1/1998      Years since quittin.3    Smokeless tobacco: Never   Substance Use Topics    Alcohol use: No     Alcohol/week: 0.0 standard drinks of alcohol     Family History   Problem Relation Age of Onset    Diabetes Father         type 2      Arthritis Father     Glaucoma Father     Cancer Mother 58        smoker,metastatic adnoid cystic cancer,  early 60s    Arthritis Mother     Endocrine Disease Mother         thyroid    Hypertension Maternal Grandmother          at a young age    Kidney Disease Maternal Grandmother     Hypertension Maternal Grandfather     Cancer Paternal Grandmother         stomach cancer    Endocrine Disease Sister         thyroid    Genitourinary Problems Daughter 32        renal failure     Endocrine Disease Sister         thyroid    Chronic Obstructive Pulmonary Disease Paternal Grandfather         smoking  young    Heart Disease Maternal Half-Brother          Current Outpatient Medications   Medication Sig Dispense Refill    calcium carbonate-vitamin D (CALCIUM 600/VITAMIN D) 600-10 MG-MCG per tablet Take 1 tablet by mouth daily      carvedilol (COREG) 12.5 MG tablet Take 1.5 tablets (18.75 mg) by mouth 2 times daily (with meals). 270 tablet 3    levothyroxine (SYNTHROID/LEVOTHROID) 125 MCG tablet Take 1 tablet (125 mcg) by mouth daily. 90 tablet 3    mycophenolic acid (GENERIC EQUIVALENT) 180 MG EC tablet TAKE 3 TABLETS (540 MG) BY MOUTH 2 TIMES DAILY 540 tablet 1    rivaroxaban ANTICOAGULANT (XARELTO ANTICOAGULANT) 20 MG TABS tablet Take 1 tablet (20 mg) by mouth daily (with dinner). 90 tablet 2    sotalol (BETAPACE) 80 MG tablet Take 0.5 tablets (40 mg) by mouth 2 times daily. 90 tablet 3    tacrolimus (GENERIC EQUIVALENT) 0.5 MG capsule Take 1 capsule (0.5 mg) by mouth every evening Total dose = 2 mg in the AM and 1.5 mg in the PM 90 capsule 3    tacrolimus (GENERIC EQUIVALENT) 1 MG capsule Total dose = 2 mg in the AM and 1.5 mg in the  capsule 3    VITAMIN D,  "CHOLECALCIFEROL, PO Take 1,000 Units by mouth daily       Allergies   Allergen Reactions    Dilaudid [Hydromorphone Hcl]      Patient unable to recall    Hydromorphone      Patient unable to recall    Sulfa Antibiotics Swelling     \"swelling\" - age 3       Current providers sharing in care for this patient include:  Patient Care Team:  Gina Dc MD as PCP - General (Family Medicine)  Chris Marquez MD as MD (Nephrology)  Gina Dc MD as Assigned PCP  Anthony Garcia MD as MD (Dermatology)  Steve Negron DPM as Assigned Musculoskeletal Provider    The following health maintenance items are reviewed in Epic and correct as of today:  Health Maintenance   Topic Date Due    ZOSTER IMMUNIZATION (1 of 2) Never done    Pneumococcal Vaccine: 50+ Years (2 of 2 - PCV) 02/18/2006    RSV VACCINE (1 - Risk 60-74 years 1-dose series) Never done    MAMMO SCREENING  05/13/2025    COVID-19 Vaccine (8 - [employer] risk 2024-25 season) 05/07/2025    BMP  03/28/2026    LIPID  03/28/2026    MICROALBUMIN  03/28/2026    TSH W/FREE T4 REFLEX  03/28/2026    MEDICARE ANNUAL WELLNESS VISIT  04/24/2026    ANNUAL REVIEW OF HM ORDERS  04/24/2026    FALL RISK ASSESSMENT  04/24/2026    PAP FOLLOW-UP  04/19/2027    HPV FOLLOW-UP  04/19/2027    COLORECTAL CANCER SCREENING  04/25/2027    DIABETES SCREENING  03/28/2028    ADVANCE CARE PLANNING  04/24/2030    DTAP/TDAP/TD IMMUNIZATION (3 - Td or Tdap) 02/08/2032    DEXA  05/13/2039    HEPATITIS C SCREENING  Completed    PHQ-2 (once per calendar year)  Completed    INFLUENZA VACCINE  Completed    URINALYSIS  Completed    HPV IMMUNIZATION  Aged Out    MENINGITIS IMMUNIZATION  Aged Out    LUNG CANCER SCREENING  Discontinued         Review of Systems  Constitutional, HEENT, cardiovascular, pulmonary, gi and gu systems are negative, except as otherwise noted.     Objective    Exam  /69   Pulse 83   Temp 97.4  F (36.3  C)   Resp 16   Ht 1.626 m (5' 4\")   Wt 90.3 kg (199 " "lb)   LMP 10/25/2004 (Exact Date)   SpO2 98%   BMI 34.16 kg/m     Estimated body mass index is 34.16 kg/m  as calculated from the following:    Height as of this encounter: 1.626 m (5' 4\").    Weight as of this encounter: 90.3 kg (199 lb).    Physical Exam  GENERAL APPEARANCE: healthy, alert and no distress  EYES: Eyes grossly normal to inspection and conjunctivae and sclerae normal  RESP: lungs clear to auscultation - no rales, rhonchi or wheezes  CV: regular rates and rhythm, normal S1 S2  ABDOMEN: soft, non-tender and no rebound or guarding   MS: extremities normal- no gross deformities noted   NEURO: Normal strength and tone, mentation intact and speech normal  PSYCH: mentation appears normal           4/24/2025   Mini Cog   Clock Draw Score 2 Normal   3 Item Recall 3 objects recalled   Mini Cog Total Score 5         Lab on 03/28/2025   Component Date Value Ref Range Status    WBC Count 03/28/2025 5.4  4.0 - 11.0 10e3/uL Final    RBC Count 03/28/2025 4.27  3.80 - 5.20 10e6/uL Final    Hemoglobin 03/28/2025 12.3  11.7 - 15.7 g/dL Final    Hematocrit 03/28/2025 38.6  35.0 - 47.0 % Final    MCV 03/28/2025 90  78 - 100 fL Final    MCH 03/28/2025 28.8  26.5 - 33.0 pg Final    MCHC 03/28/2025 31.9  31.5 - 36.5 g/dL Final    RDW 03/28/2025 12.9  10.0 - 15.0 % Final    Platelet Count 03/28/2025 157  150 - 450 10e3/uL Final    Sodium 03/28/2025 140  135 - 145 mmol/L Final    Potassium 03/28/2025 5.2  3.4 - 5.3 mmol/L Final    Chloride 03/28/2025 104  98 - 107 mmol/L Final    Carbon Dioxide (CO2) 03/28/2025 24  22 - 29 mmol/L Final    Anion Gap 03/28/2025 12  7 - 15 mmol/L Final    Urea Nitrogen 03/28/2025 24.8 (H)  8.0 - 23.0 mg/dL Final    Creatinine 03/28/2025 0.75  0.51 - 0.95 mg/dL Final    GFR Estimate 03/28/2025 87  >60 mL/min/1.73m2 Final    eGFR calculated using 2021 CKD-EPI equation.    Calcium 03/28/2025 10.1  8.8 - 10.4 mg/dL Final    Glucose 03/28/2025 107 (H)  70 - 99 mg/dL Final    Patient Fasting > " 8hrs? 03/28/2025 Yes   Final    Tacrolimus by [employer] Mass Spectrom* 03/28/2025 5.5  5.0 - 15.0 ug/L Final    Comment: Tacrolimus Reference Range (ug/L):    Kidney Transplant:  Pediatric  0-3 months post transplant: 10-12  3-6 months post transplant: 8-10  6-12 months post transplant: 6-8  >12 months post transplant: 4-7    Adult  0-6 months post transplant: 8-10  6-12 months post transplant: 6-8  >12 months post transplant: 4-6  >5 years post transplant: 3-5    Heart Transplant:  Pediatric  0-12 months post transplant: 10-15  >12 months post transplant: 5-10    Adult  0-3 months post transplant: 10-15  3-6 months post transplant: 8-12  6-12 months post transplant: 6-12  >12 months post transplant: 6-10    Lung Transplant:  0-12 months post transplant: 10-15  >12 months post transplant: 8-12    Liver Transplant:  Pediatric  0-3 months post transplant: 10-15  3-6 months post transplant: 8-10  6 months-5 years post transplant: 6-8   >5 years post transplant: 1-3    Adult  0-3 months post transplant: 10-12  3-6 months post transplant: 8-10  >6 months post transplant: 6-8    Pancreas Transplant:  0-6                            months post transplant: 8-10  >6 months post transplant: 5-8    Tacrolimus Last Dose Date 03/28/2025 3/27/2025   Final    Tacrolimus Last Dose Time 03/28/2025  8:00 PM   Final    Total Protein Urine mg/dL 03/28/2025 <6.0    mg/dL Final    The reference ranges have not been established in urine protein. The results should be integrated into the clinical context for interpretation.    Total Protein Urine mg/mg Creat 03/28/2025    Final    Unable to calculate, urine creatinine or protein is outside the detectable limits.    Creatinine Urine mg/dL 03/28/2025 35.3  mg/dL Final    The reference ranges have not been established in urine creatinine. The results should be integrated into the clinical context for interpretation.    Cholesterol 03/28/2025 159  <200 mg/dL Final    Triglycerides 03/28/2025 66   <150 mg/dL Final    Direct Measure HDL 03/28/2025 67  >=50 mg/dL Final    LDL Cholesterol Calculated 03/28/2025 79  <100 mg/dL Final    Non HDL Cholesterol 03/28/2025 92  <130 mg/dL Final    Patient Fasting > 8hrs? 03/28/2025 Yes   Final    Creatinine Urine mg/dL 03/28/2025 35.3  mg/dL Final    The reference ranges have not been established in urine creatinine. The results should be integrated into the clinical context for interpretation.  The reference ranges have not been established in urine creatinine. The results should be integrated into the clinical context for interpretation.    Albumin Urine mg/L 03/28/2025 <12.0  mg/L Final    The reference ranges have not been established in urine albumin. The results should be integrated into the clinical context for interpretation.    Albumin Urine mg/g Cr 03/28/2025    Final    Unable to calculate, urine albumin and/or urine creatinine is outside detectable limits.  Microalbuminuria is defined as an albumin:creatinine ratio of 17 to 299 for males and 25 to 299 for females. A ratio of albumin:creatinine of 300 or higher is indicative of overt proteinuria.  Due to biologic variability, positive results should be confirmed by a second, first-morning random or 24-hour timed urine specimen. If there is discrepancy, a third specimen is recommended. When 2 out of 3 results are in the microalbuminuria range, this is evidence for incipient nephropathy and warrants increased efforts at glucose control, blood pressure control, and institution of therapy with an angiotensin-converting-enzyme (ACE) inhibitor (if the patient can tolerate it).      TSH 03/28/2025 0.52  0.30 - 4.20 uIU/mL Final         Signed Electronically by: Gina Dc MD

## 2025-04-28 ENCOUNTER — PATIENT OUTREACH (OUTPATIENT)
Dept: CARE COORDINATION | Facility: CLINIC | Age: 65
End: 2025-04-28
Payer: COMMERCIAL

## 2025-06-04 DIAGNOSIS — Z94.0 IMMUNOSUPPRESSIVE MANAGEMENT ENCOUNTER FOLLOWING KIDNEY TRANSPLANT: ICD-10-CM

## 2025-06-04 DIAGNOSIS — Z79.899 IMMUNOSUPPRESSIVE MANAGEMENT ENCOUNTER FOLLOWING KIDNEY TRANSPLANT: ICD-10-CM

## 2025-06-04 DIAGNOSIS — Z48.298 AFTERCARE FOLLOWING ORGAN TRANSPLANT: ICD-10-CM

## 2025-06-04 DIAGNOSIS — Z94.0 KIDNEY REPLACED BY TRANSPLANT: ICD-10-CM

## 2025-06-04 DIAGNOSIS — Z94.0 KIDNEY TRANSPLANTED: ICD-10-CM

## 2025-06-05 RX ORDER — MYCOPHENOLIC ACID 180 MG/1
540 TABLET, DELAYED RELEASE ORAL 2 TIMES DAILY
Qty: 540 TABLET | Refills: 0 | Status: SHIPPED | OUTPATIENT
Start: 2025-06-05

## 2025-06-05 RX ORDER — TACROLIMUS 0.5 MG/1
CAPSULE ORAL
Qty: 90 CAPSULE | Refills: 0 | Status: SHIPPED | OUTPATIENT
Start: 2025-06-05

## 2025-06-05 RX ORDER — TACROLIMUS 1 MG/1
CAPSULE ORAL
Qty: 270 CAPSULE | Refills: 0 | Status: SHIPPED | OUTPATIENT
Start: 2025-06-05

## 2025-06-20 NOTE — PATIENT INSTRUCTIONS
"Thank you so much for choosing us for your care. It was a pleasure to see you at the vascular clinic today.     Follow-up recommendations: Please let us know if you would like to proceed with fistula ligation. You can send us a [employer] message or call the RN directly at [phone]. If you decide not to proceed with surgery, we will see you back on an \"as-needed\" basis.      Our scheduling team will get in touch with you to set up any follow-up testing/imaging and/or appointments. Please be aware that any testing/imaging recommended today will need to completed prior to your next visit with the provider. If testing/imaging is not completed prior to your next visit, your visit may be rescheduled.     If you have any questions, please contact our clinic directly at [phone] and ask for the nurse. We also encourage the use of [employer] to communicate with your healthcare provider.    If you have an urgent need after business hours (8:00 am to 4:30 pm) please call [phone], option 4, and ask for the vascular attending on call. For non-urgent after hours needs, please call the vascular clinic at [phone]. For scheduling needs, please call our clinic directly at [phone].    For additional patient education resources, please visit the Society of Vascular [employer] patient resources website at: [email or web address]    "

## 2025-06-23 ENCOUNTER — OFFICE VISIT (OUTPATIENT)
Dept: VASCULAR SURGERY | Facility: CLINIC | Age: 65
End: 2025-06-23
Attending: SURGERY
Payer: COMMERCIAL

## 2025-06-23 ENCOUNTER — RESULTS FOLLOW-UP (OUTPATIENT)
Dept: TRANSPLANT | Facility: CLINIC | Age: 65
End: 2025-06-23

## 2025-06-23 ENCOUNTER — ANCILLARY PROCEDURE (OUTPATIENT)
Dept: ULTRASOUND IMAGING | Facility: CLINIC | Age: 65
End: 2025-06-23
Attending: SURGERY
Payer: COMMERCIAL

## 2025-06-23 VITALS
HEART RATE: 79 BPM | SYSTOLIC BLOOD PRESSURE: 127 MMHG | BODY MASS INDEX: 34.18 KG/M2 | WEIGHT: 199.1 LBS | OXYGEN SATURATION: 96 % | DIASTOLIC BLOOD PRESSURE: 81 MMHG

## 2025-06-23 DIAGNOSIS — N18.2 CKD (CHRONIC KIDNEY DISEASE) STAGE 2, GFR 60-89 ML/MIN: Primary | ICD-10-CM

## 2025-06-23 DIAGNOSIS — T82.898A PROBLEM WITH DIALYSIS ACCESS, INITIAL ENCOUNTER: ICD-10-CM

## 2025-06-23 DIAGNOSIS — Z94.0 HTN, KIDNEY TRANSPLANT RELATED: Chronic | ICD-10-CM

## 2025-06-23 DIAGNOSIS — I15.1 HTN, KIDNEY TRANSPLANT RELATED: Chronic | ICD-10-CM

## 2025-06-23 DIAGNOSIS — I15.1 HTN, KIDNEY TRANSPLANT RELATED: ICD-10-CM

## 2025-06-23 DIAGNOSIS — Z94.0 HTN, KIDNEY TRANSPLANT RELATED: ICD-10-CM

## 2025-06-23 DIAGNOSIS — D84.9 IMMUNOSUPPRESSED STATUS: ICD-10-CM

## 2025-06-23 DIAGNOSIS — Z94.0 KIDNEY REPLACED BY TRANSPLANT: Chronic | ICD-10-CM

## 2025-06-23 PROCEDURE — 3074F SYST BP LT 130 MM HG: CPT | Performed by: SURGERY

## 2025-06-23 PROCEDURE — 3079F DIAST BP 80-89 MM HG: CPT | Performed by: SURGERY

## 2025-06-23 PROCEDURE — 93990 DOPPLER FLOW TESTING: CPT | Mod: GC | Performed by: RADIOLOGY

## 2025-06-23 PROCEDURE — 99204 OFFICE O/P NEW MOD 45 MIN: CPT | Performed by: SURGERY

## 2025-06-23 PROCEDURE — 1126F AMNT PAIN NOTED NONE PRSNT: CPT | Performed by: SURGERY

## 2025-06-23 ASSESSMENT — PAIN SCALES - GENERAL: PAINLEVEL_OUTOF10: NO PAIN (0)

## 2025-06-23 NOTE — LETTER
"6/23/2025       RE: Bessy Spangler  [id number]  [facility] [id number]     Dear Colleague,    Thank you for referring your patient, Bessy Spangler, to the [facility] at [facility]. Please see a copy of my visit note below.    Vascular & Endovascular Surgery Clinic Consultation   Vascular Surgeon: César Perez MD, RPVI       Location:  [facility]    Bessy Spangler  Medical Record #:  [id number]  YOB: 1960  Age:  65 year old     Date of Service: 6/23/2025    Referring Provider: Stewart Villaseñor.  Primary Care Provider: Gina Dc    Chief Complaint/Reason for Visit: Aneurysmal arteriovenous fistula    HPI:  Ms. Spangler is a pleasant 65 year old female seen today with her family    CV risk factors include ESRD 2/2 Alport's syndrome, s/p left radiocephalic fistula creation, s/p LDKD in 2009.      Relevant surgical history:  - Left radiocephalic fistula, created pre transplant  - Living Donor Kidney Transplant to right iliac fossa (Dr Palmer - 9/8/2025)    Review of Systems:    A 12 point ROS was reviewed and is negative except for symptoms noted in HPI.     Medical/Surgical History:    Past Medical History:   Diagnosis Date     Acute deep vein thrombosis (DVT) of distal vein of right lower extremity (H) 07/24/2018     Basal cell carcinoma 5/9/2022    Left thigh     Basal cell carcinoma 5/9/2022    Left thigh     Breast cancer (H)      Chronic kidney disease (CKD), stage V (H)     Due to \"nephritis\". Biopsy done years ago. I do not know the histologic specifics     Conjunctival hemorrhage 04/09/2014     Hypertension      Hypothyroid      Kidney replaced by transplant     nephritis as a child     PE (pulmonary embolism) 09/04/2009    Pulmonary embolism post kidney transplant 2009     SCC (squamous cell carcinoma) 10/30/2012    Left Arm     SCC (squamous cell carcinoma) " "10/30/2012    Left Arm     Vestibular neuronitis of right ear 04/16/2018         Past Surgical History:   Procedure Laterality Date     APPENDECTOMY OPEN  01/01/1999     AV FISTULA OR GRAFT ARTERIAL      left     LUMPECTOMY BREAST  2002    left breast partial mastectomy     ZZC TRANSPLANT,PREP LIVING  RENAL GRAFT  07/30/2009    Hx of nephritis        Allergies:     Allergies   Allergen Reactions     Dilaudid [Hydromorphone Hcl]      Patient unable to recall     Hydromorphone      Patient unable to recall     Sulfa Antibiotics Swelling     \"swelling\" - age 3          Medications:    Current Outpatient Medications   Medication Sig Dispense Refill     calcium carbonate-vitamin D (CALCIUM 600/VITAMIN D) 600-10 MG-MCG per tablet Take 1 tablet by mouth daily       carvedilol (COREG) 12.5 MG tablet Take 1.5 tablets (18.75 mg) by mouth 2 times daily (with meals). 270 tablet 3     levothyroxine (SYNTHROID/LEVOTHROID) 125 MCG tablet Take 1 tablet (125 mcg) by mouth daily. 90 tablet 3     mycophenolic acid (GENERIC EQUIVALENT) 180 MG EC tablet TAKE 3 TABLETS (540 MG) BY MOUTH 2 TIMES DAILY 540 tablet 0     rivaroxaban ANTICOAGULANT (XARELTO ANTICOAGULANT) 20 MG TABS tablet Take 1 tablet (20 mg) by mouth daily (with dinner). 90 tablet 2     sotalol (BETAPACE) 80 MG tablet Take 0.5 tablets (40 mg) by mouth 2 times daily. 90 tablet 3     tacrolimus (GENERIC EQUIVALENT) 0.5 MG capsule TAKE 1 CAPSULE BY MOUTH EVERY EVENING. TOTAL DOSE = 2 MG IN THE AM AND 1.5 MG IN THE PM. 90 capsule 0     tacrolimus (GENERIC EQUIVALENT) 1 MG capsule TAKE 2 CAPSULES BY MOUTH IN THE MORNING AND 1 CAPSULE EVERY EVENING. TOTAL DOSE= 2 MG IN THE AM AND 1.5MG IN THE PM. 270 capsule 0     VITAMIN D, CHOLECALCIFEROL, PO Take 1,000 Units by mouth daily       No current facility-administered medications for this visit.        Social Habits:    Tobacco Use      Smoking status: Former        Packs/day: 0.00        Years: 1 pack/day for 20.0 years (20.0 ttl " pk-yrs)        Types: Cigarettes        Start date: 1978        Quit date: 1998        Years since quittin.4      Smokeless tobacco: Never       Alcohol Use: Not on file       History   Drug Use No        Family History:    Family History   Problem Relation Age of Onset     Diabetes Father         type 2  2015     Arthritis Father      Glaucoma Father      Cancer Mother 58        smoker,metastatic adnoid cystic cancer,  early 60s     Arthritis Mother      Endocrine Disease Mother         thyroid     Hypertension Maternal Grandmother          at a young age     Kidney Disease Maternal Grandmother      Hypertension Maternal Grandfather      Cancer Paternal Grandmother         stomach cancer     Endocrine Disease Sister         thyroid     Genitourinary Problems Daughter 32        renal failure      Endocrine Disease Sister         thyroid     Chronic Obstructive Pulmonary Disease Paternal Grandfather         smoking  young     Heart Disease Maternal Half-Brother        Physical Examination:  Wt 90.3 kg (199 lb 1.6 oz)   LMP 10/25/2004 (Exact Date)   BMI 34.18 kg/m    Wt Readings from Last 1 Encounters:   25 90.3 kg (199 lb 1.6 oz)     Body mass index is 34.18 kg/m .    Exam:  General: No apparent distress. Answers questions appropriately   Neurologic: Focally intact, Alert and oriented x 3.   Eyes: Grossly normal.   Cardiac:  RRR by peripheral pulse   Pulmonary:  Non labored breathing with normal respiratory effort  Abdominal: Soft, non distended, non tender to palpation. Musculoskeletal/Extremity: 5/5 gross upper  extremity strength. No edema.  No open wounds or abrasions. No skin thinning over mildly dilated aneurysm.  Hand warm and well perfused.     Vascular Exam:     Radial: Left: 2+   Right: 2+      Laboratory Findings:  Hemoglobin   Date Value Ref Range Status   2025 12.3 11.7 - 15.7 g/dL Final   2024 12.2 11.7 - 15.7 g/dL Final   2021 12.4 11.7 - 15.7 g/dL  Final   11/27/2020 12.8 11.7 - 15.7 g/dL Final     WBC   Date Value Ref Range Status   03/22/2021 5.2 4.0 - 11.0 10e9/L Final   11/27/2020 3.5 (L) 4.0 - 11.0 10e9/L Final     WBC Count   Date Value Ref Range Status   03/28/2025 5.4 4.0 - 11.0 10e3/uL Final   12/26/2024 5.2 4.0 - 11.0 10e3/uL Final     Platelet Count   Date Value Ref Range Status   03/28/2025 157 150 - 450 10e3/uL Final   12/26/2024 142 (L) 150 - 450 10e3/uL Final   03/22/2021 167 150 - 450 10e9/L Final   11/27/2020 118 (L) 150 - 450 10e9/L Final     Creatinine   Date Value Ref Range Status   03/28/2025 0.75 0.51 - 0.95 mg/dL Final   03/22/2021 0.76 0.52 - 1.04 mg/dL Final     Sodium   Date Value Ref Range Status   03/28/2025 140 135 - 145 mmol/L Final   03/22/2021 138 133 - 144 mmol/L Final     Glucose   Date Value Ref Range Status   03/28/2025 107 (H) 70 - 99 mg/dL Final   12/26/2024 115 (H) 70 - 99 mg/dL Final   04/14/2023 95 70 - 99 mg/dL Final   03/02/2023 108 (H) 70 - 99 mg/dL Final   03/22/2021 105 (H) 70 - 99 mg/dL Final   11/27/2020 99 70 - 99 mg/dL Final     Comment:     Fasting specimen     Hemoglobin A1C   Date Value Ref Range Status   02/08/2022 5.5 0.0 - 5.6 % Final     Comment:     Normal <5.7%   Prediabetes 5.7-6.4%    Diabetes 6.5% or higher     Note: Adopted from ADA consensus guidelines.   11/27/2020 5.0 0 - 5.6 % Final     Comment:     Normal <5.7% Prediabetes 5.7-6.4%  Diabetes 6.5% or higher - adopted from ADA   consensus guidelines.     07/29/2009 5.0 4.3 - 6.0 % Final     INR   Date Value Ref Range Status   02/24/2010 3.10 (H) 0.86 - 1.14 Final     INR Point of Care   Date Value Ref Range Status   04/01/2018 1.2 (H) 0.86 - 1.14 Final       Imaging:    I personally reviewed the AV Access Duplex ultrasound from today which shows patent radial artery with 25 mm cephalic vein aneurysm.  The radial artery is patent distally with mild proximal dilation expected with long term AV fistula.  There is a stump of small caliber cephalic  vein at the anastomosis prior to the aneurysm.     Impression/Shared Medical Decision Making:    #1- Small, asymptomatic radiocephalic fistula aneurysm  #2- ESRD secondary to Alport's syndrome status post living donor kidney trasnplatn to the right iliac fossa, Dr. Palmer 9/2009  #3- Hypertension  #4- Paroxsymal atrial fibrillation on xarelto   #5- Recurrent DVT on Xarelto  #6- History of breast cancer status post partial mastectomy and adjuvant chemoradiation  Ms. Spangler is a pleasant 65 year old female evaluated for an AV fistula dilation and expected aneurysmal dilation of a fistula of this duration.  She used to have intermittent pain in the wrist when weight lifting. This has resolved now that she is not doing this. She does not have pain at rest. She does have some tingling that is unrelated to the fistula and not ischemic in nature.  We discussion operative conduct of fistula ligation and resection.  We discussed that it may not entirely resolve pain particularly if there is another concomitant etiology but may improve her symptoms.  We discussed that it is unlikely to resolve the tingling as she has no nerve compression or ischemia related to the fistula. The arterial segment does not need arterial reconstruction.  She is not particularly interested in surgery if it is not necessary, which this is not and would be purely based on symptoms.    Risks, benefits, and alternatives  including but not limited to death, anesthetic or cardiopulmonary complications, bleeding, infection, lymphatic leak, dissection, embolization, vessel perforation or other injury, ischemia with resulting limb loss, compartment syndrome, and nerve injury.  She would like to think about this more.  We will reach out to her in several days.    Discussed warning signs including, but not limited to, bleeding.  If she experiences any of these, she has been advised to seek treatment and contact my team.    Ms. Spangler and her family are in  agreement with this plan as outlined.    Recommendations/Plan:  We will call her in several days to see if she has further questions or would like to proceed with fistula ligation.    César Perez MD  Vascular & Endovascular Surgery      30 minutes spent on the day of encounter doing chart review from our system and care everywhere, history and exam, documentation, coordinating care, and further activities as noted with over half spent counseling.     Again, thank you for allowing me to participate in the care of your patient.      Sincerely,    César Perez MD

## 2025-06-23 NOTE — PROGRESS NOTES
"Vascular & Endovascular Surgery Clinic Consultation   Vascular Surgeon: César Perez MD, RPVI       Location:  [facility]    Bessy Spangler  Medical Record #:  [id number]  YOB: 1960  Age:  65 year old     Date of Service: 6/23/2025    Referring Provider: Stewart Villaseñor.  Primary Care Provider: Gina Dc    Chief Complaint/Reason for Visit: Aneurysmal arteriovenous fistula    HPI:  Ms. Spangler is a pleasant 65 year old female seen today with her family    CV risk factors include ESRD 2/2 Alport's syndrome, s/p left radiocephalic fistula creation, s/p LDKD in 2009.      Relevant surgical history:  - Left radiocephalic fistula, created pre transplant  - Living Donor Kidney Transplant to right iliac fossa (Dr Palmer - 9/8/2025)    Review of Systems:    A 12 point ROS was reviewed and is negative except for symptoms noted in HPI.     Medical/Surgical History:    Past Medical History:   Diagnosis Date    Acute deep vein thrombosis (DVT) of distal vein of right lower extremity (H) 07/24/2018    Basal cell carcinoma 5/9/2022    Left thigh    Basal cell carcinoma 5/9/2022    Left thigh    Breast cancer (H)     Chronic kidney disease (CKD), stage V (H)     Due to \"nephritis\". Biopsy done years ago. 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/2018         Past Surgical History:   Procedure Laterality Date    APPENDECTOMY OPEN  01/01/1999    AV FISTULA OR GRAFT ARTERIAL      left    LUMPECTOMY BREAST  2002    left breast partial mastectomy    ZZC TRANSPLANT,PREP LIVING  RENAL GRAFT  07/30/2009    Hx of " "nephritis        Allergies:     Allergies   Allergen Reactions    Dilaudid [Hydromorphone Hcl]      Patient unable to recall    Hydromorphone      Patient unable to recall    Sulfa Antibiotics Swelling     \"swelling\" - age 3          Medications:    Current Outpatient Medications   Medication Sig Dispense Refill    calcium carbonate-vitamin D (CALCIUM 600/VITAMIN D) 600-10 MG-MCG per tablet Take 1 tablet by mouth daily      carvedilol (COREG) 12.5 MG tablet Take 1.5 tablets (18.75 mg) by mouth 2 times daily (with meals). 270 tablet 3    levothyroxine (SYNTHROID/LEVOTHROID) 125 MCG tablet Take 1 tablet (125 mcg) by mouth daily. 90 tablet 3    mycophenolic acid (GENERIC EQUIVALENT) 180 MG EC tablet TAKE 3 TABLETS (540 MG) BY MOUTH 2 TIMES DAILY 540 tablet 0    rivaroxaban ANTICOAGULANT (XARELTO ANTICOAGULANT) 20 MG TABS tablet Take 1 tablet (20 mg) by mouth daily (with dinner). 90 tablet 2    sotalol (BETAPACE) 80 MG tablet Take 0.5 tablets (40 mg) by mouth 2 times daily. 90 tablet 3    tacrolimus (GENERIC EQUIVALENT) 0.5 MG capsule TAKE 1 CAPSULE BY MOUTH EVERY EVENING. TOTAL DOSE = 2 MG IN THE AM AND 1.5 MG IN THE PM. 90 capsule 0    tacrolimus (GENERIC EQUIVALENT) 1 MG capsule TAKE 2 CAPSULES BY MOUTH IN THE MORNING AND 1 CAPSULE EVERY EVENING. TOTAL DOSE= 2 MG IN THE AM AND 1.5MG IN THE PM. 270 capsule 0    VITAMIN D, CHOLECALCIFEROL, PO Take 1,000 Units by mouth daily       No current facility-administered medications for this visit.        Social Habits:    Tobacco Use      Smoking status: Former        Packs/day: 0.00        Years: 1 pack/day for 20.0 years (20.0 ttl pk-yrs)        Types: Cigarettes        Start date: 1978        Quit date: 1998        Years since quittin.4      Smokeless tobacco: Never       Alcohol Use: Not on file       History   Drug Use No        Family History:    Family History   Problem Relation Age of Onset    Diabetes Father         type 2  2015    Arthritis Father  "    Glaucoma Father     Cancer Mother 58        smoker,metastatic adnoid cystic cancer,  early 60s    Arthritis Mother     Endocrine Disease Mother         thyroid    Hypertension Maternal Grandmother          at a young age    Kidney Disease Maternal Grandmother     Hypertension Maternal Grandfather     Cancer Paternal Grandmother         stomach cancer    Endocrine Disease Sister         thyroid    Genitourinary Problems Daughter 32        renal failure     Endocrine Disease Sister         thyroid    Chronic Obstructive Pulmonary Disease Paternal Grandfather         smoking  young    Heart Disease Maternal Half-Brother        Physical Examination:  Wt 90.3 kg (199 lb 1.6 oz)   LMP 10/25/2004 (Exact Date)   BMI 34.18 kg/m    Wt Readings from Last 1 Encounters:   25 90.3 kg (199 lb 1.6 oz)     Body mass index is 34.18 kg/m .    Exam:  General: No apparent distress. Answers questions appropriately   Neurologic: Focally intact, Alert and oriented x 3.   Eyes: Grossly normal.   Cardiac:  RRR by peripheral pulse   Pulmonary:  Non labored breathing with normal respiratory effort  Abdominal: Soft, non distended, non tender to palpation. Musculoskeletal/Extremity: 5/5 gross upper  extremity strength. No edema.  No open wounds or abrasions. No skin thinning over mildly dilated aneurysm.  Hand warm and well perfused.     Vascular Exam:     Radial: Left: 2+   Right: 2+      Laboratory Findings:  Hemoglobin   Date Value Ref Range Status   2025 12.3 11.7 - 15.7 g/dL Final   2024 12.2 11.7 - 15.7 g/dL Final   2021 12.4 11.7 - 15.7 g/dL Final   2020 12.8 11.7 - 15.7 g/dL Final     WBC   Date Value Ref Range Status   2021 5.2 4.0 - 11.0 10e9/L Final   2020 3.5 (L) 4.0 - 11.0 10e9/L Final     WBC Count   Date Value Ref Range Status   2025 5.4 4.0 - 11.0 10e3/uL Final   2024 5.2 4.0 - 11.0 10e3/uL Final     Platelet Count   Date Value Ref Range Status   2025 157  150 - 450 10e3/uL Final   12/26/2024 142 (L) 150 - 450 10e3/uL Final   03/22/2021 167 150 - 450 10e9/L Final   11/27/2020 118 (L) 150 - 450 10e9/L Final     Creatinine   Date Value Ref Range Status   03/28/2025 0.75 0.51 - 0.95 mg/dL Final   03/22/2021 0.76 0.52 - 1.04 mg/dL Final     Sodium   Date Value Ref Range Status   03/28/2025 140 135 - 145 mmol/L Final   03/22/2021 138 133 - 144 mmol/L Final     Glucose   Date Value Ref Range Status   03/28/2025 107 (H) 70 - 99 mg/dL Final   12/26/2024 115 (H) 70 - 99 mg/dL Final   04/14/2023 95 70 - 99 mg/dL Final   03/02/2023 108 (H) 70 - 99 mg/dL Final   03/22/2021 105 (H) 70 - 99 mg/dL Final   11/27/2020 99 70 - 99 mg/dL Final     Comment:     Fasting specimen     Hemoglobin A1C   Date Value Ref Range Status   02/08/2022 5.5 0.0 - 5.6 % Final     Comment:     Normal <5.7%   Prediabetes 5.7-6.4%    Diabetes 6.5% or higher     Note: Adopted from ADA consensus guidelines.   11/27/2020 5.0 0 - 5.6 % Final     Comment:     Normal <5.7% Prediabetes 5.7-6.4%  Diabetes 6.5% or higher - adopted from ADA   consensus guidelines.     07/29/2009 5.0 4.3 - 6.0 % Final     INR   Date Value Ref Range Status   02/24/2010 3.10 (H) 0.86 - 1.14 Final     INR Point of Care   Date Value Ref Range Status   04/01/2018 1.2 (H) 0.86 - 1.14 Final       Imaging:    I personally reviewed the AV Access Duplex ultrasound from today which shows patent radial artery with 25 mm cephalic vein aneurysm.  The radial artery is patent distally with mild proximal dilation expected with long term AV fistula.  There is a stump of small caliber cephalic vein at the anastomosis prior to the aneurysm.     Impression/Shared Medical Decision Making:    #1- Small, asymptomatic radiocephalic fistula aneurysm  #2- ESRD secondary to Alport's syndrome status post living donor kidney trasnplatn to the right iliac fossa, Dr. Palmer 9/2009  #3- Hypertension  #4- Paroxsymal atrial fibrillation on xarelto   #5- Recurrent DVT on  Xarelto  #6- History of breast cancer status post partial mastectomy and adjuvant chemoradiation  Ms. Spangler is a pleasant 65 year old female evaluated for an AV fistula dilation and expected aneurysmal dilation of a fistula of this duration.  She used to have intermittent pain in the wrist when weight lifting. This has resolved now that she is not doing this. She does not have pain at rest. She does have some tingling that is unrelated to the fistula and not ischemic in nature.  We discussion operative conduct of fistula ligation and resection.  We discussed that it may not entirely resolve pain particularly if there is another concomitant etiology but may improve her symptoms.  We discussed that it is unlikely to resolve the tingling as she has no nerve compression or ischemia related to the fistula. The arterial segment does not need arterial reconstruction.  She is not particularly interested in surgery if it is not necessary, which this is not and would be purely based on symptoms.    Risks, benefits, and alternatives  including but not limited to death, anesthetic or cardiopulmonary complications, bleeding, infection, lymphatic leak, dissection, embolization, vessel perforation or other injury, ischemia with resulting limb loss, compartment syndrome, and nerve injury.  She would like to think about this more.  We will reach out to her in several days.    Discussed warning signs including, but not limited to, bleeding.  If she experiences any of these, she has been advised to seek treatment and contact my team.    Ms. Spangler and her family are in agreement with this plan as outlined.    Recommendations/Plan:  We will call her in several days to see if she has further questions or would like to proceed with fistula ligation.    César Perez MD  Vascular & Endovascular Surgery      30 minutes spent on the day of encounter doing chart review from our system and care everywhere, history and exam,  documentation, coordinating care, and further activities as noted with over half spent counseling.

## 2025-06-23 NOTE — NURSING NOTE
Chief Complaint   Patient presents with    New Patient     6/23/2025 visit with Céasr Perez MD for NEW VASCULAR PATIENT - Suspected aneurysmal RUE AVF w/ arterial involvement; no longer in use, s/p txp       Vitals were taken and medications reconciled.    Brodie Pete, Visit Facilitator  7:53 AM

## 2025-06-26 DIAGNOSIS — Z79.899 IMMUNOSUPPRESSIVE MANAGEMENT ENCOUNTER FOLLOWING KIDNEY TRANSPLANT: ICD-10-CM

## 2025-06-26 DIAGNOSIS — Z94.0 IMMUNOSUPPRESSIVE MANAGEMENT ENCOUNTER FOLLOWING KIDNEY TRANSPLANT: ICD-10-CM

## 2025-06-26 DIAGNOSIS — Z48.298 AFTERCARE FOLLOWING ORGAN TRANSPLANT: ICD-10-CM

## 2025-06-26 DIAGNOSIS — Z94.0 KIDNEY TRANSPLANTED: ICD-10-CM

## 2025-06-26 DIAGNOSIS — Z94.0 KIDNEY REPLACED BY TRANSPLANT: ICD-10-CM

## 2025-06-26 RX ORDER — MYCOPHENOLIC ACID 180 MG/1
540 TABLET, DELAYED RELEASE ORAL 2 TIMES DAILY
Qty: 540 TABLET | Refills: 0 | Status: SHIPPED | OUTPATIENT
Start: 2025-06-26

## 2025-06-26 RX ORDER — TACROLIMUS 0.5 MG/1
CAPSULE ORAL
Qty: 90 CAPSULE | Refills: 0 | Status: SHIPPED | OUTPATIENT
Start: 2025-06-26

## 2025-06-26 RX ORDER — TACROLIMUS 1 MG/1
CAPSULE ORAL
Qty: 270 CAPSULE | Refills: 0 | Status: SHIPPED | OUTPATIENT
Start: 2025-06-26

## 2025-06-27 ENCOUNTER — RESULTS FOLLOW-UP (OUTPATIENT)
Dept: TRANSPLANT | Facility: CLINIC | Age: 65
End: 2025-06-27

## 2025-06-27 DIAGNOSIS — I15.1 HTN, KIDNEY TRANSPLANT RELATED: ICD-10-CM

## 2025-06-27 DIAGNOSIS — Z94.0 HTN, KIDNEY TRANSPLANT RELATED: ICD-10-CM
